# Patient Record
Sex: MALE | Race: BLACK OR AFRICAN AMERICAN | NOT HISPANIC OR LATINO | Employment: OTHER | URBAN - METROPOLITAN AREA
[De-identification: names, ages, dates, MRNs, and addresses within clinical notes are randomized per-mention and may not be internally consistent; named-entity substitution may affect disease eponyms.]

---

## 2017-01-27 DIAGNOSIS — I50.810 RIGHT HEART FAILURE WITH REDUCED RIGHT VENTRICULAR FUNCTION: Primary | ICD-10-CM

## 2017-01-30 ENCOUNTER — OFFICE VISIT (OUTPATIENT)
Dept: CARDIOLOGY | Facility: CLINIC | Age: 27
End: 2017-01-30
Payer: MEDICAID

## 2017-01-30 VITALS
DIASTOLIC BLOOD PRESSURE: 62 MMHG | SYSTOLIC BLOOD PRESSURE: 104 MMHG | HEART RATE: 82 BPM | HEIGHT: 65 IN | BODY MASS INDEX: 35.82 KG/M2 | OXYGEN SATURATION: 95 % | WEIGHT: 215 LBS

## 2017-01-30 DIAGNOSIS — I37.0 RESIDUAL PULMONARY OUTFLOW OBSTRUCTION: Primary | ICD-10-CM

## 2017-01-30 DIAGNOSIS — Z95.0 PACEMAKER: ICD-10-CM

## 2017-01-30 DIAGNOSIS — Z87.74 S/P SURGERY FOR COMPLEX CONGENITAL HEART DISEASE: ICD-10-CM

## 2017-01-30 DIAGNOSIS — I11.9 RIGHT VENTRICULAR HYPERTENSION: ICD-10-CM

## 2017-01-30 PROCEDURE — 99214 OFFICE O/P EST MOD 30 MIN: CPT | Mod: ,,, | Performed by: PEDIATRICS

## 2017-01-30 RX ORDER — LISINOPRIL 10 MG/1
TABLET ORAL
Status: ON HOLD | COMMUNITY
Start: 2016-12-06 | End: 2017-05-12 | Stop reason: HOSPADM

## 2017-01-30 RX ORDER — DIGOXIN 250 MCG
TABLET ORAL
Status: ON HOLD | COMMUNITY
Start: 2016-12-06 | End: 2017-05-12 | Stop reason: HOSPADM

## 2017-01-30 RX ORDER — LEVOTHYROXINE SODIUM 88 UG/1
88 TABLET ORAL
COMMUNITY
Start: 2016-12-06 | End: 2021-06-23 | Stop reason: SDUPTHER

## 2017-01-30 RX ORDER — CARVEDILOL 25 MG/1
TABLET ORAL
Status: ON HOLD | COMMUNITY
Start: 2016-12-06 | End: 2017-05-12 | Stop reason: HOSPADM

## 2017-01-30 NOTE — PROGRESS NOTES
"Subjective:          HPI     This patient is a 26 year old with Down Syndrome.  He originally was found to have TOF at [facility].  He underwent complete repair at 1 year of age at [facility]. He required  a bioprosthetic pulmonary valve (27 mm Free Style Medtronic Valve) for severe PI placed in 1999 at [facility].   He subsequently required a dual-chamber pacemaker for sinus node dysfunction in 2008 at [facility].  In 2010 he had a EPS for ventricular ectopy at [facility].  There was no inducable VT.  Cipriano has RV contractile dysfunction, and continues to have PVCc, couplents and triplets, but no sustained runs.  In the last year or so, Mom reports Cipriano has had increasing SOB walking up stairs.  No complaints of CP, slow or fast HRs. He sleeps well, and does not require extra pillows.  Minimal physical activity.   Cipriano also has, a history of hypothyroidism and is on Synthroid.  He also had an episode of atrial flutter (interatrial reentry tachycardia),  but at the time had an "elevated" T4 level. (? This may have been the cause of the rhythm disturbance.)   However, because of the tachycardia, he was started on warfarin by the EP staff at [facility].  Cipriano also suffers from obesity.     He currently is on Digoxin 0.25 mg qd.  Lisinoplil 10 mg qd.  Carvedilol 25 mg qd ( We should consider changing this to 12.5 mg q 12 hrs.)  He is on this for RV contractile dysfunction and some protection for ventricular ectopy.). Synthroid is at 88 micrograms qd (T4 has been ok on this dose).  Warfarin 5 mg qd  (I believe I discontinued this medication.)        Review of Systems   Constitution: Negative for chills, decreased appetite, diaphoresis, fever, weakness, malaise/fatigue and night sweats.   HENT: Negative for headaches, nosebleeds and stridor.    Cardiovascular: Positive for dyspnea on exertion and palpitations. Negative for chest pain, cyanosis, leg swelling " and syncope.   Respiratory: Positive for snoring. Negative for sleep disturbances due to breathing.    Gastrointestinal: Negative for abdominal pain, constipation, hematemesis, hemorrhoids and vomiting.   Genitourinary: Negative for frequency.   Neurological: Negative for dizziness, focal weakness, light-headedness, paresthesias and seizures.                 Physical Exam     A 26 yr old with Down Syndrome and in no distress.  Non cyanotic.  HEENT: No bruits. Normal eye movements. Membranes are moist and pink.  Neck is supple. No JVD.  Chest: Symmetric. Well healed mid-line scar. R-lateral scar. The BSs are distant. Air entry appears normal. Slight decreased in BSs at the bases.   No crackles or wheezing.   Heart:  Normal precordial activity. Diffuse RV impulse. S1 is normal. S2 appears to be single and is increased. A Gr 1-2/6 MARIELA over the base. Faint DDM at LLSB.                                                             No clicks, rubs or gallops.   Abd: Obese.  Liver edge at the RCM and down 3 FB and non-tender.  No spleen felt. No other masses.  Exts: Pulses are 2+ throughout.  No cyanosis or clubbing.  No edema.    ECHO:  (S/P) Repair of TOF. (S/P) Bioprosthetic Pulmonary Valve.  There are 4 cardiac chambers.  No evidence of an ASD or VSD. The RV is slightly dilated.   Measurements :  RV is 35 mm. LV is 46 mm. LA is 31 mm. IVS is 12 mm. PW is 12 mm. The pulmonary annulus is ~ 14 mm. The valve opening is ~ 8 mm.    The RPA is 11 mm and the LPA is 9 mm.  The Ao root is 31 mm and the Ao annulus is 25 mm. LV-EF is 56%. RV area of shortening is 33%, which is reduced.                                                              There is septal flattening, with paradoxical motion. No clots or vegetations seen. No pericardial effusion.    Doppler  Velocity Analysis: No MR or AI. Mod TR with a jet of 58 mmHg, in the setting of reduced contractile function. Moderate PI.                                                                                                          Peak Ao outflow pressure drop is 4 mmHg. Peak pulmonary outflow pressure drop is 41 mmHg (again reduced RV contractile function).  No retrograde flow in the branch PAs. Pulmonary venous return is to the LA.    ECG:  RBB, RVH based on QRS frontal axis. Atrial pacing.      Assessment:       1. Residual pulmonary outflow obstruction    2. Pacemaker    3. Right ventricular hypertension, and contractile dysfunction..     4. S/P surgery for complex congenital heart disease         Plan:        1. Continue with the current meds. (consider changing the carvedilol)  2. Patient needs pacemaker check in the next 2 weeks....    [This study showed No atrial flutter and frequent ventricular ectopy.  Six 3-beat run of VT (150 to 240 bpm).]   3. Will need to discuss patient with Surgery/Cardiology team at [facility].  Cipriano will require conduit revision (Suspect Jeanna Valve).                                                                                                                He may also need an EPS, although I suspect fixing the hemodynamics is most important. Also, probably a need to look further for evidence of A-flutter/fib.  4. Lab work needs to be checked.  5. Antibiotics for any invasive procedure.  6. RTC in 6 mos.   (I will call mom and inform her about the meeting with the [facility] team.)   Mom's # is .

## 2017-01-31 ENCOUNTER — TELEPHONE (OUTPATIENT)
Dept: CARDIOLOGY | Facility: CLINIC | Age: 27
End: 2017-01-31

## 2017-02-09 ENCOUNTER — OFFICE VISIT (OUTPATIENT)
Dept: CARDIOLOGY | Facility: CLINIC | Age: 27
End: 2017-02-09
Payer: MEDICAID

## 2017-02-09 VITALS
HEIGHT: 65 IN | SYSTOLIC BLOOD PRESSURE: 110 MMHG | WEIGHT: 217 LBS | HEART RATE: 87 BPM | TEMPERATURE: 98 F | BODY MASS INDEX: 36.15 KG/M2 | DIASTOLIC BLOOD PRESSURE: 68 MMHG

## 2017-02-09 DIAGNOSIS — Z95.0 PACEMAKER: Primary | ICD-10-CM

## 2017-02-09 PROCEDURE — 99211 OFF/OP EST MAY X REQ PHY/QHP: CPT | Mod: ,,, | Performed by: PEDIATRICS

## 2017-02-09 NOTE — PROGRESS NOTES
Pacemaker check:    Cipriano had a pacemaker check today.  It revealed ventricular ectopy, with couplets, six 3-beat runs  and one 4- beat run.   No higher ventricular runs.  None sustained.   (rates 150 to 248 bpm).    No obvious autrial flutter.  No atrial flutter or fib.      Will discuss with the cardiology team at the main campus.

## 2017-02-24 ENCOUNTER — TELEPHONE (OUTPATIENT)
Dept: PEDIATRIC CARDIOLOGY | Facility: CLINIC | Age: 27
End: 2017-02-24

## 2017-02-24 NOTE — TELEPHONE ENCOUNTER
Left voice mail to set up cath and offer clinic appt if they prefer.    Asked family to call back our office.

## 2017-03-02 ENCOUNTER — TELEPHONE (OUTPATIENT)
Dept: CARDIOLOGY | Facility: CLINIC | Age: 27
End: 2017-03-02

## 2017-03-06 ENCOUNTER — DOCUMENTATION ONLY (OUTPATIENT)
Dept: CARDIOLOGY | Facility: CLINIC | Age: 27
End: 2017-03-06

## 2017-03-07 ENCOUNTER — TELEPHONE (OUTPATIENT)
Dept: PEDIATRIC CARDIOLOGY | Facility: CLINIC | Age: 27
End: 2017-03-07

## 2017-03-07 NOTE — TELEPHONE ENCOUNTER
----- Message from Kimo Rehman Jr., MD sent at 2/23/2017  3:55 PM CST -----  Regarding: RE: kenzie toussaint  Thanks Yaniv, will get scheduled.    Yohana/Cierra/Guerita please go ahead and schedule for cath with congenital valve team under [facility] with RHC/LHC and TPVR (Mi).    OK to ask family if they would prefer to meet in clinic or in 3 prep    Adria    ----- Message -----     From: Augustine Dean MD     Sent: 2/23/2017  10:39 AM       To: Kimo Rehman Jr., MD  Subject: kenzie toussaint                                      Hi Adria,    I have spoken with mom.  She wanted the procedure done after Vick Chery. I am concerned about the RV contractile dysfunction, ventricular ectopy and residual PS/PI.  Thus, feel free to call her. I told her she would be contacted. Any problem just call me. Thanks for your help.     Yaniv  ----- Message -----     From: Kimo Rehman Jr., MD     Sent: 2/21/2017   2:20 PM       To: Augustine Dean MD, Bhargav Godfrey MD, #    Yaniv,    Would you like for me to go ahead and have the cath team contact Kenzie's family to set up a pre-cath eval and cath with percutaneous pulmonary valve implant?    Adria      ----- Message -----     From: Augustine Dean MD     Sent: 2/16/2017   1:23 PM       To: Kimo Rehman Jr., MD, #

## 2017-03-10 ENCOUNTER — TELEPHONE (OUTPATIENT)
Dept: PEDIATRIC CARDIOLOGY | Facility: CLINIC | Age: 27
End: 2017-03-10

## 2017-03-10 NOTE — TELEPHONE ENCOUNTER
----- Message from Bridger Villegas sent at 3/10/2017  8:28 AM CST -----  Contact: Mom [phone]  Mom is returning a missed call to Yohana.

## 2017-03-10 NOTE — TELEPHONE ENCOUNTER
Spoke to pt's mom, Luann, regarding scheduling cardiac cath procedure. Per Dr. Rehman, pt to be scheduled with RHC/LHC +/- TPVR with GET[facility]. Mom agreed to Thursday, March 30th at 11:00 AM. Pre-procedural instructions given and mom stated understanding. Address verified and letter placed in mail with instructions. Informed mom to call with any further questions or concerns. Mom stated understanding. Dr. Dean updated regarding scheduling.

## 2017-03-14 ENCOUNTER — TELEPHONE (OUTPATIENT)
Dept: PEDIATRIC CARDIOLOGY | Facility: CLINIC | Age: 27
End: 2017-03-14

## 2017-03-14 NOTE — TELEPHONE ENCOUNTER
----- Message from Bridger Villegas sent at 3/14/2017  3:42 PM CDT -----  Contact: Mom uLann [phone]  Mom states that Yohana sent her directions about the cardiac cath procedure via text and she somehow lost them. Mom is asking that those directions be resent to her.

## 2017-03-24 ENCOUNTER — TELEPHONE (OUTPATIENT)
Dept: PEDIATRIC CARDIOLOGY | Facility: CLINIC | Age: 27
End: 2017-03-24

## 2017-03-24 NOTE — TELEPHONE ENCOUNTER
----- Message from Isabel Ramos sent at 3/24/2017  8:11 AM CDT -----  Contact: mom [phone]  Mom would like to talk  to Cierra re: pre-op labs

## 2017-03-24 NOTE — TELEPHONE ENCOUNTER
Reviewed info and plan for cath day- informed mother we will draw labs that day.  Emotional support provided. Reviewed time and place for cath date again.

## 2017-03-28 ENCOUNTER — TELEPHONE (OUTPATIENT)
Dept: PEDIATRIC CARDIOLOGY | Facility: CLINIC | Age: 27
End: 2017-03-28

## 2017-03-29 ENCOUNTER — TELEPHONE (OUTPATIENT)
Dept: PEDIATRIC CARDIOLOGY | Facility: CLINIC | Age: 27
End: 2017-03-29

## 2017-03-29 NOTE — TELEPHONE ENCOUNTER
Spoke to pt's mom re: rescheduling cardiac cath procedure d/t surgeon avaliability. Offered mom a date next week 4/4. Mom agreed with new date. Pre-procedural instructions given and mom stated understanding. New letter sent via email per mom's request. Mom instructed to call if any further questions or concerns. Mom stated understanding.

## 2017-03-29 NOTE — TELEPHONE ENCOUNTER
----- Message from Bridger Villegas sent at 3/29/2017 11:14 AM CDT -----  Contact: Alisha Kong [phone]  Mom is requesting a return phone call from either Radha or Cierra regarding patient's upcoming procedure. I did inform the patient of the scheduled procedure date. Please advise.

## 2017-03-29 NOTE — TELEPHONE ENCOUNTER
Left detailed message about r/s of cath procedure to next Tuesday. Requested return phone call for confirmation.

## 2017-04-04 ENCOUNTER — HOSPITAL ENCOUNTER (OUTPATIENT)
Facility: HOSPITAL | Age: 27
Discharge: HOME | End: 2017-04-05
Attending: PEDIATRICS | Admitting: PEDIATRICS
Payer: MEDICAID

## 2017-04-04 ENCOUNTER — ANESTHESIA (OUTPATIENT)
Dept: MEDSURG UNIT | Facility: HOSPITAL | Age: 27
End: 2017-04-04
Payer: MEDICAID

## 2017-04-04 ENCOUNTER — ANESTHESIA EVENT (OUTPATIENT)
Dept: MEDSURG UNIT | Facility: HOSPITAL | Age: 27
End: 2017-04-04
Payer: MEDICAID

## 2017-04-04 ENCOUNTER — SURGERY (OUTPATIENT)
Age: 27
End: 2017-04-04

## 2017-04-04 DIAGNOSIS — I37.0 RESIDUAL PULMONARY OUTFLOW OBSTRUCTION: Primary | ICD-10-CM

## 2017-04-04 DIAGNOSIS — Z98.890 OTHER SPECIFIED POSTPROCEDURAL STATES: ICD-10-CM

## 2017-04-04 DIAGNOSIS — Z87.74 PERSONAL HISTORY OF CORRECTED CONGENITAL MALFORMATIONS OF HEART AND CIRCULATORY SYSTEM: ICD-10-CM

## 2017-04-04 DIAGNOSIS — Q21.3 TOF (TETRALOGY OF FALLOT): ICD-10-CM

## 2017-04-04 DIAGNOSIS — I11.9 RIGHT VENTRICULAR HYPERTENSION: ICD-10-CM

## 2017-04-04 DIAGNOSIS — Z95.0 PACEMAKER: ICD-10-CM

## 2017-04-04 DIAGNOSIS — I37.2: ICD-10-CM

## 2017-04-04 DIAGNOSIS — Z87.74 S/P SURGERY FOR COMPLEX CONGENITAL HEART DISEASE: ICD-10-CM

## 2017-04-04 LAB
ABO + RH BLD: NORMAL
ANION GAP SERPL CALC-SCNC: 6 MMOL/L
BASOPHILS # BLD AUTO: 0.04 K/UL
BASOPHILS NFR BLD: 0.8 %
BLD GP AB SCN CELLS X3 SERPL QL: NORMAL
BUN SERPL-MCNC: 26 MG/DL
CALCIUM SERPL-MCNC: 9 MG/DL
CHLORIDE SERPL-SCNC: 106 MMOL/L
CO2 SERPL-SCNC: 25 MMOL/L
CREAT SERPL-MCNC: 1.3 MG/DL
DIFFERENTIAL METHOD: ABNORMAL
EOSINOPHIL # BLD AUTO: 0.1 K/UL
EOSINOPHIL NFR BLD: 1.6 %
ERYTHROCYTE [DISTWIDTH] IN BLOOD BY AUTOMATED COUNT: 14.3 %
EST. GFR  (AFRICAN AMERICAN): >60 ML/MIN/1.73 M^2
EST. GFR  (NON AFRICAN AMERICAN): >60 ML/MIN/1.73 M^2
GLUCOSE SERPL-MCNC: 91 MG/DL
HCT VFR BLD AUTO: 41.3 %
HGB BLD-MCNC: 13.8 G/DL
LYMPHOCYTES # BLD AUTO: 1.4 K/UL
LYMPHOCYTES NFR BLD: 29.1 %
MCH RBC QN AUTO: 30.3 PG
MCHC RBC AUTO-ENTMCNC: 33.4 %
MCV RBC AUTO: 91 FL
MONOCYTES # BLD AUTO: 0.4 K/UL
MONOCYTES NFR BLD: 7.4 %
NEUTROPHILS # BLD AUTO: 3 K/UL
NEUTROPHILS NFR BLD: 60.9 %
PLATELET # BLD AUTO: 179 K/UL
PMV BLD AUTO: 9.8 FL
POTASSIUM SERPL-SCNC: 4.3 MMOL/L
RBC # BLD AUTO: 4.55 M/UL
SODIUM SERPL-SCNC: 137 MMOL/L
WBC # BLD AUTO: 4.88 K/UL

## 2017-04-04 PROCEDURE — 93320 DOPPLER ECHO COMPLETE: CPT | Performed by: PEDIATRICS

## 2017-04-04 PROCEDURE — 80048 BASIC METABOLIC PNL TOTAL CA: CPT

## 2017-04-04 PROCEDURE — 25000003 PHARM REV CODE 250: Performed by: PEDIATRICS

## 2017-04-04 PROCEDURE — 27000221 HC OXYGEN, UP TO 24 HOURS

## 2017-04-04 PROCEDURE — 86900 BLOOD TYPING SEROLOGIC ABO: CPT

## 2017-04-04 PROCEDURE — C1894 INTRO/SHEATH, NON-LASER: HCPCS

## 2017-04-04 PROCEDURE — 93563 NJX CGEN CAR CTH SLCTV C ANG: CPT | Mod: ,,, | Performed by: PEDIATRICS

## 2017-04-04 PROCEDURE — 93567 NJX CAR CTH SPRVLV AORTGRPHY: CPT | Mod: ,,, | Performed by: PEDIATRICS

## 2017-04-04 PROCEDURE — 25000003 PHARM REV CODE 250: Performed by: NURSE ANESTHETIST, CERTIFIED REGISTERED

## 2017-04-04 PROCEDURE — 63600175 PHARM REV CODE 636 W HCPCS: Performed by: NURSE ANESTHETIST, CERTIFIED REGISTERED

## 2017-04-04 PROCEDURE — 25000003 PHARM REV CODE 250: Performed by: ANESTHESIOLOGY

## 2017-04-04 PROCEDURE — 63600175 PHARM REV CODE 636 W HCPCS: Performed by: ANESTHESIOLOGY

## 2017-04-04 PROCEDURE — D9220A PRA ANESTHESIA: Mod: ANES,,, | Performed by: ANESTHESIOLOGY

## 2017-04-04 PROCEDURE — 75820 VEIN X-RAY ARM/LEG: CPT | Mod: 26,59,, | Performed by: PEDIATRICS

## 2017-04-04 PROCEDURE — 25000003 PHARM REV CODE 250

## 2017-04-04 PROCEDURE — 93531 CATH LAB PROCEDURE: CPT | Mod: 26,,, | Performed by: PEDIATRICS

## 2017-04-04 PROCEDURE — 36011 PLACE CATHETER IN VEIN: CPT | Mod: ,,, | Performed by: PEDIATRICS

## 2017-04-04 PROCEDURE — 37000009 HC ANESTHESIA EA ADD 15 MINS: Performed by: PEDIATRICS

## 2017-04-04 PROCEDURE — 86850 RBC ANTIBODY SCREEN: CPT

## 2017-04-04 PROCEDURE — D9220A PRA ANESTHESIA: Mod: CRNA,,, | Performed by: NURSE ANESTHETIST, CERTIFIED REGISTERED

## 2017-04-04 PROCEDURE — 37000008 HC ANESTHESIA 1ST 15 MINUTES: Performed by: PEDIATRICS

## 2017-04-04 PROCEDURE — 93568 NJX CAR CTH NSLC P-ART ANGRP: CPT | Mod: ,,, | Performed by: PEDIATRICS

## 2017-04-04 PROCEDURE — 86920 COMPATIBILITY TEST SPIN: CPT

## 2017-04-04 PROCEDURE — 93325 DOPPLER ECHO COLOR FLOW MAPG: CPT | Performed by: PEDIATRICS

## 2017-04-04 PROCEDURE — 93303 ECHO TRANSTHORACIC: CPT | Performed by: PEDIATRICS

## 2017-04-04 PROCEDURE — 85025 COMPLETE CBC W/AUTO DIFF WBC: CPT

## 2017-04-04 RX ORDER — DEXMEDETOMIDINE HYDROCHLORIDE 4 UG/ML
INJECTION, SOLUTION INTRAVENOUS
Status: COMPLETED
Start: 2017-04-04 | End: 2017-04-04

## 2017-04-04 RX ORDER — MIDAZOLAM HYDROCHLORIDE 1 MG/ML
INJECTION, SOLUTION INTRAMUSCULAR; INTRAVENOUS
Status: DISCONTINUED | OUTPATIENT
Start: 2017-04-04 | End: 2017-04-04

## 2017-04-04 RX ORDER — ACETAMINOPHEN 325 MG/1
650 TABLET ORAL EVERY 4 HOURS PRN
Status: DISCONTINUED | OUTPATIENT
Start: 2017-04-04 | End: 2017-04-05 | Stop reason: HOSPADM

## 2017-04-04 RX ORDER — LORAZEPAM 2 MG/ML
0.25 INJECTION INTRAMUSCULAR ONCE AS NEEDED
Status: COMPLETED | OUTPATIENT
Start: 2017-04-04 | End: 2017-04-04

## 2017-04-04 RX ORDER — PHENYLEPHRINE HYDROCHLORIDE 10 MG/ML
INJECTION INTRAVENOUS
Status: DISCONTINUED | OUTPATIENT
Start: 2017-04-04 | End: 2017-04-04

## 2017-04-04 RX ORDER — HYDROMORPHONE HYDROCHLORIDE 1 MG/ML
0.2 INJECTION, SOLUTION INTRAMUSCULAR; INTRAVENOUS; SUBCUTANEOUS EVERY 5 MIN PRN
Status: DISCONTINUED | OUTPATIENT
Start: 2017-04-04 | End: 2017-04-04 | Stop reason: HOSPADM

## 2017-04-04 RX ORDER — ONDANSETRON 8 MG/1
8 TABLET, ORALLY DISINTEGRATING ORAL EVERY 8 HOURS PRN
Status: DISCONTINUED | OUTPATIENT
Start: 2017-04-04 | End: 2017-04-05 | Stop reason: HOSPADM

## 2017-04-04 RX ORDER — FENTANYL CITRATE 50 UG/ML
25 INJECTION, SOLUTION INTRAMUSCULAR; INTRAVENOUS EVERY 5 MIN PRN
Status: DISCONTINUED | OUTPATIENT
Start: 2017-04-04 | End: 2017-04-04 | Stop reason: HOSPADM

## 2017-04-04 RX ORDER — LIDOCAINE HCL/PF 100 MG/5ML
SYRINGE (ML) INTRAVENOUS
Status: DISCONTINUED | OUTPATIENT
Start: 2017-04-04 | End: 2017-04-04

## 2017-04-04 RX ORDER — OXYCODONE HYDROCHLORIDE 5 MG/1
10 TABLET ORAL EVERY 4 HOURS PRN
Status: DISCONTINUED | OUTPATIENT
Start: 2017-04-04 | End: 2017-04-05 | Stop reason: HOSPADM

## 2017-04-04 RX ORDER — HEPARIN SODIUM 1000 [USP'U]/ML
INJECTION, SOLUTION INTRAVENOUS; SUBCUTANEOUS
Status: DISCONTINUED | OUTPATIENT
Start: 2017-04-04 | End: 2017-04-04

## 2017-04-04 RX ORDER — ONDANSETRON 2 MG/ML
4 INJECTION INTRAMUSCULAR; INTRAVENOUS DAILY PRN
Status: DISCONTINUED | OUTPATIENT
Start: 2017-04-04 | End: 2017-04-04 | Stop reason: HOSPADM

## 2017-04-04 RX ORDER — PROPOFOL 10 MG/ML
VIAL (ML) INTRAVENOUS
Status: DISCONTINUED | OUTPATIENT
Start: 2017-04-04 | End: 2017-04-04

## 2017-04-04 RX ORDER — HYDROCODONE BITARTRATE AND ACETAMINOPHEN 500; 5 MG/1; MG/1
TABLET ORAL
Status: DISCONTINUED | OUTPATIENT
Start: 2017-04-04 | End: 2017-04-04

## 2017-04-04 RX ORDER — ROCURONIUM BROMIDE 10 MG/ML
INJECTION, SOLUTION INTRAVENOUS
Status: DISCONTINUED | OUTPATIENT
Start: 2017-04-04 | End: 2017-04-04

## 2017-04-04 RX ORDER — CEFAZOLIN SODIUM 1 G/3ML
INJECTION, POWDER, FOR SOLUTION INTRAMUSCULAR; INTRAVENOUS
Status: DISCONTINUED | OUTPATIENT
Start: 2017-04-04 | End: 2017-04-04

## 2017-04-04 RX ORDER — DEXMEDETOMIDINE HYDROCHLORIDE 4 UG/ML
1 INJECTION, SOLUTION INTRAVENOUS CONTINUOUS
Status: ACTIVE | OUTPATIENT
Start: 2017-04-04 | End: 2017-04-04

## 2017-04-04 RX ORDER — SODIUM CHLORIDE 0.9 % (FLUSH) 0.9 %
3 SYRINGE (ML) INJECTION
Status: DISCONTINUED | OUTPATIENT
Start: 2017-04-04 | End: 2017-04-05 | Stop reason: HOSPADM

## 2017-04-04 RX ORDER — FENTANYL CITRATE 50 UG/ML
INJECTION, SOLUTION INTRAMUSCULAR; INTRAVENOUS
Status: DISCONTINUED | OUTPATIENT
Start: 2017-04-04 | End: 2017-04-04

## 2017-04-04 RX ORDER — DEXMEDETOMIDINE HYDROCHLORIDE 100 UG/ML
INJECTION, SOLUTION INTRAVENOUS
Status: DISCONTINUED | OUTPATIENT
Start: 2017-04-04 | End: 2017-04-04

## 2017-04-04 RX ORDER — SODIUM CHLORIDE 0.9 % (FLUSH) 0.9 %
3 SYRINGE (ML) INJECTION EVERY 8 HOURS
Status: DISCONTINUED | OUTPATIENT
Start: 2017-04-04 | End: 2017-04-04 | Stop reason: HOSPADM

## 2017-04-04 RX ORDER — SUCCINYLCHOLINE CHLORIDE 20 MG/ML
INJECTION INTRAMUSCULAR; INTRAVENOUS
Status: DISCONTINUED | OUTPATIENT
Start: 2017-04-04 | End: 2017-04-04

## 2017-04-04 RX ORDER — MORPHINE SULFATE 2 MG/ML
2 INJECTION, SOLUTION INTRAMUSCULAR; INTRAVENOUS EVERY 4 HOURS PRN
Status: DISCONTINUED | OUTPATIENT
Start: 2017-04-04 | End: 2017-04-05 | Stop reason: HOSPADM

## 2017-04-04 RX ADMIN — HEPARIN SODIUM 5000 UNITS: 1000 INJECTION INTRAVENOUS; SUBCUTANEOUS at 12:04

## 2017-04-04 RX ADMIN — FENTANYL CITRATE 50 MCG: 50 INJECTION, SOLUTION INTRAMUSCULAR; INTRAVENOUS at 11:04

## 2017-04-04 RX ADMIN — SODIUM CHLORIDE, PRESERVATIVE FREE 3 ML: 5 INJECTION INTRAVENOUS at 10:04

## 2017-04-04 RX ADMIN — MIDAZOLAM HYDROCHLORIDE 1 MG: 1 INJECTION INTRAMUSCULAR; INTRAVENOUS at 01:04

## 2017-04-04 RX ADMIN — PROPOFOL 150 MG: 10 INJECTION, EMULSION INTRAVENOUS at 11:04

## 2017-04-04 RX ADMIN — PROPOFOL 50 MG: 10 INJECTION, EMULSION INTRAVENOUS at 12:04

## 2017-04-04 RX ADMIN — MIDAZOLAM HYDROCHLORIDE 2 MG: 1 INJECTION INTRAMUSCULAR; INTRAVENOUS at 10:04

## 2017-04-04 RX ADMIN — PHENYLEPHRINE HYDROCHLORIDE 200 MCG: 10 INJECTION INTRAVENOUS at 11:04

## 2017-04-04 RX ADMIN — ROCURONIUM BROMIDE 30 MG: 10 INJECTION, SOLUTION INTRAVENOUS at 12:04

## 2017-04-04 RX ADMIN — DEXMEDETOMIDINE HYDROCHLORIDE 1 MCG/KG/HR: 4 INJECTION, SOLUTION INTRAVENOUS at 03:04

## 2017-04-04 RX ADMIN — CEFAZOLIN 2 G: 1 INJECTION, POWDER, FOR SOLUTION INTRAMUSCULAR; INTRAVENOUS; PARENTERAL at 11:04

## 2017-04-04 RX ADMIN — ONDANSETRON 8 MG: 8 TABLET, ORALLY DISINTEGRATING ORAL at 11:04

## 2017-04-04 RX ADMIN — EPHEDRINE SULFATE 10 MG: 50 INJECTION, SOLUTION INTRAMUSCULAR; INTRAVENOUS; SUBCUTANEOUS at 11:04

## 2017-04-04 RX ADMIN — FENTANYL CITRATE 100 MCG: 50 INJECTION, SOLUTION INTRAMUSCULAR; INTRAVENOUS at 01:04

## 2017-04-04 RX ADMIN — EPHEDRINE SULFATE 5 MG: 50 INJECTION, SOLUTION INTRAMUSCULAR; INTRAVENOUS; SUBCUTANEOUS at 12:04

## 2017-04-04 RX ADMIN — PHENYLEPHRINE HYDROCHLORIDE 100 MCG: 10 INJECTION INTRAVENOUS at 11:04

## 2017-04-04 RX ADMIN — LORAZEPAM 0.25 MG: 2 INJECTION, SOLUTION INTRAMUSCULAR; INTRAVENOUS at 02:04

## 2017-04-04 RX ADMIN — ROCURONIUM BROMIDE 10 MG: 10 INJECTION, SOLUTION INTRAVENOUS at 12:04

## 2017-04-04 RX ADMIN — SUCCINYLCHOLINE CHLORIDE 120 MG: 20 INJECTION, SOLUTION INTRAMUSCULAR; INTRAVENOUS at 11:04

## 2017-04-04 RX ADMIN — ROCURONIUM BROMIDE 45 MG: 10 INJECTION, SOLUTION INTRAVENOUS at 11:04

## 2017-04-04 RX ADMIN — LIDOCAINE HYDROCHLORIDE 100 MG: 20 INJECTION, SOLUTION INTRAVENOUS at 11:04

## 2017-04-04 RX ADMIN — DEXMEDETOMIDINE HYDROCHLORIDE 100 MCG: 100 INJECTION, SOLUTION, CONCENTRATE INTRAVENOUS at 01:04

## 2017-04-04 RX ADMIN — ROCURONIUM BROMIDE 5 MG: 10 INJECTION, SOLUTION INTRAVENOUS at 11:04

## 2017-04-04 NOTE — H&P
[facility]  History & Physical    Subjective:      Chief Complaint/Reason for Admission: Pulmonary insufficiency    Cipriano Thompson is a 26 y.o. male with repaired tetralogy of Fallot and pulmonary insufficiency    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     Past Surgical History:   Procedure Laterality Date    BUNIONECTOMY      CARDIAC SURGERY      open heart, ppm     TONSILLECTOMY       History reviewed. No pertinent family history.  Social History   Substance Use Topics    Smoking status: Never Smoker    Smokeless tobacco: None    Alcohol use No       PTA Medications   Medication Sig    carvedilol (COREG) 25 MG tablet     digoxin (LANOXIN) 250 mcg tablet     levothyroxine (SYNTHROID) 88 MCG tablet     lisinopril 10 MG tablet      Review of patient's allergies indicates:   Allergen Reactions    Latex, natural rubber     Sulfa (sulfonamide antibiotics)         Review of Systems   Constitutional: Negative.    HENT: Negative.    Eyes: Negative.    Cardiovascular: Negative.    Gastrointestinal: Negative.    Skin: Negative.    Neurological: Negative.        Objective:      Vital Signs (Most Recent)  Temp: 98.6 °F (37 °C) (04/04/17 0925)  Pulse: 75 (04/04/17 0925)  Resp: 18 (04/04/17 0925)  BP: (!) 154/70 (04/04/17 0926)  SpO2: 96 % (04/04/17 0925)    Vital Signs Range (Last 24H):  Temp:  [98.6 °F (37 °C)]   Pulse:  [75]   Resp:  [18]   BP: (154-160)/(70)   SpO2:  [96 %]     Physical Exam   Constitutional: He is oriented to person, place, and time. He appears well-developed.   HENT:   Head: Normocephalic.   Eyes: Pupils are equal, round, and reactive to light.   Neck: Normal range of motion.   Cardiovascular: Normal rate and regular rhythm.    Murmur heard.  Pulmonary/Chest: Effort normal and breath sounds normal.   Abdominal: Soft.   Musculoskeletal: Normal  range of motion.   Neurological: He is alert and oriented to person, place, and time.       Data Review:  Clinic notes reviewed     Assessment:      Active Hospital Problems    Diagnosis  POA    Pulmonary valve stenosis with insufficiency [I37.2]  Yes      Resolved Hospital Problems    Diagnosis Date Resolved POA   No resolved problems to display.   Cipriano Thomposn is a 26 y.o. male with repaired tetralogy of Fallot and pulmonary insufficiency    Plan:    To cath lab for right/left heart cath and possible percutaneous pulmonary valve implantation.

## 2017-04-04 NOTE — PLAN OF CARE
Problem: Cardiac Catheterization (Diagnostic/Interventional) (Adult)  Goal: Signs and Symptoms of Listed Potential Problems Will be Absent, Minimized or Managed (Cardiac Catheterization)  Signs and symptoms of listed potential problems will be absent, minimized or managed by discharge/transition of care (reference Cardiac Catheterization (Diagnostic/Interventional) (Adult) CPG).  Outcome: Ongoing (interventions implemented as appropriate)  Admit assessment done. Labs sent. IV placed x 2. Plan of care and safety prec initiated. Will continue to monitor.

## 2017-04-04 NOTE — DISCHARGE SUMMARY
[facility]  Discharge Note  Short Stay    Admit Date: 4/4/2017    Discharge Date and Time: 4/4/2017  1700    Attending Physician: Kimo Rehman Jr., MD     Discharge Provider: Amie Burgos    Diagnoses:  Active Hospital Problems    Diagnosis  POA    *TOF (tetralogy of Fallot) [Q21.3]  Not Applicable    Pulmonary valve stenosis with insufficiency [I37.2]  Yes      Resolved Hospital Problems    Diagnosis Date Resolved POA   No resolved problems to display.       Discharged Condition: good    Hospital Course: Patient was admitted for an outpatient procedure and tolerated the procedure well with no complications.    Final Diagnoses: Same as principal problem.    Disposition: Home or Self Care    Follow up/Patient Instructions:  Follow-up in the cardiology clinic will be scheduled after surgery.  Medications:  Reconciled Home Medications:   Current Discharge Medication List      CONTINUE these medications which have NOT CHANGED    Details   carvedilol (COREG) 25 MG tablet       digoxin (LANOXIN) 250 mcg tablet       levothyroxine (SYNTHROID) 88 MCG tablet       lisinopril 10 MG tablet              Discharge Procedure Orders  Diet general     Activity as tolerated     No dressing needed     Call MD for:  increased confusion or weakness     Call MD for:  persistent dizziness, light-headedness, or visual disturbances     Call MD for:  worsening rash     Call MD for:  difficulty breathing or increased cough     Call MD for:  severe persistent headache     Call MD for:  redness, tenderness, or signs of infection (pain, swelling, redness, odor or green/yellow discharge around incision site)     Call MD for:  severe uncontrolled pain     Call MD for:  persistent nausea and vomiting or diarrhea     Call MD for:  temperature >100.4           Discharge Procedure Orders (must include Diet, Follow-up, Activity):    Discharge Procedure Orders (must include Diet, Follow-up, Activity)  Diet general     Activity  as tolerated     No dressing needed     Call MD for:  increased confusion or weakness     Call MD for:  persistent dizziness, light-headedness, or visual disturbances     Call MD for:  worsening rash     Call MD for:  difficulty breathing or increased cough     Call MD for:  severe persistent headache     Call MD for:  redness, tenderness, or signs of infection (pain, swelling, redness, odor or green/yellow discharge around incision site)     Call MD for:  severe uncontrolled pain     Call MD for:  persistent nausea and vomiting or diarrhea     Call MD for:  temperature >100.4

## 2017-04-04 NOTE — PROGRESS NOTES
Patient report received from MILTON Mckeon. Patient supine, on bed rest until 1800. Patient sedated on precedex infusing infusing, face tent in place. Mother at the bedside. R groin site clean/dry/intact. No s/s of distress. Will continue to monitor.

## 2017-04-04 NOTE — OP NOTE
MD:  Amie Burgos III, MD  Assistant: Kimo Rehman MD  Procedure: 1) Right heart prograde catheterization (congenital anomalies)                     2) Left heart retrograde catheterization (congenital anomalies)  Indication for procedure: Repaired tetralogy of Fallot s/p bioprosthetic pulmonary valve implantation  Angios:  1) MPA  2) AO  3) Left coronary artery  4) Right coronary artery  5) Innominate vein  Intervention: None performed  Procedure time: 1hr 28 minutes  Fluoroscopy time: 15 minutes  Contrast total: 150cc  Access: 7F  RFV, 4F RFA  Estimated blood loss: 3cc  Replaced: None  Anesthesia: GETA  Anticoagulation: Heparin 5000 units IV X1  Antibiotics: Ancef 2gm IV X1  Complications: None evident  Findings: 1) Down syndrome. Repaired tetralogy of Fallot with subsequent 27mm Freestyle bioprosthetic pulmonary valve implantation                  2) Moderate subpulmonary stenosis (peak gradient 24mmHg). Moderate pulmonary insufficiency                  3) Normal cardiac output and vascular resistance calculations.                  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 Normal origin of left coronary                  6) Left aortic arch. Normal head and neck branching  Disposition: To PACU for recovery

## 2017-04-04 NOTE — ANESTHESIA PREPROCEDURE EVALUATION
04/04/2017  Cipriano Thompson is a 26 y.o., male.    [facility] Anesthesia Evaluation    I have reviewed the Patient Summary Reports.    I have reviewed the Nursing Notes.   I have reviewed the Medications.     Review of Systems  Anesthesia Hx:  History of emergence delirium History of prior surgery of interest to airway management or planning: Denies Family Hx of Anesthesia complications.    Social:  Non-Smoker, No Alcohol Use    Hematology/Oncology:  Hematology Normal   Oncology Normal     EENT/Dental:EENT/Dental Normal   Cardiovascular:   Pacemaker Dysrhythmias CHF TOF sp repair, pulmonary insufficiency, cardiac notes and studies reviewed.    ECHO: (S/P) Repair of TOF. (S/P) Bioprosthetic Pulmonary Valve. There are 4 cardiac chambers. No evidence of an ASD or VSD. The RV is slightly dilated.   Measurements : RV is 35 mm. LV is 46 mm. LA is 31 mm. IVS is 12 mm. PW is 12 mm. The pulmonary annulus is ~ 14 mm. The valve opening is ~ 8 mm.   The RPA is 11 mm and the LPA is 9 mm. The Ao root is 31 mm and the Ao annulus is 25 mm. LV-EF is 56%. RV area of shortening is 33%, which is reduced. There is septal flattening, with paradoxical motion. No clots or vegetations seen. No pericardial effusion.     Doppler Velocity Analysis: No MR or AI. Mod TR with a jet of 58 mmHg, in the setting of reduced contractile function. Moderate PI. Peak Ao outflow pressure drop is 4 mmHg. Peak pulmonary outflow pressure drop is 41 mmHg (again reduced RV contractile function).  No retrograde flow in the branch PAs. Pulmonary venous return is to the LA.     ECG: RBB, RVH based on QRS frontal axis. Atrial pacing.   Pulmonary:  Pulmonary Normal    Renal/:  Renal/ Normal     Hepatic/GI:  Hepatic/GI Normal    Musculoskeletal:   Denies neck pain on flexion, extension, or lateral rotation, denies neck stiffness   OB/GYN/PEDS:  Down syndrome    Neurological:  Neurology Normal    Endocrine:   Hypothyroidism Morbid obesity       Physical Exam  General:  Well nourished, Morbid Obesity    Airway/Jaw/Neck:  Airway Findings: Mouth Opening: Small, but > 3cm Tongue: Large  General Airway Assessment: Adult  Down facies  Mallampati: III  TM Distance: 4 - 6 cm  Jaw/Neck Findings:  Neck ROM: Extension Decreased, Mild      Dental:  Dental Findings: In tact   Chest/Lungs:  Chest/Lungs Findings: Clear to auscultation, Normal Respiratory Rate     Heart/Vascular:  Heart Findings: Rate: Normal  Rhythm: Regular Rhythm        Mental Status:  Mental Status Findings:  Cooperative, Alert and Oriented         Anesthesia Plan  Type of Anesthesia, risks & benefits discussed:  Anesthesia Type:  general  Patient's Preference:   Intra-op Monitoring Plan:   Intra-op Monitoring Plan Comments:   Post Op Pain Control Plan:   Post Op Pain Control Plan Comments:   Induction:   IV  Beta Blocker:  Patient is on a Beta-Blocker and has received one dose within the past 24 hours (No further documentation required).       Informed Consent: Patient representative understands risks and agrees with Anesthesia plan.  Questions answered. Anesthesia consent signed with patient representative.  ASA Score: 3     Day of Surgery Review of History & Physical:    H&P update referred to the surgeon.         Ready For Surgery From Anesthesia Perspective.

## 2017-04-04 NOTE — IP AVS SNAPSHOT
[facility]  [address]  [facility] [address]  Phone: [phone]           Patient Discharge Instructions   Our goal is to set you up for success. This packet includes information on your condition, medications, and your home care.  It will help you care for yourself to prevent having to return to the hospital.     Please ask your nurse if you have any questions.      There are many details to remember when preparing to leave the hospital. Here is what you will need to do:    1. Take your medicine. If you are prescribed medications, review your Medication List on the following pages. You may have new medications to  at the pharmacy and others that you'll need to stop taking. Review the instructions for how and when to take your medications. Talk with your doctor or nurses if you are unsure of what to do.     2. Go to your follow-up appointments. Specific follow-up information is listed in the following pages. Your may be contacted by a nurse or clinical provider about future appointments. Be sure we have all of the phone numbers to reach you. Please contact your provider's office if you are unable to make an appointment.     3. Watch for warning signs. Your doctor or nurse will give you detailed warning signs to watch for and when to call for assistance. These instructions may also include educational information about your condition. If you experience any of warning signs to your health, call your doctor.           [facility]  Unless otherwise directed by your provider, please   contact [facility], our nurse care line   that is available for 24/7 assistance.     [phone] (toll-free)     Registered nurses in the [facility]   provide: appointment scheduling, clinical advisement, health education, and other advisory services.                  ** Verify the list of medication(s) below is accurate and up to date. Carry this with you in case of  emergency. If your medications have changed, please notify your healthcare provider.             Medication List      CONTINUE taking these medications        Additional Info    Begin Date AM Noon PM Bedtime    carvedilol 25 MG tablet   Commonly known as:  COREG   Refills:  0                                digoxin 250 mcg tablet   Commonly known as:  LANOXIN   Refills:  0                                levothyroxine 88 MCG tablet   Commonly known as:  SYNTHROID   Refills:  0                                lisinopril 10 MG tablet   Refills:  0                                           Please bring to all follow up appointments:    1. A copy of your discharge instructions.  2. All medicines you are currently taking in their original bottles.  3. Identification and insurance card.    Please arrive 15 minutes ahead of scheduled appointment time.    Please call 24 hours in advance if you must reschedule your appointment and/or time.          Discharge Instructions     Future Orders    Activity as tolerated     Activity as tolerated     Call MD for:  difficulty breathing or increased cough     Call MD for:  difficulty breathing or increased cough     Call MD for:  increased confusion or weakness     Call MD for:  increased confusion or weakness     Call MD for:  persistent dizziness, light-headedness, or visual disturbances     Call MD for:  persistent dizziness, light-headedness, or visual disturbances     Call MD for:  persistent nausea and vomiting or diarrhea     Call MD for:  persistent nausea and vomiting or diarrhea     Call MD for:  redness, tenderness, or signs of infection (pain, swelling, redness, odor or green/yellow discharge around incision site)     Call MD for:  redness, tenderness, or signs of infection (pain, swelling, redness, odor or green/yellow discharge around incision site)     Call MD for:  severe persistent headache     Call MD for:  severe persistent headache     Call MD for:  severe  "uncontrolled pain     Call MD for:  severe uncontrolled pain     Call MD for:  temperature >100.4     Call MD for:  temperature >100.4     Call MD for:  worsening rash     Call MD for:  worsening rash     Diet general     Questions:    Total calories:      Fat restriction, if any:      Protein restriction, if any:      Na restriction, if any:      Fluid restriction:      Additional restrictions:      Diet general     Questions:    Total calories:      Fat restriction, if any:      Protein restriction, if any:      Na restriction, if any:      Fluid restriction:      Additional restrictions:      No dressing needed     No dressing needed         Primary Diagnosis     Your primary diagnosis was:  Tetralogy Of Fallot      Admission Information     Date & Time Provider Department [facility]    4/4/2017  8:57 AM Kimo Rehman Jr., MD [facility] [id number]      Care Providers     Provider Role Specialty Primary office phone    Kimo Rehman Jr., MD Attending Provider Pediatric Cardiology [phone]    Kimo Rehman Jr., MD Surgeon  Pediatric Cardiology [phone]      Your Vitals Were     BP Pulse Temp Resp Height Weight    106/52 (BP Location: Right arm, Patient Position: Lying, BP Method: Automatic) 75 98.1 °F (36.7 °C) (Oral) 16 5' 5" (1.651 m) 99.8 kg (220 lb)    SpO2 BMI             94% 36.61 kg/m2         Recent Lab Values     No lab values to display.      Pending Labs     Order Current Status    Prepare RBC 1 Unit In process    Prepare RBC Preliminary result      Allergies as of 4/5/2017        Reactions    Latex, Natural Rubber     Sulfa (Sulfonamide Antibiotics)       Advance Directives     An advance directive is a document which, in the event you are no longer able to make decisions for yourself, tells your healthcare team what kind of treatment you do or do not want to receive, or who you would like to make those decisions for you.  If you do not currently have an advance directive, " [facility] encourages you to create one.  For more information call:  [phone]), [phone] ([phone]),  or log on to [email or web address].        Language Assistance Services     ATTENTION: Language assistance services are available, free of charge. Please call [phone].      ATENCIÓN: Si habla español, tiene a roberts disposición servicios gratuitos de asistencia lingüística. Llame al [phone].     [facility] Ý: N?u b?n nói Ti?ng Vi?t, có các d?ch v? h? tr? ngôn ng? mi?n phí dành cho b?n. G?i s? [phone].        [facility] Sign-Up     Activating your [facility] account is as easy as 1-2-3!     1) Visit [email or web address], select Sign Up Now, enter this activation code and your date of birth, then select Next.  [email or web address]  Expires: 5/20/2017  9:27 AM      2) Create a username and password to use when you visit [facility] in the future and select a security question in case you lose your password and select Next.    3) Enter your e-mail address and click Sign Up!    Additional Information  If you have questions, please e-mail [email or web address] or call [phone] to talk to our [facility] staff. Remember, [facility] is NOT to be used for urgent needs. For medical emergencies, dial 911.          [facility] complies with applicable Federal civil rights laws and does not discriminate on the basis of race, color, national origin, age, disability, or sex.

## 2017-04-05 ENCOUNTER — TELEPHONE (OUTPATIENT)
Dept: CARDIAC SURGERY | Facility: CLINIC | Age: 27
End: 2017-04-05

## 2017-04-05 ENCOUNTER — CONFERENCE (OUTPATIENT)
Dept: PEDIATRIC CARDIOLOGY | Facility: CLINIC | Age: 27
End: 2017-04-05
Payer: MEDICAID

## 2017-04-05 VITALS
SYSTOLIC BLOOD PRESSURE: 106 MMHG | OXYGEN SATURATION: 94 % | HEART RATE: 75 BPM | RESPIRATION RATE: 16 BRPM | HEIGHT: 65 IN | BODY MASS INDEX: 36.65 KG/M2 | TEMPERATURE: 98 F | WEIGHT: 220 LBS | DIASTOLIC BLOOD PRESSURE: 52 MMHG

## 2017-04-05 DIAGNOSIS — Q90.9 TRISOMY 21: ICD-10-CM

## 2017-04-05 DIAGNOSIS — Z87.74 TETRALOGY OF FALLOT S/P REPAIR: ICD-10-CM

## 2017-04-05 DIAGNOSIS — Q21.3 TETRALOGY OF FALLOT: Primary | ICD-10-CM

## 2017-04-05 DIAGNOSIS — Z01.818 PRE-OP TESTING: ICD-10-CM

## 2017-04-05 DIAGNOSIS — I37.2 PULMONARY VALVE STENOSIS WITH INSUFFICIENCY, UNSPECIFIED ETIOLOGY: ICD-10-CM

## 2017-04-05 LAB
GLUCOSE SERPL-MCNC: 93 MG/DL (ref 70–110)
HCO3 UR-SCNC: 20.8 MMOL/L (ref 24–28)
HCT VFR BLD CALC: 37 %PCV (ref 36–54)
PCO2 BLDA: 35.5 MMHG (ref 35–45)
PH SMN: 7.38 [PH] (ref 7.35–7.45)
PO2 BLDA: 59 MMHG (ref 80–100)
POC ACTIVATED CLOTTING TIME K: 209 SEC (ref 74–137)
POC BE: -4 MMOL/L
POC IONIZED CALCIUM: 1.09 MMOL/L (ref 1.06–1.42)
POC SATURATED O2: 90 % (ref 95–100)
POC TCO2: 22 MMOL/L (ref 23–27)
POTASSIUM BLD-SCNC: 3.8 MMOL/L (ref 3.5–5.1)
SAMPLE: ABNORMAL
SAMPLE: ABNORMAL
SODIUM BLD-SCNC: 140 MMOL/L (ref 136–145)

## 2017-04-05 NOTE — NURSING TRANSFER
Nursing Transfer Note      4/4/2017     Transfer To: 307    Transfer via stretcher    Transfer with cardiac monitoring    Transported by RN & PACU Tech    Medicines sent: none    Chart send with patient: Yes    Notified: mother at bedside    Patient reassessed at: 4/4/17@2300     Upon arrival to floor: cardiac monitor applied

## 2017-04-05 NOTE — PROGRESS NOTES
Pt was reviewed in Peds CV Conference yesterday after diagnostic cath and echo. Team agrees that pt should have surgical pulmonary valve replacement. Per Dr. Rehman, he spoke with Dr. Yaniv Dean and we have been advised to schedule electively. Lorelei and the CV team spoke to pt and his mother yesterday in recovery and are scheduling surgery for 4/18/17.

## 2017-04-05 NOTE — DISCHARGE SUMMARY
[facility]  Discharge Note  Short Stay    Admit Date: 4/4/2017    Discharge Date and Time: 4/5/2017  0841     Attending Physician: Kimo Rehman Jr., MD     Discharge Provider: Amie Burgos    Diagnoses:  Active Hospital Problems    Diagnosis  POA    *TOF (tetralogy of Fallot) [Q21.3]  Not Applicable    Pulmonary valve stenosis with insufficiency [I37.2]  Yes      Resolved Hospital Problems    Diagnosis Date Resolved POA   No resolved problems to display.       Discharged Condition: good    Hospital Course: Patient was admitted for an outpatient procedure and tolerated the procedure well with no complications.    Final Diagnoses: Same as principal problem.    Disposition: Home or Self Care    Follow up/Patient Instructions:  Follow-up in clinic will be scheduled after surgical procedure on April 18th.  Medications:  Reconciled Home Medications:   Current Discharge Medication List      CONTINUE these medications which have NOT CHANGED    Details   carvedilol (COREG) 25 MG tablet       digoxin (LANOXIN) 250 mcg tablet       levothyroxine (SYNTHROID) 88 MCG tablet       lisinopril 10 MG tablet              Discharge Procedure Orders  Diet general     Diet general     Activity as tolerated     No dressing needed     Call MD for:  increased confusion or weakness     Call MD for:  persistent dizziness, light-headedness, or visual disturbances     Call MD for:  worsening rash     Call MD for:  difficulty breathing or increased cough     Call MD for:  severe persistent headache     Call MD for:  redness, tenderness, or signs of infection (pain, swelling, redness, odor or green/yellow discharge around incision site)     Call MD for:  severe uncontrolled pain     Call MD for:  persistent nausea and vomiting or diarrhea     Call MD for:  temperature >100.4     Activity as tolerated     No dressing needed     Call MD for:  increased confusion or weakness     Call MD for:  persistent dizziness,  light-headedness, or visual disturbances     Call MD for:  worsening rash     Call MD for:  severe persistent headache     Call MD for:  difficulty breathing or increased cough     Call MD for:  redness, tenderness, or signs of infection (pain, swelling, redness, odor or green/yellow discharge around incision site)     Call MD for:  severe uncontrolled pain     Call MD for:  persistent nausea and vomiting or diarrhea     Call MD for:  temperature >100.4           Discharge Procedure Orders (must include Diet, Follow-up, Activity):    Discharge Procedure Orders (must include Diet, Follow-up, Activity)  Diet general     Diet general     Activity as tolerated     No dressing needed     Call MD for:  increased confusion or weakness     Call MD for:  persistent dizziness, light-headedness, or visual disturbances     Call MD for:  worsening rash     Call MD for:  difficulty breathing or increased cough     Call MD for:  severe persistent headache     Call MD for:  redness, tenderness, or signs of infection (pain, swelling, redness, odor or green/yellow discharge around incision site)     Call MD for:  severe uncontrolled pain     Call MD for:  persistent nausea and vomiting or diarrhea     Call MD for:  temperature >100.4     Activity as tolerated     No dressing needed     Call MD for:  increased confusion or weakness     Call MD for:  persistent dizziness, light-headedness, or visual disturbances     Call MD for:  worsening rash     Call MD for:  severe persistent headache     Call MD for:  difficulty breathing or increased cough     Call MD for:  redness, tenderness, or signs of infection (pain, swelling, redness, odor or green/yellow discharge around incision site)     Call MD for:  severe uncontrolled pain     Call MD for:  persistent nausea and vomiting or diarrhea     Call MD for:  temperature >100.4

## 2017-04-05 NOTE — PLAN OF CARE
Problem: Patient Care Overview  Goal: Individualization & Mutuality  Outcome: Outcome(s) achieved Date Met:  04/05/17  Pt discharged per MD orders.  Tele and IV discontinued.  Catheter tip intact x1.  Medication list and prescriptions reviewed; prescriptions sent to pt preferred pharmacy and printed prescriptions provided.  Pt verbalizes understanding of all written and verbal discharge instructions.  MIS performed and documented in chart. Pt awaiting family/escort arrival.  Will continue to monitor.

## 2017-04-05 NOTE — NURSING TRANSFER
Nursing Transfer Note      4/5/2017      Transfer From: PACU    Transfer via bed    Transfer with cardiac monitoring    Transported by patient transport    Medicines sent: none    Chart send with patient: Yes    Notified: mother    Patient reassessed at: 04/5/17 12:19am     Upon arrival to floor: cardiac monitor applied, patient oriented to room, call bell in reach and bed in lowest position  VSS and NADN. Mother at bedside. Will continue to monitor.

## 2017-04-05 NOTE — ANESTHESIA POSTPROCEDURE EVALUATION
"Anesthesia Post Evaluation    Patient: Cipriano Thompson    Procedure(s) Performed: Procedure(s) (LRB):  RHC WITH RETRO LHC CONGEITAL CARD ABN (N/A)  REPLACEMENT-VALVE-PULMONARY (N/A)    Final Anesthesia Type: general  Patient location during evaluation: PACU  Patient participation: Yes- Able to Participate  Level of consciousness: awake and alert and oriented  Post-procedure vital signs: reviewed and stable  Pain management: adequate  Airway patency: patent  PONV status at discharge: No PONV  Anesthetic complications: no      Cardiovascular status: blood pressure returned to baseline, hemodynamically stable and stable  Respiratory status: unassisted, room air and spontaneous ventilation  Hydration status: euvolemic  Follow-up not needed.        Visit Vitals    BP (!) 94/55    Pulse 76    Temp 36.5 °C (97.7 °F) (Axillary)    Resp 14    Ht 5' 5" (1.651 m)    Wt 99.8 kg (220 lb)    SpO2 (!) 92%    BMI 36.61 kg/m2       Pain/Justo Score: Pain Assessment Performed: Yes (4/4/2017  9:00 PM)  Presence of Pain: non-verbal indicators absent (pt sleeping with NAD noted ) (4/4/2017  9:00 PM)  Justo Score: 7 (4/4/2017  4:00 PM)      "

## 2017-04-05 NOTE — PLAN OF CARE
Problem: Patient Care Overview  Goal: Plan of Care Review  Outcome: Ongoing (interventions implemented as appropriate)  Pt. Remains free from falls/ injury/trauma. No complaints of CP, SOB, pain/discomfort. Right Groin site covered with gauze. No bleeding or hematoma at the site. Plan of Care reviewed with patient and mother. VSS and NADN. Will continue to monitor.

## 2017-04-05 NOTE — ANESTHESIA RELEASE NOTE
"Anesthesia Release from PACU Note    Patient: Cipriano Thompson    Procedure(s) Performed: Procedure(s) (LRB):  RHC WITH RETRO LHC CONGEITAL CARD ABN (N/A)  REPLACEMENT-VALVE-PULMONARY (N/A)    Anesthesia type: general    Post pain: Adequate analgesia    Post assessment: no apparent anesthetic complications, tolerated procedure well and no evidence of recall    Last Vitals:   Visit Vitals    BP (!) 94/55    Pulse 76    Temp 36.5 °C (97.7 °F) (Axillary)    Resp 14    Ht 5' 5" (1.651 m)    Wt 99.8 kg (220 lb)    SpO2 (!) 92%    BMI 36.61 kg/m2       Post vital signs: stable    Level of consciousness: awake, alert  and oriented    Nausea/Vomiting: no nausea/no vomiting    Complications: none    Airway Patency: patent    Respiratory: unassisted    Cardiovascular: stable and blood pressure at baseline    Hydration: euvolemic  "

## 2017-04-05 NOTE — TELEPHONE ENCOUNTER
Spoke with Cipriano's mother, Luann Thompson, to reschedule heart surgery to May 2, 2017 and pre op appointments to May 1, 2017.  Explained will need to reschedule d/t surgeon being out of town on mission trip. Mother very understanding. Notified Dr Augustine Dean via epic message.

## 2017-04-08 LAB
BLD PROD TYP BPU: NORMAL
BLOOD UNIT EXPIRATION DATE: NORMAL
BLOOD UNIT TYPE CODE: 7300
BLOOD UNIT TYPE: NORMAL
CODING SYSTEM: NORMAL
DISPENSE STATUS: NORMAL
TRANS ERYTHROCYTES VOL PATIENT: NORMAL ML

## 2017-05-01 ENCOUNTER — ANESTHESIA EVENT (OUTPATIENT)
Dept: SURGERY | Facility: HOSPITAL | Age: 27
DRG: 219 | End: 2017-05-01
Payer: MEDICAID

## 2017-05-01 ENCOUNTER — DOCUMENTATION ONLY (OUTPATIENT)
Dept: CARDIAC SURGERY | Facility: CLINIC | Age: 27
End: 2017-05-01

## 2017-05-01 ENCOUNTER — HOSPITAL ENCOUNTER (OUTPATIENT)
Dept: RADIOLOGY | Facility: HOSPITAL | Age: 27
Discharge: HOME | DRG: 219 | End: 2017-05-01
Attending: THORACIC SURGERY (CARDIOTHORACIC VASCULAR SURGERY)
Payer: MEDICAID

## 2017-05-01 ENCOUNTER — INITIAL CONSULT (OUTPATIENT)
Dept: VASCULAR SURGERY | Facility: CLINIC | Age: 27
DRG: 219 | End: 2017-05-01
Payer: MEDICAID

## 2017-05-01 ENCOUNTER — CLINICAL SUPPORT (OUTPATIENT)
Dept: PEDIATRIC CARDIOLOGY | Facility: CLINIC | Age: 27
DRG: 219 | End: 2017-05-01
Payer: MEDICAID

## 2017-05-01 VITALS
OXYGEN SATURATION: 99 % | BODY MASS INDEX: 35.61 KG/M2 | HEART RATE: 90 BPM | SYSTOLIC BLOOD PRESSURE: 101 MMHG | DIASTOLIC BLOOD PRESSURE: 68 MMHG | HEIGHT: 66 IN | WEIGHT: 221.56 LBS

## 2017-05-01 DIAGNOSIS — Z01.818 PRE-OP TESTING: ICD-10-CM

## 2017-05-01 DIAGNOSIS — Q90.9 TRISOMY 21: ICD-10-CM

## 2017-05-01 DIAGNOSIS — Z87.74 TETRALOGY OF FALLOT S/P REPAIR: ICD-10-CM

## 2017-05-01 DIAGNOSIS — I37.2 PULMONARY VALVE STENOSIS WITH INSUFFICIENCY, UNSPECIFIED ETIOLOGY: ICD-10-CM

## 2017-05-01 DIAGNOSIS — Q21.3 TETRALOGY OF FALLOT: ICD-10-CM

## 2017-05-01 PROCEDURE — 99999 PR PBB SHADOW E&M-EST. PATIENT-LVL I: CPT | Mod: PBBFAC,,,

## 2017-05-01 PROCEDURE — 71020 XR CHEST PA AND LATERAL: CPT | Mod: 26,,, | Performed by: RADIOLOGY

## 2017-05-01 PROCEDURE — 99999 PR PBB SHADOW E&M-EST. PATIENT-LVL IV: CPT | Mod: PBBFAC,,, | Performed by: PHYSICIAN ASSISTANT

## 2017-05-01 PROCEDURE — 93010 ELECTROCARDIOGRAM REPORT: CPT | Mod: S$PBB,,, | Performed by: PEDIATRICS

## 2017-05-01 PROCEDURE — 99204 OFFICE O/P NEW MOD 45 MIN: CPT | Mod: 57,S$PBB,, | Performed by: PHYSICIAN ASSISTANT

## 2017-05-01 RX ORDER — MILRINONE LACTATE 0.2 MG/ML
0.25 INJECTION, SOLUTION INTRAVENOUS CONTINUOUS
Status: DISCONTINUED | OUTPATIENT
Start: 2017-05-02 | End: 2017-05-02

## 2017-05-01 RX ORDER — DEXMEDETOMIDINE HYDROCHLORIDE 4 UG/ML
0.5 INJECTION, SOLUTION INTRAVENOUS CONTINUOUS
Status: DISCONTINUED | OUTPATIENT
Start: 2017-05-02 | End: 2017-05-02

## 2017-05-01 RX ORDER — CEFAZOLIN SODIUM 1 G/50ML
1 SOLUTION INTRAVENOUS
Status: DISCONTINUED | OUTPATIENT
Start: 2017-05-02 | End: 2017-05-02 | Stop reason: HOSPADM

## 2017-05-01 NOTE — PROGRESS NOTES
Subjective:      Patient: Cipriano Thompson, MRN: [id number]  Requesting Physician:  Dr. Augustine Dean     Chief Complaint   Patient presents with    Heart Problem     pulmonary vlave stenosis/insufficiency       Surgical CONSULT/EVALUATION: Patient presents for pulmonary valve replacement    Diagnosis:      ICD-10-CM ICD-9-CM   1. Tetralogy of Fallot Q21.3 745.2   2. Tetralogy of Fallot s/p repair Z98.890 V15.1    Z87.74    3. Pulmonary valve stenosis with insufficiency, unspecified etiology I37.2 424.3   4. Pre-op testing Z01.818 V72.84   5. Trisomy 21 Q90.9 758.0       HPI:   Cipriano is a 26 year old withTOF and trisomy 21.  He underwent complete TOF repair at 1 year of age at [facility]. He required  a bioprosthetic pulmonary valve (27 mm Free Style Medtronic Valve) for severe PI placed in 1999 at [facility].  He subsequently required a dual-chamber pacemaker for sinus node dysfunction in 2008 at [facility]. In 2010 he had a EPS for ventricular ectopy at [facility].  In the last year or so, Mom reports Cipriano has had increasing SOB walking up stairs. No complaints of chest pain or palpitations. Cipriano also has, a history of hypothyroidism and is on Synthroid. He was referred by his cardiologist for possible Jeanna valve implantation. Unfortunately due to his coronary artery anatomy he was not a candidate. He was referred for surgical valve replacement.          Review of Systems   Constitution: Negative for chills, diaphoresis, fever and weight loss.   HENT: Negative for congestion, headaches and sore throat.    Eyes: Negative for discharge and redness.   Cardiovascular: Negative for chest pain, cyanosis, dyspnea on exertion, irregular heartbeat, leg swelling, near-syncope, orthopnea, palpitations and syncope.   Respiratory: Positive for shortness of breath and snoring. Negative for cough and wheezing.    Skin: Negative for color change, nail changes and rash.   Musculoskeletal:  Negative for muscle weakness and stiffness.   Gastrointestinal: Negative for abdominal pain, constipation, diarrhea, nausea and vomiting.   Neurological: Negative for dizziness, focal weakness, paresthesias and seizures.   Psychiatric/Behavioral: Negative for altered mental status. The patient is not nervous/anxious.    Allergic/Immunologic: Negative for environmental allergies.       History:    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    Tetralogy of Fallot 05/1990       Past Surgical History:   Procedure Laterality Date    BUNIONECTOMY      CARDIAC SURGERY      open heart, ppm     INSERT / REPLACE / REMOVE PACEMAKER Left 10/2008    TONSILLECTOMY         Family History   Problem Relation Age of Onset    No Known Problems Mother     No Known Problems Sister        Social History     Social History    Marital status: Single     Spouse name: N/A    Number of children: N/A    Years of education: N/A     Occupational History    Not on file.     Social History Main Topics    Smoking status: Never Smoker    Smokeless tobacco: Not on file    Alcohol use No    Drug use: Not on file    Sexual activity: Not Currently     Other Topics Concern    Not on file     Social History Narrative    Lives with mother, 1 dog (inside)         Objective:      Physical Exam   Constitutional: He is oriented to person, place, and time. He appears well-developed and well-nourished. No distress.   HENT:   Head: Normocephalic and atraumatic.   Mouth/Throat: Oropharynx is clear and moist. No oropharyngeal exudate.   Eyes: Pupils are equal, round, and reactive to light. Right eye exhibits no discharge. Left eye exhibits no discharge.   Neck: Normal range of motion. Neck supple. No JVD present.   Cardiovascular: Normal rate and regular rhythm.    Murmur (II/VI MARIELA) heard.  Pulmonary/Chest: Effort  "normal and breath sounds normal. No stridor. No respiratory distress. He has no wheezes.   Abdominal: Soft. Bowel sounds are normal. He exhibits no distension. There is no hepatosplenomegaly. There is no tenderness.   Musculoskeletal: Normal range of motion. He exhibits no edema or deformity.   Neurological: He is alert and oriented to person, place, and time. He exhibits normal muscle tone.   Skin: Skin is warm and dry. No rash noted. He is not diaphoretic. No erythema.   Psychiatric:   Trisomy 21       /68  Pulse 90  Ht 5' 6.14" (1.68 m)  Wt 100.5 kg (221 lb 9 oz)  SpO2 99%  BMI 35.61 kg/m2        Studies:  Echo:  Images consistent with history of repaired tetralogy of Fallot and later placement  of bioprosthetic valve in pulmonary position:  Technically limited acoustic windows-.  Mild right atrial enlargement.  A pacing wires noted crossing the tricuspid valve to enter the right ventricle with  mild associated tricuspid insufficiency.  Dilated right ventricle, moderate.  Qualitative impression of at least moderately reduced right ventricular systolic  function  Right ventricular pressure estimated greater than 50 mmHg from well defined  tricuspid insufficiency Doppler profile.  Intact ventricular septum.  Mildly calcified bioprosthetic pulmonary valve in pulmonary position with peak  velocity <3.1 m/sec., mean gradient <25 mmHg and mild insufficiency  Limited views suggest normal size pulmonary branches.  Left pulmonary artery branch stenosis, mild  Normal left ventricle structure and size.  Abnormal septal motion with shortening fraction measured as 39% and left  ventricular ejection fraction estimated 50-55% from apical views.  Normal aortic valve velocity.  Normal size aorta.  No evidence of coarctation of the aorta.  No pericardial effusion.        All physician notes and studies have been reviewed in detail.    Assessment & Plan:       Cipriano is a 26 year old with moderate subpulmonary stenosis " (peak gradient 24mmHg) and moderate pulmonary insufficiency following TOF repair. He would benefit from pulmonary valve replacement at this time. The risks, benefits, and alternatives to pulmonary valve replacement were discussed in detail with the patient and his mother. They are aware that there is a five percent mortality associated with this operation. There is also a risk of bleeding, infection, stroke, and the risk of anesthesia. A surgical date of 5-2-17 has been made. Cipriano will undergo pre-operative testing-cbc, type and cross, cmp, mrsa screen, cxr, ekg-prior to his operation. These results will be reviewed when available. All questions and concerns were addressed.

## 2017-05-01 NOTE — LETTER
May 1, 2017      Augustine Dean MD  [address]  [address]  [facility] [address]           [facility] - Pediatric Cardiovasular Surgery  [address]  [facility] [address]  Phone: [phone]  Fax: [phone]          Patient: Cipriano Thompson   MR Number: [id number]   YOB: 1990   Date of Visit: 5/1/2017       Dear Dr. Augustine Dean:    Thank you for referring Cipriano Thompson to me for evaluation. Attached you will find relevant portions of my assessment and plan of care.    If you have questions, please do not hesitate to call me. I look forward to following Cipriano Thompson along with you.    Sincerely,    Alexia Tao PA-C    Enclosure  CC:  No Recipients    If you would like to receive this communication electronically, please contact [email or web address] or [phone] to request more information on [employer] [facility] access.    For providers and/or their staff who would like to refer a patient to [facility], please contact us through our one-stop-shop provider referral line, [facility], at [phone].    If you feel you have received this communication in error or would no longer like to receive these types of communications, please e-mail [email or web address]

## 2017-05-01 NOTE — MR AVS SNAPSHOT
[facility] - Pediatric Cardiovasular Surgery  [address]  [place] [address]  Phone: [phone]  Fax: [phone]                  Cipriano Thompson   2017 1:30 PM   Initial consult    Description:  Male : 1990   Provider:  Alexia Tao PA-C   Department:  [facility] - Pediatric Cardiovasular Surgery           Reason for Visit     Heart Problem           Diagnoses this Visit        Comments    Tetralogy of Fallot         Tetralogy of Fallot s/p repair         Pulmonary valve stenosis with insufficiency, unspecified etiology         Pre-op testing         Trisomy 21                To Do List           Your Future Surgeries/Procedures     May 02, 2017   Surgery with Bhargav Godfrey MD   [facility] ([facility])    [address] [facility] [address] [phone]              Goals (5 Years of Data)     None      [facility]     [facility] Nurse Care Line -  Assistance  Unless otherwise directed by your provider, please contact [facility], our nurse care line that is available for  assistance.     Registered nurses in the [facility] provide: appointment scheduling, clinical advisement, health education, and other advisory services.  Call: [phone] (toll free)               Medications           Message regarding Medications     Verify the changes and/or additions to your medication regime listed below are the same as discussed with your clinician today.  If any of these changes or additions are incorrect, please notify your healthcare provider.             Verify that the below list of medications is an accurate representation of the medications you are currently taking.  If none reported, the list may be blank. If incorrect, please contact your healthcare provider. Carry this list with you in case of emergency.           Current Medications     carvedilol (COREG) 25 MG tablet     digoxin  "(LANOXIN) 250 mcg tablet     levothyroxine (SYNTHROID) 88 MCG tablet     lisinopril 10 MG tablet            Clinical Reference Information           Your Vitals Were     BP Pulse Height Weight SpO2 BMI    101/68 90 5' 6.14" (1.68 m) 100.5 kg (221 lb 9 oz) 99% 35.61 kg/m2      Blood Pressure          Most Recent Value    BP  101/68      Allergies as of 5/1/2017     Latex, Natural Rubber    Sulfa (Sulfonamide Antibiotics)      Immunizations Administered on Date of Encounter - 5/1/2017     None      Orders Placed During Today's Visit      Normal Orders This Visit    Culture, MRSA       [employer] Sign-Up     Activating your [facility] account is as easy as 1-2-3!     1) Visit [email or web address], select Sign Up Now, enter this activation code and your date of birth, then select Next.  [email or web address]  Expires: 5/20/2017  9:27 AM      2) Create a username and password to use when you visit [facility] in the future and select a security question in case you lose your password and select Next.    3) Enter your e-mail address and click Sign Up!    Additional Information  If you have questions, please e-mail [email or web address] or call [phone] to talk to our [facility] staff. Remember, [facility] is NOT to be used for urgent needs. For medical emergencies, dial 911.         Language Assistance Services     ATTENTION: Language assistance services are available, free of charge. Please call [phone].      ATENCIÓN: Si habla jenn, tiene a roberts disposición servicios gratuitos de asistencia lingüística. Llame al [phone].     LITZY Ý: N?u b?n nói Ti?ng Vi?t, có các d?ch v? h? tr? ngôn ng? mi?n phí dành cho b?n. G?i s? [phone].         [facility] complies with applicable Federal civil rights laws and does not discriminate on the basis of race, color, national origin, age, disability, or sex.        "

## 2017-05-01 NOTE — PROGRESS NOTES
Cipriano here today with mother for surgery consult for surgery scheduled May 1, 2017 at 0730.  Mother denies any recent illness. Provided pre op instructions--no food or milk products after 11 pm tonight, may have clear liquids, such as water or apple juice until 530 am tomorrow morning, and check in on 2nd floor of hospital, day of surgery center, for 530 am tomorrow morning.  Explained pre surgery procedures. Answered questions. Will  provided tour of hospital and Ped CVICU.

## 2017-05-02 ENCOUNTER — HOSPITAL ENCOUNTER (INPATIENT)
Facility: HOSPITAL | Age: 27
LOS: 10 days | Discharge: HOME | DRG: 219 | End: 2017-05-12
Attending: THORACIC SURGERY (CARDIOTHORACIC VASCULAR SURGERY) | Admitting: THORACIC SURGERY (CARDIOTHORACIC VASCULAR SURGERY)
Payer: MEDICAID

## 2017-05-02 ENCOUNTER — DOCUMENTATION ONLY (OUTPATIENT)
Dept: ELECTROPHYSIOLOGY | Facility: CLINIC | Age: 27
End: 2017-05-02

## 2017-05-02 ENCOUNTER — ANESTHESIA (OUTPATIENT)
Dept: SURGERY | Facility: HOSPITAL | Age: 27
DRG: 219 | End: 2017-05-02
Payer: MEDICAID

## 2017-05-02 DIAGNOSIS — Z95.0 PACEMAKER: ICD-10-CM

## 2017-05-02 DIAGNOSIS — Q21.3 TOF (TETRALOGY OF FALLOT): ICD-10-CM

## 2017-05-02 DIAGNOSIS — I49.3 PVC'S (PREMATURE VENTRICULAR CONTRACTIONS): ICD-10-CM

## 2017-05-02 DIAGNOSIS — I37.9 PULMONARY VALVE DISORDER: ICD-10-CM

## 2017-05-02 DIAGNOSIS — Z95.2 S/P PULMONARY VALVE REPLACEMENT: ICD-10-CM

## 2017-05-02 DIAGNOSIS — Z95.2 PULMONARY VALVE REPLACED: ICD-10-CM

## 2017-05-02 DIAGNOSIS — I37.1 PULMONARY VALVE INSUFFICIENCY: ICD-10-CM

## 2017-05-02 LAB
ALLENS TEST: ABNORMAL
ANION GAP SERPL CALC-SCNC: 12 MMOL/L
APTT BLDCRRT: 27.2 SEC
BASOPHILS # BLD AUTO: 0.01 K/UL
BASOPHILS NFR BLD: 0.1 %
BLD PROD TYP BPU: NORMAL
BLOOD UNIT EXPIRATION DATE: NORMAL
BLOOD UNIT TYPE CODE: 5100
BLOOD UNIT TYPE CODE: 5100
BLOOD UNIT TYPE CODE: 7300
BLOOD UNIT TYPE CODE: NORMAL
BLOOD UNIT TYPE: NORMAL
BUN SERPL-MCNC: 19 MG/DL
CALCIUM SERPL-MCNC: 10.8 MG/DL
CHLORIDE SERPL-SCNC: 108 MMOL/L
CO2 SERPL-SCNC: 20 MMOL/L
CODING SYSTEM: NORMAL
CREAT SERPL-MCNC: 1.3 MG/DL
DELSYS: ABNORMAL
DIFFERENTIAL METHOD: ABNORMAL
DISPENSE STATUS: NORMAL
EOSINOPHIL # BLD AUTO: 0 K/UL
EOSINOPHIL NFR BLD: 0.6 %
ERYTHROCYTE [DISTWIDTH] IN BLOOD BY AUTOMATED COUNT: 15.3 %
ERYTHROCYTE [SEDIMENTATION RATE] IN BLOOD BY WESTERGREN METHOD: 16 MM/H
EST. GFR  (AFRICAN AMERICAN): >60 ML/MIN/1.73 M^2
EST. GFR  (NON AFRICAN AMERICAN): >60 ML/MIN/1.73 M^2
ETCO2: 27
ETCO2: 27
ETCO2: 30
FIBRINOGEN PPP-MCNC: 290 MG/DL
FIBRINOGEN PPP-MCNC: 338 MG/DL
FIO2: 100
FIO2: 60
FIO2: 80
GLUCOSE SERPL-MCNC: 101 MG/DL (ref 70–110)
GLUCOSE SERPL-MCNC: 102 MG/DL (ref 70–110)
GLUCOSE SERPL-MCNC: 160 MG/DL (ref 70–110)
GLUCOSE SERPL-MCNC: 160 MG/DL (ref 70–110)
GLUCOSE SERPL-MCNC: 163 MG/DL
GLUCOSE SERPL-MCNC: 172 MG/DL (ref 70–110)
GLUCOSE SERPL-MCNC: 174 MG/DL (ref 70–110)
GLUCOSE SERPL-MCNC: 90 MG/DL (ref 70–110)
GLUCOSE SERPL-MCNC: 91 MG/DL (ref 70–110)
HCO3 UR-SCNC: 18 MMOL/L (ref 24–28)
HCO3 UR-SCNC: 20.6 MMOL/L (ref 24–28)
HCO3 UR-SCNC: 21.9 MMOL/L (ref 24–28)
HCO3 UR-SCNC: 21.9 MMOL/L (ref 24–28)
HCO3 UR-SCNC: 22.2 MMOL/L (ref 24–28)
HCO3 UR-SCNC: 22.5 MMOL/L (ref 24–28)
HCO3 UR-SCNC: 22.6 MMOL/L (ref 24–28)
HCO3 UR-SCNC: 22.9 MMOL/L (ref 24–28)
HCO3 UR-SCNC: 23.3 MMOL/L (ref 24–28)
HCO3 UR-SCNC: 23.5 MMOL/L (ref 24–28)
HCO3 UR-SCNC: 23.6 MMOL/L (ref 24–28)
HCO3 UR-SCNC: 24.1 MMOL/L (ref 24–28)
HCO3 UR-SCNC: 24.5 MMOL/L (ref 24–28)
HCO3 UR-SCNC: 24.7 MMOL/L (ref 24–28)
HCO3 UR-SCNC: 24.9 MMOL/L (ref 24–28)
HCO3 UR-SCNC: 25.1 MMOL/L (ref 24–28)
HCO3 UR-SCNC: 25.6 MMOL/L (ref 24–28)
HCT VFR BLD AUTO: 30.2 %
HCT VFR BLD CALC: 25 %PCV (ref 36–54)
HCT VFR BLD CALC: 26 %PCV (ref 36–54)
HCT VFR BLD CALC: 28 %PCV (ref 36–54)
HCT VFR BLD CALC: 28 %PCV (ref 36–54)
HCT VFR BLD CALC: 29 %PCV (ref 36–54)
HCT VFR BLD CALC: 30 %PCV (ref 36–54)
HCT VFR BLD CALC: 31 %PCV (ref 36–54)
HCT VFR BLD CALC: 31 %PCV (ref 36–54)
HCT VFR BLD CALC: 32 %PCV (ref 36–54)
HCT VFR BLD CALC: 32 %PCV (ref 36–54)
HCT VFR BLD CALC: 33 %PCV (ref 36–54)
HCT VFR BLD CALC: 33 %PCV (ref 36–54)
HCT VFR BLD CALC: 36 %PCV (ref 36–54)
HGB BLD-MCNC: 10.5 G/DL
INR PPP: 1.1
LDH SERPL L TO P-CCNC: 0.49 MMOL/L (ref 0.36–1.25)
LDH SERPL L TO P-CCNC: 2 MMOL/L (ref 0.36–1.25)
LDH SERPL L TO P-CCNC: 2.01 MMOL/L (ref 0.36–1.25)
LDH SERPL L TO P-CCNC: 2.24 MMOL/L (ref 0.36–1.25)
LDH SERPL L TO P-CCNC: 3.12 MMOL/L (ref 0.36–1.25)
LYMPHOCYTES # BLD AUTO: 0.5 K/UL
LYMPHOCYTES NFR BLD: 7.6 %
MAGNESIUM SERPL-MCNC: 2.3 MG/DL
MCH RBC QN AUTO: 30.7 PG
MCHC RBC AUTO-ENTMCNC: 34.8 %
MCV RBC AUTO: 88 FL
MODE: ABNORMAL
MONOCYTES # BLD AUTO: 0.1 K/UL
MONOCYTES NFR BLD: 1.4 %
NEUTROPHILS # BLD AUTO: 6.2 K/UL
NEUTROPHILS NFR BLD: 89.3 %
NUM UNITS TRANS FFP: NORMAL
PCO2 BLDA: 26.9 MMHG (ref 35–45)
PCO2 BLDA: 31.5 MMHG (ref 35–45)
PCO2 BLDA: 31.9 MMHG (ref 35–45)
PCO2 BLDA: 33.3 MMHG (ref 35–45)
PCO2 BLDA: 33.5 MMHG (ref 35–45)
PCO2 BLDA: 33.7 MMHG (ref 35–45)
PCO2 BLDA: 34.6 MMHG (ref 35–45)
PCO2 BLDA: 34.6 MMHG (ref 35–45)
PCO2 BLDA: 35 MMHG (ref 35–45)
PCO2 BLDA: 35.7 MMHG (ref 35–45)
PCO2 BLDA: 35.7 MMHG (ref 35–45)
PCO2 BLDA: 36.1 MMHG (ref 35–45)
PCO2 BLDA: 36.2 MMHG (ref 35–45)
PCO2 BLDA: 36.3 MMHG (ref 35–45)
PCO2 BLDA: 39.5 MMHG (ref 35–45)
PCO2 BLDA: 44.7 MMHG (ref 35–45)
PCO2 BLDA: 46.1 MMHG (ref 35–45)
PEEP: 6
PEEP: 7
PEEP: 7
PH SMN: 7.35 [PH] (ref 7.35–7.45)
PH SMN: 7.35 [PH] (ref 7.35–7.45)
PH SMN: 7.38 [PH] (ref 7.35–7.45)
PH SMN: 7.41 [PH] (ref 7.35–7.45)
PH SMN: 7.42 [PH] (ref 7.35–7.45)
PH SMN: 7.43 [PH] (ref 7.35–7.45)
PH SMN: 7.44 [PH] (ref 7.35–7.45)
PH SMN: 7.45 [PH] (ref 7.35–7.45)
PH SMN: 7.47 [PH] (ref 7.35–7.45)
PIP: 32
PIP: 33
PIP: 34
PLATELET # BLD AUTO: 134 K/UL
PMV BLD AUTO: 9.4 FL
PO2 BLDA: 101 MMHG (ref 80–100)
PO2 BLDA: 128 MMHG (ref 80–100)
PO2 BLDA: 129 MMHG (ref 80–100)
PO2 BLDA: 157 MMHG (ref 80–100)
PO2 BLDA: 171 MMHG (ref 80–100)
PO2 BLDA: 228 MMHG (ref 80–100)
PO2 BLDA: 311 MMHG (ref 80–100)
PO2 BLDA: 316 MMHG (ref 80–100)
PO2 BLDA: 332 MMHG (ref 80–100)
PO2 BLDA: 341 MMHG (ref 80–100)
PO2 BLDA: 349 MMHG (ref 80–100)
PO2 BLDA: 383 MMHG (ref 80–100)
PO2 BLDA: 440 MMHG (ref 80–100)
PO2 BLDA: 90 MMHG (ref 80–100)
PO2 BLDA: 90 MMHG (ref 80–100)
PO2 BLDA: 96 MMHG (ref 80–100)
PO2 BLDA: 98 MMHG (ref 80–100)
POC BE: -1 MMOL/L
POC BE: -2 MMOL/L
POC BE: -3 MMOL/L
POC BE: -4 MMOL/L
POC BE: -6 MMOL/L
POC BE: 0 MMOL/L
POC BE: 1 MMOL/L
POC BE: 1 MMOL/L
POC IONIZED CALCIUM: 1.01 MMOL/L (ref 1.06–1.42)
POC IONIZED CALCIUM: 1.04 MMOL/L (ref 1.06–1.42)
POC IONIZED CALCIUM: 1.05 MMOL/L (ref 1.06–1.42)
POC IONIZED CALCIUM: 1.11 MMOL/L (ref 1.06–1.42)
POC IONIZED CALCIUM: 1.12 MMOL/L (ref 1.06–1.42)
POC IONIZED CALCIUM: 1.15 MMOL/L (ref 1.06–1.42)
POC IONIZED CALCIUM: 1.21 MMOL/L (ref 1.06–1.42)
POC IONIZED CALCIUM: 1.25 MMOL/L (ref 1.06–1.42)
POC IONIZED CALCIUM: 1.26 MMOL/L (ref 1.06–1.42)
POC IONIZED CALCIUM: 1.28 MMOL/L (ref 1.06–1.42)
POC IONIZED CALCIUM: 1.3 MMOL/L (ref 1.06–1.42)
POC IONIZED CALCIUM: 1.31 MMOL/L (ref 1.06–1.42)
POC IONIZED CALCIUM: 1.32 MMOL/L (ref 1.06–1.42)
POC IONIZED CALCIUM: 1.32 MMOL/L (ref 1.06–1.42)
POC IONIZED CALCIUM: 1.36 MMOL/L (ref 1.06–1.42)
POC SATURATED O2: 100 % (ref 95–100)
POC SATURATED O2: 96 % (ref 95–100)
POC SATURATED O2: 97 % (ref 95–100)
POC SATURATED O2: 97 % (ref 95–100)
POC SATURATED O2: 98 % (ref 95–100)
POC SATURATED O2: 98 % (ref 95–100)
POC SATURATED O2: 99 % (ref 95–100)
POC TCO2: 19 MMOL/L (ref 23–27)
POC TCO2: 22 MMOL/L (ref 23–27)
POC TCO2: 23 MMOL/L (ref 23–27)
POC TCO2: 24 MMOL/L (ref 23–27)
POC TCO2: 25 MMOL/L (ref 23–27)
POC TCO2: 26 MMOL/L (ref 23–27)
POC TCO2: 27 MMOL/L (ref 23–27)
POOLED CRYOPPT GVN BPU: NORMAL
POOLED CRYOPPT GVN BPU: NORMAL
POTASSIUM BLD-SCNC: 3.2 MMOL/L (ref 3.5–5.1)
POTASSIUM BLD-SCNC: 3.2 MMOL/L (ref 3.5–5.1)
POTASSIUM BLD-SCNC: 3.3 MMOL/L (ref 3.5–5.1)
POTASSIUM BLD-SCNC: 3.5 MMOL/L (ref 3.5–5.1)
POTASSIUM BLD-SCNC: 3.6 MMOL/L (ref 3.5–5.1)
POTASSIUM BLD-SCNC: 3.6 MMOL/L (ref 3.5–5.1)
POTASSIUM BLD-SCNC: 3.7 MMOL/L (ref 3.5–5.1)
POTASSIUM BLD-SCNC: 3.8 MMOL/L (ref 3.5–5.1)
POTASSIUM BLD-SCNC: 4 MMOL/L (ref 3.5–5.1)
POTASSIUM BLD-SCNC: 4.1 MMOL/L (ref 3.5–5.1)
POTASSIUM SERPL-SCNC: 3.9 MMOL/L
POTASSIUM SERPL-SCNC: 3.9 MMOL/L
PROTHROMBIN TIME: 11.8 SEC
PROVIDER CREDENTIALS: ABNORMAL
PROVIDER NOTIFIED: ABNORMAL
PS: 10
RBC # BLD AUTO: 3.42 M/UL
SAMPLE: ABNORMAL
SITE: ABNORMAL
SODIUM BLD-SCNC: 141 MMOL/L (ref 136–145)
SODIUM BLD-SCNC: 142 MMOL/L (ref 136–145)
SODIUM BLD-SCNC: 143 MMOL/L (ref 136–145)
SODIUM BLD-SCNC: 144 MMOL/L (ref 136–145)
SODIUM BLD-SCNC: 145 MMOL/L (ref 136–145)
SODIUM SERPL-SCNC: 140 MMOL/L
SP02: 100
SP02: 100
SP02: 99
TIME NOTIFIED: 1530
TIME NOTIFIED: 1650
TIME NOTIFIED: 1750
TIME NOTIFIED: 1920
TIME NOTIFIED: 1920
TIME NOTIFIED: 2045
TIME NOTIFIED: 2215
TIME NOTIFIED: 2330
TIME NOTIFIED: 2330
TRANS ERYTHROCYTES VOL PATIENT: NORMAL ML
TRANS PLATPHERESIS VOL PATIENT: NORMAL ML
TRANS PLATPHERESIS VOL PATIENT: NORMAL ML
VERBAL RESULT READBACK PERFORMED: YES
VT: 500
WBC # BLD AUTO: 6.97 K/UL

## 2017-05-02 PROCEDURE — P9035 PLATELET PHERES LEUKOREDUCED: HCPCS

## 2017-05-02 PROCEDURE — 25000003 PHARM REV CODE 250: Performed by: THORACIC SURGERY (CARDIOTHORACIC VASCULAR SURGERY)

## 2017-05-02 PROCEDURE — 5A1221Z PERFORMANCE OF CARDIAC OUTPUT, CONTINUOUS: ICD-10-PCS | Performed by: THORACIC SURGERY (CARDIOTHORACIC VASCULAR SURGERY)

## 2017-05-02 PROCEDURE — 25000003 PHARM REV CODE 250: Performed by: PEDIATRICS

## 2017-05-02 PROCEDURE — P9017 PLASMA 1 DONOR FRZ W/IN 8 HR: HCPCS

## 2017-05-02 PROCEDURE — 63600175 PHARM REV CODE 636 W HCPCS: Performed by: PEDIATRICS

## 2017-05-02 PROCEDURE — 25000003 PHARM REV CODE 250: Performed by: NURSE ANESTHETIST, CERTIFIED REGISTERED

## 2017-05-02 PROCEDURE — P9012 CRYOPRECIPITATE EACH UNIT: HCPCS

## 2017-05-02 PROCEDURE — 83735 ASSAY OF MAGNESIUM: CPT

## 2017-05-02 PROCEDURE — 27100088 HC CELL SAVER

## 2017-05-02 PROCEDURE — 37799 UNLISTED PX VASCULAR SURGERY: CPT

## 2017-05-02 PROCEDURE — 27000188 HC CONGENITAL BYPASS PUMP

## 2017-05-02 PROCEDURE — 84295 ASSAY OF SERUM SODIUM: CPT

## 2017-05-02 PROCEDURE — 76937 US GUIDE VASCULAR ACCESS: CPT | Mod: 26,,, | Performed by: ANESTHESIOLOGY

## 2017-05-02 PROCEDURE — 85014 HEMATOCRIT: CPT

## 2017-05-02 PROCEDURE — 99233 SBSQ HOSP IP/OBS HIGH 50: CPT | Mod: ,,, | Performed by: PEDIATRICS

## 2017-05-02 PROCEDURE — 86985 SPLIT BLOOD OR PRODUCTS: CPT

## 2017-05-02 PROCEDURE — 93005 ELECTROCARDIOGRAM TRACING: CPT

## 2017-05-02 PROCEDURE — 93010 ELECTROCARDIOGRAM REPORT: CPT | Mod: ,,, | Performed by: PEDIATRICS

## 2017-05-02 PROCEDURE — 93317 ECHO TRANSESOPHAGEAL: CPT | Mod: 76 | Performed by: PEDIATRICS

## 2017-05-02 PROCEDURE — 27100021 HC MULTIPORT INFUSION MANIFOLD: Performed by: NURSE ANESTHETIST, CERTIFIED REGISTERED

## 2017-05-02 PROCEDURE — 86900 BLOOD TYPING SEROLOGIC ABO: CPT

## 2017-05-02 PROCEDURE — P9045 ALBUMIN (HUMAN), 5%, 250 ML: HCPCS | Performed by: NURSE ANESTHETIST, CERTIFIED REGISTERED

## 2017-05-02 PROCEDURE — D9220A PRA ANESTHESIA: Mod: ANES,,, | Performed by: ANESTHESIOLOGY

## 2017-05-02 PROCEDURE — 86965 POOLING BLOOD PLATELETS: CPT

## 2017-05-02 PROCEDURE — 36000712 HC OR TIME LEV V 1ST 15 MIN: Performed by: THORACIC SURGERY (CARDIOTHORACIC VASCULAR SURGERY)

## 2017-05-02 PROCEDURE — 86920 COMPATIBILITY TEST SPIN: CPT

## 2017-05-02 PROCEDURE — C1768 GRAFT, VASCULAR: HCPCS | Performed by: THORACIC SURGERY (CARDIOTHORACIC VASCULAR SURGERY)

## 2017-05-02 PROCEDURE — 85730 THROMBOPLASTIN TIME PARTIAL: CPT

## 2017-05-02 PROCEDURE — C1894 INTRO/SHEATH, NON-LASER: HCPCS | Performed by: NURSE ANESTHETIST, CERTIFIED REGISTERED

## 2017-05-02 PROCEDURE — 93320 DOPPLER ECHO COMPLETE: CPT | Performed by: PEDIATRICS

## 2017-05-02 PROCEDURE — 27100025 HC TUBING, SET FLUID WARMER: Performed by: NURSE ANESTHETIST, CERTIFIED REGISTERED

## 2017-05-02 PROCEDURE — 37000009 HC ANESTHESIA EA ADD 15 MINS: Performed by: THORACIC SURGERY (CARDIOTHORACIC VASCULAR SURGERY)

## 2017-05-02 PROCEDURE — 85520 HEPARIN ASSAY: CPT

## 2017-05-02 PROCEDURE — P9011 BLOOD SPLIT UNIT: HCPCS

## 2017-05-02 PROCEDURE — 99900035 HC TECH TIME PER 15 MIN (STAT)

## 2017-05-02 PROCEDURE — 36556 INSERT NON-TUNNEL CV CATH: CPT | Mod: 59,,, | Performed by: ANESTHESIOLOGY

## 2017-05-02 PROCEDURE — 36592 COLLECT BLOOD FROM PICC: CPT

## 2017-05-02 PROCEDURE — 63600175 PHARM REV CODE 636 W HCPCS: Performed by: NURSE ANESTHETIST, CERTIFIED REGISTERED

## 2017-05-02 PROCEDURE — 5A1945Z RESPIRATORY VENTILATION, 24-96 CONSECUTIVE HOURS: ICD-10-PCS | Performed by: THORACIC SURGERY (CARDIOTHORACIC VASCULAR SURGERY)

## 2017-05-02 PROCEDURE — 36000713 HC OR TIME LEV V EA ADD 15 MIN: Performed by: THORACIC SURGERY (CARDIOTHORACIC VASCULAR SURGERY)

## 2017-05-02 PROCEDURE — 27200966 HC CLOSED SUCTION SYSTEM

## 2017-05-02 PROCEDURE — 85384 FIBRINOGEN ACTIVITY: CPT

## 2017-05-02 PROCEDURE — C1729 CATH, DRAINAGE: HCPCS | Performed by: THORACIC SURGERY (CARDIOTHORACIC VASCULAR SURGERY)

## 2017-05-02 PROCEDURE — 27000191 HC C-V MONITORING

## 2017-05-02 PROCEDURE — 80048 BASIC METABOLIC PNL TOTAL CA: CPT

## 2017-05-02 PROCEDURE — 85002 BLEEDING TIME TEST: CPT

## 2017-05-02 PROCEDURE — P9021 RED BLOOD CELLS UNIT: HCPCS

## 2017-05-02 PROCEDURE — 82803 BLOOD GASES ANY COMBINATION: CPT

## 2017-05-02 PROCEDURE — 27201015 HC HEMO-CONCENTRATOR

## 2017-05-02 PROCEDURE — 27800595 HC HEART VALVES

## 2017-05-02 PROCEDURE — 25000003 PHARM REV CODE 250: Performed by: ANESTHESIOLOGY

## 2017-05-02 PROCEDURE — 83605 ASSAY OF LACTIC ACID: CPT

## 2017-05-02 PROCEDURE — 63600175 PHARM REV CODE 636 W HCPCS: Performed by: PHYSICIAN ASSISTANT

## 2017-05-02 PROCEDURE — 27000221 HC OXYGEN, UP TO 24 HOURS

## 2017-05-02 PROCEDURE — 84132 ASSAY OF SERUM POTASSIUM: CPT

## 2017-05-02 PROCEDURE — 02RH0JZ REPLACEMENT OF PULMONARY VALVE WITH SYNTHETIC SUBSTITUTE, OPEN APPROACH: ICD-10-PCS | Performed by: THORACIC SURGERY (CARDIOTHORACIC VASCULAR SURGERY)

## 2017-05-02 PROCEDURE — 99291 CRITICAL CARE FIRST HOUR: CPT | Mod: ,,, | Performed by: PEDIATRICS

## 2017-05-02 PROCEDURE — 82330 ASSAY OF CALCIUM: CPT

## 2017-05-02 PROCEDURE — 93325 DOPPLER ECHO COLOR FLOW MAPG: CPT | Performed by: PEDIATRICS

## 2017-05-02 PROCEDURE — 27100080 HC AIRWAY ADAPTER-END TIDAL CO2

## 2017-05-02 PROCEDURE — 33475 REPLACEMENT PULMONARY VALVE: CPT | Mod: ,,, | Performed by: THORACIC SURGERY (CARDIOTHORACIC VASCULAR SURGERY)

## 2017-05-02 PROCEDURE — 93313 ECHO TRANSESOPHAGEAL: CPT | Mod: ,,, | Performed by: ANESTHESIOLOGY

## 2017-05-02 PROCEDURE — 99292 CRITICAL CARE ADDL 30 MIN: CPT | Mod: ,,, | Performed by: PEDIATRICS

## 2017-05-02 PROCEDURE — 37000008 HC ANESTHESIA 1ST 15 MINUTES: Performed by: THORACIC SURGERY (CARDIOTHORACIC VASCULAR SURGERY)

## 2017-05-02 PROCEDURE — 99900022

## 2017-05-02 PROCEDURE — 27201423 OPTIME MED/SURG SUP & DEVICES STERILE SUPPLY: Performed by: THORACIC SURGERY (CARDIOTHORACIC VASCULAR SURGERY)

## 2017-05-02 PROCEDURE — 27200680 HC TRANSDUCER, NEONATAL DISP

## 2017-05-02 PROCEDURE — 20300000 HC PICU ROOM

## 2017-05-02 PROCEDURE — 25000003 PHARM REV CODE 250

## 2017-05-02 PROCEDURE — 27201037 HC PRESSURE MONITORING SET UP

## 2017-05-02 PROCEDURE — 85610 PROTHROMBIN TIME: CPT

## 2017-05-02 PROCEDURE — 86901 BLOOD TYPING SEROLOGIC RH(D): CPT

## 2017-05-02 PROCEDURE — 27000445 HC TEMPORARY PACEMAKER LEADS

## 2017-05-02 PROCEDURE — 33475 REPLACEMENT PULMONARY VALVE: CPT | Mod: AS,,, | Performed by: PHYSICIAN ASSISTANT

## 2017-05-02 PROCEDURE — 85025 COMPLETE CBC W/AUTO DIFF WBC: CPT

## 2017-05-02 PROCEDURE — 36620 INSERTION CATHETER ARTERY: CPT | Mod: 59,,, | Performed by: ANESTHESIOLOGY

## 2017-05-02 PROCEDURE — D9220A PRA ANESTHESIA: Mod: CRNA,,, | Performed by: NURSE ANESTHETIST, CERTIFIED REGISTERED

## 2017-05-02 PROCEDURE — C1751 CATH, INF, PER/CENT/MIDLINE: HCPCS | Performed by: NURSE ANESTHETIST, CERTIFIED REGISTERED

## 2017-05-02 PROCEDURE — 94002 VENT MGMT INPAT INIT DAY: CPT

## 2017-05-02 DEVICE — VALVE HEART AORTIC EPIC 27MM: Type: IMPLANTABLE DEVICE | Site: HEART | Status: FUNCTIONAL

## 2017-05-02 DEVICE — IMPLANTABLE DEVICE: Type: IMPLANTABLE DEVICE | Site: HEART | Status: FUNCTIONAL

## 2017-05-02 RX ORDER — MILRINONE LACTATE 0.2 MG/ML
INJECTION, SOLUTION INTRAVENOUS CONTINUOUS PRN
Status: DISCONTINUED | OUTPATIENT
Start: 2017-05-02 | End: 2017-05-02

## 2017-05-02 RX ORDER — MAGNESIUM SULFATE HEPTAHYDRATE 40 MG/ML
2 INJECTION, SOLUTION INTRAVENOUS
Status: DISCONTINUED | OUTPATIENT
Start: 2017-05-02 | End: 2017-05-12 | Stop reason: HOSPADM

## 2017-05-02 RX ORDER — MILRINONE LACTATE 0.2 MG/ML
0.2 INJECTION, SOLUTION INTRAVENOUS CONTINUOUS
Status: DISCONTINUED | OUTPATIENT
Start: 2017-05-02 | End: 2017-05-03

## 2017-05-02 RX ORDER — FAMOTIDINE 10 MG/ML
20 INJECTION INTRAVENOUS 2 TIMES DAILY
Status: DISCONTINUED | OUTPATIENT
Start: 2017-05-02 | End: 2017-05-05

## 2017-05-02 RX ORDER — MORPHINE SULFATE 2 MG/ML
2 INJECTION, SOLUTION INTRAMUSCULAR; INTRAVENOUS
Status: DISCONTINUED | OUTPATIENT
Start: 2017-05-02 | End: 2017-05-06

## 2017-05-02 RX ORDER — MORPHINE SULFATE 2 MG/ML
1 INJECTION, SOLUTION INTRAMUSCULAR; INTRAVENOUS
Status: DISCONTINUED | OUTPATIENT
Start: 2017-05-02 | End: 2017-05-02

## 2017-05-02 RX ORDER — MORPHINE SULFATE 2 MG/ML
4 INJECTION, SOLUTION INTRAMUSCULAR; INTRAVENOUS
Status: DISCONTINUED | OUTPATIENT
Start: 2017-05-02 | End: 2017-05-06

## 2017-05-02 RX ORDER — POTASSIUM CHLORIDE 29.8 MG/ML
40 INJECTION INTRAVENOUS
Status: DISCONTINUED | OUTPATIENT
Start: 2017-05-02 | End: 2017-05-05

## 2017-05-02 RX ORDER — MIDAZOLAM HYDROCHLORIDE 5 MG/ML
4 INJECTION INTRAMUSCULAR; INTRAVENOUS
Status: DISCONTINUED | OUTPATIENT
Start: 2017-05-02 | End: 2017-05-02

## 2017-05-02 RX ORDER — POTASSIUM CHLORIDE 14.9 MG/ML
20 INJECTION INTRAVENOUS
Status: DISCONTINUED | OUTPATIENT
Start: 2017-05-02 | End: 2017-05-05

## 2017-05-02 RX ORDER — HEPARIN SODIUM,PORCINE 10 UNIT/ML
10 VIAL (ML) INTRAVENOUS
Status: DISCONTINUED | OUTPATIENT
Start: 2017-05-02 | End: 2017-05-04

## 2017-05-02 RX ORDER — FENTANYL CITRATE 50 UG/ML
INJECTION, SOLUTION INTRAMUSCULAR; INTRAVENOUS
Status: DISCONTINUED | OUTPATIENT
Start: 2017-05-02 | End: 2017-05-02

## 2017-05-02 RX ORDER — MIDAZOLAM HYDROCHLORIDE 1 MG/ML
2 INJECTION INTRAMUSCULAR; INTRAVENOUS
Status: DISCONTINUED | OUTPATIENT
Start: 2017-05-02 | End: 2017-05-03

## 2017-05-02 RX ORDER — FENTANYL CITRATE 50 UG/ML
100 INJECTION, SOLUTION INTRAMUSCULAR; INTRAVENOUS
Status: DISCONTINUED | OUTPATIENT
Start: 2017-05-02 | End: 2017-05-03

## 2017-05-02 RX ORDER — MIDAZOLAM HYDROCHLORIDE 1 MG/ML
INJECTION, SOLUTION INTRAMUSCULAR; INTRAVENOUS
Status: DISCONTINUED | OUTPATIENT
Start: 2017-05-02 | End: 2017-05-02

## 2017-05-02 RX ORDER — POTASSIUM CHLORIDE 14.9 MG/ML
20 INJECTION INTRAVENOUS
Status: DISCONTINUED | OUTPATIENT
Start: 2017-05-02 | End: 2017-05-02

## 2017-05-02 RX ORDER — ROCURONIUM BROMIDE 10 MG/ML
INJECTION, SOLUTION INTRAVENOUS
Status: DISCONTINUED | OUTPATIENT
Start: 2017-05-02 | End: 2017-05-02

## 2017-05-02 RX ORDER — MUPIROCIN 20 MG/G
1 OINTMENT TOPICAL 2 TIMES DAILY
Status: COMPLETED | OUTPATIENT
Start: 2017-05-02 | End: 2017-05-05

## 2017-05-02 RX ORDER — LIDOCAINE HYDROCHLORIDE 10 MG/ML
1 INJECTION, SOLUTION EPIDURAL; INFILTRATION; INTRACAUDAL; PERINEURAL ONCE
Status: COMPLETED | OUTPATIENT
Start: 2017-05-02 | End: 2017-05-02

## 2017-05-02 RX ORDER — SODIUM CHLORIDE 9 MG/ML
INJECTION, SOLUTION INTRAVENOUS CONTINUOUS
Status: DISCONTINUED | OUTPATIENT
Start: 2017-05-02 | End: 2017-05-02

## 2017-05-02 RX ORDER — CHLORHEXIDINE GLUCONATE ORAL RINSE 1.2 MG/ML
15 SOLUTION DENTAL 2 TIMES DAILY
Status: DISCONTINUED | OUTPATIENT
Start: 2017-05-02 | End: 2017-05-03

## 2017-05-02 RX ORDER — MIDAZOLAM HYDROCHLORIDE 1 MG/ML
4 INJECTION INTRAMUSCULAR; INTRAVENOUS
Status: DISCONTINUED | OUTPATIENT
Start: 2017-05-02 | End: 2017-05-03

## 2017-05-02 RX ORDER — DEXMEDETOMIDINE HYDROCHLORIDE 4 UG/ML
0.7 INJECTION, SOLUTION INTRAVENOUS CONTINUOUS
Status: DISCONTINUED | OUTPATIENT
Start: 2017-05-02 | End: 2017-05-02

## 2017-05-02 RX ORDER — SODIUM BICARBONATE 1 MEQ/ML
SYRINGE (ML) INTRAVENOUS
Status: DISCONTINUED | OUTPATIENT
Start: 2017-05-02 | End: 2017-05-02

## 2017-05-02 RX ORDER — LIDOCAINE HCL/PF 100 MG/5ML
SYRINGE (ML) INTRAVENOUS
Status: DISCONTINUED | OUTPATIENT
Start: 2017-05-02 | End: 2017-05-02

## 2017-05-02 RX ORDER — ONDANSETRON 2 MG/ML
INJECTION INTRAMUSCULAR; INTRAVENOUS
Status: DISCONTINUED | OUTPATIENT
Start: 2017-05-02 | End: 2017-05-02

## 2017-05-02 RX ORDER — INDOMETHACIN 25 MG/1
100 CAPSULE ORAL
Status: DISCONTINUED | OUTPATIENT
Start: 2017-05-02 | End: 2017-05-06

## 2017-05-02 RX ORDER — MIDAZOLAM HYDROCHLORIDE 5 MG/ML
2 INJECTION INTRAMUSCULAR; INTRAVENOUS
Status: DISCONTINUED | OUTPATIENT
Start: 2017-05-02 | End: 2017-05-02

## 2017-05-02 RX ORDER — PHENYLEPHRINE HYDROCHLORIDE 10 MG/ML
INJECTION INTRAVENOUS
Status: DISCONTINUED | OUTPATIENT
Start: 2017-05-02 | End: 2017-05-02

## 2017-05-02 RX ORDER — DOCUSATE SODIUM 100 MG/1
100 CAPSULE, LIQUID FILLED ORAL 2 TIMES DAILY
Status: DISCONTINUED | OUTPATIENT
Start: 2017-05-02 | End: 2017-05-10

## 2017-05-02 RX ORDER — DEXMEDETOMIDINE HYDROCHLORIDE 4 UG/ML
0.2 INJECTION, SOLUTION INTRAVENOUS CONTINUOUS
Status: DISCONTINUED | OUTPATIENT
Start: 2017-05-02 | End: 2017-05-02

## 2017-05-02 RX ORDER — FENTANYL CITRATE 50 UG/ML
50 INJECTION, SOLUTION INTRAMUSCULAR; INTRAVENOUS
Status: DISCONTINUED | OUTPATIENT
Start: 2017-05-02 | End: 2017-05-03

## 2017-05-02 RX ORDER — MAGNESIUM SULFATE HEPTAHYDRATE 40 MG/ML
INJECTION, SOLUTION INTRAVENOUS
Status: DISCONTINUED | OUTPATIENT
Start: 2017-05-02 | End: 2017-05-02

## 2017-05-02 RX ORDER — MORPHINE SULFATE 2 MG/ML
2 INJECTION, SOLUTION INTRAMUSCULAR; INTRAVENOUS
Status: DISCONTINUED | OUTPATIENT
Start: 2017-05-02 | End: 2017-05-02

## 2017-05-02 RX ORDER — PROTAMINE SULFATE 10 MG/ML
INJECTION, SOLUTION INTRAVENOUS
Status: DISCONTINUED | OUTPATIENT
Start: 2017-05-02 | End: 2017-05-02

## 2017-05-02 RX ORDER — ALBUMIN HUMAN 50 G/1000ML
SOLUTION INTRAVENOUS CONTINUOUS PRN
Status: DISCONTINUED | OUTPATIENT
Start: 2017-05-02 | End: 2017-05-02

## 2017-05-02 RX ORDER — CEFAZOLIN SODIUM 2 G/50ML
2 SOLUTION INTRAVENOUS
Status: DISPENSED | OUTPATIENT
Start: 2017-05-02 | End: 2017-05-03

## 2017-05-02 RX ORDER — INDOMETHACIN 25 MG/1
CAPSULE ORAL
Status: COMPLETED
Start: 2017-05-02 | End: 2017-05-02

## 2017-05-02 RX ORDER — HEPARIN SODIUM 1000 [USP'U]/ML
INJECTION, SOLUTION INTRAVENOUS; SUBCUTANEOUS
Status: DISCONTINUED | OUTPATIENT
Start: 2017-05-02 | End: 2017-05-02

## 2017-05-02 RX ORDER — DEXTROSE MONOHYDRATE AND SODIUM CHLORIDE 5; .45 G/100ML; G/100ML
INJECTION, SOLUTION INTRAVENOUS CONTINUOUS
Status: DISCONTINUED | OUTPATIENT
Start: 2017-05-02 | End: 2017-05-05

## 2017-05-02 RX ORDER — AMINOCAPROIC ACID 250 MG/ML
INJECTION, SOLUTION INTRAVENOUS
Status: DISCONTINUED | OUTPATIENT
Start: 2017-05-02 | End: 2017-05-02

## 2017-05-02 RX ORDER — PROPOFOL 10 MG/ML
VIAL (ML) INTRAVENOUS
Status: DISCONTINUED | OUTPATIENT
Start: 2017-05-02 | End: 2017-05-02

## 2017-05-02 RX ORDER — HEPARIN SODIUM,PORCINE/PF 1 UNIT/ML
1 SYRINGE (ML) INTRAVENOUS
Status: DISCONTINUED | OUTPATIENT
Start: 2017-05-02 | End: 2017-05-06

## 2017-05-02 RX ORDER — FUROSEMIDE 10 MG/ML
20 INJECTION INTRAMUSCULAR; INTRAVENOUS EVERY 8 HOURS
Status: DISCONTINUED | OUTPATIENT
Start: 2017-05-02 | End: 2017-05-03

## 2017-05-02 RX ADMIN — ALBUMIN (HUMAN): 12.5 SOLUTION INTRAVENOUS at 10:05

## 2017-05-02 RX ADMIN — ROCURONIUM BROMIDE 30 MG: 10 INJECTION, SOLUTION INTRAVENOUS at 12:05

## 2017-05-02 RX ADMIN — PROTAMINE SULFATE 90 MG: 10 INJECTION, SOLUTION INTRAVENOUS at 01:05

## 2017-05-02 RX ADMIN — LIDOCAINE HYDROCHLORIDE 100 MG: 20 INJECTION, SOLUTION INTRAVENOUS at 07:05

## 2017-05-02 RX ADMIN — PROTAMINE SULFATE 310 MG: 10 INJECTION, SOLUTION INTRAVENOUS at 12:05

## 2017-05-02 RX ADMIN — MIDAZOLAM HYDROCHLORIDE 3 MG: 1 INJECTION, SOLUTION INTRAMUSCULAR; INTRAVENOUS at 07:05

## 2017-05-02 RX ADMIN — DEXMEDETOMIDINE HYDROCHLORIDE 0.7 MCG/KG/HR: 4 INJECTION, SOLUTION INTRAVENOUS at 10:05

## 2017-05-02 RX ADMIN — ROCURONIUM BROMIDE 100 MG: 10 INJECTION, SOLUTION INTRAVENOUS at 07:05

## 2017-05-02 RX ADMIN — Medication 50 MCG/HR: at 05:05

## 2017-05-02 RX ADMIN — DEXTROSE MONOHYDRATE AND SODIUM CHLORIDE: 5; .45 INJECTION, SOLUTION INTRAVENOUS at 03:05

## 2017-05-02 RX ADMIN — ROCURONIUM BROMIDE 30 MG: 10 INJECTION, SOLUTION INTRAVENOUS at 11:05

## 2017-05-02 RX ADMIN — HEPARIN SODIUM 30000 UNITS: 1000 INJECTION, SOLUTION INTRAVENOUS; SUBCUTANEOUS at 10:05

## 2017-05-02 RX ADMIN — Medication 3 UNITS/HR: at 03:05

## 2017-05-02 RX ADMIN — MILRINONE LACTATE 0.5 MCG/KG/MIN: 200 INJECTION, SOLUTION INTRAVENOUS at 12:05

## 2017-05-02 RX ADMIN — FENTANYL CITRATE 250 MCG: 50 INJECTION, SOLUTION INTRAMUSCULAR; INTRAVENOUS at 03:05

## 2017-05-02 RX ADMIN — EPINEPHRINE 0.01 MCG/KG/MIN: 1 INJECTION PARENTERAL at 09:05

## 2017-05-02 RX ADMIN — CHLORHEXIDINE GLUCONATE 15 ML: 1.2 RINSE ORAL at 09:05

## 2017-05-02 RX ADMIN — LIDOCAINE HYDROCHLORIDE 0.1 MG: 10 INJECTION, SOLUTION EPIDURAL; INFILTRATION; INTRACAUDAL; PERINEURAL at 07:05

## 2017-05-02 RX ADMIN — FENTANYL CITRATE 250 MCG: 50 INJECTION, SOLUTION INTRAMUSCULAR; INTRAVENOUS at 12:05

## 2017-05-02 RX ADMIN — MIDAZOLAM HYDROCHLORIDE 2 MG: 1 INJECTION, SOLUTION INTRAMUSCULAR; INTRAVENOUS at 07:05

## 2017-05-02 RX ADMIN — PROPOFOL 150 MG: 10 INJECTION, EMULSION INTRAVENOUS at 07:05

## 2017-05-02 RX ADMIN — MUPIROCIN 1 G: 20 OINTMENT TOPICAL at 09:05

## 2017-05-02 RX ADMIN — AMINOCAPROIC ACID 10 G: 250 INJECTION, SOLUTION INTRAVENOUS at 08:05

## 2017-05-02 RX ADMIN — ROCURONIUM BROMIDE 30 MG: 10 INJECTION, SOLUTION INTRAVENOUS at 09:05

## 2017-05-02 RX ADMIN — EPINEPHRINE 0.02 MCG/KG/MIN: 1 INJECTION PARENTERAL at 09:05

## 2017-05-02 RX ADMIN — CEFAZOLIN SODIUM 2 G: 1 SOLUTION INTRAVENOUS at 09:05

## 2017-05-02 RX ADMIN — SODIUM BICARBONATE 100 MEQ: 84 INJECTION, SOLUTION INTRAVENOUS at 04:05

## 2017-05-02 RX ADMIN — CALCIUM CHLORIDE 500 MG: 100 INJECTION, SOLUTION INTRAVENOUS at 12:05

## 2017-05-02 RX ADMIN — DEXTROSE MONOHYDRATE AND SODIUM CHLORIDE 37.4 ML/HR: 5; .45 INJECTION, SOLUTION INTRAVENOUS at 09:05

## 2017-05-02 RX ADMIN — SODIUM BICARBONATE 50 MEQ: 84 INJECTION, SOLUTION INTRAVENOUS at 10:05

## 2017-05-02 RX ADMIN — ALBUMIN (HUMAN): 12.5 SOLUTION INTRAVENOUS at 12:05

## 2017-05-02 RX ADMIN — FENTANYL CITRATE 250 MCG: 50 INJECTION, SOLUTION INTRAMUSCULAR; INTRAVENOUS at 08:05

## 2017-05-02 RX ADMIN — CEFAZOLIN SODIUM 2 G: 1 SOLUTION INTRAVENOUS at 12:05

## 2017-05-02 RX ADMIN — EPINEPHRINE 0.02 MCG/KG/MIN: 1 INJECTION INTRAMUSCULAR; INTRAVENOUS; SUBCUTANEOUS at 12:05

## 2017-05-02 RX ADMIN — CALCIUM CHLORIDE 250 MG: 100 INJECTION, SOLUTION INTRAVENOUS at 01:05

## 2017-05-02 RX ADMIN — FAMOTIDINE 20 MG: 10 INJECTION, SOLUTION INTRAVENOUS at 09:05

## 2017-05-02 RX ADMIN — CALCIUM CHLORIDE 500 MG: 100 INJECTION, SOLUTION INTRAVENOUS at 01:05

## 2017-05-02 RX ADMIN — ROCURONIUM BROMIDE 30 MG: 10 INJECTION, SOLUTION INTRAVENOUS at 10:05

## 2017-05-02 RX ADMIN — ROCURONIUM BROMIDE 30 MG: 10 INJECTION, SOLUTION INTRAVENOUS at 01:05

## 2017-05-02 RX ADMIN — MIDAZOLAM HYDROCHLORIDE 2 MG: 1 INJECTION, SOLUTION INTRAMUSCULAR; INTRAVENOUS at 02:05

## 2017-05-02 RX ADMIN — PHENYLEPHRINE HYDROCHLORIDE 100 MCG: 10 INJECTION INTRAVENOUS at 07:05

## 2017-05-02 RX ADMIN — HEPARIN SODIUM: 1000 INJECTION, SOLUTION INTRAVENOUS; SUBCUTANEOUS at 05:05

## 2017-05-02 RX ADMIN — POTASSIUM CHLORIDE 40 MEQ: 400 INJECTION, SOLUTION INTRAVENOUS at 06:05

## 2017-05-02 RX ADMIN — MAGNESIUM SULFATE IN WATER 2 G: 40 INJECTION, SOLUTION INTRAVENOUS at 12:05

## 2017-05-02 RX ADMIN — FENTANYL CITRATE 250 MCG: 50 INJECTION, SOLUTION INTRAMUSCULAR; INTRAVENOUS at 09:05

## 2017-05-02 RX ADMIN — MIDAZOLAM HYDROCHLORIDE 3 MG: 1 INJECTION, SOLUTION INTRAMUSCULAR; INTRAVENOUS at 11:05

## 2017-05-02 RX ADMIN — FUROSEMIDE 20 MG: 10 INJECTION, SOLUTION INTRAMUSCULAR; INTRAVENOUS at 10:05

## 2017-05-02 RX ADMIN — MIDAZOLAM HYDROCHLORIDE 2 MG: 1 INJECTION, SOLUTION INTRAMUSCULAR; INTRAVENOUS at 01:05

## 2017-05-02 RX ADMIN — FENTANYL CITRATE 500 MCG: 50 INJECTION, SOLUTION INTRAMUSCULAR; INTRAVENOUS at 02:05

## 2017-05-02 RX ADMIN — DEXMEDETOMIDINE HYDROCHLORIDE 0.7 MCG/KG/HR: 4 INJECTION, SOLUTION INTRAVENOUS at 04:05

## 2017-05-02 RX ADMIN — PROTAMINE SULFATE 20 MG: 10 INJECTION, SOLUTION INTRAVENOUS at 01:05

## 2017-05-02 RX ADMIN — FENTANYL CITRATE 200 MCG: 50 INJECTION, SOLUTION INTRAMUSCULAR; INTRAVENOUS at 08:05

## 2017-05-02 RX ADMIN — MILRINONE LACTATE 0.5 MCG/KG/MIN: 200 INJECTION, SOLUTION INTRAVENOUS at 04:05

## 2017-05-02 RX ADMIN — SODIUM CHLORIDE, SODIUM GLUCONATE, SODIUM ACETATE, POTASSIUM CHLORIDE, MAGNESIUM CHLORIDE, SODIUM PHOSPHATE, DIBASIC, AND POTASSIUM PHOSPHATE: .53; .5; .37; .037; .03; .012; .00082 INJECTION, SOLUTION INTRAVENOUS at 08:05

## 2017-05-02 RX ADMIN — ACETAMINOPHEN 650 MG: 10 INJECTION, SOLUTION INTRAVENOUS at 06:05

## 2017-05-02 RX ADMIN — FENTANYL CITRATE 250 MCG: 50 INJECTION, SOLUTION INTRAMUSCULAR; INTRAVENOUS at 10:05

## 2017-05-02 RX ADMIN — FENTANYL CITRATE 50 MCG: 50 INJECTION, SOLUTION INTRAMUSCULAR; INTRAVENOUS at 07:05

## 2017-05-02 RX ADMIN — FENTANYL CITRATE 250 MCG: 50 INJECTION, SOLUTION INTRAMUSCULAR; INTRAVENOUS at 02:05

## 2017-05-02 RX ADMIN — ROCURONIUM BROMIDE 30 MG: 10 INJECTION, SOLUTION INTRAVENOUS at 02:05

## 2017-05-02 RX ADMIN — DEXMEDETOMIDINE HYDROCHLORIDE 0.5 MCG/KG/HR: 100 INJECTION, SOLUTION, CONCENTRATE INTRAVENOUS at 12:05

## 2017-05-02 RX ADMIN — AMINOCAPROIC ACID 1 G/HR: 250 INJECTION, SOLUTION INTRAVENOUS at 08:05

## 2017-05-02 RX ADMIN — PHENYLEPHRINE HYDROCHLORIDE 25 MCG: 10 INJECTION INTRAVENOUS at 10:05

## 2017-05-02 RX ADMIN — SODIUM CHLORIDE: 0.9 INJECTION, SOLUTION INTRAVENOUS at 07:05

## 2017-05-02 RX ADMIN — CALCIUM CHLORIDE 1000 MG: 100 INJECTION, SOLUTION INTRAVENOUS at 12:05

## 2017-05-02 RX ADMIN — CEFAZOLIN SODIUM 2 G: 2 SOLUTION INTRAVENOUS at 09:05

## 2017-05-02 NOTE — IP AVS SNAPSHOT
[facility]  [address]  [facility] [address]  Phone: [phone]           Patient Discharge Instructions   Our goal is to set you up for success. This packet includes information on your condition, medications, and your home care.  It will help you care for yourself to prevent having to return to the hospital.     Please ask your nurse if you have any questions.      There are many details to remember when preparing to leave the hospital. Here is what you will need to do:    1. Take your medicine. If you are prescribed medications, review your Medication List on the following pages. You may have new medications to  at the pharmacy and others that you'll need to stop taking. Review the instructions for how and when to take your medications. Talk with your doctor or nurses if you are unsure of what to do.     2. Go to your follow-up appointments. Specific follow-up information is listed in the following pages. Your may be contacted by a nurse or clinical provider about future appointments. Be sure we have all of the phone numbers to reach you. Please contact your provider's office if you are unable to make an appointment.     3. Watch for warning signs. Your doctor or nurse will give you detailed warning signs to watch for and when to call for assistance. These instructions may also include educational information about your condition. If you experience any of warning signs to your health, call your doctor.           [facility]  Unless otherwise directed by your provider, please   contact [facility], our nurse care line   that is available for 24/7 assistance.     [phone] (toll-free)     Registered nurses in the [facility]   provide: appointment scheduling, clinical advisement, health education, and other advisory services.                  ** Verify the list of medication(s) below is accurate and up to date. Carry this with you in case of  emergency. If your medications have changed, please notify your healthcare provider.             Medication List      START taking these medications        Additional Info    Begin Date AM Noon PM Bedtime    furosemide 40 MG tablet   Commonly known as:  LASIX   Quantity:  60 tablet   Refills:  0   Dose:  40 mg    Last time this was given:  40 mg on 5/12/2017  9:56 AM   Instructions:  Take 1 tablet (40 mg total) by mouth 2 (two) times daily.                                  metoprolol tartrate 25 MG tablet   Commonly known as:  LOPRESSOR   Quantity:  60 tablet   Refills:  0   Dose:  25 mg    Last time this was given:  25 mg on 5/12/2017  9:56 AM   Instructions:  Take 1 tablet (25 mg total) by mouth 2 (two) times daily.                                  oxycodone 5 MG immediate release tablet   Commonly known as:  ROXICODONE   Quantity:  30 tablet   Refills:  0   Dose:  5 mg    Last time this was given:  5 mg on 5/12/2017  5:54 AM   Instructions:  Take 1 tablet (5 mg total) by mouth every 6 (six) hours as needed.                              CONTINUE taking these medications        Additional Info    Begin Date AM Noon PM Bedtime    levothyroxine 88 MCG tablet   Commonly known as:  SYNTHROID   Refills:  0    Last time this was given:  88 mcg on 5/12/2017  5:52 AM                                 STOP taking these medications     carvedilol 25 MG tablet   Commonly known as:  COREG       digoxin 250 mcg tablet   Commonly known as:  LANOXIN       lisinopril 10 MG tablet            Where to Get Your Medications      You can get these medications from any pharmacy     Bring a paper prescription for each of these medications     furosemide 40 MG tablet    metoprolol tartrate 25 MG tablet    oxycodone 5 MG immediate release tablet                  Please bring to all follow up appointments:    1. A copy of your discharge instructions.  2. All medicines you are currently taking in their original bottles.  3. Identification and  insurance card.    Please arrive 15 minutes ahead of scheduled appointment time.    Please call 24 hours in advance if you must reschedule your appointment and/or time.        Your Scheduled Appointments     May 15, 2017  1:00 PM CDT   Established Patient Visit with Shanna Prado MD   [facility]    [address], [address]  [facility] [address]   [phone]            May 26, 2017  8:00 AM CDT   Diagnostic Xray with NOM XR1 PEDS  LB LIMIT   [facility])    [address]  [place] [address]   [phone]            May 26, 2017  8:15 AM CDT   Procedure with ECHO, PEDIATRICS   [facility] ([facility])    [address]  [place] [address]   [phone]            May 26, 2017  9:00 AM CDT   Established Patient Visit with MD [facility] - Peds Cardiology ([facility])    [address]  [place] [id number] [phone]            May 26, 2017  9:30 AM CDT   Established Patient Visit with VIVIENNE [facility]oa [facility] - Pediatric Cardiovasular Surgery ([facility] )    [address]  [place] [id number] [phone]              Follow-up Information     Follow up with Augustine Dean MD On 5/15/2017.    Specialty:  Pediatric Cardiology    Why:  at one pm    Contact information:    [address]  [address]  [facility] [address]  [phone]          Follow up with Bhargav Godfrey MD On 5/26/2017.    Specialty:  Cardiothoracic Surgery    Contact information:    [address] [facility] [address]  [phone]          Discharge Instructions     Future Orders    Call MD for:  difficulty breathing or increased cough     Call MD for:  increased confusion or weakness     Call MD for:  redness, tenderness, or signs of infection (pain, swelling, redness, odor or green/yellow  "discharge around incision site)     Call MD for:  severe uncontrolled pain     Diet general     Questions:    Total calories:      Fat restriction, if any:      Protein restriction, if any:      Na restriction, if any:      Fluid restriction:      Additional restrictions:      Lifting restrictions     Scheduling Instructions:    No heavy lifting for 6 weeks    No dressing needed     Shower on day dressing removed (No bath)     Scheduling Instructions:    MAY SHOWER DAILY        Primary Diagnosis     Your primary diagnosis was:  Status Post Pulmonary Valve Replacement      Admission Information     Date & Time Provider Department [facility]    5/2/2017  6:07 AM Bhargav Godfrey MD [facility] [phone]      Care Providers     Provider Role Specialty Primary office phone    Bhargav Godfrey MD Attending Provider Cardiothoracic Surgery [phone]    Bhargav Godfrey MD Surgeon  Cardiothoracic Surgery [phone]    Sagrario Diggs MD Consulting Physician  Pediatric Cardiology  [phone]    Grecia Hansen MD Consulting Physician  Pediatric Cardiology  [phone]      Your Vitals Were     BP Pulse Temp Resp Height Weight    125/60 (BP Location: Left arm, Patient Position: Sitting, BP Method: Automatic) 90 98 °F (36.7 °C) (Oral) 18 5' 6" (1.676 m) 98.8 kg (217 lb 13 oz)    SpO2 BMI             96% 35.16 kg/m2         Recent Lab Values     No lab values to display.      Pending Labs     Order Current Status    Prepare Cryoprecipitate 1 Dose (10 Units) In process    Prepare Platelets 1 Dose In process    Prepare RBC 2 Units; surgery In process    Type & Screen In process    Urinalysis In process    CBC auto differential Preliminary result    Prepare Cryoprecipitate 1 Dose (10 Units) Preliminary result    Prepare Cryoprecipitate 1 Dose (10 Units) Preliminary result    Prepare Fresh Frozen Plasma 1 Unit Preliminary result    Prepare Fresh Frozen Plasma 2 Units Preliminary result    " Prepare Fresh Frozen Plasma 2 Units Preliminary result    Prepare Platelets 1 Dose Preliminary result    Prepare Platelets 1 Dose Preliminary result    Prepare RBC Preliminary result    Prepare RBC 2 Units; surgery Preliminary result      Allergies as of 5/12/2017        Reactions    Latex, Natural Rubber     Sulfa (Sulfonamide Antibiotics)       Advance Directives     An advance directive is a document which, in the event you are no longer able to make decisions for yourself, tells your healthcare team what kind of treatment you do or do not want to receive, or who you would like to make those decisions for you.  If you do not currently have an advance directive, [facility] encourages you to create one.  For more information call:  [phone] ([phone]),  or log on to [email or web address].        Language Assistance Services     ATTENTION: Language assistance services are available, free of charge. Please call [phone].      ATENCIÓN: Si habla español, tiene a roberts disposición servicios gratuitos de asistencia lingüística. Llame al [phone].     [facility] Ý: N?u b?n nói Ti?ng Vi?t, có các d?ch v? h? tr? ngôn ng? mi?n phí dành cho b?n. G?i s? [phone].        Blood Transfusion Reaction Signs and Symptoms     The blood you have received has been matched for you as carefully as possible. Most patients who receive a blood transfusion do not experience problems. However, there can be a delayed reaction that happens a few weeks after your blood transfusion. Contact your physician immediately if you experience any NEW SYMPTOMS listed below:     Fever greater than 100.4 degrees    Chills   Yellow color to your skin or eyes(Jaundice)   Back pain, chest pain, or pain at the infusion site   Weakness (more than usual)   Discomfort or uneasiness more than usual (Malaise)   Nausea or vomiting   Shortness of breath, wheezing, or coughing   Higher or lower blood pressure than  normal   Skin rash, itching, skin redness, or localized swelling (example: hands or feet)   Urinating less than normal   Urine appears reddish or orange and is darker than normal      Remember that some these signs may already exist for you--such as having chronic back pain or high blood pressure. You only need to look for and report to your doctor any new occurrences since your blood transfusion that are of concern.        [facility] Sign-Up     Activating your [facility] account is as easy as 1-2-3!     1) Visit [email or web address], select Sign Up Now, enter this activation code and your date of birth, then select Next.  [email or web address]  Expires: 5/20/2017  9:27 AM      2) Create a username and password to use when you visit [facility] in the future and select a security question in case you lose your password and select Next.    3) Enter your e-mail address and click Sign Up!    Additional Information  If you have questions, please e-mail [email or web address] or call [phone] to talk to our [facility] staff. Remember, [facility] is NOT to be used for urgent needs. For medical emergencies, dial 911.          [facility] complies with applicable Federal civil rights laws and does not discriminate on the basis of race, color, national origin, age, disability, or sex.

## 2017-05-02 NOTE — ANESTHESIA PROCEDURE NOTES
Arterial    Diagnosis: Pulm Valve Stenosis  Doctor requesting consult: Bekah    Patient location during procedure: done in OR  Procedure start time: 5/2/2017 8:01 AM  Timeout: 5/2/2017 8:01 AM  Procedure end time: 5/2/2017 8:12 AM  Staffing  Anesthesiologist: ARI LEVINE  Performed by: anesthesiologist   Anesthesiologist was present at the time of the procedure.  Preanesthetic Checklist  Completed: patient identified, site marked, surgical consent, pre-op evaluation, timeout performed, IV checked, risks and benefits discussed, monitors and equipment checked and anesthesia consent given  Arterial Line  Skin Prep: chlorhexidine gluconate  Local Infiltration: none  Orientation: right  Location: radial  Catheter Size: 20 G{[facility] ANESTHESIA BLOCK ART PLACEMENT  Vessel Caliber: small, patent, compressibility normal  Vascular Doppler:  not done  Needle advanced into vessel with real time Ultrasound guidance.  Sterile sheath used.Insertion Attempts: 2  Assessment  Dressing: secured with tape and tegaderm  Patient: Tolerated well

## 2017-05-02 NOTE — TRANSFER OF CARE
"Anesthesia Transfer of Care Note    Patient: Cipriano Thompson    Procedure(s) Performed: Procedure(s) (LRB):  REPLACEMENT-VALVE-PULMONARY (N/A)    Patient location: Other: PICU CVICU    Anesthesia Type: general    Transport from OR: Transported from OR intubated on 100% O2 by AMBU with adequate controlled ventilation. Continuous ECG monitoring in transport. Continuous SpO2 monitoring in transport. Upon arrival to PACU/ICU, patient attached to ventilator and auscultated to confirm bilateral breath sounds and adequate TV. Continuos invasive BP monitoring in transport    Post pain: adequate analgesia    Post assessment: no apparent anesthetic complications    Post vital signs: stable    Level of consciousness: sedated    Nausea/Vomiting: no nausea/vomiting    Complications: none          Last vitals:   Visit Vitals    /69 (BP Location: Left arm, Patient Position: Lying, BP Method: Automatic)    Pulse 75    Temp 36.4 °C (97.5 °F) (Axillary)    Resp 16    Ht 5' 6" (1.676 m)    Wt 100.5 kg (221 lb 9 oz)    SpO2 100%    BMI 35.76 kg/m2     "

## 2017-05-02 NOTE — H&P (VIEW-ONLY)
Subjective:      Patient: Cipriano Thompson, MRN: [id number]  Requesting Physician:  Dr. Augustine Dean     Chief Complaint   Patient presents with    Heart Problem     pulmonary vlave stenosis/insufficiency       Surgical CONSULT/EVALUATION: Patient presents for pulmonary valve replacement    Diagnosis:      ICD-10-CM ICD-9-CM   1. Tetralogy of Fallot Q21.3 745.2   2. Tetralogy of Fallot s/p repair Z98.890 V15.1    Z87.74    3. Pulmonary valve stenosis with insufficiency, unspecified etiology I37.2 424.3   4. Pre-op testing Z01.818 V72.84   5. Trisomy 21 Q90.9 758.0       HPI:   Cipriano is a 26 year old withTOF and trisomy 21.  He underwent complete TOF repair at 1 year of age at [facility]. He required  a bioprosthetic pulmonary valve (27 mm Free Style Medtronic Valve) for severe PI placed in 1999 at [facility].  He subsequently required a dual-chamber pacemaker for sinus node dysfunction in 2008 at [facility]. In 2010 he had a EPS for ventricular ectopy at [facility].  In the last year or so, Mom reports Cipriano has had increasing SOB walking up stairs. No complaints of chest pain or palpitations. Cipriano also has, a history of hypothyroidism and is on Synthroid. He was referred by his cardiologist for possible Jeanna valve implantation. Unfortunately due to his coronary artery anatomy he was not a candidate. He was referred for surgical valve replacement.          Review of Systems   Constitution: Negative for chills, diaphoresis, fever and weight loss.   HENT: Negative for congestion, headaches and sore throat.    Eyes: Negative for discharge and redness.   Cardiovascular: Negative for chest pain, cyanosis, dyspnea on exertion, irregular heartbeat, leg swelling, near-syncope, orthopnea, palpitations and syncope.   Respiratory: Positive for shortness of breath and snoring. Negative for cough and wheezing.    Skin: Negative for color change, nail changes and rash.   Musculoskeletal:  Negative for muscle weakness and stiffness.   Gastrointestinal: Negative for abdominal pain, constipation, diarrhea, nausea and vomiting.   Neurological: Negative for dizziness, focal weakness, paresthesias and seizures.   Psychiatric/Behavioral: Negative for altered mental status. The patient is not nervous/anxious.    Allergic/Immunologic: Negative for environmental allergies.       History:    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    Tetralogy of Fallot 05/1990       Past Surgical History:   Procedure Laterality Date    BUNIONECTOMY      CARDIAC SURGERY      open heart, ppm     INSERT / REPLACE / REMOVE PACEMAKER Left 10/2008    TONSILLECTOMY         Family History   Problem Relation Age of Onset    No Known Problems Mother     No Known Problems Sister        Social History     Social History    Marital status: Single     Spouse name: N/A    Number of children: N/A    Years of education: N/A     Occupational History    Not on file.     Social History Main Topics    Smoking status: Never Smoker    Smokeless tobacco: Not on file    Alcohol use No    Drug use: Not on file    Sexual activity: Not Currently     Other Topics Concern    Not on file     Social History Narrative    Lives with mother, 1 dog (inside)         Objective:      Physical Exam   Constitutional: He is oriented to person, place, and time. He appears well-developed and well-nourished. No distress.   HENT:   Head: Normocephalic and atraumatic.   Mouth/Throat: Oropharynx is clear and moist. No oropharyngeal exudate.   Eyes: Pupils are equal, round, and reactive to light. Right eye exhibits no discharge. Left eye exhibits no discharge.   Neck: Normal range of motion. Neck supple. No JVD present.   Cardiovascular: Normal rate and regular rhythm.    Murmur (II/VI MARIELA) heard.  Pulmonary/Chest: Effort  "normal and breath sounds normal. No stridor. No respiratory distress. He has no wheezes.   Abdominal: Soft. Bowel sounds are normal. He exhibits no distension. There is no hepatosplenomegaly. There is no tenderness.   Musculoskeletal: Normal range of motion. He exhibits no edema or deformity.   Neurological: He is alert and oriented to person, place, and time. He exhibits normal muscle tone.   Skin: Skin is warm and dry. No rash noted. He is not diaphoretic. No erythema.   Psychiatric:   Trisomy 21       /68  Pulse 90  Ht 5' 6.14" (1.68 m)  Wt 100.5 kg (221 lb 9 oz)  SpO2 99%  BMI 35.61 kg/m2        Studies:  Echo:  Images consistent with history of repaired tetralogy of Fallot and later placement  of bioprosthetic valve in pulmonary position:  Technically limited acoustic windows-.  Mild right atrial enlargement.  A pacing wires noted crossing the tricuspid valve to enter the right ventricle with  mild associated tricuspid insufficiency.  Dilated right ventricle, moderate.  Qualitative impression of at least moderately reduced right ventricular systolic  function  Right ventricular pressure estimated greater than 50 mmHg from well defined  tricuspid insufficiency Doppler profile.  Intact ventricular septum.  Mildly calcified bioprosthetic pulmonary valve in pulmonary position with peak  velocity <3.1 m/sec., mean gradient <25 mmHg and mild insufficiency  Limited views suggest normal size pulmonary branches.  Left pulmonary artery branch stenosis, mild  Normal left ventricle structure and size.  Abnormal septal motion with shortening fraction measured as 39% and left  ventricular ejection fraction estimated 50-55% from apical views.  Normal aortic valve velocity.  Normal size aorta.  No evidence of coarctation of the aorta.  No pericardial effusion.        All physician notes and studies have been reviewed in detail.    Assessment & Plan:       Cipriano is a 26 year old with moderate subpulmonary stenosis " (peak gradient 24mmHg) and moderate pulmonary insufficiency following TOF repair. He would benefit from pulmonary valve replacement at this time. The risks, benefits, and alternatives to pulmonary valve replacement were discussed in detail with the patient and his mother. They are aware that there is a five percent mortality associated with this operation. There is also a risk of bleeding, infection, stroke, and the risk of anesthesia. A surgical date of 5-2-17 has been made. Cipriano will undergo pre-operative testing-cbc, type and cross, cmp, mrsa screen, cxr, ekg-prior to his operation. These results will be reviewed when available. All questions and concerns were addressed.

## 2017-05-02 NOTE — PROGRESS NOTES
[facility]  Pediatric Critical Care  Postoperative Note      Patient Name: Cipriano Thompson  MRN: [id number]  Admission Date: 5/2/2017  Code Status: Full Code   Attending Provider: Shaunna Villalba  Primary Care Physician: Princess KHUSHBOO Rodgers MD  Principal Problem:<principal problem not specified>    Subjective:     HPI: Cipriano Thompson is a 26 y.o. 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  He also has a ho hypertension and ventricular ectopy (PVCs, couplets and triplets).  He presented to us with mixed moderate subPS (24mmhg)/moderatePI and exercise intolerance (SOB with climbing stairs) is now s/p redo sternotomy and pulmonary valve replacement with 27mm porcine valve in 30 mm conduit POD#0    At home meds include:  Carvedilol, digoxin, synthroid and lisinopril    Intraoperative Course:  Difficult case without major complications but overall did well.  Required decent amount of blood products.  No significant arrhythmias in the OR.  CPB: 87 min, MUFF: 300ml.  Received: 3 units of blood, 2 units of FFP, platelets & cryo, 250 ml of cell saver, 1750ml 5% albumin and 700ml of Crystalloid.  He arrived to pCVICU on Milrinone 0.5mcg/kg/min, Nicardipine of 5mg/hr and Epinephrine was discontinued on the way up.       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    Tetralogy of Fallot 05/1990       Past Surgical History:   Procedure Laterality Date    BUNIONECTOMY      CARDIAC SURGERY      open heart, ppm     INSERT / REPLACE / REMOVE PACEMAKER Left 10/2008    TONSILLECTOMY         Review of patient's allergies indicates:   Allergen Reactions    Latex, natural rubber     Sulfa (sulfonamide antibiotics)        Family History     Problem Relation (Age of Onset)     No Known Problems Mother, Sister          Social History Main Topics    Smoking status: Never Smoker    Smokeless tobacco: Not on file    Alcohol use No    Drug use: Not on file    Sexual activity: Not Currently       Review of Systems    Objective:     Vital Signs Range (Last 24H):  Temp:  [97.5 °F (36.4 °C)-98.4 °F (36.9 °C)]   Pulse:  [75-77]   Resp:  [16-20]   BP: (135)/(69)   SpO2:  [100 %]   Arterial Line BP: (121)/(50)     I & O (Last 24H):  Intake/Output Summary (Last 24 hours) at 05/02/17 1624  Last data filed at 05/02/17 1600   Gross per 24 hour   Intake             5206 ml   Output             1080 ml   Net             4126 ml       Ventilator Data (Last 24H):     Vent Mode: SIMV (VC) + PS  Oxygen Concentration (%):  [80.1] 80.1  Resp Rate Total:  [16 br/min] 16 br/min  Vt Set:  [500 mL] 500 mL  PEEP/CPAP:  [6 cmH20] 6 cmH20  Pressure Support:  [10 cmH20] 10 cmH20  Mean Airway Pressure:  [9.4 cmH20] 9.4 cmH20    Hemodynamic Parameters (Last 24H):       Physical Exam:  Physical Exam  Gen: sedated  HEENT: MMM. PERRL. Down's facies  Resp: course bs b/l, no wheezes noted.  On mechanical ventilation  CV: RRR. 3/6 systolic murmur best heard at LUSB. 2+ pulses, CR < 3sec   Abd: soft NTND.  Liver edge 4 cm below RCM.  Ext: dry skin    Lines/Drains/Airways     Central Venous Catheter Line                 Percutaneous Central Line Insertion/Assessment - double lumen  05/02/17 0816 right internal jugular less than 1 day          Drain                 Chest Tube 05/02/17 1602 Left Mediastinal 24 Fr. less than 1 day         Chest Tube 05/02/17 1602 Right Mediastinal 24 Fr. less than 1 day         Urethral Catheter 05/02/17 0844 Non-latex;Temperature probe 16 Fr. less than 1 day          Airway                 Airway - Non-Surgical 05/02/17 0751 Endotracheal Tube less than 1 day          Arterial Line                 Arterial Line 05/02/17 0807 Right Radial less than 1 day          Peripheral Intravenous Line                  Peripheral IV - Single Lumen 05/02/17 0727 Right Forearm less than 1 day         Peripheral IV - Single Lumen 05/02/17 0807 Left Wrist less than 1 day                Laboratory (Last 24H):   ABG:   Recent Labs  Lab 05/02/17  1527   PH 7.445   PCO2 33.3*   HCO3 22.9*   POCSATURATED 100   BE -1     CMP:   Recent Labs  Lab 05/02/17  1525      K 3.9  3.9      CO2 20*   *   BUN 19   CREATININE 1.3   CALCIUM 10.8*   ANIONGAP 12   EGFRNONAA >60.0     CBC:   Recent Labs  Lab 05/01/17  1343 05/02/17  1525 05/02/17  1527   WBC 6.14 6.97  --    HGB 13.6* 10.5*  --    HCT 39.8* 30.2* 31*    134*  --        Chest X-Ray: I personally reviewed the films and findings are: ETT in place, +pulmonary edema, Righ IJ in am.      Assessment/Plan:     Active Diagnoses:    Diagnosis Date Noted POA    Pulmonary valve insufficiency [I37.1] 05/02/2017 Yes      Problems Resolved During this Admission:    Diagnosis Date Noted Date Resolved MITCHA     Cipriano Thompson is a 26 y.o. male with T21 s/p TOF s/p repair s/p subsequent pulmonary valve replacement in 1999, with sinus node syfunction requiring pacemaker placement in 2008 with symptomatic mixed moderate subPS (24mmhg)/moderatePI is now s/p redo sternotomy and pulmonary valve replacement with 27mm porcine valve/30 mm conduit POD#0    Neuro:  Postoperative Sedation & Analgesia:  -- Will start Precedex and Fentanyl gtt , will need to continue Precedex thru extubation  -- Prn Fentanyl and Versed  -- IV tylenol to start tonight  -- Will start Toradol if no CI (YAEL/Bleeding/Thrombocytopenia)    Resp:  Postoperative Respiratory Failure:  -- On mechanical ventilation with PEEP of 7 due to concerns of pulmonary edema on cxr  -- Will wean to a goal of extubation in am  -- Once extubated start IS every 1 hour while awake    CV:  Pulmonary Valve replacement with Porcine 27mm valve/30mm conduit:  Rhythm: ho junctional escape rhythm.  Medtronic pacer: A-paced, V-sensed  low rate: 75, upper rate:150  : PVCs noted as per history.  Preload: 75ml/hr IVF.  Once hemodynamically stable, will start Lasix this evening  Contractility: Milrinone 0.5mcg/kg/min  Afterload: Milrinone 0.5 mcg/kg/min, holding home Carvedilol and Lisinopril.  Nicardipine at bedside.    FEN/GI:  Nutrition/IVFs:  -- NPO. On fluids, will advance diet once extubated  Electrolytes:  -- will replace as needed  GI prophylaxis:  -- Famotidine    Renal:  -- BUN/Cr pending  -- Strict Is/Os, tai in place    Heme:  -- Minimal postoperative bleeding, coags pending  -- Monitor for excessive bleeding    ID:  Postoperative prophylaxis:  -- Ancef for 72 hours     Social/Dispo:  I will update family members once they arrive to bedside    Critical Care Time: 120 minutes     Shaunna Villalba  Pediatric Critical Care  [facility]

## 2017-05-02 NOTE — ASSESSMENT & PLAN NOTE
26 y.o. male with history of Down's, TOF s/p repair as infant, PVR in 1999, sinus node dysfunction s/p pacemaker 2008, now s/p repeat PVR today. Post-operative respiratory insufficiency.   Neuro/Pain:  - Continue Sedative Infusions  - Pain control  Respiratory:  - Wean mechanical ventilation as tolerated  - CPT  Cardiovascular:  - Monitor BP's closely.   - Follow up EKG. Monitor telemetry. Known to have frequent PVC's  - Monitor BP, patient on multi antihypertensives at home  FEN/GI:  - NPO  - Monitor electrolytes and replace as needed.   Renal:  - Monitor I/O's closely  Heme/ID:  - Ancef x 48 hours post-op  - H/H stable. Monitor for bleeding  Plastics:  - Stable  Disposition:  - Stabilize. Wean respiratory support. Monitor blood pressures.

## 2017-05-02 NOTE — ANESTHESIA PREPROCEDURE EVALUATION
05/02/2017  Cipriano Thompson is a 26 y.o., male with repaired tetralogy of Fallot and pulmonary insufficiency. Now needs pulmonary valve replacement.    Anesthesia Evaluation    I have reviewed the Patient Summary Reports.    I have reviewed the Nursing Notes.   I have reviewed the Medications.     Review of Systems  Anesthesia Hx:  Hx of Anesthetic complications History of emergence delirium History of prior surgery of interest to airway management or planning: Denies Family Hx of Anesthesia complications.   Denies Personal Hx of Anesthesia complications.   Social:  Non-Smoker, No Alcohol Use    Hematology/Oncology:  Hematology Normal   Oncology Normal     EENT/Dental:EENT/Dental Normal   Cardiovascular:   Pacemaker Dysrhythmias CHF TOF sp repair, pulmonary insufficiency, cardiac notes and studies reviewed.    ECHO: (S/P) Repair of TOF. (S/P) Bioprosthetic Pulmonary Valve. There are 4 cardiac chambers. No evidence of an ASD or VSD. The RV is slightly dilated.   Measurements : RV is 35 mm. LV is 46 mm. LA is 31 mm. IVS is 12 mm. PW is 12 mm. The pulmonary annulus is ~ 14 mm. The valve opening is ~ 8 mm.   The RPA is 11 mm and the LPA is 9 mm. The Ao root is 31 mm and the Ao annulus is 25 mm. LV-EF is 56%. RV area of shortening is 33%, which is reduced. There is septal flattening, with paradoxical motion. No clots or vegetations seen. No pericardial effusion.     Doppler Velocity Analysis: No MR or AI. Mod TR with a jet of 58 mmHg, in the setting of reduced contractile function. Moderate PI. Peak Ao outflow pressure drop is 4 mmHg. Peak pulmonary outflow pressure drop is 41 mmHg (again reduced RV contractile function).  No retrograde flow in the branch PAs. Pulmonary venous return is to the LA.     ECG: RBB, RVH based on QRS frontal axis. Atrial pacing.   Pulmonary:  Pulmonary Normal    Renal/:  Renal/ Normal      Hepatic/GI:  Hepatic/GI Normal    Musculoskeletal:   Denies neck pain on flexion, extension, or lateral rotation, denies neck stiffness   OB/GYN/PEDS:  Down syndrome   Neurological:  Neurology Normal    Endocrine:   Hypothyroidism Morbid obesity       Physical Exam  General:  Well nourished, Morbid Obesity    Airway/Jaw/Neck:  Airway Findings: Mouth Opening: Small, but > 3cm Tongue: Large  General Airway Assessment: Adult  Down facies  Mallampati: III  TM Distance: 4 - 6 cm  Jaw/Neck Findings:  Neck ROM: Extension Decreased, Mild      Dental:  Dental Findings: In tact   Chest/Lungs:  Chest/Lungs Findings: Clear to auscultation, Normal Respiratory Rate     Heart/Vascular:  Heart Findings: Rate: Normal  Rhythm: Regular Rhythm        Mental Status:  Mental Status Findings:  Cooperative, Alert and Oriented         Anesthesia Plan  Type of Anesthesia, risks & benefits discussed:  Anesthesia Type:  general  Patient's Preference:   Intra-op Monitoring Plan:   Intra-op Monitoring Plan Comments:   Post Op Pain Control Plan:   Post Op Pain Control Plan Comments:   Induction:   IV  Beta Blocker:  Patient is on a Beta-Blocker and has received one dose within the past 24 hours (No further documentation required).       Informed Consent: Patient representative understands risks and agrees with Anesthesia plan.  Questions answered. Anesthesia consent signed with patient representative.  ASA Score: 4     Day of Surgery Review of History & Physical:    H&P update referred to the surgeon.         Ready For Surgery From Anesthesia Perspective.

## 2017-05-02 NOTE — PROGRESS NOTES
Arrhythmia clinic  Called to OR to reprogram pt's PPM.  Set at AAIR-DDDR 75bpm.  RA paces 99%  RV 0%.  Underlying rhythm SB   Reprogrammed to DOO 75bpm per Anesthesia request.  Please call [phone] post op to have PPM restored to initial settings.

## 2017-05-02 NOTE — ANESTHESIA PROCEDURE NOTES
Monitor JAKE    Diagnosis: Pulmonary valve Stenosis  Patient location during procedure: OR  Procedure start time: 5/2/2017 8:21 AM  Timeout: 5/2/2017 8:21 AM  Procedure end time: 5/2/2017 8:22 AM  Surgery related to: Pulm Valve replacement  Exam type: Monitor Only  Staffing  Anesthesiologist: ARI PRATER  Performed by: anesthesiologist   Preanesthetic Checklist  Completed: patient identified, surgical consent, pre-op evaluation, timeout performed, risks and benefits discussed, monitors and equipment checked, anesthesia consent given, oxygen available, suction available, hand hygiene performed and patient being monitored  Setup & Induction  Patient preparation: bite block inserted  Probe Insertion: easy  Exam: incomplete    Exam                                      Effusions    Summary    Other Findings  JAKE placement by Dr. Prater, JAKE exam and interpretation by Dr. Villegas (peds cardiologist)

## 2017-05-02 NOTE — NURSING TRANSFER
Nursing Transfer Note    Receiving Transfer Note    5/2/2017 3:15 PM  Received in transfer from OR to [id number]  Report received as documented in PER Handoff on Doc Flowsheet.  See Doc Flowsheet for VS's and complete assessment.  Continuous EKG monitoring in place Yes  Chart received with patient: Yes   Frequent PVCs.  500 mcg fentanyl given by anesthesia for SBP >125.  Debby Robledo RN  5/2/2017 3:15 PM

## 2017-05-02 NOTE — SUBJECTIVE & OBJECTIVE
Hospital Course:  Cipriano was taken to the operating room on 5/2/17 where he underwent pulmonary valve replacement with a 27 mm pig valve. CPB time 87 minutes,  min. He tolerated the procedure well and returns to the Pediatric CVICU on mechanical ventilation, sedative infusions, Milrinone and Nipride infusions. Stable on arrival.     Past Surgical History:   Procedure Laterality Date    BUNIONECTOMY      CARDIAC SURGERY      open heart, ppm     INSERT / REPLACE / REMOVE PACEMAKER Left 10/2008    TONSILLECTOMY         Review of patient's allergies indicates:   Allergen Reactions    Latex, natural rubber     Sulfa (sulfonamide antibiotics)        No current facility-administered medications on file prior to encounter.      Current Outpatient Prescriptions on File Prior to Encounter   Medication Sig    carvedilol (COREG) 25 MG tablet     digoxin (LANOXIN) 250 mcg tablet     levothyroxine (SYNTHROID) 88 MCG tablet     lisinopril 10 MG tablet      Family History     Problem Relation (Age of Onset)    No Known Problems Mother, Sister        Social History     Social History Narrative    Lives with mother, 1 dog (inside)     Review of Systems  Objective:     Vital Signs (Most Recent):  Temp: 98.4 °F (36.9 °C) (05/02/17 1530)  Pulse: 75 (05/02/17 1537)  Resp: 20 (05/02/17 1537)  BP: 135/69 (05/02/17 0701)  SpO2: 100 % (05/02/17 1537) Vital Signs (24h Range):  Temp:  [97.5 °F (36.4 °C)-98.4 °F (36.9 °C)] 98.4 °F (36.9 °C)  Pulse:  [75-77] 75  Resp:  [16-20] 20  SpO2:  [100 %] 100 %  BP: (135)/(69) 135/69  Arterial Line BP: (121)/(50) 121/50     Weight: 100.5 kg (221 lb 9 oz)  Body mass index is 35.76 kg/(m^2).    SpO2: 100 %  O2 Device (Oxygen Therapy): ventilator      Intake/Output Summary (Last 24 hours) at 05/02/17 1608  Last data filed at 05/02/17 1524   Gross per 24 hour   Intake             5206 ml   Output              965 ml   Net             4241 ml       Lines/Drains/Airways     Central Venous  Catheter Line                 Percutaneous Central Line Insertion/Assessment - double lumen  05/02/17 0816 right internal jugular less than 1 day          Drain                 Chest Tube 05/02/17 1602 Left Mediastinal 24 Fr. less than 1 day         Chest Tube 05/02/17 1602 Right Mediastinal 24 Fr. less than 1 day         Urethral Catheter 05/02/17 0844 Non-latex;Temperature probe 16 Fr. less than 1 day          Airway                 Airway - Non-Surgical 05/02/17 0751 Endotracheal Tube less than 1 day          Arterial Line                 Arterial Line 05/02/17 0807 Right Radial less than 1 day          Peripheral Intravenous Line                 Peripheral IV - Single Lumen 05/02/17 0727 Right Forearm less than 1 day         Peripheral IV - Single Lumen 05/02/17 0807 Left Wrist less than 1 day                Physical Exam  General: Well-nourished. Obese Male. Down's facies. Sedated/Intubated and in NAD.   HEENT: Normocephalic. Atraumatic. PERRL. ETT in place. MMM.   Neck: Supple. No lymphadenopathy or thyromegaly.   Respiratory: Symmetrical chest wall rise. CTA bilaterally.   Cardiac: Regular rate and normal Rhythm. Normal S1 and physiologically split S2. + rub, 2/6 systolic ejection murmur. No gallop.   Abdomen: Soft. ND. No splenomegaly. Liver border difficult to palpate given body habitus. Hypoactive bowel sounds  Extremities: No cyanosis, clubbing or edema. Brisk capillary refill. Pulses 2+ bilaterally to upper and lower extremities.  Derm: No rashes or lesions noted.   Lines/Tubes: Espitia, ETT, NG, R IJ CVL, R radial Art Line, R forearm, L wrist PIV, L and R Mediastinal tubes    Lab Results   Component Value Date    WBC 6.14 05/01/2017    HGB 13.6 (L) 05/01/2017    HCT 31 (L) 05/02/2017    MCV 93 05/01/2017     05/01/2017     CMP  Sodium   Date Value Ref Range Status   05/01/2017 141 136 - 145 mmol/L Final     Potassium   Date Value Ref Range Status   05/01/2017 4.6 3.5 - 5.1 mmol/L Final     Chloride    Date Value Ref Range Status   05/01/2017 107 95 - 110 mmol/L Final     CO2   Date Value Ref Range Status   05/01/2017 26 23 - 29 mmol/L Final     Glucose   Date Value Ref Range Status   05/01/2017 87 70 - 110 mg/dL Final     BUN, Bld   Date Value Ref Range Status   05/01/2017 24 (H) 6 - 20 mg/dL Final     Creatinine   Date Value Ref Range Status   05/01/2017 1.3 0.5 - 1.4 mg/dL Final     Calcium   Date Value Ref Range Status   05/01/2017 9.1 8.7 - 10.5 mg/dL Final     Total Protein   Date Value Ref Range Status   05/01/2017 7.5 6.0 - 8.4 g/dL Final     Albumin   Date Value Ref Range Status   05/01/2017 3.4 (L) 3.5 - 5.2 g/dL Final     Total Bilirubin   Date Value Ref Range Status   05/01/2017 0.3 0.1 - 1.0 mg/dL Final     Comment:     For infants and newborns, interpretation of results should be based  on gestational age, weight and in agreement with clinical  observations.  Premature Infant recommended reference ranges:  Up to 24 hours.............<8.0 mg/dL  Up to 48 hours............<12.0 mg/dL  3-5 days..................<15.0 mg/dL  6-29 days.................<15.0 mg/dL       Alkaline Phosphatase   Date Value Ref Range Status   05/01/2017 107 55 - 135 U/L Final     AST   Date Value Ref Range Status   05/01/2017 18 10 - 40 U/L Final     ALT   Date Value Ref Range Status   05/01/2017 15 10 - 44 U/L Final     Anion Gap   Date Value Ref Range Status   05/01/2017 8 8 - 16 mmol/L Final     eGFR if    Date Value Ref Range Status   05/01/2017 >60.0 >60 mL/min/1.73 m^2 Final     eGFR if non    Date Value Ref Range Status   05/01/2017 >60.0 >60 mL/min/1.73 m^2 Final     Comment:     Calculation used to obtain the estimated glomerular filtration  rate (eGFR) is the CKD-EPI equation. Since race is unknown   in our information system, the eGFR values for   -American and Non--American patients are given   for each creatinine result.       Lab Results   Component Value Date     INR 1.1 05/02/2017     ABG    Recent Labs  Lab 05/02/17  1527   PH 7.445   PO2 228*   PCO2 33.3*   HCO3 22.9*   BE -1       CXR Reviewed

## 2017-05-02 NOTE — CONSULTS
[facility]  Pediatric Cardiology  Consult Note    Patient Name: Cipriano Thompson  MRN: [id number]  Admission Date: 5/2/2017  Hospital Length of Stay: 0 days  Code Status: No Order   Attending Provider: Bhargav Godfrey MD   Consulting Provider: BHARAT Perdue  Primary Care Physician: Princess KHUSHBOO Rodgers MD  Principal Problem:<principal problem not specified>    Inpatient consult to Pediatric Cardiology  Consult performed by: SILVERIO TELLO  Consult ordered by: KRISTIE CABALLERO        Subjective:     Chief Complaint:  TOF     HPI:   Cipriano is a 26 y.o. Male with Down's syndrome, TOF s/p complete repair as an infant with subsequent PVR in 1999 for right ventricular dilation and pacemaker placement in 2008 for Sinus node dysfunction. He presented with exercise intolerance and moderate PS/PI so he was referred for repeat PVR. Also with history of hypertension and ventricular ectopy with frequent PVC's. His home medication regimen includes Carvedilol 25 mg daily, Digoxin 250 mcg qd, Lisinopril 10 mg qd and Synthroid. He has a cardiac catheterization for possible Jeanna valve implantation on 4/4/17 and were unable to secondary to coronary artery anatomy.   Cath findings:  1.  Repaired tetralogy of Fallot with subsequent 27 mm FreeStyle bioprosthetic pulmonary valve implantation.  2.  Moderate pulmonary valve stenosis (peak gradient 24 mmHg).  Moderate pulmonary insufficiency.  3.  Normal cardiac output and vascular resistance calculations.  4.  Pacemaker for sinus node dysfunction.  5.  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  Normal origin of left coronary.  6.  Left aortic arch.  Normal head and neck branching.   He was presented in our multidisciplinary cardiac conference and recommendations were made for surgical pulmonary valve replacement.     Hospital Course:  Cipriano was taken to the operating room on 5/2/17 where he underwent pulmonary valve replacement with a 27 mm St. Quinn Epic Valve.  Postoperative JAKE reportedly demonstrated a bioprosthetic pulmonary valve with a peak velocity of 2.7 m/sec, no significant regurgitation and good biventricular systolic function. He reportedly had atrial flutter during the procedure with no furher complications. He returns to the Pediatric CVICU on mechanical ventilation, sedative infusions, Milrinone and Nipride infusions.      Past Surgical History:   Procedure Laterality Date    BUNIONECTOMY      CARDIAC SURGERY      open heart, ppm     INSERT / REPLACE / REMOVE PACEMAKER Left 10/2008    TONSILLECTOMY         Review of patient's allergies indicates:   Allergen Reactions    Latex, natural rubber     Sulfa (sulfonamide antibiotics)        No current facility-administered medications on file prior to encounter.      Current Outpatient Prescriptions on File Prior to Encounter   Medication Sig    carvedilol (COREG) 25 MG tablet     digoxin (LANOXIN) 250 mcg tablet     levothyroxine (SYNTHROID) 88 MCG tablet     lisinopril 10 MG tablet      Family History     Problem Relation (Age of Onset)    No Known Problems Mother, Sister        Social History     Social History Narrative    Lives with mother, 1 dog (inside)     Review of Systems  Objective:     Vital Signs (Most Recent):  Temp: 98.4 °F (36.9 °C) (05/02/17 1530)  Pulse: 75 (05/02/17 1537)  Resp: 20 (05/02/17 1537)  BP: 135/69 (05/02/17 0701)  SpO2: 100 % (05/02/17 1537) Vital Signs (24h Range):  Temp:  [97.5 °F (36.4 °C)-98.4 °F (36.9 °C)] 98.4 °F (36.9 °C)  Pulse:  [75-77] 75  Resp:  [16-20] 20  SpO2:  [100 %] 100 %  BP: (135)/(69) 135/69  Arterial Line BP: (121)/(50) 121/50     Weight: 100.5 kg (221 lb 9 oz)  Body mass index is 35.76 kg/(m^2).    SpO2: 100 %  O2 Device (Oxygen Therapy): ventilator      Intake/Output Summary (Last 24 hours) at 05/02/17 1608  Last data filed at 05/02/17 1524   Gross per 24 hour   Intake             5206 ml   Output              965 ml   Net             4241 ml        Lines/Drains/Airways     Central Venous Catheter Line                 Percutaneous Central Line Insertion/Assessment - double lumen  05/02/17 0816 right internal jugular less than 1 day          Drain                 Chest Tube 05/02/17 1602 Left Mediastinal 24 Fr. less than 1 day         Chest Tube 05/02/17 1602 Right Mediastinal 24 Fr. less than 1 day         Urethral Catheter 05/02/17 0844 Non-latex;Temperature probe 16 Fr. less than 1 day          Airway                 Airway - Non-Surgical 05/02/17 0751 Endotracheal Tube less than 1 day          Arterial Line                 Arterial Line 05/02/17 0807 Right Radial less than 1 day          Peripheral Intravenous Line                 Peripheral IV - Single Lumen 05/02/17 0727 Right Forearm less than 1 day         Peripheral IV - Single Lumen 05/02/17 0807 Left Wrist less than 1 day                Physical Exam  General: Well-nourished. Obese Male. Down's facies. Sedated/Intubated and in NAD.   HEENT: Normocephalic. Atraumatic. PERRL. ETT in place. MMM.   Neck: Supple. No lymphadenopathy or thyromegaly.   Respiratory: Symmetrical chest wall rise. CTA bilaterally.   Cardiac: Regular rate and normal Rhythm. Normal S1 and physiologically split S2. + rub, 2/6 systolic ejection murmur. No gallop.   Abdomen: Soft. ND. No splenomegaly. Liver border difficult to palpate given body habitus. Hypoactive bowel sounds  Extremities: No cyanosis, clubbing or edema. Brisk capillary refill. Pulses 2+ bilaterally to upper and lower extremities.  Derm: No rashes or lesions noted.   Lines/Tubes: Espitai, ETT, NG, R IJ CVL, R radial Art Line, R forearm, L wrist PIV, L and R Mediastinal tubes    Lab Results   Component Value Date    WBC 6.14 05/01/2017    HGB 13.6 (L) 05/01/2017    HCT 31 (L) 05/02/2017    MCV 93 05/01/2017     05/01/2017     CMP  Sodium   Date Value Ref Range Status   05/01/2017 141 136 - 145 mmol/L Final     Potassium   Date Value Ref Range Status    05/01/2017 4.6 3.5 - 5.1 mmol/L Final     Chloride   Date Value Ref Range Status   05/01/2017 107 95 - 110 mmol/L Final     CO2   Date Value Ref Range Status   05/01/2017 26 23 - 29 mmol/L Final     Glucose   Date Value Ref Range Status   05/01/2017 87 70 - 110 mg/dL Final     BUN, Bld   Date Value Ref Range Status   05/01/2017 24 (H) 6 - 20 mg/dL Final     Creatinine   Date Value Ref Range Status   05/01/2017 1.3 0.5 - 1.4 mg/dL Final     Calcium   Date Value Ref Range Status   05/01/2017 9.1 8.7 - 10.5 mg/dL Final     Total Protein   Date Value Ref Range Status   05/01/2017 7.5 6.0 - 8.4 g/dL Final     Albumin   Date Value Ref Range Status   05/01/2017 3.4 (L) 3.5 - 5.2 g/dL Final     Total Bilirubin   Date Value Ref Range Status   05/01/2017 0.3 0.1 - 1.0 mg/dL Final     Comment:     For infants and newborns, interpretation of results should be based  on gestational age, weight and in agreement with clinical  observations.  Premature Infant recommended reference ranges:  Up to 24 hours.............<8.0 mg/dL  Up to 48 hours............<12.0 mg/dL  3-5 days..................<15.0 mg/dL  6-29 days.................<15.0 mg/dL       Alkaline Phosphatase   Date Value Ref Range Status   05/01/2017 107 55 - 135 U/L Final     AST   Date Value Ref Range Status   05/01/2017 18 10 - 40 U/L Final     ALT   Date Value Ref Range Status   05/01/2017 15 10 - 44 U/L Final     Anion Gap   Date Value Ref Range Status   05/01/2017 8 8 - 16 mmol/L Final     eGFR if    Date Value Ref Range Status   05/01/2017 >60.0 >60 mL/min/1.73 m^2 Final     eGFR if non    Date Value Ref Range Status   05/01/2017 >60.0 >60 mL/min/1.73 m^2 Final     Comment:     Calculation used to obtain the estimated glomerular filtration  rate (eGFR) is the CKD-EPI equation. Since race is unknown   in our information system, the eGFR values for   -American and Non--American patients are given   for each creatinine  result.       Lab Results   Component Value Date    INR 1.1 05/02/2017     ABG    Recent Labs  Lab 05/02/17  1527   PH 7.445   PO2 228*   PCO2 33.3*   HCO3 22.9*   BE -1       CXR Reviewed      Assessment and Plan:     Cardiac  Pulmonary valve insufficiency  26 y.o. male with history of Down's syndrome, TOF s/p repair as infant, and sinus node dysfunction s/p pacemaker 2008, now s/p pulmonary valve replacement with a 27 mm St. Quinn epic bioprosthetic valve (5/2/17). He is currently critically ill with post-operative respiratory and cardiac failure on vasoactive medications and mechanical ventilation.     Neuro/Pain:  - Continue Sedative Infusions  - Pain control prn  Respiratory:  - Wean mechanical ventilation as tolerated  Cardiovascular:  - Monitor BP's closely.   - Follow up EKG. Monitor telemetry. Known to have frequent PVC's  - Monitor BP, patient on multi antihypertensives at home  - Goal normotensive  FEN/GI:  - NPO  - Monitor electrolytes and replace as needed.   Renal:  - Monitor I/O's closely  Heme/ID:  - Ancef x 48 hours post-op  - H/H stable. Monitor for bleeding  Plastics:  - Stable  Disposition:  - Stabilize. Wean respiratory support. Monitor blood pressures.       Thank you for your consult. I will follow-up with patient. Please contact us if you have any additional questions.    BHARAT Perdue  Pediatric Cardiology   [facility]    Patient seen, examined, discussed with PICU team.  I agree with the physician assistant's findings, assessment, and plan with addition of my edits as above.     Sagrario Diggs MD  Pediatric Cardiology  [facility] [address] [place]  [address]  [phone]

## 2017-05-02 NOTE — PLAN OF CARE
Problem: Patient Care Overview  Goal: Plan of Care Review  Outcome: Ongoing (interventions implemented as appropriate)  Mother at bedside, updated on POC and pt status. Cipriano is sedated and comfortable at this time. Addressed all questions and concerns with mom. Will continue to monitor closely.

## 2017-05-02 NOTE — ANESTHESIA POSTPROCEDURE EVALUATION
"Anesthesia Post Evaluation    Patient: Cipriano Thompson    Procedure(s) Performed: Procedure(s) (LRB):  REPLACEMENT-VALVE-PULMONARY (N/A)    Final Anesthesia Type: general  Patient location during evaluation: PICU  Patient participation: No - Unable to Participate, Intubation  Level of consciousness: sedated  Post-procedure vital signs: reviewed and stable  Pain management: adequate  Airway patency: patent  PONV status at discharge: No PONV  Anesthetic complications: no      Cardiovascular status: blood pressure returned to baseline, stable and hemodynamically stable  Respiratory status: ventilator, intubated and ETT  Hydration status: euvolemic  Follow-up not needed.        Visit Vitals    /69 (BP Location: Left arm, Patient Position: Lying, BP Method: Automatic)    Pulse 75    Temp 36.9 °C (98.4 °F) (Axillary)    Resp 20    Ht 5' 6" (1.676 m)    Wt 100.5 kg (221 lb 9 oz)    SpO2 100%    BMI 35.76 kg/m2       Pain/Justo Score: Pain Assessment Performed: Yes (5/2/2017  7:02 AM)  Presence of Pain: denies (5/2/2017  7:02 AM)  Pain Assessment Performed: Yes (5/2/2017  3:15 PM)      "

## 2017-05-02 NOTE — INTERVAL H&P NOTE
The patient has been examined and the H&P has been reviewed:    I concur with the findings and no changes have occurred since H&P was written.    Anesthesia/Surgery risks, benefits and alternative options discussed and understood by patient/family.    Labs and studies were reviewed. Patient is clear to proceed with surgery at this time.       Active Hospital Problems    Diagnosis  POA    Pulmonary valve insufficiency [I37.1]  Yes      Resolved Hospital Problems    Diagnosis Date Resolved POA   No resolved problems to display.

## 2017-05-03 LAB
ALBUMIN SERPL BCP-MCNC: 3.3 G/DL
ALBUMIN SERPL BCP-MCNC: 3.5 G/DL
ALLENS TEST: ABNORMAL
ALP SERPL-CCNC: 64 U/L
ALP SERPL-CCNC: 65 U/L
ALT SERPL W/O P-5'-P-CCNC: 11 U/L
ALT SERPL W/O P-5'-P-CCNC: 13 U/L
ANION GAP SERPL CALC-SCNC: 10 MMOL/L
ANION GAP SERPL CALC-SCNC: 10 MMOL/L
APTT BLDCRRT: 24.5 SEC
AST SERPL-CCNC: 33 U/L
AST SERPL-CCNC: 41 U/L
BASOPHILS # BLD AUTO: 0.03 K/UL
BASOPHILS NFR BLD: 0.3 %
BILIRUB SERPL-MCNC: 0.6 MG/DL
BILIRUB SERPL-MCNC: 1 MG/DL
BLD PROD TYP BPU: NORMAL
BLOOD UNIT EXPIRATION DATE: NORMAL
BLOOD UNIT TYPE CODE: 8400
BLOOD UNIT TYPE: NORMAL
BUN SERPL-MCNC: 18 MG/DL
BUN SERPL-MCNC: 21 MG/DL
CALCIUM SERPL-MCNC: 9.2 MG/DL
CALCIUM SERPL-MCNC: 9.2 MG/DL
CHLORIDE SERPL-SCNC: 105 MMOL/L
CHLORIDE SERPL-SCNC: 108 MMOL/L
CO2 SERPL-SCNC: 22 MMOL/L
CO2 SERPL-SCNC: 24 MMOL/L
CODING SYSTEM: NORMAL
CREAT SERPL-MCNC: 1.4 MG/DL
CREAT SERPL-MCNC: 1.6 MG/DL
DELSYS: ABNORMAL
DIFFERENTIAL METHOD: ABNORMAL
DISPENSE STATUS: NORMAL
EOSINOPHIL # BLD AUTO: 0.1 K/UL
EOSINOPHIL NFR BLD: 0.8 %
ERYTHROCYTE [DISTWIDTH] IN BLOOD BY AUTOMATED COUNT: 14.8 %
ERYTHROCYTE [SEDIMENTATION RATE] IN BLOOD BY WESTERGREN METHOD: 10 MM/H
ERYTHROCYTE [SEDIMENTATION RATE] IN BLOOD BY WESTERGREN METHOD: 12 MM/H
ERYTHROCYTE [SEDIMENTATION RATE] IN BLOOD BY WESTERGREN METHOD: 12 MM/H
ERYTHROCYTE [SEDIMENTATION RATE] IN BLOOD BY WESTERGREN METHOD: 20 MM/H
EST. GFR  (AFRICAN AMERICAN): >60 ML/MIN/1.73 M^2
EST. GFR  (AFRICAN AMERICAN): >60 ML/MIN/1.73 M^2
EST. GFR  (NON AFRICAN AMERICAN): 58.6 ML/MIN/1.73 M^2
EST. GFR  (NON AFRICAN AMERICAN): >60 ML/MIN/1.73 M^2
ETCO2: 39
ETCO2: 39
ETCO2: 40
ETCO2: 41
ETCO2: 41
FIBRINOGEN PPP-MCNC: 442 MG/DL
FIO2: 100
FIO2: 50
FIO2: 80
FLOW: 10
FLOW: 12
FLOW: 15
GLUCOSE SERPL-MCNC: 183 MG/DL
GLUCOSE SERPL-MCNC: 204 MG/DL
HCO3 UR-SCNC: 24.4 MMOL/L (ref 24–28)
HCO3 UR-SCNC: 24.8 MMOL/L (ref 24–28)
HCO3 UR-SCNC: 24.9 MMOL/L (ref 24–28)
HCO3 UR-SCNC: 25.1 MMOL/L (ref 24–28)
HCO3 UR-SCNC: 25.1 MMOL/L (ref 24–28)
HCO3 UR-SCNC: 25.6 MMOL/L (ref 24–28)
HCO3 UR-SCNC: 26 MMOL/L (ref 24–28)
HCO3 UR-SCNC: 26.5 MMOL/L (ref 24–28)
HCO3 UR-SCNC: 26.7 MMOL/L (ref 24–28)
HCO3 UR-SCNC: 27.1 MMOL/L (ref 24–28)
HCO3 UR-SCNC: 27.5 MMOL/L (ref 24–28)
HCT VFR BLD AUTO: 32.5 %
HCT VFR BLD CALC: 31 %PCV (ref 36–54)
HCT VFR BLD CALC: 31 %PCV (ref 36–54)
HCT VFR BLD CALC: 33 %PCV (ref 36–54)
HCT VFR BLD CALC: 34 %PCV (ref 36–54)
HCT VFR BLD CALC: 35 %PCV (ref 36–54)
HGB BLD-MCNC: 11.6 G/DL
INR PPP: 1
LDH SERPL L TO P-CCNC: 1.47 MMOL/L (ref 0.36–1.25)
LDH SERPL L TO P-CCNC: 1.75 MMOL/L (ref 0.36–1.25)
LDH SERPL L TO P-CCNC: 2.58 MMOL/L (ref 0.36–1.25)
LDH SERPL L TO P-CCNC: 2.98 MMOL/L (ref 0.36–1.25)
LDH SERPL L TO P-CCNC: 3.16 MMOL/L (ref 0.36–1.25)
LYMPHOCYTES # BLD AUTO: 0.4 K/UL
LYMPHOCYTES NFR BLD: 3.4 %
MAGNESIUM SERPL-MCNC: 1.7 MG/DL
MAGNESIUM SERPL-MCNC: 2.1 MG/DL
MCH RBC QN AUTO: 30.6 PG
MCHC RBC AUTO-ENTMCNC: 35.7 %
MCV RBC AUTO: 86 FL
MODE: ABNORMAL
MONOCYTES # BLD AUTO: 0.3 K/UL
MONOCYTES NFR BLD: 2.8 %
MRSA SPEC QL CULT: NORMAL
NEUTROPHILS # BLD AUTO: 9.5 K/UL
NEUTROPHILS NFR BLD: 92.4 %
NUM UNITS TRANS FFP: NORMAL
PCO2 BLDA: 43.4 MMHG (ref 35–45)
PCO2 BLDA: 46.2 MMHG (ref 35–45)
PCO2 BLDA: 47.4 MMHG (ref 35–45)
PCO2 BLDA: 48.5 MMHG (ref 35–45)
PCO2 BLDA: 48.7 MMHG (ref 35–45)
PCO2 BLDA: 50.9 MMHG (ref 35–45)
PCO2 BLDA: 51.3 MMHG (ref 35–45)
PCO2 BLDA: 51.6 MMHG (ref 35–45)
PCO2 BLDA: 53.1 MMHG (ref 35–45)
PCO2 BLDA: 53.5 MMHG (ref 35–45)
PCO2 BLDA: 54.2 MMHG (ref 35–45)
PEEP: 6
PEEP: 6
PEEP: 7
PH SMN: 7.3 [PH] (ref 7.35–7.45)
PH SMN: 7.3 [PH] (ref 7.35–7.45)
PH SMN: 7.31 [PH] (ref 7.35–7.45)
PH SMN: 7.31 [PH] (ref 7.35–7.45)
PH SMN: 7.32 [PH] (ref 7.35–7.45)
PH SMN: 7.33 [PH] (ref 7.35–7.45)
PH SMN: 7.33 [PH] (ref 7.35–7.45)
PH SMN: 7.34 [PH] (ref 7.35–7.45)
PH SMN: 7.37 [PH] (ref 7.35–7.45)
PHOSPHATE SERPL-MCNC: 3.5 MG/DL
PIP: 23
PIP: 23
PIP: 24
PIP: 30
PIP: 30
PLATELET # BLD AUTO: 152 K/UL
PMV BLD AUTO: 9.2 FL
PO2 BLDA: 101 MMHG (ref 80–100)
PO2 BLDA: 103 MMHG (ref 80–100)
PO2 BLDA: 104 MMHG (ref 80–100)
PO2 BLDA: 112 MMHG (ref 80–100)
PO2 BLDA: 139 MMHG (ref 80–100)
PO2 BLDA: 167 MMHG (ref 80–100)
PO2 BLDA: 67 MMHG (ref 80–100)
PO2 BLDA: 91 MMHG (ref 80–100)
PO2 BLDA: 95 MMHG (ref 80–100)
PO2 BLDA: 97 MMHG (ref 80–100)
PO2 BLDA: 98 MMHG (ref 80–100)
POC BE: -1 MMOL/L
POC BE: -2 MMOL/L
POC BE: 0 MMOL/L
POC BE: 1 MMOL/L
POC BE: 1 MMOL/L
POC IONIZED CALCIUM: 1.17 MMOL/L (ref 1.06–1.42)
POC IONIZED CALCIUM: 1.18 MMOL/L (ref 1.06–1.42)
POC IONIZED CALCIUM: 1.24 MMOL/L (ref 1.06–1.42)
POC IONIZED CALCIUM: 1.25 MMOL/L (ref 1.06–1.42)
POC IONIZED CALCIUM: 1.25 MMOL/L (ref 1.06–1.42)
POC IONIZED CALCIUM: 1.26 MMOL/L (ref 1.06–1.42)
POC IONIZED CALCIUM: 1.26 MMOL/L (ref 1.06–1.42)
POC IONIZED CALCIUM: 1.27 MMOL/L (ref 1.06–1.42)
POC IONIZED CALCIUM: 1.27 MMOL/L (ref 1.06–1.42)
POC IONIZED CALCIUM: 1.29 MMOL/L (ref 1.06–1.42)
POC IONIZED CALCIUM: 1.31 MMOL/L (ref 1.06–1.42)
POC SATURATED O2: 91 % (ref 95–100)
POC SATURATED O2: 97 % (ref 95–100)
POC SATURATED O2: 98 % (ref 95–100)
POC SATURATED O2: 99 % (ref 95–100)
POC SATURATED O2: 99 % (ref 95–100)
POC TCO2: 26 MMOL/L (ref 23–27)
POC TCO2: 27 MMOL/L (ref 23–27)
POC TCO2: 27 MMOL/L (ref 23–27)
POC TCO2: 28 MMOL/L (ref 23–27)
POC TCO2: 29 MMOL/L (ref 23–27)
POC TCO2: 29 MMOL/L (ref 23–27)
POCT GLUCOSE: 154 MG/DL (ref 70–110)
POCT GLUCOSE: 183 MG/DL (ref 70–110)
POCT GLUCOSE: 193 MG/DL (ref 70–110)
POCT GLUCOSE: 207 MG/DL (ref 70–110)
POCT GLUCOSE: 212 MG/DL (ref 70–110)
POTASSIUM BLD-SCNC: 3.8 MMOL/L (ref 3.5–5.1)
POTASSIUM BLD-SCNC: 4.1 MMOL/L (ref 3.5–5.1)
POTASSIUM BLD-SCNC: 4.1 MMOL/L (ref 3.5–5.1)
POTASSIUM BLD-SCNC: 4.2 MMOL/L (ref 3.5–5.1)
POTASSIUM BLD-SCNC: 4.3 MMOL/L (ref 3.5–5.1)
POTASSIUM BLD-SCNC: 4.4 MMOL/L (ref 3.5–5.1)
POTASSIUM BLD-SCNC: 4.4 MMOL/L (ref 3.5–5.1)
POTASSIUM BLD-SCNC: 4.6 MMOL/L (ref 3.5–5.1)
POTASSIUM BLD-SCNC: 4.6 MMOL/L (ref 3.5–5.1)
POTASSIUM SERPL-SCNC: 4.2 MMOL/L
POTASSIUM SERPL-SCNC: 4.4 MMOL/L
PROT SERPL-MCNC: 6.2 G/DL
PROT SERPL-MCNC: 6.3 G/DL
PROTHROMBIN TIME: 10.7 SEC
PROVIDER CREDENTIALS: ABNORMAL
PROVIDER NOTIFIED: ABNORMAL
PS: 10
PS: 10
PS: 8
RBC # BLD AUTO: 3.79 M/UL
SAMPLE: ABNORMAL
SITE: ABNORMAL
SODIUM BLD-SCNC: 137 MMOL/L (ref 136–145)
SODIUM BLD-SCNC: 139 MMOL/L (ref 136–145)
SODIUM BLD-SCNC: 140 MMOL/L (ref 136–145)
SODIUM BLD-SCNC: 140 MMOL/L (ref 136–145)
SODIUM BLD-SCNC: 141 MMOL/L (ref 136–145)
SODIUM BLD-SCNC: 142 MMOL/L (ref 136–145)
SODIUM BLD-SCNC: 142 MMOL/L (ref 136–145)
SODIUM BLD-SCNC: 143 MMOL/L (ref 136–145)
SODIUM SERPL-SCNC: 137 MMOL/L
SODIUM SERPL-SCNC: 142 MMOL/L
SP02: 100
SP02: 94
SP02: 96
SP02: 98
SP02: 98
SP02: 99
SP02: 99
TIME NOTIFIED: 1035
TIME NOTIFIED: 115
TIME NOTIFIED: 1155
TIME NOTIFIED: 1155
TIME NOTIFIED: 1340
TIME NOTIFIED: 1535
TIME NOTIFIED: 1545
TIME NOTIFIED: 1545
TIME NOTIFIED: 1940
TIME NOTIFIED: 1940
TIME NOTIFIED: 2200
TIME NOTIFIED: 310
TIME NOTIFIED: 310
TIME NOTIFIED: 515
TIME NOTIFIED: 755
TIME NOTIFIED: 755
VERBAL RESULT READBACK PERFORMED: YES
VT: 500
WBC # BLD AUTO: 10.23 K/UL

## 2017-05-03 PROCEDURE — 84132 ASSAY OF SERUM POTASSIUM: CPT

## 2017-05-03 PROCEDURE — 83735 ASSAY OF MAGNESIUM: CPT

## 2017-05-03 PROCEDURE — 25000003 PHARM REV CODE 250: Performed by: PEDIATRICS

## 2017-05-03 PROCEDURE — 97530 THERAPEUTIC ACTIVITIES: CPT

## 2017-05-03 PROCEDURE — 63600175 PHARM REV CODE 636 W HCPCS: Performed by: PEDIATRICS

## 2017-05-03 PROCEDURE — 93288 INTERROG EVL PM/LDLS PM IP: CPT | Mod: 26,,, | Performed by: PEDIATRICS

## 2017-05-03 PROCEDURE — 99291 CRITICAL CARE FIRST HOUR: CPT | Mod: ,,, | Performed by: PEDIATRICS

## 2017-05-03 PROCEDURE — 99900035 HC TECH TIME PER 15 MIN (STAT)

## 2017-05-03 PROCEDURE — 94770 HC EXHALED C02 TEST: CPT

## 2017-05-03 PROCEDURE — 85025 COMPLETE CBC W/AUTO DIFF WBC: CPT

## 2017-05-03 PROCEDURE — 99900017 HC EXTUBATION W/PARAMETERS (STAT)

## 2017-05-03 PROCEDURE — 86644 CMV ANTIBODY: CPT

## 2017-05-03 PROCEDURE — 82330 ASSAY OF CALCIUM: CPT

## 2017-05-03 PROCEDURE — 85014 HEMATOCRIT: CPT

## 2017-05-03 PROCEDURE — 27000221 HC OXYGEN, UP TO 24 HOURS

## 2017-05-03 PROCEDURE — 97161 PT EVAL LOW COMPLEX 20 MIN: CPT

## 2017-05-03 PROCEDURE — 83605 ASSAY OF LACTIC ACID: CPT

## 2017-05-03 PROCEDURE — 99292 CRITICAL CARE ADDL 30 MIN: CPT | Mod: ,,, | Performed by: PEDIATRICS

## 2017-05-03 PROCEDURE — 94799 UNLISTED PULMONARY SVC/PX: CPT

## 2017-05-03 PROCEDURE — 94664 DEMO&/EVAL PT USE INHALER: CPT

## 2017-05-03 PROCEDURE — 85384 FIBRINOGEN ACTIVITY: CPT

## 2017-05-03 PROCEDURE — 99233 SBSQ HOSP IP/OBS HIGH 50: CPT | Mod: ,,, | Performed by: PEDIATRICS

## 2017-05-03 PROCEDURE — 99900029 HC O2 SETUP (STAT)

## 2017-05-03 PROCEDURE — 82803 BLOOD GASES ANY COMBINATION: CPT

## 2017-05-03 PROCEDURE — 97535 SELF CARE MNGMENT TRAINING: CPT

## 2017-05-03 PROCEDURE — 80053 COMPREHEN METABOLIC PANEL: CPT | Mod: 91

## 2017-05-03 PROCEDURE — 93010 ELECTROCARDIOGRAM REPORT: CPT | Mod: 59,,, | Performed by: PEDIATRICS

## 2017-05-03 PROCEDURE — 20300000 HC PICU ROOM

## 2017-05-03 PROCEDURE — 84295 ASSAY OF SERUM SODIUM: CPT

## 2017-05-03 PROCEDURE — 27100171 HC OXYGEN HIGH FLOW UP TO 24 HOURS

## 2017-05-03 PROCEDURE — 94003 VENT MGMT INPAT SUBQ DAY: CPT

## 2017-05-03 PROCEDURE — 97165 OT EVAL LOW COMPLEX 30 MIN: CPT

## 2017-05-03 PROCEDURE — 94761 N-INVAS EAR/PLS OXIMETRY MLT: CPT

## 2017-05-03 PROCEDURE — 84100 ASSAY OF PHOSPHORUS: CPT

## 2017-05-03 PROCEDURE — 37799 UNLISTED PX VASCULAR SURGERY: CPT

## 2017-05-03 PROCEDURE — 83735 ASSAY OF MAGNESIUM: CPT | Mod: 91

## 2017-05-03 PROCEDURE — 93005 ELECTROCARDIOGRAM TRACING: CPT

## 2017-05-03 PROCEDURE — 93288 INTERROG EVL PM/LDLS PM IP: CPT

## 2017-05-03 PROCEDURE — 85610 PROTHROMBIN TIME: CPT

## 2017-05-03 PROCEDURE — 85730 THROMBOPLASTIN TIME PARTIAL: CPT

## 2017-05-03 PROCEDURE — 80053 COMPREHEN METABOLIC PANEL: CPT

## 2017-05-03 RX ORDER — METOPROLOL TARTRATE 25 MG/1
25 TABLET, FILM COATED ORAL EVERY EVENING
Status: DISCONTINUED | OUTPATIENT
Start: 2017-05-03 | End: 2017-05-04

## 2017-05-03 RX ORDER — LEVOTHYROXINE SODIUM 88 UG/1
88 TABLET ORAL
Status: DISCONTINUED | OUTPATIENT
Start: 2017-05-03 | End: 2017-05-12 | Stop reason: HOSPADM

## 2017-05-03 RX ORDER — KETOROLAC TROMETHAMINE 15 MG/ML
30 INJECTION, SOLUTION INTRAMUSCULAR; INTRAVENOUS EVERY 6 HOURS
Status: DISCONTINUED | OUTPATIENT
Start: 2017-05-03 | End: 2017-05-03

## 2017-05-03 RX ORDER — CEFAZOLIN SODIUM 2 G/50ML
2 SOLUTION INTRAVENOUS
Status: COMPLETED | OUTPATIENT
Start: 2017-05-03 | End: 2017-05-05

## 2017-05-03 RX ORDER — ONDANSETRON 2 MG/ML
4 INJECTION INTRAMUSCULAR; INTRAVENOUS EVERY 8 HOURS PRN
Status: DISCONTINUED | OUTPATIENT
Start: 2017-05-03 | End: 2017-05-04

## 2017-05-03 RX ORDER — METOPROLOL TARTRATE 25 MG/1
25 TABLET, FILM COATED ORAL EVERY EVENING
Status: DISCONTINUED | OUTPATIENT
Start: 2017-05-03 | End: 2017-05-03

## 2017-05-03 RX ADMIN — ACETAMINOPHEN 650 MG: 10 INJECTION, SOLUTION INTRAVENOUS at 05:05

## 2017-05-03 RX ADMIN — METOPROLOL TARTRATE 25 MG: 25 TABLET ORAL at 09:05

## 2017-05-03 RX ADMIN — FENTANYL CITRATE 50 MCG: 50 INJECTION, SOLUTION INTRAMUSCULAR; INTRAVENOUS at 03:05

## 2017-05-03 RX ADMIN — MUPIROCIN 1 G: 20 OINTMENT TOPICAL at 09:05

## 2017-05-03 RX ADMIN — CHLORHEXIDINE GLUCONATE 15 ML: 1.2 RINSE ORAL at 09:05

## 2017-05-03 RX ADMIN — DEXTROSE 2 G: 50 INJECTION, SOLUTION INTRAVENOUS at 08:05

## 2017-05-03 RX ADMIN — FUROSEMIDE 20 MG: 10 INJECTION, SOLUTION INTRAMUSCULAR; INTRAVENOUS at 01:05

## 2017-05-03 RX ADMIN — MILRINONE LACTATE 0.3 MCG/KG/MIN: 200 INJECTION, SOLUTION INTRAVENOUS at 01:05

## 2017-05-03 RX ADMIN — MAGNESIUM SULFATE IN WATER 2 G: 40 INJECTION, SOLUTION INTRAVENOUS at 05:05

## 2017-05-03 RX ADMIN — MAGNESIUM SULFATE IN WATER 2 G: 40 INJECTION, SOLUTION INTRAVENOUS at 01:05

## 2017-05-03 RX ADMIN — MILRINONE LACTATE 0.3 MCG/KG/MIN: 200 INJECTION, SOLUTION INTRAVENOUS at 10:05

## 2017-05-03 RX ADMIN — DEXMEDETOMIDINE HYDROCHLORIDE 0.7 MCG/KG/HR: 4 INJECTION, SOLUTION INTRAVENOUS at 10:05

## 2017-05-03 RX ADMIN — ACETAMINOPHEN 650 MG: 10 INJECTION, SOLUTION INTRAVENOUS at 11:05

## 2017-05-03 RX ADMIN — DOCUSATE SODIUM 100 MG: 100 CAPSULE, LIQUID FILLED ORAL at 09:05

## 2017-05-03 RX ADMIN — DEXTROSE MONOHYDRATE AND SODIUM CHLORIDE: 5; .45 INJECTION, SOLUTION INTRAVENOUS at 07:05

## 2017-05-03 RX ADMIN — MORPHINE SULFATE 4 MG: 2 INJECTION, SOLUTION INTRAMUSCULAR; INTRAVENOUS at 06:05

## 2017-05-03 RX ADMIN — FENTANYL CITRATE 50 MCG: 50 INJECTION, SOLUTION INTRAMUSCULAR; INTRAVENOUS at 12:05

## 2017-05-03 RX ADMIN — DEXTROSE 2 G: 50 INJECTION, SOLUTION INTRAVENOUS at 12:05

## 2017-05-03 RX ADMIN — LEVOTHYROXINE SODIUM 88 MCG: 88 TABLET ORAL at 10:05

## 2017-05-03 RX ADMIN — CEFAZOLIN SODIUM 2 G: 2 SOLUTION INTRAVENOUS at 04:05

## 2017-05-03 RX ADMIN — ACETAMINOPHEN 650 MG: 10 INJECTION, SOLUTION INTRAVENOUS at 12:05

## 2017-05-03 RX ADMIN — DOCUSATE SODIUM 100 MG: 100 CAPSULE, LIQUID FILLED ORAL at 08:05

## 2017-05-03 RX ADMIN — KETOROLAC TROMETHAMINE 30 MG: 15 INJECTION, SOLUTION INTRAMUSCULAR; INTRAVENOUS at 01:05

## 2017-05-03 RX ADMIN — ONDANSETRON 4 MG: 2 INJECTION INTRAMUSCULAR; INTRAVENOUS at 08:05

## 2017-05-03 RX ADMIN — DEXTROSE MONOHYDRATE AND SODIUM CHLORIDE: 5; .45 INJECTION, SOLUTION INTRAVENOUS at 01:05

## 2017-05-03 RX ADMIN — FENTANYL CITRATE 50 MCG: 50 INJECTION, SOLUTION INTRAMUSCULAR; INTRAVENOUS at 09:05

## 2017-05-03 RX ADMIN — FAMOTIDINE 20 MG: 10 INJECTION, SOLUTION INTRAVENOUS at 09:05

## 2017-05-03 RX ADMIN — MIDAZOLAM HYDROCHLORIDE 4 MG: 1 INJECTION, SOLUTION INTRAMUSCULAR; INTRAVENOUS at 03:05

## 2017-05-03 RX ADMIN — DEXMEDETOMIDINE HYDROCHLORIDE 0.7 MCG/KG/HR: 4 INJECTION, SOLUTION INTRAVENOUS at 04:05

## 2017-05-03 RX ADMIN — EPINEPHRINE 0.02 MCG/KG/MIN: 1 INJECTION PARENTERAL at 06:05

## 2017-05-03 RX ADMIN — FAMOTIDINE 20 MG: 10 INJECTION, SOLUTION INTRAVENOUS at 08:05

## 2017-05-03 NOTE — PLAN OF CARE
Problem: Occupational Therapy Goal  Goal: Occupational Therapy Goal  Goals to be met by: 5/13/17     Patient will increase functional independence with ADLs by performing:    UE Dressing with Supervision.  LE Dressing with Supervision.  Grooming while standing at sink with Supervision.  Toileting from toilet with Supervision for hygiene and clothing management.   Supine to sit with Supervision.  Stand pivot transfers with Supervision.  Toilet transfer to toilet with Supervision.  Outcome: Ongoing (interventions implemented as appropriate)  OT evaluation completed, POC established.  DONNIE Jacobo  5/3/2017

## 2017-05-03 NOTE — PLAN OF CARE
Problem: Patient Care Overview  Goal: Plan of Care Review  PT evaluation complete. Pt is POD#1 s/p pulmonary valve replacement, sternal precautions maintained. Assisted OOBTC for 1.5 hours this afternoon, tolerated overall well but with a drop in BP once in chair. Assisted back without difficult, asymptomatic and no pain. Discussed POC and sternal precautions with patient and mom; more time OOBTC tomorrow and possibly PM ambulation into hallway. Anticipate d/c to home with family assistance once medically appropriate. No DME recommended at this time. Will progress mobility as tolerated.     Ric Fiore, PT  5/3/2017

## 2017-05-03 NOTE — PT/OT/SLP EVAL
Physical Therapy  Evaluation/Treatment    Cipriano Thompson   MRN: [id number]   Admitting Diagnosis: s/p pulmonary valve replacement, POD#1    PT Received On: 05/03/17  PT Start Time: 1400     PT Stop Time: 1434 (returned from 2484-6963 to assist back to bed)  PT Total Time (min): 48 min       Billable Minutes:  Evaluation 20 and Therapeutic Activity 28    Diagnosis: s/p pulmonary valve replacement, POD#1    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    Tetralogy of Fallot 05/1990      Past Surgical History:   Procedure Laterality Date    BUNIONECTOMY      CARDIAC SURGERY      open heart, ppm     INSERT / REPLACE / REMOVE PACEMAKER Left 10/2008    TONSILLECTOMY       Referring physician: Alec King MD  Date referred to PT: 5/2/17    General Precautions: Standard, fall, sternal, cardiac  Orthopedic Precautions: Sternal    Do you have any cultural, spiritual, Moravian conflicts, given your current situation?: Mother has no barriers to learning. Mother verbalizes understanding of his/her program and goals and demonstrates them correctly. No cultural, spiritual or educational needs identified.    Patient History:  Lives With: parent(s), sibling(s)  Living Arrangements: house  Home Accessibility: stairs within home  Home Layout: Able to live on 1st floor  Number of Stairs Within Home: 14  Stair Railings at Home: inside, present at both sides    Living Environment Comment: Pt lives with mom and sibling(s) in a 2  with 0 CHERIE; pt is able to stay downstairs on 1st floor upon d/c. PTA, he was primarily independent with mobility; however, he did have foot surgery in August 2016. Mom states they have a walker stored away in closet when Cipriano has increased foot pain (we discussed how he would not be able to use this post-op 2* sternal). Needs supervision for ADL's. Very strong, participated in  "special [employer] for powerlifting and long jumping. Has good family support available upon d/c.    Equipment Currently Used at Home: walker, rolling (PRN 2* foot pain)  DME owned (not currently used): none    Previous Level of Function:  Ambulation Skills: independent  Transfer Skills: independent  ADL Skills: family supervision    Subjective:  Communicated with MILTON Guardado prior to session, ok to see for evaluation this afternoon. Pt was just recently extubated ~1 hour ago.    Pt supine in bed (HOB elevated) with no family present upon PT/OT entry to room (Mom arrived once patient into chair). Cipriano was initially difficult to arouse (weaning precedex) but expressed interest in getting into a "wheelchair".    Chief Complaint: none, states no pain and feeling good to be up OOB  Patient goals: go home    Pain Ratin/10   Pain Rating Post-Intervention: 0/10    Objective:   Patient found with: arterial line, blood pressure cuff, central line (RIJ), chest tube x 2, tai catheter, oxygen via HFNC, peripheral IV, pulse ox (continuous), telemetry, SCD     Cognitive Exam:  Oriented to: Person, Place, Time and Situation    Follows Commands/attention: Follows one-step commands  Communication: clear/fluent  Safety awareness/insight to disability: intact    Physical Exam:    Lower Extremity Range of Motion:  Right Lower Extremity: WFL  Left Lower Extremity: WFL    Lower Extremity Strength:  Right Lower Extremity: grossly 4-/5  Left Lower Extremity: grossly 4-/5    Functional Mobility:    Bed Mobility:  Scooting/Bridging: Maximum Assistance (towards EOB in sitting)    Supine to Sit: Minimum Assistance  Sit to Supine: Moderate Assistance    Transfers:  Sit <> Stand Assistance: Maximum Assistance on 1st trial from EOB, Minimum Assistance on 2nd trial from EOB; pt hugging sternal pillow on both attempts  Sit <> Stand Assistive Device: No Assistive Device    Bed <> Chair Technique: Stand Pivot  Bed <> Chair Assistance: Minimum " Assistance x 1 trial from EOB to BS chair; min (A) x 1 trial from BS chair to EOB, pt hugging sternal pillow  Bed <> Chair Assistive Device: No Assistive Device    Gait:   Gait Distance: 4 steps to/from bed and BS chair via stand pivot, min (A) of therapist at hips to guide patient and steady    Assistance 1: Minimum assistance  Gait Assistive Device: No device  Gait Pattern: 2-point gait  Gait Deviation(s): decreased lance, increased time in double stance, decreased velocity of limb motion, decreased step length, decreased stride length    Balance:   Static Sit: ~10 minutes at EOB with SBA/CGA, frequent cueing to lift head upright    Static Stand: on 1st stand, only ~15 seconds due to dizziness/weakness; up to 30 seconds on 2nd trial before getting into BS chair    Therapeutic Activities and Exercises:  1. Pt OOBTC for ~1.5 hours this afternoon with PT/OT assistance. Requiring increased time for line management    AM-PAC 6 CLICK MOBILITY  How much help from another person does this patient currently need?   1 = Unable, Total/Dependent Assistance  2 = A lot, Maximum/Moderate Assistance  3 = A little, Minimum/Contact Guard/Supervision  4 = None, Modified [place]/Independent    Turning over in bed (including adjusting bedclothes, sheets and blankets)?: 3  Sitting down on and standing up from a chair with arms (e.g., wheelchair, bedside commode, etc.): 3  Moving from lying on back to sitting on the side of the bed?: 2  Moving to and from a bed to a chair (including a wheelchair)?: 3  Need to walk in hospital room?: 2  Climbing 3-5 steps with a railing?: 1  Total Score: 14     AM-PAC Raw Score CMS G-Code Modifier Level of Impairment Assistance   6 % Total / Unable   7 - 9 CM 80 - 100% Maximal Assist   10 - 14 CL 60 - 80% Moderate Assist   15 - 19 CK 40 - 60% Moderate Assist   20 - 22 CJ 20 - 40% Minimal Assist   23 CI 1-20% SBA / CGA   24 CH 0% Independent/ Mod I     Patient left supine (after being OOBTC  for 1.5 hours) with all lines intact, call button in reach, RN notified and mom present.    Assessment:   Cipriano Thompson is a 26 y.o. male admitted to [facility] on 17, now POD#1 pulmonary valve replacement. Upon initial PT evaluation, pt presents with decreased strength, endurance, balance, functional mobility. Pt would benefit from skilled PT services to address these deficits and improve return to PLOF. Anticipate d/c to home with family assistance as needed. No DME recommended at this time.    Rehab identified problem list/impairments: weakness, impaired endurance, impaired self care skills, impaired functional mobilty, gait instability, impaired balance, impaired cognition, decreased lower extremity function, decreased upper extremity function, decreased safety awareness, impaired cardiopulmonary response to activity, orthopedic precautions    Rehab potential is good.    Activity tolerance: Good    Discharge recommendations: home     Barriers to discharge: None    Equipment recommendations: none     GOALS:   Physical Therapy Goals        Problem: Physical Therapy Goal    Goal Priority Disciplines Outcome Goal Variances Interventions   Physical Therapy Goal     PT/OT, PT      Description:  Goals to be met by: 5/10/17     Patient will increase functional independence with mobility by performin. Supine to sit with Stand-by Assistance  2. Sit to supine with Stand-by Assistance  3. Sit to stand transfer with Stand-By Assistance  4. Bed to chair transfer with Stand-by Assistance  5. Gait x 200 feet with Stand-by Assistance.             PLAN:    Patient to be seen 6x/week to address the above listed problems via gait training, therapeutic activities, therapeutic exercises     Plan of Care expires: 17  Plan of Care reviewed with: mother, patient     Ric Fiore, PT  5/3/2017

## 2017-05-03 NOTE — PLAN OF CARE
Problem: Patient Care Overview  Goal: Plan of Care Review  Outcome: Ongoing (interventions implemented as appropriate)  Mother at bedside throughout the shift, updated on POC and pt status, addressed all questions and concerns. Mother attentive to Cipriano and participating in pt cares. Pt extubated to HFNC, did great. Up in chair today with PT, tolerated well. Epi has been on and off throughout the shift, Milrinone remains off. Cipriano has rested on and off throughout the day and said he is feeling better. He is looking forward to getting out of the bed again donnie. Will continue to monitor closely.

## 2017-05-03 NOTE — PROCEDURES
Called to interrogate pacemaker on patient postop Day 1 from pulmonary valve replacement.     Digital [employer] Beau GARCÍA# [id number]  S# [id number]  DOI: 10/09/08    Interrogation and lead testing performed.    Battery 2.70V/ >=9 mos longevity    Mode AAIR<=>DDDR 75-150bpm    P-wave 2.0-2.8mV  Imp 378 ohms  Threshold 0.25V @ 0.4ms    R-wave 4.0-5.6mV  Imp 507 ohms  Threshold 1.0V @ 0.4ms    Ap 99%   3.3%    Current ECG reveal Ap-Vs with frequent PVCs noted.  Underlying Rhythm SR with PVCs @ 62-65 bpm    No episodes noted on device.    Device and lead function WNL.    Leydi Mckeon RN, CCDS  Pediatric Cardiology RN Arrhythmia Device Specialist

## 2017-05-03 NOTE — PROGRESS NOTES
[facility]  Pediatric Critical Care  Postoperative Note      Patient Name: Cipriano Thompson  MRN: [id number]  Admission Date: 5/2/2017  Code Status: Full Code   Attending Provider: Shaunna Villalba  Primary Care Physician: Princess KHUSHBOO Rodgers MD  Principal Problem:<principal problem not specified>    Subjective:     HPI: Cipriano Thompson is a 26 y.o. 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  He also has a ho hypertension and ventricular ectopy (PVCs, couplets and triplets).  He presented to us with mixed moderate subPS (24mmhg)/moderatePI and exercise intolerance (SOB with climbing stairs) is now s/p redo sternotomy and pulmonary valve replacement with 27mm porcine valve in 30 mm conduit POD#1      Overnight Night:  Had multiple PVCs over the course of the evening, required increased dose of sedation due to severe agitation.       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    Tetralogy of Fallot 05/1990       Past Surgical History:   Procedure Laterality Date    BUNIONECTOMY      CARDIAC SURGERY      open heart, ppm     INSERT / REPLACE / REMOVE PACEMAKER Left 10/2008    TONSILLECTOMY         Review of patient's allergies indicates:   Allergen Reactions    Latex, natural rubber     Sulfa (sulfonamide antibiotics)        Family History     Problem Relation (Age of Onset)    No Known Problems Mother, Sister          Social History Main Topics    Smoking status: Never Smoker    Smokeless tobacco: Not on file    Alcohol use No    Drug use: Not on file    Sexual activity: Not Currently       Review of Systems    Objective:     Vital Signs Range (Last 24H):  Temp:  [95.9 °F (35.5 °C)-98.4 °F (36.9 °C)]   Pulse:  [74-78]   Resp:  [0-20]   BP:  ()/(35-56)   SpO2:  [95 %-100 %]   Arterial Line BP: ()/(36-63)     I & O (Last 24H):    Intake/Output Summary (Last 24 hours) at 05/03/17 0941  Last data filed at 05/03/17 0900   Gross per 24 hour   Intake          6786.89 ml   Output             2631 ml   Net          4155.89 ml       Ventilator Data (Last 24H):     Vent Mode: SIMV (VC) + PS  Oxygen Concentration (%):  [49.4-80.2] 50  Resp Rate Total:  [0 br/min-16 br/min] 12 br/min  Vt Set:  [500 mL] 500 mL  PEEP/CPAP:  [6 cmH20-7 cmH20] 6 cmH20  Pressure Support:  [10 cmH20] 10 cmH20  Mean Airway Pressure:  [8.6 cmH20-10.4 cmH20] 9 cmH20    Hemodynamic Parameters (Last 24H):       Physical Exam:  Physical Exam  Gen: sedated  HEENT: MMM. PERRL. Down's facies  Resp: course bs b/l, no wheezes noted.  On mechanical ventilation  CV: RRR. 3/6 systolic murmur best heard at LUSB. 2+ pulses, CR < 3sec   Abd: soft NTND.  Liver edge 4 cm below RCM.  Ext: dry skin    Lines/Drains/Airways     Central Venous Catheter Line                 Percutaneous Central Line Insertion/Assessment - double lumen  05/02/17 0816 right internal jugular 1 day          Drain                 Urethral Catheter 05/02/17 0844 Non-latex;Temperature probe 16 Fr. 1 day         Chest Tube 05/02/17 1602 Left Mediastinal 24 Fr. less than 1 day         Chest Tube 05/02/17 1602 Right Mediastinal 24 Fr. less than 1 day         NG/OG Tube 05/02/17 1500 [place] sump Center mouth less than 1 day          Airway                 Airway - Non-Surgical 05/02/17 0751 Endotracheal Tube 1 day          Arterial Line                 Arterial Line 05/02/17 0807 Right Radial 1 day          Peripheral Intravenous Line                 Peripheral IV - Single Lumen 05/02/17 0727 Right Forearm 1 day         Peripheral IV - Single Lumen 05/02/17 0807 Left Wrist 1 day                Laboratory (Last 24H):   ABG:     Recent Labs  Lab 05/02/17  2325 05/03/17  0108 05/03/17  0259 05/03/17  0515 05/03/17  0752   PH 7.346*  7.344* 7.330* 7.370 7.326*   PCO2 44.7 46.2* 51.3* 43.4 47.4*   HCO3 24.5 25.1 27.1 25.1 24.8   POCSATURATED 96 98 97 97 97   BE -1 -1 1 0 -1     CMP:     Recent Labs  Lab 05/02/17  1525 05/03/17  0300    142   K 3.9  3.9 4.2    108   CO2 20* 24   * 183*   BUN 19 18   CREATININE 1.3 1.4   CALCIUM 10.8* 9.2   PROT  --  6.2   ALBUMIN  --  3.5   BILITOT  --  1.0   ALKPHOS  --  64   AST  --  41*   ALT  --  13   ANIONGAP 12 10   EGFRNONAA >60.0 >60.0     CBC:   Recent Labs  Lab 05/01/17  1343  05/02/17  1525  05/03/17  0300 05/03/17  0515 05/03/17  0752   WBC 6.14  --  6.97  --  10.23  --   --    HGB 13.6*  --  10.5*  --  11.6*  --   --    HCT 39.8*  < > 30.2*  < > 32.5* 33* 34*     --  134*  --  152  --   --    < > = values in this interval not displayed.    Chest X-Ray: I personally reviewed the films and findings are: ETT in place, slight improvement in pulmonary edema, Righ IJ in am.      Assessment/Plan:     Active Diagnoses:    Diagnosis Date Noted POA    Pulmonary valve insufficiency [I37.1] 05/02/2017 Yes      Problems Resolved During this Admission:    Diagnosis Date Noted Date Resolved POA     Cipriano Thompson is a 26 y.o. male with T21 s/p TOF s/p repair s/p subsequent pulmonary valve replacement in 1999, with sinus node syfunction requiring pacemaker placement in 2008 with symptomatic mixed moderate subPS (24mmhg)/moderatePI is now s/p redo sternotomy and pulmonary valve replacement with 27mm porcine valve/30 mm conduit POD#0    Neuro:  Postoperative Sedation & Analgesia:  -- On Precedex and Fentanyl gtt , will continue Precedex thru extubation  -- Prn Fentanyl and Versed  -- Continue IV tylenol  -- Increased lactate during the course of multiple arrhythmias - once improved will start Toradol this afternoon.     Resp:  Postoperative Respiratory Failure:  -- Goal of extubation to high flow nasal canula this am  -- Once extubated start IS every 1 hour while awake    CV:  Pulmonary Valve  replacement with Porcine 27mm valve/30mm conduit:  Rhythm: ho junctional escape rhythm.  Medtronic pacer: A-paced, V-sensed low rate: 75, upper rate:150  : PVCs noted as per history.  Pacemaker interrogated no issues.  Will keep Mag greater than 2.2  Preload: 75ml/hr IVF.  On Lasix IV Q8   Contractility: Milrinone 0.3mcg/kg/min, Epinephrine 0.02mcg/kg/min  Afterload: Milrinone 0.3 mcg/kg/min - will restart lisinopril post extubation.  Holding home Carvedilol and Digoxin    FEN/GI:  Nutrition/IVFs:  -- NPO. On fluids, will advance diet once extubated  Electrolytes:  -- will replace as needed, Mag greater than 2.2.  GI prophylaxis:  -- Famotidine    Renal:  -- Mild post bypass YAEL - with Cr of 1.4 from baseline of 1.3, will monitor  -- Strict Is/Os, tai in place    Heme:  -- Minimal postoperative bleeding, coags stable    ID:  Postoperative prophylaxis:  -- Ancef for 72 hours     Social/Dispo:  I will update mom once she awakes.     Critical Care Time: 100 minutes     Shaunna Villalba  Pediatric Critical Care  [facility]

## 2017-05-03 NOTE — PT/OT/SLP EVAL
Occupational Therapy  Evaluation/Treatment    Cipriano Thompson   MRN: [id number]   Admitting Diagnosis: Pulmonary valve insufficiency    OT Date of Treatment: 05/03/17   OT Start Time: 1349 (6002)  OT Stop Time: 1419 (1546)  OT Total Time (min): 30 min + 14 min = 44 min total    Billable Minutes:  Evaluation 20 min  Self Care/Home Management 24 min    Diagnosis: Pulmonary valve insufficieny   S/p pulmonary valve replacement POD#1    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    Tetralogy of Fallot 05/1990      Past Surgical History:   Procedure Laterality Date    BUNIONECTOMY      CARDIAC SURGERY      open heart, ppm     INSERT / REPLACE / REMOVE PACEMAKER Left 10/2008    TONSILLECTOMY         Referring physician: Bekah  Date referred to OT: 5/3/17    General Precautions: Standard, fall, sternal (cardiac)  Orthopedic Precautions:  (Sternal)  Braces: N/A    Do you have any cultural, spiritual, Quaker conflicts, given your current situation?: None     Patient History:  Living Environment  Lives With: parent(s), sibling(s)  Living Arrangements: house  Home Accessibility: stairs within home  Home Layout: Able to live on 1st floor  Number of Stairs Within Home: 14  Stair Railings at Home: inside, present on right side  Transportation Available: family or friend will provide  Living Environment Comment: Pt lives with his mom and sister in a 2SH with 0 CHERIE, pt's bed/bath on 2nd floor however mom states he can sleep on the 1st floor for a while. PTA, he requires Supervision for  ADLs and (I) with mobility, however had foot surgery in August 2016, and occasionally uses RW when he has pain. He was recently in [place] [employer] and enjoys power lifting and long jump. He has good family support upon D/c.  Equipment Currently Used at Home: walker, rolling (PRN with foot pain)    Prior level of  function:   Bed Mobility/Transfers: independent (needs RW PRN, but per mom doesn't typically use it)  Grooming: independent  Bathing: independent  Upper Body Dressing: independent  Lower Body Dressing: independent  Toileting: independent        Dominant hand: right    Subjective:  Communicated with MILTON Guardado prior to session.  Pt agreeable to OT/PT valerio.  Chief Complaint: None reported  Patient/Family stated goals: Return home    Pain Ratin/10  Pain Rating Post-Intervention: 0/10    Objective:  Patient found with: arterial line, blood pressure cuff, central line, chest tube, tai catheter, peripheral IV, telemetry, pulse ox (continuous), oxygen, SCD    Cognitive Exam:  Oriented to: Person and Place  Follows Commands/attention: Follows two-step commands  Communication: clear/fluent  Memory:  No Deficits noted  Safety awareness/insight to disability: intact  Coping skills/emotional control: Appropriate to situation    Visual/perceptual:  Intact    Physical Exam:  Postural examination/scapula alignment: Rounded shoulder and Head forward  Skin integrity: Visible skin intact  Edema: None noted     Sensation:   Intact    Upper Extremity Range of Motion:  Right Upper Extremity: WFL  Left Upper Extremity: WFL    Upper Extremity Strength:  Right Upper Extremity: WFL  Left Upper Extremity: WFL   Strength: WFL bilaterally    Fine motor coordination:   Intact    Gross motor coordination: WFL    Functional Mobility:  Bed Mobility:  Scooting/Bridging: Maximum Assistance (in sitting scooting to EOB)  Supine to Sit: Minimum Assistance  Sit to Supine: Contact Guard Assistance    Transfers:  Sit <> Stand Assistance:  (x1 from EOB with Max Assist, 2nd trial with Min Assist and x1 trial from bedside chair with Min Assist)  Sit <> Stand Assistive Device: No Assistive Device  Bed <> Chair Technique: Stand Pivot  Bed <> Chair Transfer Assistance: Minimum Assistance  Bed <> Chair Assistive Device: No Assistive Device    Functional  "Ambulation: Pt performed ~4 steps to and from bed<>chair with Min Assist for the stand pivot transfers.    Activities of Daily Living:    UE Dressing Level of Assistance: Total assistance (to don/doff gown as robe while seated EOB 2/2 line management)    LE Dressing Level of Assistance: Total assistance (to don socks while supine in bed)    Grooming Position: other (supine in bed)  Grooming Level of Assistance: Total assistance (for face washing)      Balance:   Static Sit: Min to CGA for sitting EOB  Static Stand: Min Assist     Therapeutic Activities and Exercises:  - Pt and mom educated on OT role and POC, as well as sternal precautions in regards to functional mobility.   - Pt tolerated sitting UIC for ~1.5 hours today.    AM-PAC 6 CLICK ADL  How much help from another person does this patient currently need?  1 = Unable, Total/Dependent Assistance  2 = A lot, Maximum/Moderate Assistance  3 = A little, Minimum/Contact Guard/Supervision  4 = None, Modified [place]/Independent    Putting on and taking off regular lower body clothing? : 2  Bathing (including washing, rinsing, drying)?: 2  Toileting, which includes using toilet, bedpan, or urinal? : 2  Putting on and taking off regular upper body clothing?: 2  Taking care of personal grooming such as brushing teeth?: 2  Eating meals?: 2  Total Score: 12    AM-PAC Raw Score CMS "G-Code Modifier Level of Impairment Assistance   6 % Total / Unable   7 - 9 CM 80 - 100% Maximal Assist   10 - 14 CL 60 - 80% Moderate Assist   15 - 19 CK 40 - 60% Moderate Assist   20 - 22 CJ 20 - 40% Minimal Assist   23 CI 1-20% SBA / CGA   24 CH 0% Independent/ Mod I       Patient left supine with all lines intact, call button in reach and MILTON Guardado present    Assessment:  Cipriano Thompson is a 26 y.o. male with a medical diagnosis of Pulmonary valve insufficiency s/p valve replacement POD#1 and presents with decreased mobility and performance in ADLs. He tolerated session well and " tolerated OOBTC for ~1.5 hrs. He would benefit from skilled OT intervention to promote (I) in ADLs and return to PLOF.    Rehab identified problem list/impairments: Rehab identified problem list/impairments: weakness, impaired endurance, impaired balance, impaired self care skills, impaired functional mobilty, decreased upper extremity function, pain, orthopedic precautions, impaired cardiopulmonary response to activity, decreased ROM    Rehab potential is good.    Activity tolerance: Good    Discharge recommendations: Discharge Facility/Level Of Care Needs: home     Barriers to discharge: Barriers to Discharge: None    Equipment recommendations: none     GOALS:   Occupational Therapy Goals        Problem: Occupational Therapy Goal    Goal Priority Disciplines Outcome Interventions   Occupational Therapy Goal     OT, PT/OT Ongoing (interventions implemented as appropriate)    Description:  Goals to be met by: 5/13/17     Patient will increase functional independence with ADLs by performing:    UE Dressing with Supervision.  LE Dressing with Supervision.  Grooming while standing at sink with Supervision.  Toileting from toilet with Supervision for hygiene and clothing management.   Supine to sit with Supervision.  Stand pivot transfers with Supervision.  Toilet transfer to toilet with Supervision.                PLAN:  Patient to be seen 6 x/week to address the above listed problems via self-care/home management, therapeutic activities, therapeutic exercises  Plan of Care expires: 06/02/17  Plan of Care reviewed with: patient, mother         DONNIE Lion  05/03/2017

## 2017-05-03 NOTE — PROGRESS NOTES
Pt extubated to HFNC at 15lpm and 100% O2. MD and RN at bedside. Tolerating well, will continue to monitor.

## 2017-05-03 NOTE — ASSESSMENT & PLAN NOTE
26 y.o. male with Down's syndrome, TOF s/p repair as infant, and sinus node dysfunction s/p pacemaker 2008, now s/p pulmonary valve replacement with a 27 mm [employer] Quinn epic bioprosthetic valve (5/2/17). He is currently critically ill with post-operative respiratory and cardiac failure on vasoactive medications and mechanical ventilation.     Plan:   Neuro/Pain:  - Continue Sedative Infusions  - Pain control prn  - Scheduled Tylenol, will start Toradol later today  Respiratory:  - Goal extubation today once she wakes up  Cardiovascular:  - Monitor BP's closely, goal normotensive   - Pacemaker interrogated by EP: frequent PVC's, no atrial arrhythmia, generator requires replacement in 6-9 months   - Metoprolol 25 mg daily for ectopy  FEN/GI:  - NPO  - Monitor electrolytes and replace as needed.   - Start Lasix 20 mg IV q8  Renal:  - Monitor I/O's closely  Heme/ID:  - Ancef x 48 hours post-op  - H/H stable. Monitor for bleeding  Plastics:  - Stable  Disposition:  - Wean respiratory support. Monitor blood pressures.

## 2017-05-03 NOTE — PLAN OF CARE
Problem: Patient Care Overview  Goal: Plan of Care Review  Outcome: Ongoing (interventions implemented as appropriate)  Plan of care updated and discussed with mother, answered all questions addressed all issues.

## 2017-05-03 NOTE — PLAN OF CARE
05/03/17 1408   Discharge Assessment   Assessment Type Discharge Planning Assessment   Eileen PEREZ to complete d/c assess

## 2017-05-03 NOTE — SUBJECTIVE & OBJECTIVE
Interval History: Woke up last night agitated and trying to remove lines. Received a bolus of fentanyl and was placed on a fentanyl gtt.     Objective:     Vital Signs (Most Recent):  Temp: 97.7 °F (36.5 °C) (05/03/17 0800)  Pulse: 78 (05/03/17 0900)  Resp: 17 (05/03/17 0900)  BP: (!) 87/37 (05/03/17 0900)  SpO2: 97 % (05/03/17 0900) Vital Signs (24h Range):  Temp:  [95.9 °F (35.5 °C)-98.4 °F (36.9 °C)] 97.7 °F (36.5 °C)  Pulse:  [74-78] 78  Resp:  [0-20] 17  SpO2:  [95 %-100 %] 97 %  BP: ()/(35-56) 87/37  Arterial Line BP: ()/(36-63) 105/45     Weight: 100.3 kg (221 lb 1.9 oz)  Body mass index is 35.69 kg/(m^2).     SpO2: 97 %  O2 Device (Oxygen Therapy): ventilator    Intake/Output - Last 3 Shifts       05/01 0700 - 05/02 0659 05/02 0700 - 05/03 0659 05/03 0700 - 05/04 0659    I.V. (mL/kg)  4476.1 (44.6) 359.8 (3.6)    Blood  2306     IV Piggyback  395     Total Intake(mL/kg)  7177.1 (71.6) 359.8 (3.6)    Urine (mL/kg/hr)  2559 (1.1) 103 (0.4)    Blood  3 (0)     Chest Tube  246 (0.1) 20 (0.1)    Total Output   2808 123    Net   +4369.1 +236.8             CT: R: 117/60 ml, L: 74/67 ml    Lines/Drains/Airways     Central Venous Catheter Line                 Percutaneous Central Line Insertion/Assessment - double lumen  05/02/17 0816 right internal jugular 1 day          Drain                 Urethral Catheter 05/02/17 0844 Non-latex;Temperature probe 16 Fr. 1 day         Chest Tube 05/02/17 1602 Left Mediastinal 24 Fr. less than 1 day         Chest Tube 05/02/17 1602 Right Mediastinal 24 Fr. less than 1 day         NG/OG Tube 05/02/17 1500 [facility] mouth less than 1 day          Airway                 Airway - Non-Surgical 05/02/17 0751 Endotracheal Tube 1 day          Arterial Line                 Arterial Line 05/02/17 0807 Right Radial 1 day          Peripheral Intravenous Line                 Peripheral IV - Single Lumen 05/02/17 0727 Right Forearm 1 day         Peripheral IV - Single Lumen  05/02/17 0807 Left Wrist 1 day                Scheduled Medications:    acetaminophen  650 mg Intravenous Q6H    ceFAZolin (ANCEF) IVPB  2 g Intravenous Q8H    ceFAZolin (ANCEF) IVPB  2 g Intravenous Q8H    chlorhexidine  15 mL Mouth/Throat BID    docusate sodium  100 mg Oral BID    famotidine (PF)  20 mg Intravenous BID    furosemide  20 mg Intravenous Q8H    levothyroxine  88 mcg Oral Before breakfast    metoprolol tartrate  25 mg Oral Daily    mupirocin  1 g Nasal BID       Continuous Medications:    dexmedetomidine (PRECEDEX) infusion (non-titrating) 0.7 mcg/kg/hr (05/03/17 0900)    dextrose 5 % and 0.45 % NaCl 75 mL/hr at 05/03/17 0900    epinephrine infusion 0.02 mcg/kg/min (05/03/17 0900)    fentanyl 50 mcg/hr (05/03/17 0900)    heparin(porcine) in 0.45% NaCl 3 Units/hr (05/03/17 0900)    heparin(porcine) in 0.45% NaCl      heparin(porcine) in 0.45% NaCl Stopped (05/02/17 1728)    milrinone 20mg/100ml D5W (200mcg/ml) 0.3 mcg/kg/min (05/03/17 0900)    papervine / heparin 3 mL/hr at 05/03/17 0900       PRN Medications: fentaNYL, fentaNYL, heparin flush (porcine), heparin, porcine (PF), magnesium sulfate IVPB, midazolam (PF), midazolam (PF), morphine, morphine, [DISCONTINUED] potassium chloride **AND** potassium chloride **AND** potassium chloride, sodium bicarbonate    Physical Exam   Constitutional: He appears well-developed and well-nourished.   Sedated, intubated   HENT:   Head: Normocephalic and atraumatic.   Mouth/Throat: Oropharynx is clear and moist.   Down's facies   Eyes: Conjunctivae are normal. Pupils are equal, round, and reactive to light.   Neck: Neck supple.   Cardiovascular: Normal rate, regular rhythm, S1 normal and S2 normal.  Frequent extrasystoles are present. Exam reveals no gallop and no friction rub.    Pulses:       Radial pulses are 1+ on the left side.        Dorsalis pedis pulses are 1+ on the left side.   There is a harsh 2/6 systolic ejection murmur heard best at  the LUSB. Cool distal extremities.   Pulmonary/Chest: Effort normal and breath sounds normal. He has no wheezes. He has no rales.   Chest tube and sternal wound bandaged   Abdominal: Soft. Bowel sounds are normal. He exhibits no distension. There is no hepatosplenomegaly. There is no tenderness.   Musculoskeletal: He exhibits no edema or tenderness.   Neurological: He exhibits normal muscle tone.   Skin: Skin is dry. No cyanosis. No pallor. Nails show no clubbing.       Significant Labs:   ABG    Recent Labs  Lab 05/03/17  0752   PH 7.326*   PO2 98   PCO2 47.4*   HCO3 24.8   BE -1     Lab Results   Component Value Date    WBC 10.23 05/03/2017    HGB 11.6 (L) 05/03/2017    HCT 34 (L) 05/03/2017    MCV 86 05/03/2017     05/03/2017     CMP  Sodium   Date Value Ref Range Status   05/03/2017 142 136 - 145 mmol/L Final     Potassium   Date Value Ref Range Status   05/03/2017 4.2 3.5 - 5.1 mmol/L Final     Chloride   Date Value Ref Range Status   05/03/2017 108 95 - 110 mmol/L Final     CO2   Date Value Ref Range Status   05/03/2017 24 23 - 29 mmol/L Final     Glucose   Date Value Ref Range Status   05/03/2017 183 (H) 70 - 110 mg/dL Final     BUN, Bld   Date Value Ref Range Status   05/03/2017 18 6 - 20 mg/dL Final     Creatinine   Date Value Ref Range Status   05/03/2017 1.4 0.5 - 1.4 mg/dL Final     Calcium   Date Value Ref Range Status   05/03/2017 9.2 8.7 - 10.5 mg/dL Final     Total Protein   Date Value Ref Range Status   05/03/2017 6.2 6.0 - 8.4 g/dL Final     Albumin   Date Value Ref Range Status   05/03/2017 3.5 3.5 - 5.2 g/dL Final     Total Bilirubin   Date Value Ref Range Status   05/03/2017 1.0 0.1 - 1.0 mg/dL Final     Comment:     For infants and newborns, interpretation of results should be based  on gestational age, weight and in agreement with clinical  observations.  Premature Infant recommended reference ranges:  Up to 24 hours.............<8.0 mg/dL  Up to 48 hours............<12.0 mg/dL  3-5  days..................<15.0 mg/dL  6-29 days.................<15.0 mg/dL       Alkaline Phosphatase   Date Value Ref Range Status   05/03/2017 64 55 - 135 U/L Final     AST   Date Value Ref Range Status   05/03/2017 41 (H) 10 - 40 U/L Final     ALT   Date Value Ref Range Status   05/03/2017 13 10 - 44 U/L Final     Anion Gap   Date Value Ref Range Status   05/03/2017 10 8 - 16 mmol/L Final     eGFR if    Date Value Ref Range Status   05/03/2017 >60.0 >60 mL/min/1.73 m^2 Final     eGFR if non    Date Value Ref Range Status   05/03/2017 >60.0 >60 mL/min/1.73 m^2 Final     Comment:     Calculation used to obtain the estimated glomerular filtration  rate (eGFR) is the CKD-EPI equation. Since race is unknown   in our information system, the eGFR values for   -American and Non--American patients are given   for each creatinine result.           Significant Imaging:  CXR reviewed    JAKE (post): Pulmonary valve velocity <2.5 m/sec. Mild to moderate TR with RV pressure estimate of 46 mmHg. Mildly dilated RV with low normal systolic function. Low normal LV systolic function.

## 2017-05-03 NOTE — PROGRESS NOTES
Dr. Villalba, Alex Pagett RT, and RN at bedside for extubation to [facility], pt tolerated well. Clear shallow breath sounds port extubation, pt encouraged to cough. Well continue to monitor

## 2017-05-03 NOTE — PROGRESS NOTES
[facility]  Pediatric Cardiology  Progress Note    Patient Name: Cipriano Thompson  MRN: [id number]  Admission Date: 5/2/2017  Hospital Length of Stay: 1 days  Code Status: Full Code   Attending Physician: Bhargav Godfrey MD   Primary Care Physician: Princess KHUSHBOO Rodgers MD  Expected Discharge Date:   Principal Problem:<principal problem not specified>    Subjective:     Interval History: Woke up last night agitated and trying to remove lines. Received a bolus of fentanyl and was placed on a fentanyl gtt. Increased ectopy overnight with lower BP and increased lactate this AM.    Objective:     Vital Signs (Most Recent):  Temp: 97.7 °F (36.5 °C) (05/03/17 0800)  Pulse: 78 (05/03/17 0900)  Resp: 17 (05/03/17 0900)  BP: (!) 87/37 (05/03/17 0900)  SpO2: 97 % (05/03/17 0900) Vital Signs (24h Range):  Temp:  [95.9 °F (35.5 °C)-98.4 °F (36.9 °C)] 97.7 °F (36.5 °C)  Pulse:  [74-78] 78  Resp:  [0-20] 17  SpO2:  [95 %-100 %] 97 %  BP: ()/(35-56) 87/37  Arterial Line BP: ()/(36-63) 105/45     Weight: 100.3 kg (221 lb 1.9 oz)  Body mass index is 35.69 kg/(m^2).     SpO2: 97 %  O2 Device (Oxygen Therapy): ventilator    Intake/Output - Last 3 Shifts       05/01 0700 - 05/02 0659 05/02 0700 - 05/03 0659 05/03 0700 - 05/04 0659    I.V. (mL/kg)  4476.1 (44.6) 359.8 (3.6)    Blood  2306     IV Piggyback  395     Total Intake(mL/kg)  7177.1 (71.6) 359.8 (3.6)    Urine (mL/kg/hr)  2559 (1.1) 103 (0.4)    Blood  3 (0)     Chest Tube  246 (0.1) 20 (0.1)    Total Output   2808 123    Net   +4369.1 +236.8             CT: R: 117/60 ml, L: 74/67 ml    Lines/Drains/Airways     Central Venous Catheter Line                 Percutaneous Central Line Insertion/Assessment - double lumen  05/02/17 0816 right internal jugular 1 day          Drain                 Urethral Catheter 05/02/17 0844 Non-latex;Temperature probe 16 Fr. 1 day         Chest Tube 05/02/17 1602 Left Mediastinal 24 Fr. less than 1 day         Chest Tube  05/02/17 1602 Right Mediastinal 24 Fr. less than 1 day         NG/OG Tube 05/02/17 1500 [facility] mouth less than 1 day          Airway                 Airway - Non-Surgical 05/02/17 0751 Endotracheal Tube 1 day          Arterial Line                 Arterial Line 05/02/17 0807 Right Radial 1 day          Peripheral Intravenous Line                 Peripheral IV - Single Lumen 05/02/17 0727 Right Forearm 1 day         Peripheral IV - Single Lumen 05/02/17 0807 Left Wrist 1 day                Scheduled Medications:    acetaminophen  650 mg Intravenous Q6H    ceFAZolin (ANCEF) IVPB  2 g Intravenous Q8H    ceFAZolin (ANCEF) IVPB  2 g Intravenous Q8H    chlorhexidine  15 mL Mouth/Throat BID    docusate sodium  100 mg Oral BID    famotidine (PF)  20 mg Intravenous BID    furosemide  20 mg Intravenous Q8H    levothyroxine  88 mcg Oral Before breakfast    metoprolol tartrate  25 mg Oral Daily    mupirocin  1 g Nasal BID       Continuous Medications:    dexmedetomidine (PRECEDEX) infusion (non-titrating) 0.7 mcg/kg/hr (05/03/17 0900)    dextrose 5 % and 0.45 % NaCl 75 mL/hr at 05/03/17 0900    epinephrine infusion 0.02 mcg/kg/min (05/03/17 0900)    fentanyl 50 mcg/hr (05/03/17 0900)    heparin(porcine) in 0.45% NaCl 3 Units/hr (05/03/17 0900)    heparin(porcine) in 0.45% NaCl      heparin(porcine) in 0.45% NaCl Stopped (05/02/17 1728)    milrinone 20mg/100ml D5W (200mcg/ml) 0.3 mcg/kg/min (05/03/17 0900)    papervine / heparin 3 mL/hr at 05/03/17 0900       PRN Medications: fentaNYL, fentaNYL, heparin flush (porcine), heparin, porcine (PF), magnesium sulfate IVPB, midazolam (PF), midazolam (PF), morphine, morphine, [DISCONTINUED] potassium chloride **AND** potassium chloride **AND** potassium chloride, sodium bicarbonate    Physical Exam   Constitutional: He appears well-developed and well-nourished.   Sedated, intubated   HENT:   Head: Normocephalic and atraumatic.   Mouth/Throat: Oropharynx is  clear and moist.   Down's facies   Eyes: Conjunctivae are normal. Pupils are equal, round, and reactive to light.   Neck: Neck supple.   Cardiovascular: Normal rate, regular rhythm, S1 normal and S2 normal.  Frequent extrasystoles are present. Exam reveals no gallop and no friction rub.    Pulses:       Radial pulses are 1+ on the left side.        Dorsalis pedis pulses are 1+ on the left side.   There is a harsh 2/6 systolic ejection murmur heard best at the LUSB. Cool distal extremities.   Pulmonary/Chest: Effort normal and breath sounds normal. He has no wheezes. He has no rales.   Chest tube and sternal wound bandaged   Abdominal: Soft. Bowel sounds are normal. He exhibits no distension. There is no hepatosplenomegaly. There is no tenderness.   Musculoskeletal: He exhibits no edema or tenderness.   Neurological: He exhibits normal muscle tone.   Skin: Skin is dry. No cyanosis. No pallor. Nails show no clubbing.       Significant Labs:   ABG    Recent Labs  Lab 05/03/17  0752   PH 7.326*   PO2 98   PCO2 47.4*   HCO3 24.8   BE -1     Lab Results   Component Value Date    WBC 10.23 05/03/2017    HGB 11.6 (L) 05/03/2017    HCT 34 (L) 05/03/2017    MCV 86 05/03/2017     05/03/2017     CMP  Sodium   Date Value Ref Range Status   05/03/2017 142 136 - 145 mmol/L Final     Potassium   Date Value Ref Range Status   05/03/2017 4.2 3.5 - 5.1 mmol/L Final     Chloride   Date Value Ref Range Status   05/03/2017 108 95 - 110 mmol/L Final     CO2   Date Value Ref Range Status   05/03/2017 24 23 - 29 mmol/L Final     Glucose   Date Value Ref Range Status   05/03/2017 183 (H) 70 - 110 mg/dL Final     BUN, Bld   Date Value Ref Range Status   05/03/2017 18 6 - 20 mg/dL Final     Creatinine   Date Value Ref Range Status   05/03/2017 1.4 0.5 - 1.4 mg/dL Final     Calcium   Date Value Ref Range Status   05/03/2017 9.2 8.7 - 10.5 mg/dL Final     Total Protein   Date Value Ref Range Status   05/03/2017 6.2 6.0 - 8.4 g/dL Final      Albumin   Date Value Ref Range Status   05/03/2017 3.5 3.5 - 5.2 g/dL Final     Total Bilirubin   Date Value Ref Range Status   05/03/2017 1.0 0.1 - 1.0 mg/dL Final     Comment:     For infants and newborns, interpretation of results should be based  on gestational age, weight and in agreement with clinical  observations.  Premature Infant recommended reference ranges:  Up to 24 hours.............<8.0 mg/dL  Up to 48 hours............<12.0 mg/dL  3-5 days..................<15.0 mg/dL  6-29 days.................<15.0 mg/dL       Alkaline Phosphatase   Date Value Ref Range Status   05/03/2017 64 55 - 135 U/L Final     AST   Date Value Ref Range Status   05/03/2017 41 (H) 10 - 40 U/L Final     ALT   Date Value Ref Range Status   05/03/2017 13 10 - 44 U/L Final     Anion Gap   Date Value Ref Range Status   05/03/2017 10 8 - 16 mmol/L Final     eGFR if    Date Value Ref Range Status   05/03/2017 >60.0 >60 mL/min/1.73 m^2 Final     eGFR if non    Date Value Ref Range Status   05/03/2017 >60.0 >60 mL/min/1.73 m^2 Final     Comment:     Calculation used to obtain the estimated glomerular filtration  rate (eGFR) is the CKD-EPI equation. Since race is unknown   in our information system, the eGFR values for   -American and Non--American patients are given   for each creatinine result.           Significant Imaging:  CXR reviewed    JAKE (post): Pulmonary valve velocity <2.5 m/sec. Mild to moderate TR with RV pressure estimate of 46 mmHg. Mildly dilated RV with low normal systolic function. Low normal LV systolic function.       Assessment and Plan:   Cardiac  Pulmonary valve insufficiency  26 y.o. male with Down's syndrome, TOF s/p repair as infant, and sinus node dysfunction s/p pacemaker 2008, now s/p pulmonary valve replacement with a 27 mm [employer] Quinn epic bioprosthetic valve (5/2/17). He is currently critically ill with post-operative respiratory and cardiac failure on  vasoactive medications and mechanical ventilation.     Plan:   Neuro/Pain:  - Continue Sedative Infusions  - Pain control prn  - Scheduled Tylenol, will start Toradol later today  Respiratory:  - Goal extubation today once she wakes up  Cardiovascular:  - Monitor BP's closely, goal normotensive   - Pacemaker interrogated by EP: frequent PVC's, no atrial arrhythmia, generator requires replacement in 6-9 months   - Metoprolol 25 mg daily for ectopy  FEN/GI:  - NPO  - Monitor electrolytes and replace as needed.  - Keep Magnesium >2 (closer to 2.5)  - Start Lasix 20 mg IV q8  Renal:  - Monitor I/O's closely  Heme/ID:  - Ancef x 48 hours post-op  - H/H stable. Monitor for bleeding  Plastics:  - Stable  Disposition:  - Wean respiratory support. Monitor blood pressures.       Sagrario Diggs MD  Pediatric Cardiology  [facility]

## 2017-05-04 PROBLEM — I37.9 PULMONARY VALVE DISORDER: Status: ACTIVE | Noted: 2017-01-30

## 2017-05-04 LAB
ALBUMIN SERPL BCP-MCNC: 3.1 G/DL
ALLENS TEST: ABNORMAL
ALLENS TEST: NORMAL
ALP SERPL-CCNC: 86 U/L
ALT SERPL W/O P-5'-P-CCNC: 8 U/L
ANION GAP SERPL CALC-SCNC: 10 MMOL/L
ANION GAP SERPL CALC-SCNC: 10 MMOL/L
AST SERPL-CCNC: 28 U/L
BASOPHILS # BLD AUTO: 0.04 K/UL
BASOPHILS NFR BLD: 0.3 %
BILIRUB SERPL-MCNC: 0.5 MG/DL
BUN SERPL-MCNC: 17 MG/DL
BUN SERPL-MCNC: 18 MG/DL
CALCIUM SERPL-MCNC: 8.5 MG/DL
CALCIUM SERPL-MCNC: 8.6 MG/DL
CHLORIDE SERPL-SCNC: 102 MMOL/L
CHLORIDE SERPL-SCNC: 107 MMOL/L
CO2 SERPL-SCNC: 23 MMOL/L
CO2 SERPL-SCNC: 26 MMOL/L
CREAT SERPL-MCNC: 1.4 MG/DL
CREAT SERPL-MCNC: 1.6 MG/DL
DELSYS: ABNORMAL
DIFFERENTIAL METHOD: ABNORMAL
EOSINOPHIL # BLD AUTO: 0.1 K/UL
EOSINOPHIL NFR BLD: 0.8 %
ERYTHROCYTE [DISTWIDTH] IN BLOOD BY AUTOMATED COUNT: 15.6 %
EST. GFR  (AFRICAN AMERICAN): >60 ML/MIN/1.73 M^2
EST. GFR  (AFRICAN AMERICAN): >60 ML/MIN/1.73 M^2
EST. GFR  (NON AFRICAN AMERICAN): 58.6 ML/MIN/1.73 M^2
EST. GFR  (NON AFRICAN AMERICAN): >60 ML/MIN/1.73 M^2
FIO2: 100
FIO2: 100
FIO2: 60
FIO2: 80
FIO2: 99
FLOW: 10
FLOW: 30
GLUCOSE SERPL-MCNC: 116 MG/DL (ref 70–110)
GLUCOSE SERPL-MCNC: 121 MG/DL (ref 70–110)
GLUCOSE SERPL-MCNC: 125 MG/DL (ref 70–110)
GLUCOSE SERPL-MCNC: 134 MG/DL
GLUCOSE SERPL-MCNC: 151 MG/DL
HCO3 UR-SCNC: 23.4 MMOL/L (ref 24–28)
HCO3 UR-SCNC: 24.9 MMOL/L (ref 24–28)
HCO3 UR-SCNC: 26.3 MMOL/L (ref 24–28)
HCO3 UR-SCNC: 26.5 MMOL/L (ref 24–28)
HCO3 UR-SCNC: 27.8 MMOL/L (ref 24–28)
HCO3 UR-SCNC: 28.2 MMOL/L (ref 24–28)
HCO3 UR-SCNC: 29 MMOL/L (ref 24–28)
HCO3 UR-SCNC: 30 MMOL/L (ref 24–28)
HCO3 UR-SCNC: 30.7 MMOL/L (ref 24–28)
HCT VFR BLD AUTO: 32.5 %
HCT VFR BLD CALC: 26 %PCV (ref 36–54)
HCT VFR BLD CALC: 26 %PCV (ref 36–54)
HCT VFR BLD CALC: 27 %PCV (ref 36–54)
HCT VFR BLD CALC: 31 %PCV (ref 36–54)
HCT VFR BLD CALC: 31 %PCV (ref 36–54)
HCT VFR BLD CALC: 32 %PCV (ref 36–54)
HCT VFR BLD CALC: 33 %PCV (ref 36–54)
HGB BLD-MCNC: 11.2 G/DL
LDH SERPL L TO P-CCNC: 1.01 MMOL/L (ref 0.36–1.25)
LYMPHOCYTES # BLD AUTO: 0.6 K/UL
LYMPHOCYTES NFR BLD: 4.9 %
MAGNESIUM SERPL-MCNC: 1.8 MG/DL
MCH RBC QN AUTO: 30.7 PG
MCHC RBC AUTO-ENTMCNC: 34.5 %
MCV RBC AUTO: 89 FL
MODE: ABNORMAL
MONOCYTES # BLD AUTO: 0.6 K/UL
MONOCYTES NFR BLD: 4.6 %
NEUTROPHILS # BLD AUTO: 11.1 K/UL
NEUTROPHILS NFR BLD: 89.4 %
PCO2 BLDA: 41.3 MMHG (ref 35–45)
PCO2 BLDA: 41.6 MMHG (ref 35–45)
PCO2 BLDA: 42.8 MMHG (ref 35–45)
PCO2 BLDA: 46.3 MMHG (ref 35–45)
PCO2 BLDA: 47.5 MMHG (ref 35–45)
PCO2 BLDA: 49.9 MMHG (ref 35–45)
PCO2 BLDA: 50.8 MMHG (ref 35–45)
PCO2 BLDA: 53.6 MMHG (ref 35–45)
PCO2 BLDA: 59.4 MMHG (ref 35–45)
PH SMN: 7.31 [PH] (ref 7.35–7.45)
PH SMN: 7.32 [PH] (ref 7.35–7.45)
PH SMN: 7.32 [PH] (ref 7.35–7.45)
PH SMN: 7.33 [PH] (ref 7.35–7.45)
PH SMN: 7.36 [PH] (ref 7.35–7.45)
PH SMN: 7.38 [PH] (ref 7.35–7.45)
PH SMN: 7.39 [PH] (ref 7.35–7.45)
PH SMN: 7.43 [PH] (ref 7.35–7.45)
PH SMN: 7.44 [PH] (ref 7.35–7.45)
PHOSPHATE SERPL-MCNC: 4.5 MG/DL
PLATELET # BLD AUTO: 117 K/UL
PMV BLD AUTO: 9.9 FL
PO2 BLDA: 202 MMHG (ref 80–100)
PO2 BLDA: 39 MMHG (ref 40–60)
PO2 BLDA: 461 MMHG (ref 80–100)
PO2 BLDA: 483 MMHG (ref 80–100)
PO2 BLDA: 50 MMHG (ref 80–100)
PO2 BLDA: 58 MMHG (ref 80–100)
PO2 BLDA: 61 MMHG (ref 80–100)
PO2 BLDA: 64 MMHG (ref 80–100)
PO2 BLDA: 91 MMHG (ref 80–100)
POC BE: -3 MMOL/L
POC BE: 0 MMOL/L
POC BE: 0 MMOL/L
POC BE: 1 MMOL/L
POC BE: 3 MMOL/L
POC BE: 4 MMOL/L
POC BE: 4 MMOL/L
POC BE: 5 MMOL/L
POC BE: 5 MMOL/L
POC IONIZED CALCIUM: 0.91 MMOL/L (ref 1.06–1.42)
POC IONIZED CALCIUM: 0.96 MMOL/L (ref 1.06–1.42)
POC IONIZED CALCIUM: 0.97 MMOL/L (ref 1.06–1.42)
POC IONIZED CALCIUM: 1.1 MMOL/L (ref 1.06–1.42)
POC IONIZED CALCIUM: 1.15 MMOL/L (ref 1.06–1.42)
POC IONIZED CALCIUM: 1.15 MMOL/L (ref 1.06–1.42)
POC IONIZED CALCIUM: 1.18 MMOL/L (ref 1.06–1.42)
POC IONIZED CALCIUM: 1.18 MMOL/L (ref 1.06–1.42)
POC IONIZED CALCIUM: 1.19 MMOL/L (ref 1.06–1.42)
POC SATURATED O2: 100 % (ref 95–100)
POC SATURATED O2: 73 % (ref 95–100)
POC SATURATED O2: 81 % (ref 95–100)
POC SATURATED O2: 87 % (ref 95–100)
POC SATURATED O2: 90 % (ref 95–100)
POC SATURATED O2: 90 % (ref 95–100)
POC SATURATED O2: 96 % (ref 95–100)
POC TCO2: 25 MMOL/L (ref 23–27)
POC TCO2: 26 MMOL/L (ref 24–29)
POC TCO2: 28 MMOL/L (ref 23–27)
POC TCO2: 28 MMOL/L (ref 23–27)
POC TCO2: 29 MMOL/L (ref 23–27)
POC TCO2: 30 MMOL/L (ref 23–27)
POC TCO2: 30 MMOL/L (ref 23–27)
POC TCO2: 32 MMOL/L (ref 23–27)
POC TCO2: 32 MMOL/L (ref 23–27)
POCT GLUCOSE: 155 MG/DL (ref 70–110)
POCT GLUCOSE: 182 MG/DL (ref 70–110)
POTASSIUM BLD-SCNC: 3.6 MMOL/L (ref 3.5–5.1)
POTASSIUM BLD-SCNC: 3.7 MMOL/L (ref 3.5–5.1)
POTASSIUM BLD-SCNC: 3.8 MMOL/L (ref 3.5–5.1)
POTASSIUM BLD-SCNC: 4.3 MMOL/L (ref 3.5–5.1)
POTASSIUM BLD-SCNC: 4.5 MMOL/L (ref 3.5–5.1)
POTASSIUM SERPL-SCNC: 3.9 MMOL/L
POTASSIUM SERPL-SCNC: 4.6 MMOL/L
PROT SERPL-MCNC: 6.3 G/DL
PROVIDER CREDENTIALS: ABNORMAL
PROVIDER CREDENTIALS: NORMAL
PROVIDER NOTIFIED: ABNORMAL
PROVIDER NOTIFIED: NORMAL
RBC # BLD AUTO: 3.65 M/UL
SAMPLE: ABNORMAL
SAMPLE: NORMAL
SITE: ABNORMAL
SITE: NORMAL
SODIUM BLD-SCNC: 136 MMOL/L (ref 136–145)
SODIUM BLD-SCNC: 139 MMOL/L (ref 136–145)
SODIUM BLD-SCNC: 139 MMOL/L (ref 136–145)
SODIUM BLD-SCNC: 141 MMOL/L (ref 136–145)
SODIUM BLD-SCNC: 141 MMOL/L (ref 136–145)
SODIUM BLD-SCNC: 143 MMOL/L (ref 136–145)
SODIUM BLD-SCNC: 143 MMOL/L (ref 136–145)
SODIUM SERPL-SCNC: 138 MMOL/L
SODIUM SERPL-SCNC: 140 MMOL/L
SP02: 80
SP02: 82
SP02: 94
T4 FREE SERPL-MCNC: 0.65 NG/DL
TIME NOTIFIED: 2110
TIME NOTIFIED: 345
TIME NOTIFIED: 345
TIME NOTIFIED: 515
TSH SERPL DL<=0.005 MIU/L-ACNC: 0.75 UIU/ML
VERBAL RESULT READBACK PERFORMED: YES
WBC # BLD AUTO: 12.55 K/UL

## 2017-05-04 PROCEDURE — 84100 ASSAY OF PHOSPHORUS: CPT

## 2017-05-04 PROCEDURE — 25000003 PHARM REV CODE 250: Performed by: PEDIATRICS

## 2017-05-04 PROCEDURE — 99292 CRITICAL CARE ADDL 30 MIN: CPT | Mod: ,,, | Performed by: PEDIATRICS

## 2017-05-04 PROCEDURE — 63600175 PHARM REV CODE 636 W HCPCS: Performed by: PEDIATRICS

## 2017-05-04 PROCEDURE — 27100171 HC OXYGEN HIGH FLOW UP TO 24 HOURS

## 2017-05-04 PROCEDURE — 94761 N-INVAS EAR/PLS OXIMETRY MLT: CPT

## 2017-05-04 PROCEDURE — 80048 BASIC METABOLIC PNL TOTAL CA: CPT

## 2017-05-04 PROCEDURE — 97535 SELF CARE MNGMENT TRAINING: CPT

## 2017-05-04 PROCEDURE — 25000003 PHARM REV CODE 250: Performed by: THORACIC SURGERY (CARDIOTHORACIC VASCULAR SURGERY)

## 2017-05-04 PROCEDURE — 84439 ASSAY OF FREE THYROXINE: CPT

## 2017-05-04 PROCEDURE — 82330 ASSAY OF CALCIUM: CPT

## 2017-05-04 PROCEDURE — 27000221 HC OXYGEN, UP TO 24 HOURS

## 2017-05-04 PROCEDURE — 84295 ASSAY OF SERUM SODIUM: CPT

## 2017-05-04 PROCEDURE — 85014 HEMATOCRIT: CPT

## 2017-05-04 PROCEDURE — 80053 COMPREHEN METABOLIC PANEL: CPT

## 2017-05-04 PROCEDURE — 97116 GAIT TRAINING THERAPY: CPT

## 2017-05-04 PROCEDURE — 99900035 HC TECH TIME PER 15 MIN (STAT)

## 2017-05-04 PROCEDURE — 37799 UNLISTED PX VASCULAR SURGERY: CPT

## 2017-05-04 PROCEDURE — 27000190 HC CPAP FULL FACE MASK W/VALVE

## 2017-05-04 PROCEDURE — 84132 ASSAY OF SERUM POTASSIUM: CPT

## 2017-05-04 PROCEDURE — 20300000 HC PICU ROOM

## 2017-05-04 PROCEDURE — 94799 UNLISTED PULMONARY SVC/PX: CPT

## 2017-05-04 PROCEDURE — 84443 ASSAY THYROID STIM HORMONE: CPT

## 2017-05-04 PROCEDURE — 82803 BLOOD GASES ANY COMBINATION: CPT

## 2017-05-04 PROCEDURE — 85025 COMPLETE CBC W/AUTO DIFF WBC: CPT

## 2017-05-04 PROCEDURE — 97530 THERAPEUTIC ACTIVITIES: CPT

## 2017-05-04 PROCEDURE — 87086 URINE CULTURE/COLONY COUNT: CPT

## 2017-05-04 PROCEDURE — 99233 SBSQ HOSP IP/OBS HIGH 50: CPT | Mod: ,,, | Performed by: PEDIATRICS

## 2017-05-04 PROCEDURE — 87040 BLOOD CULTURE FOR BACTERIA: CPT

## 2017-05-04 PROCEDURE — 99291 CRITICAL CARE FIRST HOUR: CPT | Mod: 25,,, | Performed by: PEDIATRICS

## 2017-05-04 PROCEDURE — 63600175 PHARM REV CODE 636 W HCPCS: Performed by: THORACIC SURGERY (CARDIOTHORACIC VASCULAR SURGERY)

## 2017-05-04 PROCEDURE — 94664 DEMO&/EVAL PT USE INHALER: CPT

## 2017-05-04 PROCEDURE — 83735 ASSAY OF MAGNESIUM: CPT

## 2017-05-04 RX ORDER — FUROSEMIDE 10 MG/ML
20 INJECTION INTRAMUSCULAR; INTRAVENOUS EVERY 8 HOURS
Status: DISCONTINUED | OUTPATIENT
Start: 2017-05-04 | End: 2017-05-05

## 2017-05-04 RX ORDER — LANOLIN ALCOHOL/MO/W.PET/CERES
400 CREAM (GRAM) TOPICAL 2 TIMES DAILY
Status: DISCONTINUED | OUTPATIENT
Start: 2017-05-04 | End: 2017-05-05

## 2017-05-04 RX ORDER — FUROSEMIDE 10 MG/ML
20 INJECTION INTRAMUSCULAR; INTRAVENOUS ONCE
Status: COMPLETED | OUTPATIENT
Start: 2017-05-04 | End: 2017-05-04

## 2017-05-04 RX ORDER — OXYCODONE HCL 5 MG/5 ML
2.5 SOLUTION, ORAL ORAL EVERY 4 HOURS PRN
Status: DISCONTINUED | OUTPATIENT
Start: 2017-05-04 | End: 2017-05-05

## 2017-05-04 RX ORDER — ONDANSETRON 2 MG/ML
8 INJECTION INTRAMUSCULAR; INTRAVENOUS EVERY 8 HOURS PRN
Status: DISCONTINUED | OUTPATIENT
Start: 2017-05-04 | End: 2017-05-06

## 2017-05-04 RX ORDER — ACETAMINOPHEN 325 MG/1
650 TABLET ORAL
Status: COMPLETED | OUTPATIENT
Start: 2017-05-04 | End: 2017-05-06

## 2017-05-04 RX ORDER — HEPARIN SODIUM,PORCINE/PF 10 UNIT/ML
10 SYRINGE (ML) INTRAVENOUS
Status: DISCONTINUED | OUTPATIENT
Start: 2017-05-04 | End: 2017-05-06

## 2017-05-04 RX ORDER — OXYCODONE HCL 5 MG/5 ML
5 SOLUTION, ORAL ORAL EVERY 4 HOURS PRN
Status: DISCONTINUED | OUTPATIENT
Start: 2017-05-04 | End: 2017-05-05

## 2017-05-04 RX ORDER — ACETAMINOPHEN 10 MG/ML
1000 INJECTION, SOLUTION INTRAVENOUS ONCE
Status: COMPLETED | OUTPATIENT
Start: 2017-05-04 | End: 2017-05-04

## 2017-05-04 RX ORDER — METOPROLOL TARTRATE 50 MG/1
50 TABLET ORAL ONCE
Status: DISCONTINUED | OUTPATIENT
Start: 2017-05-04 | End: 2017-05-04

## 2017-05-04 RX ORDER — MICONAZOLE NITRATE 2 %
POWDER (GRAM) TOPICAL 2 TIMES DAILY
Status: DISCONTINUED | OUTPATIENT
Start: 2017-05-04 | End: 2017-05-12 | Stop reason: HOSPADM

## 2017-05-04 RX ADMIN — DEXTROSE 2 G: 50 INJECTION, SOLUTION INTRAVENOUS at 11:05

## 2017-05-04 RX ADMIN — MICONAZOLE NITRATE: 20 POWDER TOPICAL at 08:05

## 2017-05-04 RX ADMIN — FUROSEMIDE 20 MG: 10 INJECTION, SOLUTION INTRAMUSCULAR; INTRAVENOUS at 04:05

## 2017-05-04 RX ADMIN — ACETAMINOPHEN 1000 MG: 10 INJECTION, SOLUTION INTRAVENOUS at 09:05

## 2017-05-04 RX ADMIN — MORPHINE SULFATE 2 MG: 2 INJECTION, SOLUTION INTRAMUSCULAR; INTRAVENOUS at 04:05

## 2017-05-04 RX ADMIN — MAGNESIUM SULFATE IN WATER 2 G: 40 INJECTION, SOLUTION INTRAVENOUS at 05:05

## 2017-05-04 RX ADMIN — NICARDIPINE HYDROCHLORIDE 0.5 MCG/KG/MIN: 0.2 INJECTION, SOLUTION INTRAVENOUS at 03:05

## 2017-05-04 RX ADMIN — DEXTROSE 2 G: 50 INJECTION, SOLUTION INTRAVENOUS at 07:05

## 2017-05-04 RX ADMIN — Medication 1 UNITS: at 03:05

## 2017-05-04 RX ADMIN — CHLOROTHIAZIDE SODIUM 0.1 MG/KG/HR: 500 INJECTION, POWDER, LYOPHILIZED, FOR SOLUTION INTRAVENOUS at 06:05

## 2017-05-04 RX ADMIN — MAGNESIUM OXIDE TAB 400 MG (241.3 MG ELEMENTAL MG) 400 MG: 400 (241.3 MG) TAB at 08:05

## 2017-05-04 RX ADMIN — HEPARIN SODIUM: 1000 INJECTION, SOLUTION INTRAVENOUS; SUBCUTANEOUS at 04:05

## 2017-05-04 RX ADMIN — OXYCODONE HYDROCHLORIDE 5 MG: 5 SOLUTION ORAL at 06:05

## 2017-05-04 RX ADMIN — MUPIROCIN 1 G: 20 OINTMENT TOPICAL at 09:05

## 2017-05-04 RX ADMIN — DOCUSATE SODIUM 100 MG: 100 CAPSULE, LIQUID FILLED ORAL at 09:05

## 2017-05-04 RX ADMIN — LEVOTHYROXINE SODIUM 88 MCG: 88 TABLET ORAL at 09:05

## 2017-05-04 RX ADMIN — MUPIROCIN 1 G: 20 OINTMENT TOPICAL at 08:05

## 2017-05-04 RX ADMIN — DEXTROSE 2 G: 50 INJECTION, SOLUTION INTRAVENOUS at 04:05

## 2017-05-04 RX ADMIN — MICONAZOLE NITRATE 1 EACH: 20 POWDER TOPICAL at 02:05

## 2017-05-04 RX ADMIN — OXYCODONE HYDROCHLORIDE 5 MG: 5 SOLUTION ORAL at 02:05

## 2017-05-04 RX ADMIN — FAMOTIDINE 20 MG: 10 INJECTION, SOLUTION INTRAVENOUS at 08:05

## 2017-05-04 RX ADMIN — OXYCODONE HYDROCHLORIDE 5 MG: 5 SOLUTION ORAL at 10:05

## 2017-05-04 RX ADMIN — FUROSEMIDE 20 MG: 10 INJECTION, SOLUTION INTRAMUSCULAR; INTRAVENOUS at 11:05

## 2017-05-04 RX ADMIN — NICARDIPINE HYDROCHLORIDE 1.5 MCG/KG/MIN: 0.2 INJECTION, SOLUTION INTRAVENOUS at 07:05

## 2017-05-04 RX ADMIN — ACETAMINOPHEN 650 MG: 325 TABLET ORAL at 08:05

## 2017-05-04 RX ADMIN — DEXTROSE MONOHYDRATE AND SODIUM CHLORIDE 44 ML/HR: 5; .45 INJECTION, SOLUTION INTRAVENOUS at 11:05

## 2017-05-04 RX ADMIN — DEXMEDETOMIDINE HYDROCHLORIDE 0.5 MCG/KG/HR: 4 INJECTION, SOLUTION INTRAVENOUS at 05:05

## 2017-05-04 RX ADMIN — Medication 3 UNITS/HR: at 07:05

## 2017-05-04 RX ADMIN — DEXTROSE MONOHYDRATE AND SODIUM CHLORIDE: 5; .45 INJECTION, SOLUTION INTRAVENOUS at 05:05

## 2017-05-04 RX ADMIN — DOCUSATE SODIUM 100 MG: 100 CAPSULE, LIQUID FILLED ORAL at 08:05

## 2017-05-04 RX ADMIN — ONDANSETRON 4 MG: 2 INJECTION INTRAMUSCULAR; INTRAVENOUS at 04:05

## 2017-05-04 RX ADMIN — ACETAMINOPHEN 650 MG: 325 TABLET ORAL at 02:05

## 2017-05-04 RX ADMIN — HEPARIN, PORCINE (PF) 10 UNIT/ML INTRAVENOUS SYRINGE 10 UNITS: at 03:05

## 2017-05-04 NOTE — PROGRESS NOTES
Labs reviewed with Dr. Barajas, will increase total IVF's to 50 ml., allow pt to drink., Remains off diuretics, urine output decreasing and more concentrated in appearance

## 2017-05-04 NOTE — PROGRESS NOTES
[facility]  Pediatric Cardiology  Progress Note    Patient Name: Cipriano Thompson  MRN: [id number]  Admission Date: 5/2/2017  Hospital Length of Stay: 2 days  Code Status: Full Code   Attending Physician: Bhargav Godfrey MD   Primary Care Physician: Princess KHUSHBOO Rodgers MD  Expected Discharge Date:   Principal Problem:Pulmonary valve insufficiency    Subjective:     Interval History: Desaturated this am requiring BiPap, CXR demonstrated pulmonary edema, now on Lasix/Diuril gtt. Mild elevation in creatinine yesterday that is improving.     Objective:     Vital Signs (Most Recent):  Temp: (!) 100.6 °F (38.1 °C) (05/04/17 1200)  Pulse: 75 (05/04/17 1245)  Resp: (!) 21 (05/04/17 1245)  BP: (!) 86/61 (05/04/17 1141)  SpO2: 100 % (05/04/17 1245) Vital Signs (24h Range):  Temp:  [98.3 °F (36.8 °C)-102.5 °F (39.2 °C)] 100.6 °F (38.1 °C)  Pulse:  [74-96] 75  Resp:  [12-37] 21  SpO2:  [82 %-100 %] 100 %  BP: ()/(47-61) 86/61  Arterial Line BP: ()/(40-77) 125/48     Weight: 100.3 kg (221 lb 1.9 oz)  Body mass index is 35.69 kg/(m^2).     SpO2: 100 %  O2 Device (Oxygen Therapy): BiPAP   Oxygen Concentration (%):  [] 60      Intake/Output - Last 3 Shifts       05/02 0700 - 05/03 0659 05/03 0700 - 05/04 0659 05/04 0700 - 05/05 0659    P.O.  560 90    I.V. (mL/kg) 4476.1 (44.6) 2640.5 (26.3) 628.3 (6.3)    Blood 2306      IV Piggyback 395 215 185.2    Total Intake(mL/kg) 7177.1 (71.6) 3415.5 (34.1) 903.5 (9)    Urine (mL/kg/hr) 2559 (1.1) 1427 (0.6) 2180 (3.4)    Drains  50 (0)     Blood 3 (0)  4 (0)    Chest Tube 246 (0.1) 156 (0.1) 26 (0)    Total Output 2808 1633 2210    Net +4369.1 +1782.5 -1306.6             CT: R: 79/46/60 ml, L: 77/48 ml    Lines/Drains/Airways     Central Venous Catheter Line                 Percutaneous Central Line Insertion/Assessment - double lumen  05/02/17 0816 right internal jugular 2 days          Drain                 Urethral Catheter 05/02/17 0844  Non-latex;Temperature probe 16 Fr. 2 days         Chest Tube 05/02/17 1602 Left Mediastinal 24 Fr. 1 day         Chest Tube 05/02/17 1602 Right Mediastinal 24 Fr. 1 day          Arterial Line                 Arterial Line 05/02/17 0807 Right Radial 2 days          Peripheral Intravenous Line                 Peripheral IV - Single Lumen 05/02/17 0727 Right Forearm 2 days         Peripheral IV - Single Lumen 05/02/17 0807 Left Wrist 2 days                Scheduled Medications:    acetaminophen  650 mg Oral Q6H    ceFAZolin (ANCEF) IVPB  2 g Intravenous Q8H    docusate sodium  100 mg Oral BID    famotidine (PF)  20 mg Intravenous BID    levothyroxine  88 mcg Oral Before breakfast    magnesium oxide  400 mg Oral BID    miconazole NITRATE 2 %   Topical BID    mupirocin  1 g Nasal BID       Continuous Medications:    dextrose 5 % and 0.45 % NaCl 69 mL/hr at 05/04/17 1300    heparin(porcine) in 0.45% NaCl 3 Units/hr (05/04/17 1300)    heparin(porcine) in 0.45% NaCl Stopped (05/03/17 2056)    heparin(porcine) in 0.45% NaCl Stopped (05/02/17 1728)    papervine / heparin 3 mL/hr at 05/04/17 1300       PRN Medications: heparin, porcine (PF), heparin, porcine (PF), magnesium sulfate IVPB, morphine, morphine, ondansetron, [DISCONTINUED] potassium chloride **AND** potassium chloride **AND** potassium chloride, sodium bicarbonate    Physical Exam  Constitutional: He appears well-developed and well-nourished. On BiPap in no acute distress.  HENT:   Head: Normocephalic and atraumatic.   Mouth/Throat: Oropharynx is clear and moist. Down's facies   Eyes: Conjunctivae are normal. Pupils are equal, round, and reactive to light.   Neck: Neck supple.   Cardiovascular: Normal rate, regular rhythm, S1 normal and S2 split.  Infrequent extrasystoles are present. Exam reveals no gallop and no friction rub.    Pulses:       Radial pulses are 1+ on the left side.        Dorsalis pedis pulses are 1+ on the left side.   There is a  harsh 2/6 systolic ejection murmur heard best at the LUSB. Warm distal extremities.   Pulmonary/Chest: Mild tachypnea, no retractions, symmetric breath sounds. He has no wheezes. He has no rales. Chest tube and sternal wound bandaged   Abdominal: Soft. Bowel sounds are normal. He exhibits no distension. There is no hepatosplenomegaly. There is no tenderness.   Musculoskeletal: He exhibits no edema or tenderness.   Neurological: He exhibits normal muscle tone.   Skin: Skin is dry. No cyanosis. No pallor. Nails show no clubbing.     Significant Labs:   ABG    Recent Labs  Lab 05/04/17  0712   PH 7.313*   PO2 58*   PCO2 46.3*   HCO3 23.4*   BE -3     Lab Results   Component Value Date    WBC 12.55 05/04/2017    HGB 11.2 (L) 05/04/2017    HCT 31 (L) 05/04/2017    MCV 89 05/04/2017     (L) 05/04/2017     CMP  Sodium   Date Value Ref Range Status   05/04/2017 140 136 - 145 mmol/L Final     Potassium   Date Value Ref Range Status   05/04/2017 4.6 3.5 - 5.1 mmol/L Final     Chloride   Date Value Ref Range Status   05/04/2017 107 95 - 110 mmol/L Final     CO2   Date Value Ref Range Status   05/04/2017 23 23 - 29 mmol/L Final     Glucose   Date Value Ref Range Status   05/04/2017 151 (H) 70 - 110 mg/dL Final     BUN, Bld   Date Value Ref Range Status   05/04/2017 17 6 - 20 mg/dL Final     Creatinine   Date Value Ref Range Status   05/04/2017 1.4 0.5 - 1.4 mg/dL Final     Calcium   Date Value Ref Range Status   05/04/2017 8.6 (L) 8.7 - 10.5 mg/dL Final     Total Protein   Date Value Ref Range Status   05/04/2017 6.3 6.0 - 8.4 g/dL Final     Albumin   Date Value Ref Range Status   05/04/2017 3.1 (L) 3.5 - 5.2 g/dL Final     Total Bilirubin   Date Value Ref Range Status   05/04/2017 0.5 0.1 - 1.0 mg/dL Final     Comment:     For infants and newborns, interpretation of results should be based  on gestational age, weight and in agreement with clinical  observations.  Premature Infant recommended reference ranges:  Up to 24  hours.............<8.0 mg/dL  Up to 48 hours............<12.0 mg/dL  3-5 days..................<15.0 mg/dL  6-29 days.................<15.0 mg/dL       Alkaline Phosphatase   Date Value Ref Range Status   05/04/2017 86 55 - 135 U/L Final     AST   Date Value Ref Range Status   05/04/2017 28 10 - 40 U/L Final     ALT   Date Value Ref Range Status   05/04/2017 8 (L) 10 - 44 U/L Final     Anion Gap   Date Value Ref Range Status   05/04/2017 10 8 - 16 mmol/L Final     eGFR if    Date Value Ref Range Status   05/04/2017 >60.0 >60 mL/min/1.73 m^2 Final     eGFR if non    Date Value Ref Range Status   05/04/2017 >60.0 >60 mL/min/1.73 m^2 Final     Comment:     Calculation used to obtain the estimated glomerular filtration  rate (eGFR) is the CKD-EPI equation. Since race is unknown   in our information system, the eGFR values for   -American and Non--American patients are given   for each creatinine result.           Significant Imaging:  CXR reviewed    JAKE (post): Pulmonary valve velocity <2.5 m/sec. Mild to moderate TR with RV pressure estimate of 46 mmHg. Mildly dilated RV with low normal systolic function. Low normal LV systolic function.       Assessment and Plan:   Cardiac  * Pulmonary valve insufficiency  26 y.o. male with Down's syndrome, TOF s/p repair as infant, and sinus node dysfunction s/p pacemaker 2008, now s/p pulmonary valve replacement with a 27 mm St. Quinn epic bioprosthetic valve (5/2/17). He is currently critically ill with respiratory failure on positive pressure ventilation secondary to what sounds like flash pulmonary edema. His blood pressures have been labile with high's in the 180's and lows in the 70's with improved ectopy on Metoprolol.     Plan:   Neuro/Pain:  - Continue Sedative Infusions to tolerate BiPap  - Pain control prn  - Scheduled Tylenol, consider Toradol tomorrow pending renal function  Respiratory:  - Repeat CXR later, if improved wean  respiratory support  - Goal sats >92%  Cardiovascular:  - Monitor BP's closely, goal normotensive   - Pacemaker interrogated by EP: frequent PVC's, no atrial arrhythmia, generator requires replacement in 6-9 months   - Metoprolol 25 mg daily for ectopy, may increase to 50 if persistently hypertensive  FEN/GI:  - NPO for now  - Monitor electrolytes and replace as needed.   - Continue Lasix/Diuril gtt, goal negative fluid balance  Renal:  - Monitor I/O's closely  Heme/ID:  - Ancef x 48 hours post-op  - H/H stable. Monitor for bleeding  Plastics:  - Stable  Disposition:  - Wean respiratory support. Monitor blood pressures.       Sagrario Diggs MD  Pediatric Cardiology  [facility]

## 2017-05-04 NOTE — PLAN OF CARE
Problem: Occupational Therapy Goal  Goal: Occupational Therapy Goal  Goals to be met by: 5/13/17     Patient will increase functional independence with ADLs by performing:    UE Dressing with Supervision.  LE Dressing with Supervision.  Grooming while standing at sink with Supervision.  Toileting from toilet with Supervision for hygiene and clothing management.   Supine to sit with Supervision.  Stand pivot transfers with Supervision.  Toilet transfer to toilet with Supervision.   Outcome: Ongoing (interventions implemented as appropriate)  Pt is continuing to make progress towards goals. Goals remain appropriate, continue POC.   DONNIE Jacobo  5/4/2017

## 2017-05-04 NOTE — PLAN OF CARE
Problem: Physical Therapy Goal  Goal: Physical Therapy Goal  Goals to be met by: 5/10/17     Patient will increase functional independence with mobility by performin. Supine to sit with Stand-by Assistance  2. Sit to supine with Stand-by Assistance  3. Sit to stand transfer with Stand-By Assistance  4. Bed to chair transfer with Stand-by Assistance  5. Gait x 200 feet with Stand-by Assistance.   Outcome: Ongoing (interventions implemented as appropriate)  Pt is progressing toward goals.

## 2017-05-04 NOTE — PROGRESS NOTES
Cipriano complaining of scrotal discomfort, burning., Pt stood up at chair side, washed perineal area, dried and examined with Dr. Villalba, area not excoriated, but miconazole powder applied.,   Cipriano tolerating HFNC, breath sounds very diminished yet clear, encouraging to cough and deep breath.

## 2017-05-04 NOTE — PLAN OF CARE
05/03/17 1445   Discharge Assessment   Assessment Type Discharge Planning Assessment   Confirmed/corrected address and phone number on facesheet? Yes   Assessment information obtained from? Caregiver   Expected Length of Stay (days) 7   Communicated expected length of stay with patient/caregiver yes   Prior to hospitilization cognitive status: Alert/Oriented   Prior to hospitalization functional status: Independent   Current cognitive status: Alert/Oriented   Current Functional Status: Independent   Arrived From admitted as an inpatient   Lives With parent(s);sibling(s)   Able to Return to Prior Arrangements yes   Is patient able to care for self after discharge? Yes   How many people do you have in your home that can help with your care after discharge? 2   Who are your caregiver(s) and their phone number(s)? Mom: Luann Thompson [phone]; MGma: Kelsey Perdomo [phone]   Patient's perception of discharge disposition admitted as an inpatient   Readmission Within The Last 30 Days no previous admission in last 30 days   Patient currently being followed by outpatient case management? No   Patient currently receives home health services? No   Does the patient currently use HME? No   Patient currently receives private duty nursing? No   Patient currently receives any other outside agency services? No   Equipment Currently Used at Home walker, rolling   Do you have any problems affording any of your prescribed medications? No   Is the patient taking medications as prescribed? yes   Do you have any financial concerns preventing you from receiving the healthcare you need? No   Does the patient have transportation to healthcare appointments? Yes   Transportation Available car;family or friend will provide   On Dialysis? No   Does the patient receive services at the [facility]madin Clinic? No   Are there any open cases? No   Discharge Plan A Home with family   Discharge Plan B Home with family   Patient/Family In Agreement  With Plan yes   SW met with pt's Mom in PICU CVICU 22 today. SW explained role and offered emotional and listening support. Mom and pt appear to be coping appropriately with pt's hospitalization. Pt is a 25 y/o male who was admitted to [facility] on 5/2/17 with a diagnosis of pulmonary valve insufficiency and is now s/p pulmonary valve replacement with a 27 mm St. Quinn epic bioprosthetic valve (5/2).    Pt lives with Mom in a house at [address] [place] [id number]. Pt's Dad is not very involved in his life. Mom has her own transportation and works. Mom states pt is independent is able to stay home by himself when she goes to work. He knows not to use the oven when his Mom is gone. There are also many family members who live close by and can help with pt as well. Pt has [employer]-Medicaid insurance. Pt's primary doctor is Dr. Princess RADHA Rodgers.    Contact information is listed above. No other needs identified at this time. Family is aware of how to reach SW if needed.

## 2017-05-04 NOTE — PT/OT/SLP PROGRESS
Occupational Therapy  Treatment    Cipriano Thompson   MRN: [id number]   Admitting Diagnosis: Pulmonary valve insufficiency    OT Date of Treatment: 17   OT Start Time: 140  OT Stop Time: 1433  OT Total Time (min): 31 min    Billable Minutes:  Self Care/Home Management 16 min and Therapeutic Activity 15 min    General Precautions: Standard, fall, sternal (cardiac)  Orthopedic Precautions:  (sternal)  Braces: N/A    Do you have any cultural, spiritual, Adventist conflicts, given your current situation?: None    Subjective:  Communicated with RN Ifeoma prior to session.  Pt agreeable to OT session.    Pain Ratin/10  Pain Rating Post-Intervention: 0/10    Objective:  Patient found with: arterial line, blood pressure cuff, central line, chest tube, tai catheter, peripheral IV, telemetry, pulse ox (continuous), oxygen     Functional Mobility:  Bed Mobility:   Not performed, pt found seated UIC    Transfers:   Sit <> Stand Assistance: Minimum Assistance (x1 trial from bedside chair, increased time with transition)  Sit <> Stand Assistive Device: No Assistive Device    Functional Ambulation: None today    Activities of Daily Living:  Perihygiene: Total Assist (performed by RN) while OT assisted with standing     Grooming Level of Assistance: Contact guard assistance (for washing face and oral hygiene)    Balance:   Static Sit: Supervision while seated UIC  Static Stand: Min Assist      Therapeutic Activities and Exercises:  - Pt performed grooming tasks while seated UIC.  - Pt had been UIC for ~3 hrs per RN upon OT arrival.  - RN performed perihygiene while pt was standing with Min Assist for balance. While standing he demo'd wide TABITHA and resting chin on therapist's shoulder.  - Pt educated on importance in use of BUEs and hands in functional activities. He was slightly resistant due to multiple IV's however OT educated pt and mom that using hands for ADL performance was okay as long as not breaking sternal  precautions. OT also educated pt on performing BUE exercises of bicep curls and gross grasps to promote mobility and strengthening as well as BLE ankle pumps for reducing edema. OT insisted that pt perform exercises during commercial breaks watching TV.      AM-PAC 6 CLICK ADL   How much help from another person does this patient currently need?   1 = Unable, Total/Dependent Assistance  2 = A lot, Maximum/Moderate Assistance  3 = A little, Minimum/Contact Guard/Supervision  4 = None, Modified [place]/Independent    Putting on and taking off regular lower body clothing? : 2  Bathing (including washing, rinsing, drying)?: 2  Toileting, which includes using toilet, bedpan, or urinal? : 2  Putting on and taking off regular upper body clothing?: 3  Taking care of personal grooming such as brushing teeth?: 3  Eating meals?: 3  Total Score: 15     AM-PAC Raw Score CMS G-Code Modifier Level of Impairment Assistance   6 % Total / Unable   7 - 9 CM 80 - 100% Maximal Assist   10 - 14 CL 60 - 80% Moderate Assist   15 - 19 CK 40 - 60% Moderate Assist   20 - 22 CJ 20 - 40% Minimal Assist   23 CI 1-20% SBA / CGA   24 CH 0% Independent/ Mod I     Patient left up in chair with all lines intact and call button in reach    ASSESSMENT:  Cipriano Thompson is a 26 y.o. male with a medical diagnosis of Pulmonary valve insufficiency s/p valve repair POD#2 and presents with improved mobility and performance of ADLs. He continues to require Min Assist for transfers. He is hesitant to use BUEs for functional tasks due to IV placements, however is capable of performing these tasks. He reagan continue to benefit from skilled OT services to promote (I) in ADLs and return to valued roles and routines.    Rehab identified problem list/impairments: Rehab identified problem list/impairments: weakness, impaired endurance, impaired balance, impaired self care skills, impaired functional mobilty, decreased upper extremity function, orthopedic  precautions, impaired cardiopulmonary response to activity    Rehab potential is good.    Activity tolerance: Good    Discharge recommendations: Discharge Facility/Level Of Care Needs: home     Barriers to discharge: Barriers to Discharge: None    Equipment recommendations: none     GOALS:   Occupational Therapy Goals        Problem: Occupational Therapy Goal    Goal Priority Disciplines Outcome Interventions   Occupational Therapy Goal     OT, PT/OT Ongoing (interventions implemented as appropriate)    Description:  Goals to be met by: 5/13/17     Patient will increase functional independence with ADLs by performing:    UE Dressing with Supervision.  LE Dressing with Supervision.  Grooming while standing at sink with Supervision.  Toileting from toilet with Supervision for hygiene and clothing management.   Supine to sit with Supervision.  Stand pivot transfers with Supervision.  Toilet transfer to toilet with Supervision.                Plan:  Patient to be seen 6 x/week to address the above listed problems via self-care/home management, therapeutic activities, therapeutic exercises  Plan of Care expires: 06/02/17  Plan of Care reviewed with: patient, mother         Debby J DONNIE Temple  05/04/2017

## 2017-05-04 NOTE — PROGRESS NOTES
Blood gas results reviewed with DR. Glez, will decrease flow to 25lpm and increase Fio2 to 100%. I and O's reveiwed along with blood pressure

## 2017-05-04 NOTE — PROGRESS NOTES
CXR done, much improved. ABG along with CXR reviewed with DR. Villalba, will transition to HFNC 30lpmn at 50%

## 2017-05-04 NOTE — PROGRESS NOTES
05/04/17 0800   Vital Signs   Temp (!) 102.5 °F (39.2 °C)   Temp src Axillary   Pulse 75   Resp (!) 24   SpO2 (!) 94 %   Oximetry Probe Site Intact;Assessed   Oxygen Concentration (%) 100   O2 Device (Oxygen Therapy) BiPAP   Art Line   Arterial Line /61   Arterial Line MAP (mmHg) 82 mmHg   Invasive Hemodynamic Monitoring   CVP (mean) 24 mmHg   Blood cultures, urine culture and UA sent. Dr. Anderson notified.

## 2017-05-04 NOTE — PLAN OF CARE
Problem: Patient Care Overview  Goal: Plan of Care Review  Outcome: Ongoing (interventions implemented as appropriate)  POC updated with mother and patient throughout the shift. Patient cooperative with care and slept well. After x-ray attempted acapella and deep breathing. Patient began coughing, burping and gagging after acapella and deep breathing. At the time the patient was on 10 L 80% but oxygen saturation starti dropping from 94% to 87%. Flow increased to 12 L 100% to help patient recover. Patient oxygen saturation remained 87%. Dr. Mota called to bedside. Interventions tried were blow by, putting a face mask over the HFNC, Lasix IV, Zofran given for the nausea, Morphine 4 mg,  oral suctioning, albuterol treatment and now bipap. Patient sats sitting at 91%. Patient started on Cardene due to SBP ranging 150-180, MIVF's and now Precedex to help tolerate the bipap mask. CVP range from 15-25. See doc flow for chest tube drainage output. Patient responded to Lasix dose. Awaiting Laix/Diuril infusion. AUGIE's and SCD's in place. Will continue to monitor.

## 2017-05-04 NOTE — PROGRESS NOTES
Patient art line reading 150's SBP. Unable to get a cuff pressure to correlate with art line. Changed out blood pressure cuff and cords but not successful with getting a cuff. Dr. Mota notified and Metoprolol ordered. Will continue to monitor.

## 2017-05-04 NOTE — PROGRESS NOTES
05/04/17 0950   Vital Signs   Pulse 75   Resp 18   SpO2 (!) 94 %   Art Line   Arterial Line BP 99/42   Arterial Line MAP (mmHg) 55 mmHg   Invasive Hemodynamic Monitoring   CVP (mean) 9 mmHg   Cardene stopped

## 2017-05-04 NOTE — PT/OT/SLP PROGRESS
"Physical Therapy  Treatment    Cipriano Thompson   MRN: [id number]   Admitting Diagnosis: Pulmonary valve insufficiency    PT Received On: 17  PT Start Time: 1534     PT Stop Time: 1552    PT Total Time (min): 18 min       Billable Minutes:  Gait Training 18    Treatment Type: Treatment  PT/PTA: PT       General Precautions: Standard, fall, sternal    Subjective:  Communicated with RN prior to session.  Pt reported "I like to sit up." - when asked if he would like to get back to bed.     Pain Ratin/10    Objective:   Patient found with: arterial line, blood pressure cuff, central line, chest tube, tai catheter, oxygen, pulse ox (continuous), telemetry    Functional Mobility:  Transfers:  Sit <> Stand Assistance: Contact Guard Assistance  Sit <> Stand Assistive Device: No Assistive Device    Gait:   Gait Distance: 5 steps forward and backward x 2 (limited due to high flow NC)  Assistance 1: Contact Guard Assistance  Gait Assistive Device: No device  Gait Pattern: 2-point gait  Gait Deviation(s): decreased lance, increased time in double stance, decreased velocity of limb motion, decreased step length    Balance:   Static Sit: FAIR+: Able to take MINIMAL challenges from all directions  Dynamic Sit: FAIR+: Maintains balance through MINIMAL excursions of active trunk motion  Static Stand: FAIR: Maintains without assist but unable to take challenges  Dynamic stand: FAIR: Needs CONTACT GUARD during gait     Therapeutic Activities and Exercises:  PT reviewed sternal precautions with pt prior to transferring, as he was unable to recall them when first asked. PT encouraged holding pillow whenever he goes to move to avoid pushing with UEs.      AM-PAC 6 CLICK MOBILITY  How much help from another person does this patient currently need?   1 = Unable, Total/Dependent Assistance  2 = A lot, Maximum/Moderate Assistance  3 = A little, Minimum/Contact Guard/Supervision  4 = None, Modified Trevor/Independent    Turning " over in bed (including adjusting bedclothes, sheets and blankets)?: 3  Sitting down on and standing up from a chair with arms (e.g., wheelchair, bedside commode, etc.): 3  Moving from lying on back to sitting on the side of the bed?: 3  Moving to and from a bed to a chair (including a wheelchair)?: 3  Need to walk in hospital room?: 3  Climbing 3-5 steps with a railing?: 1  Total Score: 16    AM-PAC Raw Score CMS G-Code Modifier Level of Impairment Assistance   6 % Total / Unable   7 - 9 CM 80 - 100% Maximal Assist   10 - 14 CL 60 - 80% Moderate Assist   15 - 19 CK 40 - 60% Moderate Assist   20 - 22 CJ 20 - 40% Minimal Assist   23 CI 1-20% SBA / CGA   24 CH 0% Independent/ Mod I     Patient left up in chair with all lines intact, call button in reach and RN present.    Assessment:  Cipriano Thompson is a 26 y.o. male with a medical diagnosis of Pulmonary valve insufficiency s/p replacement and presents with improving activity tolerance and strength, as he was able to stand without significant assist today. He has difficulty remembering sternal precautions, so they will need to be reinforced regularly. Gait distance limited by high flow NC however once weaned anticipate pt will be able to ambulate in hallways.    Rehab identified problem list/impairments: Rehab identified problem list/impairments: weakness, impaired endurance, impaired functional mobilty, impaired self care skills, gait instability, impaired balance, impaired cardiopulmonary response to activity    Rehab potential is good.    Activity tolerance: Good    Discharge recommendations: Discharge Facility/Level Of Care Needs: home     Barriers to discharge: Barriers to Discharge: None    Equipment recommendations: Equipment Needed After Discharge: none     GOALS:   Physical Therapy Goals        Problem: Physical Therapy Goal    Goal Priority Disciplines Outcome Goal Variances Interventions   Physical Therapy Goal     PT/OT, PT Ongoing (interventions  implemented as appropriate)     Description:  Goals to be met by: 5/10/17     Patient will increase functional independence with mobility by performin. Supine to sit with Stand-by Assistance  2. Sit to supine with Stand-by Assistance  3. Sit to stand transfer with Stand-By Assistance  4. Bed to chair transfer with Stand-by Assistance  5. Gait x 200 feet with Stand-by Assistance.               PLAN:    Patient to be seen 6 x/week  to address the above listed problems via therapeutic exercises, therapeutic activities, gait training  Plan of Care expires: 17  Plan of Care reviewed with: patient, mother     Grecia TejadaJeune, PT, DPT  [phone]

## 2017-05-04 NOTE — PROGRESS NOTES
[facility]  Pediatric Critical Care  Postoperative Note      Patient Name: Cipriano Thompson  MRN: [id number]  Admission Date: 5/2/2017  Code Status: Full Code   Attending Provider: Shaunna Villalba  Primary Care Physician: Princess KHUSHBOO Rodgers MD  Principal Problem:<principal problem not specified>    Subjective:     HPI: Cipriano Thompson is a 26 y.o. 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  He also has a ho hypertension and ventricular ectopy (PVCs, couplets and triplets).  He presented to us with mixed moderate subPS (24mmhg)/moderatePI and exercise intolerance (SOB with climbing stairs) is now s/p redo sternotomy and pulmonary valve replacement with 27mm porcine valve in 30 mm conduit POD#2      Overnight Night:  Desaturation requiring BiPAP      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    Tetralogy of Fallot 05/1990       Past Surgical History:   Procedure Laterality Date    BUNIONECTOMY      CARDIAC SURGERY      open heart, ppm     INSERT / REPLACE / REMOVE PACEMAKER Left 10/2008    TONSILLECTOMY         Review of patient's allergies indicates:   Allergen Reactions    Latex, natural rubber     Sulfa (sulfonamide antibiotics)        Family History     Problem Relation (Age of Onset)    No Known Problems Mother, Sister          Social History Main Topics    Smoking status: Never Smoker    Smokeless tobacco: Not on file    Alcohol use No    Drug use: Not on file    Sexual activity: Not Currently       Review of Systems    Objective:     Vital Signs Range (Last 24H):  Temp:  [98.3 °F (36.8 °C)-102.5 °F (39.2 °C)]   Pulse:  [74-96]   Resp:  [12-37]   BP: (100-147)/(44-59)   SpO2:  [82 %-100 %]   Arterial Line BP: ()/(42-77)     I & O  (Last 24H):    Intake/Output Summary (Last 24 hours) at 05/04/17 1027  Last data filed at 05/04/17 0910   Gross per 24 hour   Intake          3489.12 ml   Output             2528 ml   Net           961.12 ml       Ventilator Data (Last 24H):     Vent Mode: PS/CPAP  Oxygen Concentration (%):  [] 100  Resp Rate Total:  [15 br/min-26.8 br/min] 26.8 br/min  Vt Set:  [500 mL] 500 mL  PEEP/CPAP:  [7 cmH20] 7 cmH20  Pressure Support:  [6 cmH20-8 cmH20] 6 cmH20  Mean Airway Pressure:  [8.6 iiE87-07.2 cmH20] 8.6 cmH20    Hemodynamic Parameters (Last 24H):       Physical Exam:  Physical Exam  Gen: sedated  HEENT: MMM. PERRL. Down's facies  Resp: course bs decreased breath sounds b/l, no wheezes noted.  On Bipap  CV: RRR. 3/6 systolic murmur best heard at LUSB. 2+ pulses, CR < 3sec   Abd: soft NTND.  Liver edge 4 cm below RCM.  Ext: dry skin    Lines/Drains/Airways     Central Venous Catheter Line                 Percutaneous Central Line Insertion/Assessment - double lumen  05/02/17 0816 right internal jugular 2 days          Drain                 Urethral Catheter 05/02/17 0844 Non-latex;Temperature probe 16 Fr. 2 days         Chest Tube 05/02/17 1602 Left Mediastinal 24 Fr. 1 day         Chest Tube 05/02/17 1602 Right Mediastinal 24 Fr. 1 day          Arterial Line                 Arterial Line 05/02/17 0807 Right Radial 2 days          Peripheral Intravenous Line                 Peripheral IV - Single Lumen 05/02/17 0727 Right Forearm 2 days         Peripheral IV - Single Lumen 05/02/17 0807 Left Wrist 2 days                Laboratory (Last 24H):   ABG:     Recent Labs  Lab 05/03/17  1933 05/03/17  2200 05/04/17  0343 05/04/17  0515 05/04/17  0712   PH 7.308* 7.316* 7.332* 7.321* 7.313*   PCO2 48.7* 50.9* 49.9* 50.8* 46.3*   HCO3 24.4 26.0 26.5 26.3 23.4*   POCSATURATED 97 91* 90* 81* 87*   BE -2 0 1 0 -3     CMP:     Recent Labs  Lab 05/03/17  1533 05/04/17  0335    140   K 4.4 4.6    107   CO2 22* 23    * 151*   BUN 21* 17   CREATININE 1.6* 1.4   CALCIUM 9.2 8.6*   PROT 6.3 6.3   ALBUMIN 3.3* 3.1*   BILITOT 0.6 0.5   ALKPHOS 65 86   AST 33 28   ALT 11 8*   ANIONGAP 10 10   EGFRNONAA 58.6* >60.0     CBC:   Recent Labs  Lab 05/02/17  1525  05/03/17  0300  05/04/17  0335 05/04/17  0343 05/04/17  0515 05/04/17  0712   WBC 6.97  --  10.23  --  12.55  --   --   --    HGB 10.5*  --  11.6*  --  11.2*  --   --   --    HCT 30.2*  < > 32.5*  < > 32.5* 32* 33* 31*   *  --  152  --  117*  --   --   --    < > = values in this interval not displayed.    Chest X-Ray: I personally reviewed the films and findings are: B/L pulmonary edema      Assessment/Plan:     Active Diagnoses:    Diagnosis Date Noted POA    Pulmonary valve insufficiency [I37.1] 05/02/2017 Yes      Problems Resolved During this Admission:    Diagnosis Date Noted Date Resolved POA     Cipriano Thompson is a 26 y.o. male with T21 s/p TOF s/p repair s/p subsequent pulmonary valve replacement in 1999, with sinus node syfunction requiring pacemaker placement in 2008 with symptomatic mixed moderate subPS (24mmhg)/moderatePI is now s/p redo sternotomy and pulmonary valve replacement with 27mm porcine valve/30 mm conduit POD#2    Neuro:  Postoperative Sedation & Analgesia:  -- Continue Precedex while on BiPap  -- Prn Morphine  -- Tylenol prn  -- No Toradol at this time due to YAEL    Resp:  Respiratory support:  -- On Bipap 16/12 - if improves oxygenation will attempt HFNC transition either this evening or in am.  -- Once Bipap is removed, will start IS every 1 hour while awake  Chest tube maintenance:  - will maintainchest tube patency  - continuous suction @ -20 cm H20  - likely remove tomorrow    CV:  Pulmonary Valve replacement with Porcine 27mm valve/30mm conduit:  Rhythm: ho junctional escape rhythm.  Medtronic pacer: A-paced, V-sensed low rate: 75, upper rate:150  : PVCs noted as per history.  Pacemaker interrogated no issues.  Will keep Mag greater than  2 and start po Magnesium.  Metoprolol.  Preload: 50ml/hr IVF.  On Lasix/Diuril gtt.   Contractility: none   Afterload: Nicardipine for hypertension, will continue to holding home Carvedilol and Digoxin    FEN/GI:  Nutrition/IVFs:  -- NPO. On fluids, will advance diet once stable on a respiratory standpoint  Electrolytes:  -- will replace as needed, will start po Mag 400 mg po BID  GI prophylaxis:  -- Famotidine    Renal:  -- Mild post bypass YAEL - with Cr of 1.6, improving, will repeat labs this afternoon  -- Strict Is/Os, tai in place    Heme:  -- Minimal postoperative bleeding, coags stable    ID:  Postoperative prophylaxis:  -- Ancef for 72 hours   -- febrile this morning likel pulmonary source, blood cultures pending.    Social/Dispo:  I will update mom once she awakes.     Critical Care Time: 120 minutes     Shaunna Villalba  Pediatric Critical Care  [facility]

## 2017-05-04 NOTE — PROGRESS NOTES
Cardene off, 2ml blood aspirated from port, then saline locked., SBP in the 90's via holli, Pt did take synthroid and stool softener with water., Lopressor not in CVICU yet, will discuss with MD's about timing-wait for Bp to come up some.,Ot HERE, ROOM ARRANGED TO HAVE PT. GET UP TO CHAIR. mOM AWAKE, UPDATED ON PLAN OF CARE

## 2017-05-04 NOTE — ASSESSMENT & PLAN NOTE
26 y.o. male with Down's syndrome, TOF s/p repair as infant, and sinus node dysfunction s/p pacemaker 2008, now s/p pulmonary valve replacement with a 27 mm [employer] epic bioprosthetic valve (5/2/17). He is currently critically ill with respiratory failure on positive pressure ventilation secondary to what sounds like flash pulmonary edema. His blood pressures have been labile with high's in the 180's and lows in the 70's with improved ectopy on Metoprolol.     Plan:   Neuro/Pain:  - Continue Sedative Infusions to tolerate BiPap  - Pain control prn  - Scheduled Tylenol, consider Toradol tomorrow pending renal function  Respiratory:  - Repeat CXR later, if improved wean respiratory support  - Goal sats >92%  Cardiovascular:  - Monitor BP's closely, goal normotensive   - Pacemaker interrogated by EP: frequent PVC's, no atrial arrhythmia, generator requires replacement in 6-9 months   - Metoprolol 25 mg daily for ectopy, may increase to 50 if persistently hypertensive  FEN/GI:  - NPO for now  - Monitor electrolytes and replace as needed.   - Continue Lasix/Diuril gtt, goal negative fluid balance  Renal:  - Monitor I/O's closely  Heme/ID:  - Ancef x 48 hours post-op  - H/H stable. Monitor for bleeding  Plastics:  - Stable  Disposition:  - Wean respiratory support. Monitor blood pressures.

## 2017-05-04 NOTE — SUBJECTIVE & OBJECTIVE
Interval History: Desaturated this am requiring BiPap, CXR demonstrated pulmonary edema, now on Lasix/Diuril gtt. Mild elevation in creatinine yesterday that is improving.     Objective:     Vital Signs (Most Recent):  Temp: (!) 100.6 °F (38.1 °C) (05/04/17 1200)  Pulse: 75 (05/04/17 1245)  Resp: (!) 21 (05/04/17 1245)  BP: (!) 86/61 (05/04/17 1141)  SpO2: 100 % (05/04/17 1245) Vital Signs (24h Range):  Temp:  [98.3 °F (36.8 °C)-102.5 °F (39.2 °C)] 100.6 °F (38.1 °C)  Pulse:  [74-96] 75  Resp:  [12-37] 21  SpO2:  [82 %-100 %] 100 %  BP: ()/(47-61) 86/61  Arterial Line BP: ()/(40-77) 125/48     Weight: 100.3 kg (221 lb 1.9 oz)  Body mass index is 35.69 kg/(m^2).     SpO2: 100 %  O2 Device (Oxygen Therapy): BiPAP   Oxygen Concentration (%):  [] 60      Intake/Output - Last 3 Shifts       05/02 0700 - 05/03 0659 05/03 0700 - 05/04 0659 05/04 0700 - 05/05 0659    P.O.  560 90    I.V. (mL/kg) 4476.1 (44.6) 2640.5 (26.3) 628.3 (6.3)    Blood 2306      IV Piggyback 395 215 185.2    Total Intake(mL/kg) 7177.1 (71.6) 3415.5 (34.1) 903.5 (9)    Urine (mL/kg/hr) 2559 (1.1) 1427 (0.6) 2180 (3.4)    Drains  50 (0)     Blood 3 (0)  4 (0)    Chest Tube 246 (0.1) 156 (0.1) 26 (0)    Total Output 2808 1633 2210    Net +4369.1 +1782.5 -1306.6             CT: R: 79/46/60 ml, L: 77/48 ml    Lines/Drains/Airways     Central Venous Catheter Line                 Percutaneous Central Line Insertion/Assessment - double lumen  05/02/17 0816 right internal jugular 2 days          Drain                 Urethral Catheter 05/02/17 0844 Non-latex;Temperature probe 16 Fr. 2 days         Chest Tube 05/02/17 1602 Left Mediastinal 24 Fr. 1 day         Chest Tube 05/02/17 1602 Right Mediastinal 24 Fr. 1 day          Arterial Line                 Arterial Line 05/02/17 0807 Right Radial 2 days          Peripheral Intravenous Line                 Peripheral IV - Single Lumen 05/02/17 0727 Right Forearm 2 days         Peripheral IV -  Single Lumen 05/02/17 0807 Left Wrist 2 days                Scheduled Medications:    acetaminophen  650 mg Oral Q6H    ceFAZolin (ANCEF) IVPB  2 g Intravenous Q8H    docusate sodium  100 mg Oral BID    famotidine (PF)  20 mg Intravenous BID    levothyroxine  88 mcg Oral Before breakfast    magnesium oxide  400 mg Oral BID    miconazole NITRATE 2 %   Topical BID    mupirocin  1 g Nasal BID       Continuous Medications:    dextrose 5 % and 0.45 % NaCl 69 mL/hr at 05/04/17 1300    heparin(porcine) in 0.45% NaCl 3 Units/hr (05/04/17 1300)    heparin(porcine) in 0.45% NaCl Stopped (05/03/17 2056)    heparin(porcine) in 0.45% NaCl Stopped (05/02/17 1728)    papervine / heparin 3 mL/hr at 05/04/17 1300       PRN Medications: heparin, porcine (PF), heparin, porcine (PF), magnesium sulfate IVPB, morphine, morphine, ondansetron, [DISCONTINUED] potassium chloride **AND** potassium chloride **AND** potassium chloride, sodium bicarbonate    Physical Exam  Constitutional: He appears well-developed and well-nourished. On BiPap in no acute distress.  HENT:   Head: Normocephalic and atraumatic.   Mouth/Throat: Oropharynx is clear and moist. Down's facies   Eyes: Conjunctivae are normal. Pupils are equal, round, and reactive to light.   Neck: Neck supple.   Cardiovascular: Normal rate, regular rhythm, S1 normal and S2 split.  Infrequent extrasystoles are present. Exam reveals no gallop and no friction rub.    Pulses:       Radial pulses are 1+ on the left side.        Dorsalis pedis pulses are 1+ on the left side.   There is a harsh 2/6 systolic ejection murmur heard best at the LUSB. Warm distal extremities.   Pulmonary/Chest: Mild tachypnea, no retractions, symmetric breath sounds. He has no wheezes. He has no rales. Chest tube and sternal wound bandaged   Abdominal: Soft. Bowel sounds are normal. He exhibits no distension. There is no hepatosplenomegaly. There is no tenderness.   Musculoskeletal: He exhibits no edema  or tenderness.   Neurological: He exhibits normal muscle tone.   Skin: Skin is dry. No cyanosis. No pallor. Nails show no clubbing.     Significant Labs:   ABG    Recent Labs  Lab 05/04/17  0712   PH 7.313*   PO2 58*   PCO2 46.3*   HCO3 23.4*   BE -3     Lab Results   Component Value Date    WBC 12.55 05/04/2017    HGB 11.2 (L) 05/04/2017    HCT 31 (L) 05/04/2017    MCV 89 05/04/2017     (L) 05/04/2017     CMP  Sodium   Date Value Ref Range Status   05/04/2017 140 136 - 145 mmol/L Final     Potassium   Date Value Ref Range Status   05/04/2017 4.6 3.5 - 5.1 mmol/L Final     Chloride   Date Value Ref Range Status   05/04/2017 107 95 - 110 mmol/L Final     CO2   Date Value Ref Range Status   05/04/2017 23 23 - 29 mmol/L Final     Glucose   Date Value Ref Range Status   05/04/2017 151 (H) 70 - 110 mg/dL Final     BUN, Bld   Date Value Ref Range Status   05/04/2017 17 6 - 20 mg/dL Final     Creatinine   Date Value Ref Range Status   05/04/2017 1.4 0.5 - 1.4 mg/dL Final     Calcium   Date Value Ref Range Status   05/04/2017 8.6 (L) 8.7 - 10.5 mg/dL Final     Total Protein   Date Value Ref Range Status   05/04/2017 6.3 6.0 - 8.4 g/dL Final     Albumin   Date Value Ref Range Status   05/04/2017 3.1 (L) 3.5 - 5.2 g/dL Final     Total Bilirubin   Date Value Ref Range Status   05/04/2017 0.5 0.1 - 1.0 mg/dL Final     Comment:     For infants and newborns, interpretation of results should be based  on gestational age, weight and in agreement with clinical  observations.  Premature Infant recommended reference ranges:  Up to 24 hours.............<8.0 mg/dL  Up to 48 hours............<12.0 mg/dL  3-5 days..................<15.0 mg/dL  6-29 days.................<15.0 mg/dL       Alkaline Phosphatase   Date Value Ref Range Status   05/04/2017 86 55 - 135 U/L Final     AST   Date Value Ref Range Status   05/04/2017 28 10 - 40 U/L Final     ALT   Date Value Ref Range Status   05/04/2017 8 (L) 10 - 44 U/L Final     Anion Gap    Date Value Ref Range Status   05/04/2017 10 8 - 16 mmol/L Final     eGFR if    Date Value Ref Range Status   05/04/2017 >60.0 >60 mL/min/1.73 m^2 Final     eGFR if non    Date Value Ref Range Status   05/04/2017 >60.0 >60 mL/min/1.73 m^2 Final     Comment:     Calculation used to obtain the estimated glomerular filtration  rate (eGFR) is the CKD-EPI equation. Since race is unknown   in our information system, the eGFR values for   -American and Non--American patients are given   for each creatinine result.           Significant Imaging:  CXR reviewed    JAKE (post): Pulmonary valve velocity <2.5 m/sec. Mild to moderate TR with RV pressure estimate of 46 mmHg. Mildly dilated RV with low normal systolic function. Low normal LV systolic function.

## 2017-05-04 NOTE — PLAN OF CARE
Problem: Patient Care Overview  Goal: Plan of Care Review  Outcome: Ongoing (interventions implemented as appropriate)  Cipriano has made progress today, diuresis has definitely helped with respiratory decompensation. CXR at 1pm improved along with Pao2 increasing able to switch to HFNC 30lpm at 50%., Breath sounds remain very diminshed throughout.,   Cardene weaned off mid morning, still have not dosed with Lopressor due to blood pressure., Lasix and Diuril drip weaned to off, still diuresing., Precedex also discontinued. Cuff pressure lower than ohlli.,   Pt pacing at 75 along with PVC's multifocal, no runs of VT., CVP 4-12 when at phlebostatic axis., Pt has been up in chair since 11am.,  Cipriano states he is hungry, but for now we are just allowing clear liquids, abd. Soft with hypoactive bowel sounds.,   CT with minimal serosang output., Sternal incision looks good, dsg changed this am.,   Pain controlled with tylenol and oxycodone, pt's major complaint has been perineal discomfort,burning. Mom states that he at times complains at home, as stated in previous note, examined and no excoriation noted, but cleansed., dried and miconazole powder applied.,   Cipriano has been pleasant today, talkative mellissa. when pet therapy stopped by. Cipriano's sister is upposed to be coming by with his art supplies  Mom has been at bedside, helpful, has been kept up to date as has Cipriano

## 2017-05-05 LAB
ALBUMIN SERPL BCP-MCNC: 2.9 G/DL
ALLENS TEST: ABNORMAL
ALLENS TEST: NORMAL
ALP SERPL-CCNC: 117 U/L
ALT SERPL W/O P-5'-P-CCNC: 13 U/L
ANION GAP SERPL CALC-SCNC: 8 MMOL/L
AST SERPL-CCNC: 26 U/L
BACTERIA UR CULT: NO GROWTH
BASOPHILS # BLD AUTO: 0.01 K/UL
BASOPHILS NFR BLD: 0.1 %
BILIRUB SERPL-MCNC: 0.6 MG/DL
BUN SERPL-MCNC: 15 MG/DL
CALCIUM SERPL-MCNC: 8.4 MG/DL
CHLORIDE SERPL-SCNC: 98 MMOL/L
CO2 SERPL-SCNC: 29 MMOL/L
CREAT SERPL-MCNC: 1.2 MG/DL
DELSYS: ABNORMAL
DELSYS: NORMAL
DIFFERENTIAL METHOD: ABNORMAL
EOSINOPHIL # BLD AUTO: 0.2 K/UL
EOSINOPHIL NFR BLD: 3.1 %
EP: 8
ERYTHROCYTE [DISTWIDTH] IN BLOOD BY AUTOMATED COUNT: 15.8 %
ERYTHROCYTE [SEDIMENTATION RATE] IN BLOOD BY WESTERGREN METHOD: 20 MM/H
EST. GFR  (AFRICAN AMERICAN): >60 ML/MIN/1.73 M^2
EST. GFR  (NON AFRICAN AMERICAN): >60 ML/MIN/1.73 M^2
FIO2: 100
FIO2: 70
FLOW: 20
GLUCOSE SERPL-MCNC: 133 MG/DL
HCO3 UR-SCNC: 31 MMOL/L (ref 24–28)
HCO3 UR-SCNC: 31.9 MMOL/L (ref 24–28)
HCO3 UR-SCNC: 34.8 MMOL/L (ref 24–28)
HCO3 UR-SCNC: 35.4 MMOL/L (ref 24–28)
HCT VFR BLD AUTO: 30.6 %
HCT VFR BLD CALC: 31 %PCV (ref 36–54)
HCT VFR BLD CALC: 32 %PCV (ref 36–54)
HGB BLD-MCNC: 10.3 G/DL
IP: 16
LDH SERPL L TO P-CCNC: 0.87 MMOL/L (ref 0.36–1.25)
LYMPHOCYTES # BLD AUTO: 0.5 K/UL
LYMPHOCYTES NFR BLD: 6.5 %
MAGNESIUM SERPL-MCNC: 1.5 MG/DL
MCH RBC QN AUTO: 30.7 PG
MCHC RBC AUTO-ENTMCNC: 33.7 %
MCV RBC AUTO: 91 FL
MIN VOL: 6.7
MODE: ABNORMAL
MODE: NORMAL
MONOCYTES # BLD AUTO: 0.3 K/UL
MONOCYTES NFR BLD: 3.5 %
NEUTROPHILS # BLD AUTO: 6.4 K/UL
NEUTROPHILS NFR BLD: 86.5 %
PCO2 BLDA: 51.3 MMHG (ref 35–45)
PCO2 BLDA: 56.5 MMHG (ref 35–45)
PCO2 BLDA: 58.9 MMHG (ref 35–45)
PCO2 BLDA: 59.1 MMHG (ref 35–45)
PH SMN: 7.34 [PH] (ref 7.35–7.45)
PH SMN: 7.39 [PH] (ref 7.35–7.45)
PH SMN: 7.39 [PH] (ref 7.35–7.45)
PH SMN: 7.4 [PH] (ref 7.35–7.45)
PHOSPHATE SERPL-MCNC: 2.6 MG/DL
PLATELET # BLD AUTO: 91 K/UL
PMV BLD AUTO: 9.7 FL
PO2 BLDA: 74 MMHG (ref 80–100)
PO2 BLDA: 79 MMHG (ref 80–100)
PO2 BLDA: 89 MMHG (ref 80–100)
PO2 BLDA: 95 MMHG (ref 80–100)
POC BE: 10 MMOL/L
POC BE: 10 MMOL/L
POC BE: 6 MMOL/L
POC BE: 6 MMOL/L
POC IONIZED CALCIUM: 1.11 MMOL/L (ref 1.06–1.42)
POC IONIZED CALCIUM: 1.12 MMOL/L (ref 1.06–1.42)
POC IONIZED CALCIUM: 1.15 MMOL/L (ref 1.06–1.42)
POC IONIZED CALCIUM: 1.16 MMOL/L (ref 1.06–1.42)
POC SATURATED O2: 93 % (ref 95–100)
POC SATURATED O2: 95 % (ref 95–100)
POC SATURATED O2: 96 % (ref 95–100)
POC SATURATED O2: 97 % (ref 95–100)
POC TCO2: 33 MMOL/L (ref 23–27)
POC TCO2: 34 MMOL/L (ref 23–27)
POC TCO2: 36 MMOL/L (ref 23–27)
POC TCO2: 37 MMOL/L (ref 23–27)
POCT GLUCOSE: 129 MG/DL (ref 70–110)
POCT GLUCOSE: 131 MG/DL (ref 70–110)
POCT GLUCOSE: 158 MG/DL (ref 70–110)
POTASSIUM BLD-SCNC: 3.8 MMOL/L (ref 3.5–5.1)
POTASSIUM BLD-SCNC: 3.8 MMOL/L (ref 3.5–5.1)
POTASSIUM BLD-SCNC: 3.9 MMOL/L (ref 3.5–5.1)
POTASSIUM BLD-SCNC: 4 MMOL/L (ref 3.5–5.1)
POTASSIUM SERPL-SCNC: 3.9 MMOL/L
PROT SERPL-MCNC: 6.4 G/DL
PROVIDER CREDENTIALS: ABNORMAL
PROVIDER NOTIFIED: ABNORMAL
RBC # BLD AUTO: 3.36 M/UL
SAMPLE: ABNORMAL
SAMPLE: NORMAL
SITE: ABNORMAL
SITE: NORMAL
SODIUM BLD-SCNC: 133 MMOL/L (ref 136–145)
SODIUM BLD-SCNC: 136 MMOL/L (ref 136–145)
SODIUM SERPL-SCNC: 135 MMOL/L
SP02: 97
SPONT RATE: 22
TIME NOTIFIED: 230
TIME NOTIFIED: 620
VERBAL RESULT READBACK PERFORMED: YES
VERBAL RESULT READBACK PERFORMED: YES
WBC # BLD AUTO: 7.38 K/UL

## 2017-05-05 PROCEDURE — 37799 UNLISTED PX VASCULAR SURGERY: CPT

## 2017-05-05 PROCEDURE — 99291 CRITICAL CARE FIRST HOUR: CPT | Mod: ,,, | Performed by: PEDIATRICS

## 2017-05-05 PROCEDURE — 97530 THERAPEUTIC ACTIVITIES: CPT

## 2017-05-05 PROCEDURE — 84295 ASSAY OF SERUM SODIUM: CPT

## 2017-05-05 PROCEDURE — 25000003 PHARM REV CODE 250: Performed by: PEDIATRICS

## 2017-05-05 PROCEDURE — 99900035 HC TECH TIME PER 15 MIN (STAT)

## 2017-05-05 PROCEDURE — 20300000 HC PICU ROOM

## 2017-05-05 PROCEDURE — 94799 UNLISTED PULMONARY SVC/PX: CPT

## 2017-05-05 PROCEDURE — 83605 ASSAY OF LACTIC ACID: CPT

## 2017-05-05 PROCEDURE — 85014 HEMATOCRIT: CPT

## 2017-05-05 PROCEDURE — 85025 COMPLETE CBC W/AUTO DIFF WBC: CPT

## 2017-05-05 PROCEDURE — 99292 CRITICAL CARE ADDL 30 MIN: CPT | Mod: ,,, | Performed by: PEDIATRICS

## 2017-05-05 PROCEDURE — 84100 ASSAY OF PHOSPHORUS: CPT

## 2017-05-05 PROCEDURE — 63600175 PHARM REV CODE 636 W HCPCS: Performed by: PEDIATRICS

## 2017-05-05 PROCEDURE — 83735 ASSAY OF MAGNESIUM: CPT

## 2017-05-05 PROCEDURE — P9047 ALBUMIN (HUMAN), 25%, 50ML: HCPCS | Performed by: PEDIATRICS

## 2017-05-05 PROCEDURE — 82803 BLOOD GASES ANY COMBINATION: CPT

## 2017-05-05 PROCEDURE — 97116 GAIT TRAINING THERAPY: CPT

## 2017-05-05 PROCEDURE — 80053 COMPREHEN METABOLIC PANEL: CPT

## 2017-05-05 PROCEDURE — 94660 CPAP INITIATION&MGMT: CPT

## 2017-05-05 PROCEDURE — 82330 ASSAY OF CALCIUM: CPT

## 2017-05-05 PROCEDURE — 84132 ASSAY OF SERUM POTASSIUM: CPT

## 2017-05-05 RX ORDER — OXYCODONE HYDROCHLORIDE 5 MG/1
5 TABLET ORAL EVERY 6 HOURS PRN
Status: DISCONTINUED | OUTPATIENT
Start: 2017-05-05 | End: 2017-05-12 | Stop reason: HOSPADM

## 2017-05-05 RX ORDER — LANOLIN ALCOHOL/MO/W.PET/CERES
800 CREAM (GRAM) TOPICAL 2 TIMES DAILY
Status: DISCONTINUED | OUTPATIENT
Start: 2017-05-05 | End: 2017-05-12 | Stop reason: HOSPADM

## 2017-05-05 RX ORDER — ALBUMIN HUMAN 250 G/1000ML
50 SOLUTION INTRAVENOUS ONCE
Status: COMPLETED | OUTPATIENT
Start: 2017-05-05 | End: 2017-05-05

## 2017-05-05 RX ORDER — SODIUM CHLORIDE 9 MG/ML
INJECTION, SOLUTION INTRAVENOUS CONTINUOUS
Status: DISCONTINUED | OUTPATIENT
Start: 2017-05-05 | End: 2017-05-05

## 2017-05-05 RX ORDER — FAMOTIDINE 20 MG/1
20 TABLET, FILM COATED ORAL 2 TIMES DAILY
Status: DISCONTINUED | OUTPATIENT
Start: 2017-05-05 | End: 2017-05-12 | Stop reason: HOSPADM

## 2017-05-05 RX ORDER — NAPROXEN SODIUM 220 MG/1
81 TABLET, FILM COATED ORAL DAILY
Status: DISCONTINUED | OUTPATIENT
Start: 2017-05-05 | End: 2017-05-05

## 2017-05-05 RX ORDER — FUROSEMIDE 10 MG/ML
20 INJECTION INTRAMUSCULAR; INTRAVENOUS EVERY 6 HOURS
Status: DISCONTINUED | OUTPATIENT
Start: 2017-05-05 | End: 2017-05-06

## 2017-05-05 RX ADMIN — FAMOTIDINE 20 MG: 10 INJECTION, SOLUTION INTRAVENOUS at 09:05

## 2017-05-05 RX ADMIN — DEXTROSE 2 G: 50 INJECTION, SOLUTION INTRAVENOUS at 04:05

## 2017-05-05 RX ADMIN — DOCUSATE SODIUM 100 MG: 100 CAPSULE, LIQUID FILLED ORAL at 08:05

## 2017-05-05 RX ADMIN — MAGNESIUM OXIDE TAB 400 MG (241.3 MG ELEMENTAL MG) 800 MG: 400 (241.3 MG) TAB at 08:05

## 2017-05-05 RX ADMIN — LEVOTHYROXINE SODIUM 88 MCG: 88 TABLET ORAL at 08:05

## 2017-05-05 RX ADMIN — MAGNESIUM SULFATE IN WATER 2 G: 40 INJECTION, SOLUTION INTRAVENOUS at 04:05

## 2017-05-05 RX ADMIN — FUROSEMIDE 20 MG: 10 INJECTION, SOLUTION INTRAMUSCULAR; INTRAVENOUS at 11:05

## 2017-05-05 RX ADMIN — MUPIROCIN 1 G: 20 OINTMENT TOPICAL at 09:05

## 2017-05-05 RX ADMIN — OXYCODONE HYDROCHLORIDE 5 MG: 5 SOLUTION ORAL at 02:05

## 2017-05-05 RX ADMIN — OXYCODONE HYDROCHLORIDE 5 MG: 5 SOLUTION ORAL at 08:05

## 2017-05-05 RX ADMIN — FUROSEMIDE 20 MG: 10 INJECTION, SOLUTION INTRAMUSCULAR; INTRAVENOUS at 06:05

## 2017-05-05 RX ADMIN — OXYCODONE HYDROCHLORIDE 5 MG: 5 TABLET ORAL at 04:05

## 2017-05-05 RX ADMIN — MAGNESIUM OXIDE TAB 400 MG (241.3 MG ELEMENTAL MG) 800 MG: 400 (241.3 MG) TAB at 09:05

## 2017-05-05 RX ADMIN — HEPARIN SODIUM: 1000 INJECTION, SOLUTION INTRAVENOUS; SUBCUTANEOUS at 03:05

## 2017-05-05 RX ADMIN — FUROSEMIDE 20 MG: 10 INJECTION, SOLUTION INTRAMUSCULAR; INTRAVENOUS at 12:05

## 2017-05-05 RX ADMIN — ALBUMIN (HUMAN) 50 G: 12.5 SOLUTION INTRAVENOUS at 07:05

## 2017-05-05 RX ADMIN — FAMOTIDINE 20 MG: 20 TABLET, FILM COATED ORAL at 08:05

## 2017-05-05 RX ADMIN — MICONAZOLE NITRATE 1 EACH: 20 POWDER TOPICAL at 09:05

## 2017-05-05 RX ADMIN — ACETAMINOPHEN 650 MG: 325 TABLET ORAL at 02:05

## 2017-05-05 RX ADMIN — ACETAMINOPHEN 650 MG: 325 TABLET ORAL at 08:05

## 2017-05-05 RX ADMIN — MICONAZOLE NITRATE: 20 POWDER TOPICAL at 09:05

## 2017-05-05 RX ADMIN — ACETAMINOPHEN 650 MG: 325 TABLET ORAL at 01:05

## 2017-05-05 NOTE — PROGRESS NOTES
Cipriano ambulated around CVICU on O2 mask@ 15lpm-nort nonrebreather, tolerated very well. Cipriano did have to take a sitting break one third way around, then ambulated rest of way with one standing rest. When asked Cipriano did state that he felt sl. Winded., also complained of belly pain. Once back in room, settled nicely on HFNC 20lpm at 100,. Talking on phone

## 2017-05-05 NOTE — PROGRESS NOTES
Overnight Note    Initially called at approximately 3 AM by Cipriano's nurse due to elevated blood pressures.  Pressures had been labile however now mostly 150's/160s systolic on arterial line.  Ordered nicardipine and in discussion with nursing will use weight based dosing per unit practice (0.5 mcg/kg/min=3 mg /hr, with our usual range of up to 2.5 equivalent to adult upper limit of 15 mg/hr).  Gtt obtain from pharmacy and hung.  Shortly after, Cipriano had morning labs and CXR then his nurse encouraged pulmonary toilet, coughing, and acapella.  Following these therapies Cipriano had continued coughing and burping with intermittent gagging and retching.  Desatted to mid 80s and sats did not recover.  Called to bedside at 4:20 as coughing persisted and sats remained in the mid 80s.  Delivered zofran for possible nausea/emesis.  Gave morphine for pain control to minimize splinting.  Reviewed CXR and morning labs which were significant for improved (but still increased from baseline) BUN/Cr and lung fields with significant pulmonary edema and complete white out (atelectasis vs edema and effusion) at the bases.  Delivered lasix with immediate UOP (400+ml) but no improvement in sats.  Breath sounds diminished so trialed albuterol neb with no improvement in sats and exam.  Orally suctioned without significant secretions removed. Trialed multiple oxygenation methods, decreasing nasal cannula with face mask which decreased gagging and burping but did not improve sats.  Initially agitated with simple face mask but ultimately tolerated.  Increased HFNC up to 14 L 100% which he tolerated but did not improve sats.  Hypertensive with agitation which persisted after calm and nicardipine titrated up.  Desat was temporarily related to initiation of nicardipine, reviewing side effect profile of med to ensure unlikely complication (none reported) and no evidence of allergic reaction (rash, lip/tongue swelling, audible wheezes, hypotension)   Encouraged deep breathing but Cipriano stated he couldn't because of pain.  Gave additional morphine with improve comfort but did not improve breathing.  Sats now drifting down to the low 80s.  ABG consistent with sats (PCO2 50) however with stable mild respiratory acidosis from previous in evening.  Reviewed with Dr. Ortiz in cardiology who agreed with my assessment that this is likely from ongoing pulmonary edema worsened with flash pulmonary edema following agitation in setting of significant fluid overload.  No immediate cardiac cause likely.  Will trial BiPAP, hope he tolerates, and if either does not tolerated or does not improve low threshold to reintubated.  Lasix/diuril gtt ordered.  Started precedex gtt of 0.5 and then placed on full face BiPAP titrated to IPAP 14/ EPAP 10.  Desatted to low 70s during transition to BiPAP but recovered to mid 80s quickly and over next 30 minutes sats improved to 93/94.  Diuretic gtt arrived and hung.  Cipriano tolerated BiPAP well, breathing appeared more comfortable, and precedex decreased. Cipriano sleeping comfortably.     Critical Care time: 124 minutes    Grecia Mota MD  Pediatric Critical Care

## 2017-05-05 NOTE — PT/OT/SLP PROGRESS
Occupational Therapy  Treatment    Cipriano Thompson   MRN: [id number]   Admitting Diagnosis: Pulmonary valve insufficiency    OT Date of Treatment: 17   OT Start Time: 947  OT Stop Time: 1004  OT Total Time (min): 17 min    Billable Minutes:  Therapeutic Activity 17 min    General Precautions: Standard, fall, sternal (cardiac)  Orthopedic Precautions:  (sternal)  Braces: N/A    Do you have any cultural, spiritual, Pentec[employer]alism conflicts, given your current situation?: None    Subjective:  Communicated with MILTON Dia prior to session.  Pt agreeable to OT session.    Pain Ratin/10  Pain Rating Post-Intervention: 0/10    Objective:  Patient found with: arterial line, blood pressure cuff, central line, chest tube, telemetry, tai catheter, oxygen     Functional Mobility:  Bed Mobility:  Scooting/Bridging: Contact Guard Assistance (scooting forwards and backwards in chair)    Transfers:   Sit <> Stand Assistance: Minimum Assistance (x1 trial from bedside chair)  Sit <> Stand Assistive Device: No Assistive Device    Functional Ambulation: Pt performed ~4 steps at chair side with CGA for balance.    Activities of Daily Living:  - Not performed during session, however mom and pt had just finished with oral hygiene upon OT arrival.    Balance:   Static Sit: SBA  Static Stand:CGA  Dynamic stand: CGA    Therapeutic Activities and Exercises:  - Pt performed ~5 min of dynamic/static standing with CGA for balance. OT attempted to instruct pt on performing dynamic tasks such as marching in place and lateral weight shifts. Pt unable to perform marching 2/2 fear of falling.  - Pt performed BUE exercises x10 reps of bicep curls. Pt demo'd significant fatigue, possibly due to decreased sleep last night.     AM-PAC 6 CLICK ADL   How much help from another person does this patient currently need?   1 = Unable, Total/Dependent Assistance  2 = A lot, Maximum/Moderate Assistance  3 = A little, Minimum/Contact Guard/Supervision  4 = None,  Modified [place]/Independent    Putting on and taking off regular lower body clothing? : 2  Bathing (including washing, rinsing, drying)?: 2  Toileting, which includes using toilet, bedpan, or urinal? : 2  Putting on and taking off regular upper body clothing?: 3  Taking care of personal grooming such as brushing teeth?: 3  Eating meals?: 3  Total Score: 15     AM-PAC Raw Score CMS G-Code Modifier Level of Impairment Assistance   6 % Total / Unable   7 - 9 CM 80 - 100% Maximal Assist   10 - 14 CL 60 - 80% Moderate Assist   15 - 19 CK 40 - 60% Moderate Assist   20 - 22 CJ 20 - 40% Minimal Assist   23 CI 1-20% SBA / CGA   24 CH 0% Independent/ Mod I     Patient left up in chair with all lines intact and call button in reach, and RN present    ASSESSMENT:  Cipriano Thompson is a 26 y.o. male with a medical diagnosis of Pulmonary valve insufficiency and presents with steady progress towards goals. He demo'd fatigue today after only a few exercises, possibly due to not getting much sleep last night. OT ceased session so that pt would also be able to work with PT today. He would continue to benefit from skilled OT services to maximize (I) in ADLs and return to valued roles and routines.    Rehab identified problem list/impairments: Rehab identified problem list/impairments: weakness, impaired endurance, impaired balance, impaired self care skills, impaired functional mobilty, pain, impaired cardiopulmonary response to activity, decreased upper extremity function, orthopedic precautions    Rehab potential is good.    Activity tolerance: Fair    Discharge recommendations: Discharge Facility/Level Of Care Needs: home     Barriers to discharge: Barriers to Discharge: None    Equipment recommendations: none     GOALS:   Occupational Therapy Goals        Problem: Occupational Therapy Goal    Goal Priority Disciplines Outcome Interventions   Occupational Therapy Goal     OT, PT/OT Ongoing (interventions implemented as  appropriate)    Description:  Goals to be met by: 5/13/17     Patient will increase functional independence with ADLs by performing:    UE Dressing with Supervision.  LE Dressing with Supervision.  Grooming while standing at sink with Supervision.  Toileting from toilet with Supervision for hygiene and clothing management.   Supine to sit with Supervision.  Stand pivot transfers with Supervision.  Toilet transfer to toilet with Supervision.                Plan:  Patient to be seen 6 x/week to address the above listed problems via self-care/home management, therapeutic activities, therapeutic exercises  Plan of Care expires: 06/02/17  Plan of Care reviewed with: patient         DONNIE Lion  05/05/2017

## 2017-05-05 NOTE — PLAN OF CARE
"Problem: Patient Care Overview  Goal: Plan of Care Review  Outcome: Ongoing (interventions implemented as appropriate)  Pt has been pacing with MF PVC"s      "

## 2017-05-05 NOTE — PROGRESS NOTES
[facility]  Pediatric Critical Care  Postoperative Note      Patient Name: Cipriano Thompson  MRN: [id number]  Admission Date: 5/2/2017  Code Status: Full Code   Attending Provider: Shaunna Villalba  Primary Care Physician: Princess KHUSHBOO Rodgers MD  Principal Problem:Pulmonary valve insufficiency    Subjective:     HPI: Cipriano Thompson is a 26 y.o. 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  He also has a ho hypertension and ventricular ectopy (PVCs, couplets and triplets).  He presented to us with mixed moderate subPS (24mmhg)/moderatePI and exercise intolerance (SOB with climbing stairs) is now s/p redo sternotomy and pulmonary valve replacement with 27mm porcine valve in 30 mm conduit POD#3      Overnight Night:  No major events overnight.      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    Tetralogy of Fallot 05/1990       Past Surgical History:   Procedure Laterality Date    BUNIONECTOMY      CARDIAC SURGERY      open heart, ppm     INSERT / REPLACE / REMOVE PACEMAKER Left 10/2008    TONSILLECTOMY         Review of patient's allergies indicates:   Allergen Reactions    Latex, natural rubber     Sulfa (sulfonamide antibiotics)        Family History     Problem Relation (Age of Onset)    No Known Problems Mother, Sister          Social History Main Topics    Smoking status: Never Smoker    Smokeless tobacco: Not on file    Alcohol use No    Drug use: Not on file    Sexual activity: Not Currently       Review of Systems    Objective:     Vital Signs Range (Last 24H):  Temp:  [98.4 °F (36.9 °C)-102.5 °F (39.2 °C)]   Pulse:  [74-79]   Resp:  [15-31]   BP: ()/(47-73)   SpO2:  [92 %-100 %]   Arterial Line BP: ()/(40-72)     I & O (Last  24H):    Intake/Output Summary (Last 24 hours) at 05/05/17 0744  Last data filed at 05/05/17 0700   Gross per 24 hour   Intake          3269.67 ml   Output             5072 ml   Net         -1802.33 ml       Ventilator Data (Last 24H):     Oxygen Concentration (%):  [] 70    Hemodynamic Parameters (Last 24H):       Physical Exam:  Physical Exam  Gen: awake and alert  HEENT: MMM. PERRL. Down's facies  Resp: course bs decreased breath sounds b/l, no wheezes noted.  On Bipap/HFNC  CV: RRR. 3/6 systolic murmur best heard at LUSB. 2+ pulses, CR < 3sec   Abd: soft NTND.  Liver edge 4 cm below RCM.  Ext: dry skin    Lines/Drains/Airways     Central Venous Catheter Line                 Percutaneous Central Line Insertion/Assessment - double lumen  05/02/17 0816 right internal jugular 2 days          Drain                 Chest Tube 05/02/17 1602 Left Mediastinal 24 Fr. 2 days         Chest Tube 05/02/17 1602 Right Mediastinal 24 Fr. 2 days         Urethral Catheter 05/02/17 0844 Non-latex;Temperature probe 16 Fr. 2 days          Arterial Line                 Arterial Line 05/02/17 0807 Right Radial 2 days          Peripheral Intravenous Line                 Peripheral IV - Single Lumen 05/02/17 0727 Right Forearm 3 days         Peripheral IV - Single Lumen 05/02/17 0807 Left Wrist 2 days                Laboratory (Last 24H):   ABG:     Recent Labs  Lab 05/04/17  1256 05/04/17  1757 05/04/17  2058 05/05/17  0220 05/05/17  0621   PH 7.321* 7.382 7.355 7.341* 7.390   PCO2 59.4* 47.5* 53.6* 59.1* 51.3*   HCO3 30.7* 28.2* 30.0* 31.9* 31.0*   POCSATURATED 100 90* 96 93* 97   BE 5 3 4 6 6     CMP:     Recent Labs  Lab 05/04/17  1251 05/05/17  0215    135*   K 3.9 3.9    98   CO2 26 29   * 133*   BUN 18 15   CREATININE 1.6* 1.2   CALCIUM 8.5* 8.4*   PROT  --  6.4   ALBUMIN  --  2.9*   BILITOT  --  0.6   ALKPHOS  --  117   AST  --  26   ALT  --  13   ANIONGAP 10 8   EGFRNONAA 58.6* >60.0     CBC:   Recent  Labs  Lab 05/04/17  0335  05/05/17  0215 05/05/17  0220 05/05/17  0621   WBC 12.55  --  7.38  --   --    HGB 11.2*  --  10.3*  --   --    HCT 32.5*  < > 30.6* 31* 31*   *  --  91*  --   --    < > = values in this interval not displayed.    Chest X-Ray: I personally reviewed the films and findings are: B/L pulmonary edema worse on left than right    Assessment/Plan:     Active Diagnoses:    Diagnosis Date Noted POA    PRINCIPAL PROBLEM:  Pulmonary valve insufficiency [I37.1] 05/02/2017 Yes    PVC's (premature ventricular contractions) [I49.3]  Unknown      Problems Resolved During this Admission:    Diagnosis Date Noted Date Resolved POA     Cipriano Thompson is a 26 y.o. male with T21 s/p TOF s/p repair s/p subsequent pulmonary valve replacement in 1999, with sinus node syfunction requiring pacemaker placement in 2008 with symptomatic mixed moderate subPS (24mmhg)/moderatePI is now s/p redo sternotomy and pulmonary valve replacement with 27mm porcine valve/30 mm conduit POD#3    Neuro:  Postoperative Sedation & Analgesia:  -- Off Precedex   -- On Tylenol ATC, Prn Oxycodone & prn Morphine  -- Tylenol prn  -- No Toradol at this time due to thrombocytopenia    Resp:  Respiratory support:  -- CXR improved from yesterday am but remains with pulmonary edema, will place on Bipap 16/12, while asleep, when awake on HFNC 20L  -- IS/Acapella every 1 hour while awake  Post-obstructive Pulmonary Edema likely due to BIJAN:  -- continue diuresis and pulmonary toileting  Chest tube maintenance:  -- will maintainchest tube patency   -- continuous suction @ -20 cm H20  -- due to chest xray findings, will remove once xray findings improve and mobile    CV:  Pulmonary Valve replacement with Porcine 27mm valve/30mm conduit:  Rhythm: ho junctional escape rhythm.  Medtronic pacer: A-paced, V-sensed low rate: 75, upper rate:150.  PVCs noted as per history.  Pacemaker interrogated no issues.  Will keep Mag greater than 2, will increase po  Magnesium.   Preload: regular diet, Lasix 20mg IV Q6, if urine output slacks will start gtt.  Contractility: none   Afterload: off Nicardipine - currently off all BP medications. Continue holding home Carvedilol and Digoxin    FEN/GI:  Nutrition/IVFs:  -- will advance to regular diet today.   Electrolytes:  -- will replace as needed, will increase Mag 800 mg po BID  Prevention of Opiod induced Constipation:  - on Colace BID, first meal today  - Increase in magnesium should help  GI prophylaxis:  -- Famotidine    Renal:  -- Mild post bypass YAEL - now resolved with Cr 1.2.   -- Strict Is/Os, tai in place - will remove today    Heme:  -- Will need ASA, but platelets only 91K, will re-evaluate tomorrow  -- CBC daily to assess thrombocytopenia    ID:  Postoperative prophylaxis:  -- Ancef for 72 hours to finish today.  -- febrile yesterday morning likely pulmonary source, has remained afebrile since, cultures all negative.     Endo:  H/o hypothyroidism:  - on home dose of levo    Social/Dispo:  I will update mom once she awakes.     Critical Care Time: 100 minutes     Shaunna Villalba  Pediatric Critical Care  [facility]

## 2017-05-05 NOTE — SUBJECTIVE & OBJECTIVE
Interval History: Hypotensive with diuresis with lasix/diuril gtt so changed to intermittent lasix. Able to come off BiPap, now on HFNC @20lpm. Did not receive metoprolol yesterday secondary to labile blood pressure.      Objective:     Vital Signs (Most Recent):  Temp: 99 °F (37.2 °C) (05/05/17 1220)  Pulse: 82 (05/05/17 1420)  Resp: (!) 25 (05/05/17 1420)  BP: (!) 134/57 (05/05/17 1420)  SpO2: 100 % (05/05/17 1420) Vital Signs (24h Range):  Temp:  [98.4 °F (36.9 °C)-99.1 °F (37.3 °C)] 99 °F (37.2 °C)  Pulse:  [75-94] 82  Resp:  [15-33] 25  SpO2:  [92 %-100 %] 100 %  BP: (118-134)/(54-73) 134/57  Arterial Line BP: (125-171)/(45-72) 154/51     Weight: 100.3 kg (221 lb 1.9 oz)  Body mass index is 35.69 kg/(m^2).     SpO2: 100 %  O2 Device (Oxygen Therapy): High Flow nasal Cannula 20 lpm  Oxygen Concentration (%):  [] 100      Intake/Output - Last 3 Shifts       05/03 0700 - 05/04 0659 05/04 0700 - 05/05 0659 05/05 0700 - 05/06 0659    P.O. 560 1530 750    I.V. (mL/kg) 2640.5 (26.3) 1609.3 (16) 238.8 (2.4)    Blood       IV Piggyback 215 285.2     Total Intake(mL/kg) 3415.5 (34.1) 3424.5 (34.1) 988.8 (9.9)    Urine (mL/kg/hr) 1427 (0.6) 5025 (2.1) 1890 (2)    Drains 50 (0)      Blood  5 (0)     Chest Tube 156 (0.1) 82 (0) 40 (0)    Total Output 1633 5112 1930    Net +1782.5 -1687.5 -941.2             CT: R: 40/0 ml, L: 42/10 ml    Lines/Drains/Airways     Drain                 Chest Tube 05/02/17 1602 Left Mediastinal 24 Fr. 3 days         Chest Tube 05/02/17 1602 Right Mediastinal 24 Fr. 3 days          Arterial Line                 Arterial Line 05/02/17 0807 Right Radial 3 days          Peripheral Intravenous Line                 Peripheral IV - Single Lumen 05/02/17 0727 Right Forearm 3 days         Peripheral IV - Single Lumen 05/02/17 0807 Left Wrist 3 days                Scheduled Medications:    acetaminophen  650 mg Oral Q6H    docusate sodium  100 mg Oral BID    famotidine  20 mg Oral BID     furosemide  20 mg Intravenous Q6H    levothyroxine  88 mcg Oral Before breakfast    magnesium oxide  800 mg Oral BID    miconazole NITRATE 2 %   Topical BID       Continuous Medications:    heparin(porcine) in 0.45% NaCl Stopped (05/05/17 1200)    papervine / heparin 3 mL/hr at 05/05/17 1521       PRN Medications: heparin, porcine (PF), heparin, porcine (PF), magnesium sulfate IVPB, morphine, morphine, ondansetron, oxycodone, sodium bicarbonate    Physical Exam    Constitutional: He appears well-developed and well-nourished. On BiPap in no acute distress.  HENT:   Head: Normocephalic and atraumatic.   Mouth/Throat: Oropharynx is clear and moist. Down's facies   Eyes: Conjunctivae are normal. Pupils are equal, round, and reactive to light.   Neck: Neck supple.   Cardiovascular: Normal rate, regular rhythm, S1 normal and S2 split.  Infrequent extrasystoles are present. Exam reveals no gallop and no friction rub.    Pulses:       Radial pulses are 1+ on the left side.        Dorsalis pedis pulses are 1+ on the left side.   There is a harsh 2/6 systolic ejection murmur heard best at the LUSB. Warm distal extremities.   Pulmonary/Chest: No tachypnea, no retractions, symmetric breath sounds. He has no wheezes. He has no rales. Able to speak in full sentences. Chest tube and sternal wound bandaged   Abdominal: Soft. Bowel sounds are normal. He exhibits no distension. There is no hepatosplenomegaly. There is no tenderness.   Musculoskeletal: He exhibits no edema or tenderness.   Neurological: He exhibits normal muscle tone.   Skin: Skin is dry. No cyanosis. No pallor. Nails show no clubbing.     Significant Labs:   ABG    Recent Labs  Lab 05/05/17  1158   PH 7.398   PO2 79*   PCO2 56.5*   HCO3 34.8*   BE 10     Lab Results   Component Value Date    WBC 7.38 05/05/2017    HGB 10.3 (L) 05/05/2017    HCT 31 (L) 05/05/2017    MCV 91 05/05/2017    PLT 91 (L) 05/05/2017     CMP  Sodium   Date Value Ref Range Status    05/05/2017 135 (L) 136 - 145 mmol/L Final     Potassium   Date Value Ref Range Status   05/05/2017 3.9 3.5 - 5.1 mmol/L Final     Chloride   Date Value Ref Range Status   05/05/2017 98 95 - 110 mmol/L Final     CO2   Date Value Ref Range Status   05/05/2017 29 23 - 29 mmol/L Final     Glucose   Date Value Ref Range Status   05/05/2017 133 (H) 70 - 110 mg/dL Final     BUN, Bld   Date Value Ref Range Status   05/05/2017 15 6 - 20 mg/dL Final     Creatinine   Date Value Ref Range Status   05/05/2017 1.2 0.5 - 1.4 mg/dL Final     Calcium   Date Value Ref Range Status   05/05/2017 8.4 (L) 8.7 - 10.5 mg/dL Final     Total Protein   Date Value Ref Range Status   05/05/2017 6.4 6.0 - 8.4 g/dL Final     Albumin   Date Value Ref Range Status   05/05/2017 2.9 (L) 3.5 - 5.2 g/dL Final     Total Bilirubin   Date Value Ref Range Status   05/05/2017 0.6 0.1 - 1.0 mg/dL Final     Comment:     For infants and newborns, interpretation of results should be based  on gestational age, weight and in agreement with clinical  observations.  Premature Infant recommended reference ranges:  Up to 24 hours.............<8.0 mg/dL  Up to 48 hours............<12.0 mg/dL  3-5 days..................<15.0 mg/dL  6-29 days.................<15.0 mg/dL       Alkaline Phosphatase   Date Value Ref Range Status   05/05/2017 117 55 - 135 U/L Final     AST   Date Value Ref Range Status   05/05/2017 26 10 - 40 U/L Final     ALT   Date Value Ref Range Status   05/05/2017 13 10 - 44 U/L Final     Anion Gap   Date Value Ref Range Status   05/05/2017 8 8 - 16 mmol/L Final     eGFR if    Date Value Ref Range Status   05/05/2017 >60.0 >60 mL/min/1.73 m^2 Final     eGFR if non    Date Value Ref Range Status   05/05/2017 >60.0 >60 mL/min/1.73 m^2 Final     Comment:     Calculation used to obtain the estimated glomerular filtration  rate (eGFR) is the CKD-EPI equation. Since race is unknown   in our information system, the eGFR  values for   -American and Non--American patients are given   for each creatinine result.           Significant Imaging:  CXR reviewed    JAKE (post): Pulmonary valve velocity <2.5 m/sec. Mild to moderate TR with RV pressure estimate of 46 mmHg. Mildly dilated RV with low normal systolic function. Low normal LV systolic function.

## 2017-05-05 NOTE — OP NOTE
DATE OF PROCEDURE:  05/02/2017    PREOPERATIVE DIAGNOSES:  Pulmonary stenosis and insufficiency.    POSTOPERATIVE DIAGNOSES:  Pulmonary stenosis and insufficiency.    PROCEDURES:  1.  Redo sternotomy.  2.  Pulmonary valve replacement with 27 mm St. Quinn Epic valve and 30 mm Dacron   conduit.    SURGEON:  Bhargav Godfrey M.D.    FIRST ASSISTANT:  Shy Tao PA-C.    ANESTHESIA:  General endotracheal.    EBL-min    BRIEF HISTORY:  Cipriano Thompson is a 26-year-old patient with Down syndrome and   tetralogy of Fallot.  He underwent repair at an early age.  He subsequently in   1999 had a pulmonary valve replacement.  He has mixed stenosis and insufficiency   at this point and was not amenable to placement of a Jeanna valve.  We planned   pulmonary valve replacement today.    DESCRIPTION OF PROCEDURE:  The patient was brought to the Operating Room, placed   on the operating table in a supine position.  After adequate general   endotracheal anesthesia had been obtained and adequate monitoring lines had been   placed, the patient was prepped and draped in the usual sterile fashion.  A   median sternotomy incision was made.  The patient's previous median sternotomy   incision was reopened.  The sternal braided bands were removed to the best of   our ability.  The sternum was divided in midline with the oscillating saw and   Boothe scissors and laminectomy spreaders.  This all went without incident.  The   cardiac structures were dissected off of each hemisternum with the Bovie   electrocautery.  The chest retractor was placed.  Cardiac structures were   dissected out.  Cannulation sutures were placed.  The previous right ventricular   to pulmonary artery conduit was noted to be densely calcified.  Heparin was   administered.  After adequate heparin circulation time, the aorta was cannulated   with a 22-Vietnamese aortic cannula.  The SVC and IVC were cannulated via the right   atrium with right angle venous cannulas.   Cardiopulmonary bypass was   instituted.  The patient was cooled to 32 degrees centigrade.  After the heart   was decompressed, we were able to enter the conduit using a #15 blade.  The   anterior aspect of the conduit was debrided with Boothe scissors.  The valve was   encountered.  The valve leaflets were removed sharply.  The supporting valve   stent apparatus was removed partially.  The anterior half at least the valve   stent was removed with Boothe scissors.  We then filled for a right ventricular   outflow tract obstruction by placing a finger inside the RVOT.  The anterior   wall of the RVOT in this area was quite thinned out.  I placed a clamp across   the anterior wall to check for EKG changes suggestive of ischemia and therefore   important coronary branches across the anterior right ventricular outflow tract.    There were no EKG changes, I thus opened the right ventricular outflow tract   anteriorly with a #15 blade over a distance of about 2 cm.  We then sized the   right ventricular outflow tract and pulmonary annulus to a 27 mm valve.  It was   brought on the operative field.  A 30 mm Dacron graft was also brought on the   operative field.  The Dacron graft was anastomosed to the valve using 4-0   Prolene running suture.  We then debrided the conduit distally down to the   confluence of pulmonary arteries.  The distal anastomosis of a new right   ventricular pulmonary artery conduit to the confluence of the pulmonary arteries   was then made with a 3-0 Prolene running suture.  The graft was then cut to the   appropriate length and shape.  It was anastomosed to the right ventricular   outflow tract using 3-0 Prolene running suture.  The patient was rewarmed.    After adequate rewarming, the patient was weaned from cardiopulmonary bypass   without difficulty using milrinone and epinephrine.  Modified ultrafiltration   was performed.  Several bleeders at the proximal and distal conduit anastomosis   were  controlled with additional Prolene sutures.  After the modified   ultrafiltration was completed, the protamine was given, the cannulas were   removed.  Two mediastinal tubes were placed.  After adequate hemostasis had been   achieved in the wound, the sternum was reapproximated with a #6 steel wire.    The presternal fascia and linea alba were reapproximated with a running Vicryl   suture.  The skin was closed with a running Monocryl subcuticular suture.    Sterile dressings were applied.  The patient tolerated the procedure well and   was taken to the Cardiovascular Intensive Care Unit in critical but stable   condition.  I was present and scrubbed for the entire procedure.  There were no   qualified residents available in the performance of this procedure.      TERRY  dd: 05/04/2017 13:42:38 (CDT)  td: 05/04/2017 20:08:19 (CDT)  Doc ID   #[id number]  Job ID #[id number]    CC:

## 2017-05-05 NOTE — PT/OT/SLP PROGRESS
Physical Therapy  Treatment    Cipriano Thompson   MRN: [id number]   Admitting Diagnosis: Pulmonary valve insufficiency    PT Received On: 17  PT Start Time: 1342     PT Stop Time: 1416    PT Total Time (min): 34 min       Billable Minutes:  Gait Training 15 and Therapeutic Activity 19    Treatment Type: Treatment  PT/PTA: PT       General Precautions: Standard, fall, sternal, cardiac  Orthopedic Precautions: Sternal    Do you have any cultural, spiritual, Orthodox conflicts, given your current situation?: Patient with history of T21 but able to follow commands and participate in spontaneous conversation with therapist. Mom not present for session today.    Subjective:  Communicated with MILTON Dia prior to session, ok to see for treatment this afternoon.    Pt seated in BS chair with RN, RT present upon my entry to room, pt looking forward to ambulating.    Pain Ratin/10 at rest in chair  Location - Orientation: generalized  Location: abdomen (c/o pain while up and ambulating)  Pain Addressed: Reposition, Distraction  Pain Rating Post-Intervention: rFLACC pain ratin/10 with walking    Objective:   Patient found with: arterial line, blood pressure cuff, chest tube x 2, telemetry, pulse ox (continuous), oxygen via HFNC at rest but with NRB for gait    Functional Mobility:    Bed Mobility:   Pt OOBTC before and after session    Transfers:  Sit <> Stand Assistance: Minimum Assistance x 1 trial from BS chair, min (A) x 1 trial from wheelchair (after seated rest break)  Sit <> Stand Assistive Device: No Assistive Device    Gait:   Gait Distance: 70, 115 ft on consecutive trials with seated rest break in between. Pt wearing NRB for oxygen, wheelchair following for safety; art-line, CTx2, portable telemetry in tow. Primary limiting factor was c/o abdominal pain when up and moving; otherwise o2 sats in mid 90's while ambulating    Assistance 1: Contact Guard Assistance  Gait Assistive Device: No device  Gait Pattern:  2-point gait  Gait Deviation(s): decreased lance, increased time in double stance, decreased velocity of limb motion, decreased step length, decreased stride length    Balance:   Static Sit: at edge of BS chair for 6-7 minutes with SBA, in preparation for standing    Static Stand: CGA without device; SBA for 3-4 seconds at best before becoming unsteady    AM-PAC 6 CLICK MOBILITY  How much help from another person does this patient currently need?   1 = Unable, Total/Dependent Assistance  2 = A lot, Maximum/Moderate Assistance  3 = A little, Minimum/Contact Guard/Supervision  4 = None, Modified Sullivan/Independent    Turning over in bed (including adjusting bedclothes, sheets and blankets)?: 3  Sitting down on and standing up from a chair with arms (e.g., wheelchair, bedside commode, etc.): 3  Moving from lying on back to sitting on the side of the bed?: 3  Moving to and from a bed to a chair (including a wheelchair)?: 3  Need to walk in hospital room?: 3  Climbing 3-5 steps with a railing?: 2  Total Score: 17    AM-PAC Raw Score CMS G-Code Modifier Level of Impairment Assistance   6 % Total / Unable   7 - 9 CM 80 - 100% Maximal Assist   10 - 14 CL 60 - 80% Moderate Assist   15 - 19 CK 40 - 60% Moderate Assist   20 - 22 CJ 20 - 40% Minimal Assist   23 CI 1-20% SBA / CGA   24 CH 0% Independent/ Mod I     Patient left up in chair with all lines intact, call button in reach and RN present.    Assessment:  Cipriano Thompson tolerated treatment well this afternoon. Increased mobility noted today; ambulates 70, 115 ft on separate trials with ~2-3 minute seated rest break in between. Primary limiting factor was c/o abdominal pain with up and moving. Needs consistent cueing/reminders to obey sternal precautions, often attempting to place hands on armrest of chair to scoot or sit down. No family present today to update on POC; will plan on seeing Saturday for PT and Sunday for OT over the weekend. Will cont to  benefit from acute PT services.    Rehab identified problem list/impairments: weakness, impaired endurance, impaired self care skills, impaired functional mobilty, gait instability, impaired balance, decreased lower extremity function, decreased upper extremity function, pain, orthopedic precautions, impaired cardiopulmonary response to activity    Rehab potential is good.    Activity tolerance: Good; only with c/o abdominal pain when up and ambulating    Discharge recommendations: home     Barriers to discharge: None    Equipment recommendations: none     GOALS:   Physical Therapy Goals        Problem: Physical Therapy Goal    Goal Priority Disciplines Outcome Goal Variances Interventions   Physical Therapy Goal     PT/OT, PT Ongoing (interventions implemented as appropriate)     Description:  Goals to be met by: 5/10/17     Patient will increase functional independence with mobility by performin. Supine to sit with Stand-by Assistance - Not met  2. Sit to supine with Stand-by Assistance - Not met  3. Sit to stand transfer with Stand-By Assistance - Not met  4. Bed to chair transfer with Stand-by Assistance - Not met  5. Gait x 200 feet with Stand-by Assistance - Not met            PLAN:    Patient to be seen 6x/week to address the above listed problems via gait training, therapeutic activities, therapeutic exercises     Plan of Care expires: 17  Plan of Care reviewed with: patient     Ric Fiore, PT  2017

## 2017-05-05 NOTE — PLAN OF CARE
Problem: Occupational Therapy Goal  Goal: Occupational Therapy Goal  Goals to be met by: 5/13/17     Patient will increase functional independence with ADLs by performing:    UE Dressing with Supervision.  LE Dressing with Supervision.  Grooming while standing at sink with Supervision.  Toileting from toilet with Supervision for hygiene and clothing management.   Supine to sit with Supervision.  Stand pivot transfers with Supervision.  Toilet transfer to toilet with Supervision.   Outcome: Ongoing (interventions implemented as appropriate)  Pt is continuing to make progress towards goals. Goals remain appropriate, continue POC.   DONNIE Jacobo  5/5/2017

## 2017-05-05 NOTE — PROGRESS NOTES
[facility]  Pediatric Cardiology  Progress Note    Patient Name: Cipriano Thompson  MRN: [id number]  Admission Date: 5/2/2017  Hospital Length of Stay: 3 days  Code Status: Full Code   Attending Physician: Bhargav Godfrey MD   Primary Care Physician: Princess KHUSHBOO Rodgers MD  Expected Discharge Date: 5/11/2017  Principal Problem:Pulmonary valve insufficiency    Subjective:     Interval History: Hypotensive with diuresis with lasix/diuril gtt so changed to intermittent lasix. Able to come off BiPap, now on HFNC @20lpm. Did not receive metoprolol yesterday secondary to labile blood pressure.      Objective:     Vital Signs (Most Recent):  Temp: 99 °F (37.2 °C) (05/05/17 1220)  Pulse: 82 (05/05/17 1420)  Resp: (!) 25 (05/05/17 1420)  BP: (!) 134/57 (05/05/17 1420)  SpO2: 100 % (05/05/17 1420) Vital Signs (24h Range):  Temp:  [98.4 °F (36.9 °C)-99.1 °F (37.3 °C)] 99 °F (37.2 °C)  Pulse:  [75-94] 82  Resp:  [15-33] 25  SpO2:  [92 %-100 %] 100 %  BP: (118-134)/(54-73) 134/57  Arterial Line BP: (125-171)/(45-72) 154/51     Weight: 100.3 kg (221 lb 1.9 oz)  Body mass index is 35.69 kg/(m^2).     SpO2: 100 %  O2 Device (Oxygen Therapy): High Flow nasal Cannula 20 lpm  Oxygen Concentration (%):  [] 100      Intake/Output - Last 3 Shifts       05/03 0700 - 05/04 0659 05/04 0700 - 05/05 0659 05/05 0700 - 05/06 0659    P.O. 560 1530 750    I.V. (mL/kg) 2640.5 (26.3) 1609.3 (16) 238.8 (2.4)    Blood       IV Piggyback 215 285.2     Total Intake(mL/kg) 3415.5 (34.1) 3424.5 (34.1) 988.8 (9.9)    Urine (mL/kg/hr) 1427 (0.6) 5025 (2.1) 1890 (2)    Drains 50 (0)      Blood  5 (0)     Chest Tube 156 (0.1) 82 (0) 40 (0)    Total Output 1633 5112 1930    Net +1782.5 -1687.5 -941.2             CT: R: 40/0 ml, L: 42/10 ml    Lines/Drains/Airways     Drain                 Chest Tube 05/02/17 1602 Left Mediastinal 24 Fr. 3 days         Chest Tube 05/02/17 1602 Right Mediastinal 24 Fr. 3 days          Arterial Line                  Arterial Line 05/02/17 0807 Right Radial 3 days          Peripheral Intravenous Line                 Peripheral IV - Single Lumen 05/02/17 0727 Right Forearm 3 days         Peripheral IV - Single Lumen 05/02/17 0807 Left Wrist 3 days                Scheduled Medications:    acetaminophen  650 mg Oral Q6H    docusate sodium  100 mg Oral BID    famotidine  20 mg Oral BID    furosemide  20 mg Intravenous Q6H    levothyroxine  88 mcg Oral Before breakfast    magnesium oxide  800 mg Oral BID    miconazole NITRATE 2 %   Topical BID       Continuous Medications:    heparin(porcine) in 0.45% NaCl Stopped (05/05/17 1200)    papervine / heparin 3 mL/hr at 05/05/17 1521       PRN Medications: heparin, porcine (PF), heparin, porcine (PF), magnesium sulfate IVPB, morphine, morphine, ondansetron, oxycodone, sodium bicarbonate    Physical Exam    Constitutional: He appears well-developed and well-nourished. On BiPap in no acute distress.  HENT:   Head: Normocephalic and atraumatic.   Mouth/Throat: Oropharynx is clear and moist. Down's facies   Eyes: Conjunctivae are normal. Pupils are equal, round, and reactive to light.   Neck: Neck supple.   Cardiovascular: Normal rate, regular rhythm, S1 normal and S2 split.  Infrequent extrasystoles are present. Exam reveals no gallop and no friction rub.    Pulses:       Radial pulses are 1+ on the left side.        Dorsalis pedis pulses are 1+ on the left side.   There is a harsh 2/6 systolic ejection murmur heard best at the LUSB. Warm distal extremities.   Pulmonary/Chest: No tachypnea, no retractions, symmetric breath sounds. He has no wheezes. He has no rales. Able to speak in full sentences. Chest tube and sternal wound bandaged   Abdominal: Soft. Bowel sounds are normal. He exhibits no distension. There is no hepatosplenomegaly. There is no tenderness.   Musculoskeletal: He exhibits no edema or tenderness.   Neurological: He exhibits normal muscle tone.   Skin: Skin is  dry. No cyanosis. No pallor. Nails show no clubbing.     Significant Labs:   ABG    Recent Labs  Lab 05/05/17  1158   PH 7.398   PO2 79*   PCO2 56.5*   HCO3 34.8*   BE 10     Lab Results   Component Value Date    WBC 7.38 05/05/2017    HGB 10.3 (L) 05/05/2017    HCT 31 (L) 05/05/2017    MCV 91 05/05/2017    PLT 91 (L) 05/05/2017     CMP  Sodium   Date Value Ref Range Status   05/05/2017 135 (L) 136 - 145 mmol/L Final     Potassium   Date Value Ref Range Status   05/05/2017 3.9 3.5 - 5.1 mmol/L Final     Chloride   Date Value Ref Range Status   05/05/2017 98 95 - 110 mmol/L Final     CO2   Date Value Ref Range Status   05/05/2017 29 23 - 29 mmol/L Final     Glucose   Date Value Ref Range Status   05/05/2017 133 (H) 70 - 110 mg/dL Final     BUN, Bld   Date Value Ref Range Status   05/05/2017 15 6 - 20 mg/dL Final     Creatinine   Date Value Ref Range Status   05/05/2017 1.2 0.5 - 1.4 mg/dL Final     Calcium   Date Value Ref Range Status   05/05/2017 8.4 (L) 8.7 - 10.5 mg/dL Final     Total Protein   Date Value Ref Range Status   05/05/2017 6.4 6.0 - 8.4 g/dL Final     Albumin   Date Value Ref Range Status   05/05/2017 2.9 (L) 3.5 - 5.2 g/dL Final     Total Bilirubin   Date Value Ref Range Status   05/05/2017 0.6 0.1 - 1.0 mg/dL Final     Comment:     For infants and newborns, interpretation of results should be based  on gestational age, weight and in agreement with clinical  observations.  Premature Infant recommended reference ranges:  Up to 24 hours.............<8.0 mg/dL  Up to 48 hours............<12.0 mg/dL  3-5 days..................<15.0 mg/dL  6-29 days.................<15.0 mg/dL       Alkaline Phosphatase   Date Value Ref Range Status   05/05/2017 117 55 - 135 U/L Final     AST   Date Value Ref Range Status   05/05/2017 26 10 - 40 U/L Final     ALT   Date Value Ref Range Status   05/05/2017 13 10 - 44 U/L Final     Anion Gap   Date Value Ref Range Status   05/05/2017 8 8 - 16 mmol/L Final     eGFR if     Date Value Ref Range Status   05/05/2017 >60.0 >60 mL/min/1.73 m^2 Final     eGFR if non    Date Value Ref Range Status   05/05/2017 >60.0 >60 mL/min/1.73 m^2 Final     Comment:     Calculation used to obtain the estimated glomerular filtration  rate (eGFR) is the CKD-EPI equation. Since race is unknown   in our information system, the eGFR values for   -American and Non--American patients are given   for each creatinine result.           Significant Imaging:  CXR reviewed    JAKE (post): Pulmonary valve velocity <2.5 m/sec. Mild to moderate TR with RV pressure estimate of 46 mmHg. Mildly dilated RV with low normal systolic function. Low normal LV systolic function.       Assessment and Plan:   Cardiac  * Pulmonary valve insufficiency  26 y.o. male with Down's syndrome, TOF s/p repair as infant, and sinus node dysfunction s/p pacemaker 2008, now s/p pulmonary valve replacement with a 27 mm St. Quinn epic bioprosthetic valve (5/2/17). He is currently critically ill with respiratory failure on positive pressure ventilation secondary to what sounds like flash pulmonary edema. His blood pressures have been labile with high's in the 180's and lows in the 70's with improved ectopy on Metoprolol.     Plan:   Neuro/Pain:  - Pain control prn  - Scheduled Tylenol, consider Toradol tomorrow pending renal function  Respiratory:  - Cont HFNC 20 lpm, BiPap at night  - Goal sats >92%  Cardiovascular:  - Monitor BP's closely, goal normotensive   - Pacemaker interrogated by EP: frequent PVC's, no atrial arrhythmia, generator requires replacement in 6-9 months   - Metoprolol 25 mg daily for ectopy, may increase to 50 if persistently hypertensive  FEN/GI:  - Regular diet  - Monitor electrolytes and replace as needed.   - Continue Lasix, goal negative fluid balance  - May require additional stool regimen (magnesium might help)  Renal:  - Monitor I/O's closely  Heme/ID:  - S/p Ancef x 48  hours post-op  - H/H stable. Monitor for bleeding  Plastics:  - Stable  Disposition:  - Wean respiratory support. Monitor blood pressures.       Sagrario Diggs MD  Pediatric Cardiology  [facility]

## 2017-05-05 NOTE — ASSESSMENT & PLAN NOTE
26 y.o. male with Down's syndrome, TOF s/p repair as infant, and sinus node dysfunction s/p pacemaker 2008, now s/p pulmonary valve replacement with a 27 mm St. Quinn epic bioprosthetic valve (5/2/17). He is currently critically ill with respiratory failure on positive pressure ventilation secondary to what sounds like flash pulmonary edema. His blood pressures have been labile with high's in the 180's and lows in the 70's with improved ectopy on Metoprolol.     Plan:   Neuro/Pain:  - Pain control prn  - Scheduled Tylenol, consider Toradol tomorrow pending renal function  Respiratory:  - Cont HFNC 20 lpm, BiPap at night  - Goal sats >92%  Cardiovascular:  - Monitor BP's closely, goal normotensive   - Pacemaker interrogated by EP: frequent PVC's, no atrial arrhythmia, generator requires replacement in 6-9 months   - Metoprolol 25 mg daily for ectopy, may increase to 50 if persistently hypertensive  FEN/GI:  - Regular diet  - Monitor electrolytes and replace as needed.   - Continue Lasix, goal negative fluid balance  - May require additional stool regimen (magnesium might help)  Renal:  - Monitor I/O's closely  Heme/ID:  - S/p Ancef x 48 hours post-op  - H/H stable. Monitor for bleeding  Plastics:  - Stable  Disposition:  - Wean respiratory support. Monitor blood pressures.

## 2017-05-05 NOTE — PLAN OF CARE
Problem: Patient Care Overview  Goal: Plan of Care Review  Cipriano Thompson tolerated treatment well this afternoon. Increased mobility noted today; ambulates 70, 115 ft on separate trials with ~2-3 minute seated rest break in between. Primary limiting factor was c/o abdominal pain with up and moving. Needs consistent cueing/reminders to obey sternal precautions, often attempting to place hands on armrest of chair to scoot or sit down. No family present today to update on POC; will plan on seeing Saturday for PT and Sunday for OT over the weekend. Will cont to benefit from acute PT services.     Ric Fiore, PT  5/5/2017

## 2017-05-05 NOTE — PLAN OF CARE
Problem: Patient Care Overview  Goal: Plan of Care Review  Outcome: Ongoing (interventions implemented as appropriate)  Pt performing IS with good technique independently, performing acapella and demonstrating good spontaneous cough, pt was comfortable on HFNC, but was fighting sleep, finally went to sleep around 2 am, ABG's showing good oxygenation but CO2 retaining, AM CXR demonstrated increased pulmonary edema, despite diuresis and frequent, encouraged pulmonary toiletting. Placed back on bipap at approximately 4 am. Answered all questions and addressed all issues. Mother at bedside for majority of shift.

## 2017-05-05 NOTE — PLAN OF CARE
Problem: Patient Care Overview  Goal: Plan of Care Review  Outcome: Ongoing (interventions implemented as appropriate)  Cipriano has had a good day. Cipriano just went back to bed after being up in chair since 630am., Cipriano ambulated around CVICU, does IS and Acapella independently. Breath sounds this afternoon anteriorly can be heard better even in left fields.,   Espitia and CVL removed, has not voided yet. CT with minimal output. Cuff -130's today, holli 20mmHg higher.,   Cipriano eating well, on stool softener, no stool but has passed some gas.   Cipriano very chatty , drawing, playing on Iphone.,   MOm here, along with rachael Dean.

## 2017-05-05 NOTE — CONSULTS
Consulting Physician:  Sagrario Gilmore MD    CHIEF COMPLAINT: Evaluate pacemaker and arrhythmia    HPI:   Cipriano is a 26 y.o. Male with Down's syndrome, TOF s/p complete repair as an infant with subsequent PVR in 1999 for right ventricular dilation and pacemaker placement in 2008 for Sinus node dysfunction. He presented with exercise intolerance and moderate PS/PI so he was referred for repeat PVR. Also with history of hypertension and ventricular ectopy with frequent PVC's. His home medication regimen includes Carvedilol 25 mg daily, Digoxin 250 mcg qd, Lisinopril 10 mg qd and Synthroid. He has a cardiac catheterization for possible Jeanna valve implantation on 4/4/17 and were unable to secondary to coronary artery anatomy.   Cath findings:  1.  Repaired tetralogy of Fallot with subsequent 27 mm FreeStyle bioprosthetic pulmonary valve implantation.  2.  Moderate pulmonary valve stenosis (peak gradient 24 mmHg).  Moderate pulmonary insufficiency.  3.  Normal cardiac output and vascular resistance calculations.  4.  Pacemaker for sinus node dysfunction.  5.  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  Normal origin of left coronary.  6.  Left aortic arch.  Normal head and neck branching.   He was presented in our multidisciplinary cardiac conference and recommendations were made for surgical pulmonary valve replacement.      Hospital Course:  Cipriano was taken to the operating room on 5/2/17 where he underwent pulmonary valve replacement with a 27 mm St. Quinn Epic Valve. Postoperative JAKE reportedly demonstrated a bioprosthetic pulmonary valve with a peak velocity of 2.7 m/sec, no significant regurgitation and good biventricular systolic function. He reportedly had atrial flutter during the procedure with no furher complications. He returned to the Pediatric CVICU on mechanical ventilation, sedative infusions, Milrinone and Nipride infusions.   Overnight post op he had increasing frequent ectopy and concern was  raised whether pacemaker was working appropriately.    REVIEW OF SYSTEMS:     GENERAL: No fever, chills, fatigability or weight loss.  SKIN: No rashes, itching or changes in color or texture of skin.  CHEST: Denies ECHEVERRIA, cyanosis, wheezing, cough and sputum production.  CARDIOVASCULAR: Denies chest pain or reduced exercise tolerance.  ABDOMEN: Appetite fine. No weight loss. Denies diarrhea, abdominal pain, or vomiting.  PERIPHERAL VASCULAR: No claudication or cyanosis.  MUSCULOSKELETAL: No joint stiffness or swelling.   NEUROLOGIC: No history of seizures,  alteration of gait or coordination.    PAST MEDICAL HISTORY:   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    Tetralogy of Fallot 05/1990           FAMILY HISTORY:   Family History   Problem Relation Age of Onset    No Known Problems Mother     No Known Problems Sister          SOCIAL HISTORY:   Social History     Social History    Marital status: Single     Spouse name: N/A    Number of children: N/A    Years of education: N/A     Occupational History    Not on file.     Social History Main Topics    Smoking status: Never Smoker    Smokeless tobacco: Not on file    Alcohol use No    Drug use: Not on file    Sexual activity: Not Currently     Other Topics Concern    Not on file     Social History Narrative    Lives with mother, 1 dog (inside)       ALLERGIES:  Review of patient's allergies indicates:   Allergen Reactions    Latex, natural rubber     Sulfa (sulfonamide antibiotics)        MEDICATIONS:    Current Facility-Administered Medications:     acetaminophen tablet 650 mg, 650 mg, Oral, Q6H, Shaunna Villalba DO, 650 mg at 05/04/17 1430    cefazolin (ANCEF) 2 gram in dextrose 5% 50 mL IVPB (premix), 2 g, Intravenous, Q8H, Shaunna Villalba DO, Last Rate: 100 mL/hr at 05/04/17 1147, 2 g at 05/04/17 1147     dextrose 5 % and 0.45 % NaCl infusion, , Intravenous, Continuous, Shaunna Villalba DO, Last Rate: 44 mL/hr at 05/04/17 1800    docusate sodium capsule 100 mg, 100 mg, Oral, BID, Alec King MD, 100 mg at 05/04/17 0927    famotidine (PF) 20 mg/2 mL injection 20 mg, 20 mg, Intravenous, BID, Alec King MD, 20 mg at 05/04/17 0857    heparin porcine 100 units in sodium chloride 0.45% 100 mL infusion (conc: 1 unit/mL) (NICU/PICU), 3 Units/hr, Intravenous, Continuous, Bhargav Godfrey MD, Last Rate: 3 mL/hr at 05/04/17 1800, 3 Units/hr at 05/04/17 1800    heparin porcine 100 units in sodium chloride 0.45% 100 mL infusion (conc: 1 unit/mL) (NICU/PICU), 3 Units/hr, Intravenous, Continuous, Bhargav Godfrey MD, Stopped at 05/03/17 2056    heparin porcine 100 units in sodium chloride 0.45% 100 mL infusion (conc: 1 unit/mL) (NICU/PICU), 3 Units/hr, Intravenous, Continuous, Bhargav Godfrey MD, Stopped at 05/02/17 1728    heparin, porcine (PF) 1 unit/mL injection flush 1 Units, 1 Units, Intravenous, PRN, Shaunna Villalba DO, 1 Units at 05/04/17 0337    heparin, porcine (PF) 10 unit/mL injection 10 Units, 10 Units, Intravenous, PRN, Bhargav Godfrey MD, 10 Units at 05/04/17 0349    levothyroxine tablet 88 mcg, 88 mcg, Oral, Before breakfast, Shaunna Villalba DO, 88 mcg at 05/04/17 0926    magnesium oxide tablet 400 mg, 400 mg, Oral, BID, Shaunna Villalba DO    magnesium sulfate 2g in water 50mL IVPB (premix), 2 g, Intravenous, PRN, Alec King MD, Last Rate: 0 mL/hr at 05/03/17 1612, 2 g at 05/04/17 0536    miconazole NITRATE 2 % top powder, , Topical, BID, Shaunna Villalba DO, 1 each at 05/04/17 1419    morphine injection 2 mg, 2 mg, Intravenous, Q2H PRN, Shaunna Villalba DO, 2 mg at 05/04/17 0438    morphine injection 4 mg, 4 mg, Intravenous, Q2H PRN, Shaunna Villalba DO, 4 mg at 05/03/17 1814    mupirocin 2 % ointment 1 g, 1 g, Nasal, BID, Alec King MD, 1 g at 05/04/17 1952     ondansetron injection 8 mg, 8 mg, Intravenous, Q8H PRN, Shaunna V. Villalba, DO    oxycodone 5 mg/5 mL solution 2.5 mg, 2.5 mg, Oral, Q4H PRN, Shaunna V. Villalba, DO    oxycodone 5 mg/5 mL solution 5 mg, 5 mg, Oral, Q4H PRN, Shaunna V. Villalba, DO, 5 mg at 05/04/17 1817    papaverine 30 mg, heparin, porcine (PF) 250 Units, sodium chloride 0.45% 248.75 mL solution, , Intravenous, Continuous, Shaunna V. Villalba, DO, Last Rate: 3 mL/hr at 05/04/17 1800    [DISCONTINUED] potassium chloride 20 mEq in 100 mL IVPB (FOR CENTRAL LINE ADMINISTRATION ONLY), 20 mEq, Intravenous, PRN **AND** potassium chloride 40 mEq in 100 mL IVPB (FOR CENTRAL LINE ADMINISTRATION ONLY), 40 mEq, Intravenous, PRN, Last Rate: 25 mL/hr at 05/02/17 1828, 40 mEq at 05/02/17 1828 **AND** potassium chloride 20 mEq in 100 mL IVPB (FOR CENTRAL LINE ADMINISTRATION ONLY), 20 mEq, Intravenous, PRN, Alec King MD    sodium bicarbonate solution 100 mEq, 100 mEq, Intravenous, PRN, Shaunna V. Villalba, DO      PHYSICAL EXAM:   Vitals:    05/04/17 1715 05/04/17 1757 05/04/17 1800 05/04/17 1815   BP: 130/61   129/73   BP Location: Left arm   Left arm   Patient Position: Lying   Lying   BP Method: Automatic   Automatic   Pulse: 75 75 75 75   Resp: (!) 23 (!) 26 (!) 22 (!) 25   Temp:       TempSrc:       SpO2: 97% 95% (!) 92% 100%   Weight:       Height:             Physical Exam  Constitutional: He appears well-developed and well-nourished. On BiPap in no acute distress.  HENT:   Head: Normocephalic and atraumatic.   Mouth/Throat: Oropharynx is clear and moist. Down's facies   Eyes: Conjunctivae are normal. Pupils are equal, round, and reactive to light.   Neck: Neck supple.   Cardiovascular: Normal rate, regular rhythm, S1 normal and S2 split. Infrequent extrasystoles are present. Exam reveals no gallop and no friction rub.   Pulses:  Radial pulses are 1+ on the left side.   Dorsalis pedis pulses are 1+ on the left side.   There is a harsh 2/6 systolic ejection  murmur heard best at the LUSB. Warm distal extremities.   Pulmonary/Chest: Mild tachypnea, no retractions, symmetric breath sounds. He has no wheezes. He has no rales. Chest tube and sternal wound bandaged   Abdominal: Soft. Bowel sounds are normal. He exhibits no distension. There is no hepatosplenomegaly. There is no tenderness.   Musculoskeletal: He exhibits no edema or tenderness.   Neurological: He exhibits normal muscle tone.   Skin: Skin is dry. No cyanosis. No pallor. Nails show no clubbing.     STUDIES:  Telemetry reviewed.  Patient noted to have frequent PVC's  Pacemaker interrogated:  Appropriate sensing and capture noted. Battery RINA predicted in 9 months.    ASSESSMENT:  Pulmonary valve insufficiency  26 y.o. male with history of Down's syndrome, TOF s/p repair as infant, and sinus node dysfunction s/p pacemaker 2008, now s/p pulmonary valve replacement with a 27 mm St. Quinn epic bioprosthetic valve (5/2/17). His pacemaker is functioning appropriately but he is having frequent isolated PVC's.  He will be RINA within 9 months and possibly sooner.      PLAN:   1. Monitor electrolytes.   2. Monitor rhythm for further changes.  3. Will need outpatient pacemaker follow up to monitor for timing of pacemaker generator change.  4. Will follow peripherally, please call with any questions or concerns.        I hope this brings you up-to-date on Cipriano Fobbs  Please contact me with any questions or concerns.    Jose Ortiz MD  Pediatric and Adult Congenital Electrophysiologist  Pediatric Cardiologist

## 2017-05-05 NOTE — PROGRESS NOTES
Right IJ CVL removed per order, hemostasis obtained, pressure held for 10 min. Gauze with tegaderm applied., Site without hematoma, bruising.

## 2017-05-06 LAB
ALBUMIN SERPL BCP-MCNC: 3.6 G/DL
ALLENS TEST: ABNORMAL
ALP SERPL-CCNC: 206 U/L
ALT SERPL W/O P-5'-P-CCNC: 37 U/L
ANION GAP SERPL CALC-SCNC: 12 MMOL/L
AST SERPL-CCNC: 54 U/L
BASOPHILS # BLD AUTO: 0.05 K/UL
BASOPHILS NFR BLD: 0.8 %
BILIRUB SERPL-MCNC: 1 MG/DL
BUN SERPL-MCNC: 21 MG/DL
CALCIUM SERPL-MCNC: 9.5 MG/DL
CHLORIDE SERPL-SCNC: 94 MMOL/L
CO2 SERPL-SCNC: 32 MMOL/L
CREAT SERPL-MCNC: 1.3 MG/DL
DIFFERENTIAL METHOD: ABNORMAL
EOSINOPHIL # BLD AUTO: 0.2 K/UL
EOSINOPHIL NFR BLD: 3.3 %
ERYTHROCYTE [DISTWIDTH] IN BLOOD BY AUTOMATED COUNT: 15.4 %
EST. GFR  (AFRICAN AMERICAN): >60 ML/MIN/1.73 M^2
EST. GFR  (NON AFRICAN AMERICAN): >60 ML/MIN/1.73 M^2
GLUCOSE SERPL-MCNC: 152 MG/DL
HCO3 UR-SCNC: 38.4 MMOL/L (ref 24–28)
HCT VFR BLD AUTO: 31.3 %
HCT VFR BLD CALC: 32 %PCV (ref 36–54)
HGB BLD-MCNC: 10.6 G/DL
LYMPHOCYTES # BLD AUTO: 0.4 K/UL
LYMPHOCYTES NFR BLD: 6.3 %
MAGNESIUM SERPL-MCNC: 1.9 MG/DL
MCH RBC QN AUTO: 30.8 PG
MCHC RBC AUTO-ENTMCNC: 33.9 %
MCV RBC AUTO: 91 FL
MONOCYTES # BLD AUTO: 0.5 K/UL
MONOCYTES NFR BLD: 7.7 %
NEUTROPHILS # BLD AUTO: 5.4 K/UL
NEUTROPHILS NFR BLD: 81.3 %
PCO2 BLDA: 61.5 MMHG (ref 35–45)
PH SMN: 7.4 [PH] (ref 7.35–7.45)
PHOSPHATE SERPL-MCNC: 2.8 MG/DL
PLATELET # BLD AUTO: 121 K/UL
PMV BLD AUTO: 10 FL
PO2 BLDA: 119 MMHG (ref 80–100)
POC BE: 14 MMOL/L
POC IONIZED CALCIUM: 1.11 MMOL/L (ref 1.06–1.42)
POC SATURATED O2: 98 % (ref 95–100)
POC TCO2: 40 MMOL/L (ref 23–27)
POTASSIUM BLD-SCNC: 3.9 MMOL/L (ref 3.5–5.1)
POTASSIUM SERPL-SCNC: 4 MMOL/L
PROT SERPL-MCNC: 7.3 G/DL
PROVIDER CREDENTIALS: ABNORMAL
PROVIDER NOTIFIED: ABNORMAL
RBC # BLD AUTO: 3.44 M/UL
SAMPLE: ABNORMAL
SITE: ABNORMAL
SODIUM BLD-SCNC: 136 MMOL/L (ref 136–145)
SODIUM SERPL-SCNC: 138 MMOL/L
TIME NOTIFIED: 330
VERBAL RESULT READBACK PERFORMED: YES
WBC # BLD AUTO: 6.63 K/UL

## 2017-05-06 PROCEDURE — 63600175 PHARM REV CODE 636 W HCPCS: Performed by: PEDIATRICS

## 2017-05-06 PROCEDURE — 27000221 HC OXYGEN, UP TO 24 HOURS

## 2017-05-06 PROCEDURE — 82803 BLOOD GASES ANY COMBINATION: CPT

## 2017-05-06 PROCEDURE — 80053 COMPREHEN METABOLIC PANEL: CPT

## 2017-05-06 PROCEDURE — 99232 SBSQ HOSP IP/OBS MODERATE 35: CPT | Mod: ,,, | Performed by: PEDIATRICS

## 2017-05-06 PROCEDURE — 99291 CRITICAL CARE FIRST HOUR: CPT | Mod: ,,, | Performed by: PEDIATRICS

## 2017-05-06 PROCEDURE — 27100171 HC OXYGEN HIGH FLOW UP TO 24 HOURS

## 2017-05-06 PROCEDURE — 99900035 HC TECH TIME PER 15 MIN (STAT)

## 2017-05-06 PROCEDURE — 85025 COMPLETE CBC W/AUTO DIFF WBC: CPT

## 2017-05-06 PROCEDURE — 94660 CPAP INITIATION&MGMT: CPT

## 2017-05-06 PROCEDURE — 84100 ASSAY OF PHOSPHORUS: CPT

## 2017-05-06 PROCEDURE — 20600001 HC STEP DOWN PRIVATE ROOM

## 2017-05-06 PROCEDURE — 25000003 PHARM REV CODE 250: Performed by: PEDIATRICS

## 2017-05-06 PROCEDURE — 83735 ASSAY OF MAGNESIUM: CPT

## 2017-05-06 PROCEDURE — 84132 ASSAY OF SERUM POTASSIUM: CPT

## 2017-05-06 PROCEDURE — 84295 ASSAY OF SERUM SODIUM: CPT

## 2017-05-06 PROCEDURE — 94664 DEMO&/EVAL PT USE INHALER: CPT

## 2017-05-06 PROCEDURE — 97530 THERAPEUTIC ACTIVITIES: CPT

## 2017-05-06 PROCEDURE — 82330 ASSAY OF CALCIUM: CPT

## 2017-05-06 PROCEDURE — 85014 HEMATOCRIT: CPT

## 2017-05-06 PROCEDURE — 97116 GAIT TRAINING THERAPY: CPT

## 2017-05-06 PROCEDURE — 94761 N-INVAS EAR/PLS OXIMETRY MLT: CPT

## 2017-05-06 PROCEDURE — 25000003 PHARM REV CODE 250

## 2017-05-06 PROCEDURE — 37799 UNLISTED PX VASCULAR SURGERY: CPT

## 2017-05-06 PROCEDURE — 94799 UNLISTED PULMONARY SVC/PX: CPT

## 2017-05-06 RX ORDER — ACETAMINOPHEN 325 MG/1
650 TABLET ORAL EVERY 6 HOURS PRN
Status: DISPENSED | OUTPATIENT
Start: 2017-05-06 | End: 2017-05-08

## 2017-05-06 RX ORDER — IBUPROFEN 600 MG/1
600 TABLET ORAL ONCE
Status: COMPLETED | OUTPATIENT
Start: 2017-05-06 | End: 2017-05-06

## 2017-05-06 RX ORDER — ACETAMINOPHEN 325 MG/1
TABLET ORAL
Status: COMPLETED
Start: 2017-05-06 | End: 2017-05-06

## 2017-05-06 RX ORDER — FUROSEMIDE 10 MG/ML
20 INJECTION INTRAMUSCULAR; INTRAVENOUS 3 TIMES DAILY
Status: DISCONTINUED | OUTPATIENT
Start: 2017-05-06 | End: 2017-05-07

## 2017-05-06 RX ORDER — METOPROLOL TARTRATE 25 MG/1
25 TABLET, FILM COATED ORAL 2 TIMES DAILY
Status: DISCONTINUED | OUTPATIENT
Start: 2017-05-06 | End: 2017-05-12 | Stop reason: HOSPADM

## 2017-05-06 RX ADMIN — DOCUSATE SODIUM 100 MG: 100 CAPSULE, LIQUID FILLED ORAL at 08:05

## 2017-05-06 RX ADMIN — OXYCODONE HYDROCHLORIDE 5 MG: 5 TABLET ORAL at 09:05

## 2017-05-06 RX ADMIN — ACETAMINOPHEN 650 MG: 325 TABLET ORAL at 08:05

## 2017-05-06 RX ADMIN — ACETAMINOPHEN 650 MG: 325 TABLET ORAL at 02:05

## 2017-05-06 RX ADMIN — MAGNESIUM OXIDE TAB 400 MG (241.3 MG ELEMENTAL MG) 800 MG: 400 (241.3 MG) TAB at 09:05

## 2017-05-06 RX ADMIN — METOPROLOL TARTRATE 25 MG: 25 TABLET ORAL at 09:05

## 2017-05-06 RX ADMIN — FAMOTIDINE 20 MG: 20 TABLET, FILM COATED ORAL at 09:05

## 2017-05-06 RX ADMIN — MAGNESIUM OXIDE TAB 400 MG (241.3 MG ELEMENTAL MG) 800 MG: 400 (241.3 MG) TAB at 08:05

## 2017-05-06 RX ADMIN — DOCUSATE SODIUM 100 MG: 100 CAPSULE, LIQUID FILLED ORAL at 09:05

## 2017-05-06 RX ADMIN — MORPHINE SULFATE 2 MG: 2 INJECTION, SOLUTION INTRAMUSCULAR; INTRAVENOUS at 02:05

## 2017-05-06 RX ADMIN — FUROSEMIDE 20 MG: 10 INJECTION, SOLUTION INTRAVENOUS at 07:05

## 2017-05-06 RX ADMIN — MICONAZOLE NITRATE: 20 POWDER TOPICAL at 09:05

## 2017-05-06 RX ADMIN — MAGNESIUM SULFATE IN WATER 2 G: 40 INJECTION, SOLUTION INTRAVENOUS at 04:05

## 2017-05-06 RX ADMIN — FUROSEMIDE 20 MG: 10 INJECTION, SOLUTION INTRAVENOUS at 09:05

## 2017-05-06 RX ADMIN — FUROSEMIDE 20 MG: 10 INJECTION, SOLUTION INTRAVENOUS at 02:05

## 2017-05-06 RX ADMIN — MICONAZOLE NITRATE: 20 POWDER TOPICAL at 08:05

## 2017-05-06 RX ADMIN — LEVOTHYROXINE SODIUM 88 MCG: 88 TABLET ORAL at 07:05

## 2017-05-06 RX ADMIN — FAMOTIDINE 20 MG: 20 TABLET, FILM COATED ORAL at 08:05

## 2017-05-06 RX ADMIN — IBUPROFEN 600 MG: 600 TABLET, FILM COATED ORAL at 06:05

## 2017-05-06 NOTE — NURSING TRANSFER
Nursing Transfer Note      5/6/2017     Transfer From: Pediatric Card    Transfer via wheelchair    Transfer with 6L NC to O2, cardiac monitoring    Transported by Miesha ROSE    Medicines sent: yes    Chart send with patient: Yes    Notified: mother

## 2017-05-06 NOTE — SUBJECTIVE & OBJECTIVE
Interval History: Stable overnight.  CT's removed this morning.    Objective:     Vital Signs (Most Recent):  Temp: 99 °F (37.2 °C) (05/06/17 0746)  Pulse: 100 (05/06/17 0900)  Resp: (!) 36 (05/06/17 0900)  BP: 124/66 (05/06/17 0746)  SpO2: 97 % (05/06/17 0900) Vital Signs (24h Range):  Temp:  [98 °F (36.7 °C)-99.2 °F (37.3 °C)] 99 °F (37.2 °C)  Pulse:  [] 100  Resp:  [15-37] 36  SpO2:  [94 %-100 %] 97 %  BP: (124-138)/(57-66) 124/66  Arterial Line BP: (122-171)/(39-76) 137/55     Weight: 100.3 kg (221 lb 1.9 oz)  Body mass index is 35.69 kg/(m^2).     SpO2: 97 %  O2 Device (Oxygen Therapy): High Flow nasal Cannula 20 lpm  Oxygen Concentration (%):  [] 60      Intake/Output - Last 3 Shifts       05/04 0700 - 05/05 0659 05/05 0700 - 05/06 0659 05/06 0700 - 05/07 0659    P.O. 1530 1730 532    I.V. (mL/kg) 1609.3 (16) 351.8 (3.5) 9 (0.1)    IV Piggyback 285.2      Total Intake(mL/kg) 3424.5 (34.1) 2081.8 (20.8) 541 (5.4)    Urine (mL/kg/hr) 5025 (2.1) 4090 (1.7) 800 (2.5)    Drains       Blood 5 (0)      Chest Tube 82 (0) 84 (0) 0 (0)    Total Output 5112 4174 800    Net -1687.5 -2092.2 -259                 Lines/Drains/Airways     Arterial Line                 Arterial Line 05/02/17 0807 Right Radial 4 days          Peripheral Intravenous Line                 Peripheral IV - Single Lumen 05/02/17 0727 Right Forearm 4 days         Peripheral IV - Single Lumen 05/02/17 0807 Left Wrist 4 days                Scheduled Medications:    docusate sodium  100 mg Oral BID    famotidine  20 mg Oral BID    furosemide  20 mg Intravenous TID    levothyroxine  88 mcg Oral Before breakfast    magnesium oxide  800 mg Oral BID    miconazole NITRATE 2 %   Topical BID       Continuous Medications:    papervine / heparin 3 mL/hr at 05/06/17 0700       PRN Medications: heparin, porcine (PF), heparin, porcine (PF), magnesium sulfate IVPB, morphine, morphine, ondansetron, oxycodone, sodium bicarbonate    Physical  Exam    Constitutional: He appears well-developed and well-nourished. On BiPap in no acute distress.  HENT:   Head: Normocephalic and atraumatic.   Mouth/Throat: Oropharynx is clear and moist. Down's facies   Eyes: Conjunctivae are normal. Pupils are equal, round, and reactive to light.   Neck: Neck supple.   Cardiovascular: Normal rate, regular rhythm, S1 normal and S2 split.  Infrequent extrasystoles are present. Exam reveals no gallop and no friction rub.    Pulses:       Radial pulses are 1+ on the left side.        Dorsalis pedis pulses are 1+ on the left side.   There is a harsh 2/6 systolic ejection murmur heard best at the LUSB. Warm distal extremities.   Pulmonary/Chest: No tachypnea, no retractions, symmetric breath sounds. He has no wheezes. He has no rales. Able to speak in full sentences. Bandages C/D/I  Abdominal: Soft. Bowel sounds are normal. He exhibits no distension. There is no hepatosplenomegaly. There is no tenderness.   Musculoskeletal: He exhibits no edema or tenderness.   Neurological: He exhibits normal muscle tone.   Skin: Skin is dry. No cyanosis. No pallor. Nails show no clubbing.     Significant Labs:   ABG    Recent Labs  Lab 05/06/17  0320   PH 7.404   PO2 119*   PCO2 61.5*   HCO3 38.4*   BE 14     Lab Results   Component Value Date    WBC 6.63 05/06/2017    HGB 10.6 (L) 05/06/2017    HCT 32 (L) 05/06/2017    MCV 91 05/06/2017     (L) 05/06/2017     CMP  Sodium   Date Value Ref Range Status   05/06/2017 138 136 - 145 mmol/L Final     Potassium   Date Value Ref Range Status   05/06/2017 4.0 3.5 - 5.1 mmol/L Final     Chloride   Date Value Ref Range Status   05/06/2017 94 (L) 95 - 110 mmol/L Final     CO2   Date Value Ref Range Status   05/06/2017 32 (H) 23 - 29 mmol/L Final     Glucose   Date Value Ref Range Status   05/06/2017 152 (H) 70 - 110 mg/dL Final     BUN, Bld   Date Value Ref Range Status   05/06/2017 21 (H) 6 - 20 mg/dL Final     Creatinine   Date Value Ref Range  Status   05/06/2017 1.3 0.5 - 1.4 mg/dL Final     Calcium   Date Value Ref Range Status   05/06/2017 9.5 8.7 - 10.5 mg/dL Final     Total Protein   Date Value Ref Range Status   05/06/2017 7.3 6.0 - 8.4 g/dL Final     Albumin   Date Value Ref Range Status   05/06/2017 3.6 3.5 - 5.2 g/dL Final     Total Bilirubin   Date Value Ref Range Status   05/06/2017 1.0 0.1 - 1.0 mg/dL Final     Comment:     For infants and newborns, interpretation of results should be based  on gestational age, weight and in agreement with clinical  observations.  Premature Infant recommended reference ranges:  Up to 24 hours.............<8.0 mg/dL  Up to 48 hours............<12.0 mg/dL  3-5 days..................<15.0 mg/dL  6-29 days.................<15.0 mg/dL       Alkaline Phosphatase   Date Value Ref Range Status   05/06/2017 206 (H) 55 - 135 U/L Final     AST   Date Value Ref Range Status   05/06/2017 54 (H) 10 - 40 U/L Final     ALT   Date Value Ref Range Status   05/06/2017 37 10 - 44 U/L Final     Anion Gap   Date Value Ref Range Status   05/06/2017 12 8 - 16 mmol/L Final     eGFR if    Date Value Ref Range Status   05/06/2017 >60.0 >60 mL/min/1.73 m^2 Final     eGFR if non    Date Value Ref Range Status   05/06/2017 >60.0 >60 mL/min/1.73 m^2 Final     Comment:     Calculation used to obtain the estimated glomerular filtration  rate (eGFR) is the CKD-EPI equation. Since race is unknown   in our information system, the eGFR values for   -American and Non--American patients are given   for each creatinine result.           Significant Imaging:  CXR reviewed    JAKE (post): Pulmonary valve velocity <2.5 m/sec. Mild to moderate TR with RV pressure estimate of 46 mmHg. Mildly dilated RV with low normal systolic function. Low normal LV systolic function.

## 2017-05-06 NOTE — ASSESSMENT & PLAN NOTE
26 y.o. male with Down's syndrome, TOF s/p repair as infant, and sinus node dysfunction s/p pacemaker 2008, now s/p pulmonary valve replacement with a 27 mm St. Quinn epic bioprosthetic valve (5/2/17).  His chest tubes came out this morning and he is continuing to diurese.  His BP tends to drop with lasix dosing so no other medications were started.  Plan:   Neuro/Pain:  - Pain control prn  - Scheduled Tylenol  Respiratory:  - Cont NC during day, BiPap at night  - Goal sats >92%  Cardiovascular:  - Monitor BP's closely, goal normotensive   - Pacemaker interrogated by EP: frequent PVC's, no atrial arrhythmia, generator requires replacement in 6-9 months   - No current cardiac meds  FEN/GI:  - Regular diet  - Monitor electrolytes and replace as needed.   - Continue Lasix TID, goal negative fluid balance  - May require additional stool regimen (magnesium might help)  Renal:  - Monitor I/O's closely  Heme/ID:  - S/p Ancef x 48 hours post-op  - H/H stable. Monitor for bleeding  Plastics:  - Stable.  Will D/C art line.  Disposition:  - Wean respiratory support. Monitor blood pressures (Goal SBP < 130)

## 2017-05-06 NOTE — PLAN OF CARE
Problem: Patient Care Overview  Goal: Plan of Care Review  Outcome: Revised  Plan of care discussed with patient. Patient is free of fall/trauma/injury. Denies CP, SOB, or pain/discomfort. PRN tylenol given for temp=100.4F. All questions addressed. Will continue to monitor

## 2017-05-06 NOTE — NURSING TRANSFER
Nursing Transfer Note    Sending Transfer Note      5/6/2017 3:15 PM  Transfer via wheelchair  From PICU bed 22  to CSU room 346   Transfered with transport monitor, high flow nasal cannula, code sheet  Transported by: milton Landaverde  Report given as documented in PER Handoff on Doc Flowsheet  VS's per Doc Flowsheet  Medicines sent: Yes  Chart sent with patient: Yes  What caregiver / guardian was Notified of transfer: Mother  MILTON Landaverde  5/6/2017 3:15 PM

## 2017-05-06 NOTE — PLAN OF CARE
Problem: Patient Care Overview  Goal: Plan of Care Review  Outcome: Ongoing (interventions implemented as appropriate)  Mom at bedside throughout shift updated on plan of care and pt's status. Chest tubes pulled today and high flow weaned to 10 liters 100%. Pt ambulated around the unit twice and has been up in the chair throughout the shift. Plan to transfer pt to the cardiac step down unit later today. Cipriano is feeling much better and is happy his chest tubes are out.

## 2017-05-06 NOTE — PLAN OF CARE
Problem: Patient Care Overview  Goal: Plan of Care Review  Outcome: Ongoing (interventions implemented as appropriate)    05/06/17 9414   Coping/Psychosocial   Plan Of Care Reviewed With patient;mother      POC reviewed with pt and mom at bedside; all questions answered. Pt tolereated HFNC 20L; 100% at beginning of shift, then switched to BiPAP around 10PM. Only one episode of anxiety where the pt took BiPAP mask off; explained the importance of the mask and why he had to wear it; pt left mask on for the remainder of the shift. One episode of desat to 80% when pt had mask off; sats recovered once mask placed back on. HR and BP stable. CTs with minimal output. Scheduled tylenol continued. Morph X1; mag X1 (mag 1.9 on AM labs). No BM this shift. Mom remained at bedside all night.

## 2017-05-06 NOTE — PROGRESS NOTES
[facility]  Pediatric Cardiology  Progress Note    Patient Name: Cipriano Thompson  MRN: [id number]  Admission Date: 5/2/2017  Hospital Length of Stay: 4 days  Code Status: Full Code   Attending Physician: Bhargav Godfrey MD   Primary Care Physician: Princess KHUSHBOO Rodgers MD  Expected Discharge Date: 5/11/2017  Principal Problem:S/P pulmonary valve replacement    Subjective:       Interval History: Stable overnight.  CT's removed this morning.    Objective:     Vital Signs (Most Recent):  Temp: 99 °F (37.2 °C) (05/06/17 0746)  Pulse: 100 (05/06/17 0900)  Resp: (!) 36 (05/06/17 0900)  BP: 124/66 (05/06/17 0746)  SpO2: 97 % (05/06/17 0900) Vital Signs (24h Range):  Temp:  [98 °F (36.7 °C)-99.2 °F (37.3 °C)] 99 °F (37.2 °C)  Pulse:  [] 100  Resp:  [15-37] 36  SpO2:  [94 %-100 %] 97 %  BP: (124-138)/(57-66) 124/66  Arterial Line BP: (122-171)/(39-76) 137/55     Weight: 100.3 kg (221 lb 1.9 oz)  Body mass index is 35.69 kg/(m^2).     SpO2: 97 %  O2 Device (Oxygen Therapy): High Flow nasal Cannula 20 lpm  Oxygen Concentration (%):  [] 60      Intake/Output - Last 3 Shifts       05/04 0700 - 05/05 0659 05/05 0700 - 05/06 0659 05/06 0700 - 05/07 0659    P.O. 1530 1730 532    I.V. (mL/kg) 1609.3 (16) 351.8 (3.5) 9 (0.1)    IV Piggyback 285.2      Total Intake(mL/kg) 3424.5 (34.1) 2081.8 (20.8) 541 (5.4)    Urine (mL/kg/hr) 5025 (2.1) 4090 (1.7) 800 (2.5)    Drains       Blood 5 (0)      Chest Tube 82 (0) 84 (0) 0 (0)    Total Output 5112 4174 800    Net -1687.5 -2092.2 -259                 Lines/Drains/Airways     Arterial Line                 Arterial Line 05/02/17 0807 Right Radial 4 days          Peripheral Intravenous Line                 Peripheral IV - Single Lumen 05/02/17 0727 Right Forearm 4 days         Peripheral IV - Single Lumen 05/02/17 0807 Left Wrist 4 days                Scheduled Medications:    docusate sodium  100 mg Oral BID    famotidine  20 mg Oral BID    furosemide  20 mg  Intravenous TID    levothyroxine  88 mcg Oral Before breakfast    magnesium oxide  800 mg Oral BID    miconazole NITRATE 2 %   Topical BID       Continuous Medications:    papervine / heparin 3 mL/hr at 05/06/17 0700       PRN Medications: heparin, porcine (PF), heparin, porcine (PF), magnesium sulfate IVPB, morphine, morphine, ondansetron, oxycodone, sodium bicarbonate    Physical Exam    Constitutional: He appears well-developed and well-nourished. On BiPap in no acute distress.  HENT:   Head: Normocephalic and atraumatic.   Mouth/Throat: Oropharynx is clear and moist. Down's facies   Eyes: Conjunctivae are normal. Pupils are equal, round, and reactive to light.   Neck: Neck supple.   Cardiovascular: Normal rate, regular rhythm, S1 normal and S2 split.  Infrequent extrasystoles are present. Exam reveals no gallop and no friction rub.    Pulses:       Radial pulses are 1+ on the left side.        Dorsalis pedis pulses are 1+ on the left side.   There is a harsh 2/6 systolic ejection murmur heard best at the LUSB. Warm distal extremities.   Pulmonary/Chest: No tachypnea, no retractions, symmetric breath sounds. He has no wheezes. He has no rales. Able to speak in full sentences. Bandages C/D/I  Abdominal: Soft. Bowel sounds are normal. He exhibits no distension. There is no hepatosplenomegaly. There is no tenderness.   Musculoskeletal: He exhibits no edema or tenderness.   Neurological: He exhibits normal muscle tone.   Skin: Skin is dry. No cyanosis. No pallor. Nails show no clubbing.     Significant Labs:   ABG    Recent Labs  Lab 05/06/17  0320   PH 7.404   PO2 119*   PCO2 61.5*   HCO3 38.4*   BE 14     Lab Results   Component Value Date    WBC 6.63 05/06/2017    HGB 10.6 (L) 05/06/2017    HCT 32 (L) 05/06/2017    MCV 91 05/06/2017     (L) 05/06/2017     CMP  Sodium   Date Value Ref Range Status   05/06/2017 138 136 - 145 mmol/L Final     Potassium   Date Value Ref Range Status   05/06/2017 4.0 3.5 -  5.1 mmol/L Final     Chloride   Date Value Ref Range Status   05/06/2017 94 (L) 95 - 110 mmol/L Final     CO2   Date Value Ref Range Status   05/06/2017 32 (H) 23 - 29 mmol/L Final     Glucose   Date Value Ref Range Status   05/06/2017 152 (H) 70 - 110 mg/dL Final     BUN, Bld   Date Value Ref Range Status   05/06/2017 21 (H) 6 - 20 mg/dL Final     Creatinine   Date Value Ref Range Status   05/06/2017 1.3 0.5 - 1.4 mg/dL Final     Calcium   Date Value Ref Range Status   05/06/2017 9.5 8.7 - 10.5 mg/dL Final     Total Protein   Date Value Ref Range Status   05/06/2017 7.3 6.0 - 8.4 g/dL Final     Albumin   Date Value Ref Range Status   05/06/2017 3.6 3.5 - 5.2 g/dL Final     Total Bilirubin   Date Value Ref Range Status   05/06/2017 1.0 0.1 - 1.0 mg/dL Final     Comment:     For infants and newborns, interpretation of results should be based  on gestational age, weight and in agreement with clinical  observations.  Premature Infant recommended reference ranges:  Up to 24 hours.............<8.0 mg/dL  Up to 48 hours............<12.0 mg/dL  3-5 days..................<15.0 mg/dL  6-29 days.................<15.0 mg/dL       Alkaline Phosphatase   Date Value Ref Range Status   05/06/2017 206 (H) 55 - 135 U/L Final     AST   Date Value Ref Range Status   05/06/2017 54 (H) 10 - 40 U/L Final     ALT   Date Value Ref Range Status   05/06/2017 37 10 - 44 U/L Final     Anion Gap   Date Value Ref Range Status   05/06/2017 12 8 - 16 mmol/L Final     eGFR if    Date Value Ref Range Status   05/06/2017 >60.0 >60 mL/min/1.73 m^2 Final     eGFR if non    Date Value Ref Range Status   05/06/2017 >60.0 >60 mL/min/1.73 m^2 Final     Comment:     Calculation used to obtain the estimated glomerular filtration  rate (eGFR) is the CKD-EPI equation. Since race is unknown   in our information system, the eGFR values for   -American and Non--American patients are given   for each creatinine  result.           Significant Imaging:  CXR reviewed    JAKE (post): Pulmonary valve velocity <2.5 m/sec. Mild to moderate TR with RV pressure estimate of 46 mmHg. Mildly dilated RV with low normal systolic function. Low normal LV systolic function.       Assessment and Plan:   HPI: Cipriano is a 26 y.o. Male with history of Down's syndrome, TOF s/p complete repair as an infant with subsequent PVR in 1999 and pacemaker placement in 2008 for Sinus node dysfunction. He presented with exercise intolerance and moderate PS/PI so he was referred for repeat PVR. Also with history of hypertension and ventricular ectopy with frequent PVC's.     Cardiac  Pulmonary valve insufficiency  26 y.o. male with Down's syndrome, TOF s/p repair as infant, and sinus node dysfunction s/p pacemaker 2008, now s/p pulmonary valve replacement with a 27 mm [employer] epic bioprosthetic valve (5/2/17).  His chest tubes came out this morning and he is continuing to diurese.  His BP tends to drop with lasix dosing so no other medications were started.  Plan:   Neuro/Pain:  - Pain control prn  - Scheduled Tylenol  Respiratory:  - Cont NC during day, BiPap at night  - Goal sats >92%  Cardiovascular:  - Monitor BP's closely, goal normotensive   - Pacemaker interrogated by EP: frequent PVC's, no atrial arrhythmia, generator requires replacement in 6-9 months   - No current cardiac meds  FEN/GI:  - Regular diet  - Monitor electrolytes and replace as needed.   - Continue Lasix TID, goal negative fluid balance  - May require additional stool regimen (magnesium might help)  Renal:  - Monitor I/O's closely  Heme/ID:  - S/p Ancef x 48 hours post-op  - H/H stable. Monitor for bleeding  Plastics:  - Stable.  Will D/C art line.  Disposition:  - Wean respiratory support. Monitor blood pressures (Goal SBP < 130)      Jennifer Huerta MD  Pediatric Cardiology  [facility]

## 2017-05-06 NOTE — PROGRESS NOTES
[facility]  Pediatric Cardiology  Progress Note    Patient Name: Cipriano Thompson  MRN: [id number]  Admission Date: 5/2/2017  Hospital Length of Stay: 4 days  Code Status: Full Code   Attending Physician: Bhargav Godfrey MD   Primary Care Physician: Princess KHUSHBOO Rodgers MD  Expected Discharge Date: 5/11/2017  Principal Problem:S/P pulmonary valve replacement    Subjective:       No new subjective & objective note has been filed under this hospital service since the last note was generated.      Assessment and Plan:   HPI: Cipriano is a 26 y.o. Male with history of Down's syndrome, TOF s/p complete repair as an infant with subsequent PVR in 1999 and pacemaker placement in 2008 for Sinus node dysfunction. He presented with exercise intolerance and moderate PS/PI so he was referred for repeat PVR. Also with history of hypertension and ventricular ectopy with frequent PVC's.     No new Assessment & Plan notes have been filed under this hospital service since the last note was generated.  Service: Pediatric Cardiology      Jennifer Huerta MD  Pediatric Cardiology  [facility]

## 2017-05-06 NOTE — PROGRESS NOTES
[facility]  Pediatric Critical Care  Postoperative Note      Patient Name: Cipriano Thompson  MRN: [id number]  Admission Date: 5/2/2017  Code Status: Full Code   Attending Provider: Shaunna Villalba  Primary Care Physician: Princess KHUSHBOO Rodgers MD  Principal Problem:Pulmonary valve insufficiency    Subjective:     HPI: Cipriano Thompson is a 26 y.o. male w ho T21 TOF s/p repair as an infant with subsequent pulmonary valve replacement in 1999 and required pacemaker placement in 2008 due to sinus node dysfunction. He also has a hx of  hypertension and ventricular ectopy (PVCs, couplets and triplets).  He presented to us with mixed moderate sub PS (24mmhg), moderate PI and exercise intolerance (SOB with climbing stairs) is now s/p redo sternotomy and pulmonary valve replacement with 27mm porcine valve in 30 mm conduit POD#4      Overnight Night:  No major events overnight. Tolerated BIPAP better. Weaned on HFNC this am to 6L 100% Fi02   And doing well. Chest tubes removed this am and Lasix weaned to q 8 IV due to slight increase in elevated BUN/Cr.  Post Chest tube removal CXR without pneumothorax. Walking around the unit with minimal assistance.     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    Tetralogy of Fallot 05/1990       Past Surgical History:   Procedure Laterality Date    BUNIONECTOMY      CARDIAC SURGERY      open heart, ppm     INSERT / REPLACE / REMOVE PACEMAKER Left 10/2008    TONSILLECTOMY         Review of patient's allergies indicates:   Allergen Reactions    Latex, natural rubber     Sulfa (sulfonamide antibiotics)        Family History     Problem Relation (Age of Onset)    No Known Problems Mother, Sister          Social History Main Topics    Smoking status: Never Smoker    Smokeless tobacco: Not on file    Alcohol use No    Drug use: Not on file     Sexual activity: Not Currently       Review of Systems    Objective:     Vital Signs Range (Last 24H):  Temp:  [98 °F (36.7 °C)-99.2 °F (37.3 °C)]   Pulse:  [75-94]   Resp:  [15-37]   BP: (124-138)/(57-66)   SpO2:  [94 %-100 %]   Arterial Line BP: (122-171)/(39-76)     I & O (Last 24H):    Intake/Output Summary (Last 24 hours) at 05/06/17 0637  Last data filed at 05/06/17 0600   Gross per 24 hour   Intake          2081.83 ml   Output             4174 ml   Net         -2092.17 ml       Ventilator Data (Last 24H):     Oxygen Concentration (%):  [] 60    Hemodynamic Parameters (Last 24H):       Physical Exam:  Physical Exam  Gen: awake and alert  HEENT: MMM. PERRL. Down's facies  Resp: course bs decreased breath sounds b/l, no wheezes noted.  On Bipap/HFNC  CV: RRR. 3/6 systolic murmur best heard at LUSB. 2+ pulses, CR < 3sec   Abd: soft NTND.  Liver edge 3 cm below RCM.  Ext: dry skin    Lines/Drains/Airways     Drain                 Chest Tube 05/02/17 1602 Left Mediastinal 24 Fr. 3 days         Chest Tube 05/02/17 1602 Right Mediastinal 24 Fr. 3 days          Arterial Line                 Arterial Line 05/02/17 0807 Right Radial 3 days          Peripheral Intravenous Line                 Peripheral IV - Single Lumen 05/02/17 0727 Right Forearm 3 days         Peripheral IV - Single Lumen 05/02/17 0807 Left Wrist 3 days                Laboratory (Last 24H):   ABG:     Recent Labs  Lab 05/05/17  1158 05/05/17  1748 05/06/17  0320   PH 7.398 7.387 7.404   PCO2 56.5* 58.9* 61.5*   HCO3 34.8* 35.4* 38.4*   POCSATURATED 95 96 98   BE 10 10 14     CMP:     Recent Labs  Lab 05/06/17  0320      K 4.0   CL 94*   CO2 32*   *   BUN 21*   CREATININE 1.3   CALCIUM 9.5   PROT 7.3   ALBUMIN 3.6   BILITOT 1.0   ALKPHOS 206*   AST 54*   ALT 37   ANIONGAP 12   EGFRNONAA >60.0     CBC:   Recent Labs  Lab 05/05/17  0215  05/05/17  1748 05/06/17  0320 05/06/17  0320   WBC 7.38  --   --  6.63  --    HGB 10.3*  --   --   10.6*  --    HCT 30.6*  < > 32* 31.3* 32*   PLT 91*  --   --  121*  --    < > = values in this interval not displayed.    Chest X-Ray: I personally reviewed the films and findings are: Expiratory film with hazy LLL and cardiomegaly     Assessment/Plan:     Active Diagnoses:    Diagnosis Date Noted POA    PRINCIPAL PROBLEM:  Pulmonary valve insufficiency [I37.1] 05/02/2017 Yes    S/P pulmonary valve replacement [Z95.2]  Not Applicable    PVC's (premature ventricular contractions) [I49.3]  Unknown      Problems Resolved During this Admission:    Diagnosis Date Noted Date Resolved POA     Cipriano Thompson is a 26 y.o. male with T21 s/p TOF s/p repair s/p subsequent pulmonary valve replacement in 1999, with sinus node dysfunction requiring pacemaker placement in 2008 with symptomatic mixed moderate sub PS (24mmhg), moderate PI is now s/p redo sternotomy and pulmonary valve replacement with 27mm porcine valve and 30 mm conduit POD#4    Neuro:  Postoperative Sedation & Analgesia:  --  Tylenol prn for mild pain and Prn Oxycodone for moderate pain    Resp:  Respiratory support:  -- CXR  With poor technique . Unchanged from yesterday.  --  Continue nightly Bipap 16/12, while asleep, when awake on HFNC 6L  -- Continue IS/Acapella every 1 hour while awake  Post-obstructive Pulmonary Edema likely due to BIJAN:  -- continue diuresis and pulmonary toileting    CV:  Pulmonary Valve replacement with Porcine 27mm valve/30mm conduit:  Rhythm: ho junctional escape rhythm.  Medtronic pacer: A-paced, V-sensed low rate: 75, upper rate:150.  PVCs noted as per history.  Pacemaker interrogated no issues.  Will keep Mag greater than 2, will increase po Magnesium.   Preload: Regular diet, Lasix 20mg IV Q8,   Telemetry orders per cardiology  Contractility: none   Afterload: Currently off all BP medications. Continue holding home Carvedilol and Digoxin.  Keep a close eye on BP. Goal SBP 110s -130s     FEN/GI:  Nutrition/IVFs:  --Continue  regular  diet .   Electrolytes:  -- Continue Mag supplement  800 mg po BID  Prevention of Opiod induced Constipation:  - Continue on Colace BID. Hold for diarrhea  - Increase in magnesium should help  GI prophylaxis:  -- Famotidine    Renal:  -- Mild post bypass YAEL - improving and stable at Cr 1.3   -- Strict Is/Os.    Heme:  -- Will start ASA,   -- CBC daily to assess thrombocytopenia    ID:  Postoperative prophylaxis:  -- s/p Ancef for 72 hours   -- Afebrile today likely pulmonary source, has remained afebrile since, cultures all negative.     Endo:  H/o hypothyroidism:  - on home dose of levo    Social/Dispo:  Transfer to CTSU. Mom updated at bedside.  Critical Care Time: 60 minutes     [facility]  Pediatric Critical Care  [facility]

## 2017-05-06 NOTE — PLAN OF CARE
Problem: Patient Care Overview  Goal: Plan of Care Review  Cipriano Thompson tolerated treatment well this morning. Able to ambulate 375 ft around CVICU (2 laps) with SBA of therapist, 6 liters portable oxygen in tow with sats >91% throughout. No c/o abdominal pain which was a common limiting factor yesterday. In good spirits throughout, in BS chair before and after session. Plan for OT on Sunday, PT to resume Monday. Will cont to benefit from acute PT services.     Ric Fiore, PT  5/6/2017

## 2017-05-06 NOTE — PT/OT/SLP PROGRESS
Physical Therapy  Treatment    Cipriano Thompson   MRN: [id number]   Admitting Diagnosis: S/P pulmonary valve replacement, POD#4    PT Received On: 17  PT Start Time: 958     PT Stop Time:     PT Total Time (min): 24 min       Billable Minutes:  Gait Training 10 and Therapeutic Activity 14    Treatment Type: Treatment  PT/PTA: PT       General Precautions: Standard, fall, sternal, cardiac  Orthopedic Precautions: Sternal    Do you have any cultural, spiritual, Pentec[employer]alism conflicts, given your current situation?: Patient with Down's syndrome at baseline but is able to participate in spontaneous conversation with therapist. Mom sleeping throughout session.    Subjective:  Communicated with MILTON Whiteside prior to session, ok to see for treatment this morning.    Pt sitting up in BS chair playing video games upon my entry to room, agreeable to treatment.    Pain Ratin/10  Pain Rating Post-Intervention: 0/10    Objective:   Patient found with: arterial line, blood pressure cuff, pulse ox (continuous), oxygen via HFNC (at start of session), telemetry    Functional Mobility:    Bed Mobility:   Pt OOBTC before and after session    Transfers:  Sit <> Stand Assistance: Stand By Assistance x 1 trial from BS chair, pt hugging his sternal pillow  Sit <> Stand Assistive Device: No Assistive Device    Gait:   Gait Distance: 375 ft with SBA around CVICU on portable telemetry and 6 liters portable oxygen, sats in 93-94% throughout, lowest was to 91% at very end of session. No c/o fatigue, abdominal pain today (which was limiting factor yesterday).    Assistance 1: Stand by Assistance  Gait Assistive Device: No device  Gait Pattern: reciprocal  Gait Deviation(s): decreased lance, increased time in double stance, decreased velocity of limb motion, decreased step length, decreased stride length    AM-PAC 6 CLICK MOBILITY  How much help from another person does this patient currently need?   1 = Unable, Total/Dependent  Assistance  2 = A lot, Maximum/Moderate Assistance  3 = A little, Minimum/Contact Guard/Supervision  4 = None, Modified [place]/Independent    Turning over in bed (including adjusting bedclothes, sheets and blankets)?: 4  Sitting down on and standing up from a chair with arms (e.g., wheelchair, bedside commode, etc.): 4  Moving from lying on back to sitting on the side of the bed?: 3  Moving to and from a bed to a chair (including a wheelchair)?: 3  Need to walk in hospital room?: 4  Climbing 3-5 steps with a railing?: 3  Total Score: 21    AM-PAC Raw Score CMS G-Code Modifier Level of Impairment Assistance   6 % Total / Unable   7 - 9 CM 80 - 100% Maximal Assist   10 - 14 CL 60 - 80% Moderate Assist   15 - 19 CK 40 - 60% Moderate Assist   20 - 22 CJ 20 - 40% Minimal Assist   23 CI 1-20% SBA / CGA   24 CH 0% Independent/ Mod I     Patient left up in chair with all lines intact, call button in reach and RN present.    Assessment:  Cipriano Tohmpson tolerated treatment well this morning. Able to ambulate 375 ft around CVICU (2 laps) with SBA of therapist, 6 liters portable oxygen in tow with sats >91% throughout. No c/o abdominal pain which was a common limiting factor yesterday. In good spirits throughout, in BS chair before and after session. Plan for OT on Sunday, PT to resume Monday. Will cont to benefit from acute PT services.    Rehab identified problem list/impairments: weakness, impaired endurance, impaired self care skills, impaired functional mobilty, gait instability, impaired balance, decreased lower extremity function, impaired cardiopulmonary response to activity, orthopedic precautions, decreased safety awareness    Rehab potential is good.    Activity tolerance: Good    Discharge recommendations: home     Barriers to discharge: None    Equipment recommendations: none     GOALS:   Physical Therapy Goals        Problem: Physical Therapy Goal    Goal Priority Disciplines Outcome Goal Variances  Interventions   Physical Therapy Goal     PT/OT, PT Ongoing (interventions implemented as appropriate)     Description:  Goals to be met by: 5/10/17     Patient will increase functional independence with mobility by performin. Supine to sit with Stand-by Assistance - Not met  2. Sit to supine with Stand-by Assistance - Not met  3. Sit to stand transfer with Stand-By Assistance - MET ()  4. Bed to chair transfer with Stand-by Assistance - Not met  5. Gait x 200 feet with Stand-by Assistance - MET ()    6. Added on : Sit to stand mod (I) - Not met  7. Added on : Gait x 500 ft with supervision - Not met            PLAN:    Patient to be seen 6x/week to address the above listed problems via gait training, therapeutic activities, therapeutic exercises     Plan of Care expires: 17  Plan of Care reviewed with: patient     Ric Adebayo, PT  2017

## 2017-05-06 NOTE — NURSING
Patient's Wnfl=[id number]. MD Huerta notified. Order to give PRN Tylenol, however patient would be receiving more than the recommended 3,000mg/24hr. MD to put in for one time dose of Ibuprofen.

## 2017-05-07 LAB
ALBUMIN SERPL BCP-MCNC: 3.3 G/DL
ALP SERPL-CCNC: 204 U/L
ALT SERPL W/O P-5'-P-CCNC: 33 U/L
ANION GAP SERPL CALC-SCNC: 12 MMOL/L
AST SERPL-CCNC: 29 U/L
BASOPHILS # BLD AUTO: 0.05 K/UL
BASOPHILS NFR BLD: 0.6 %
BILIRUB SERPL-MCNC: 0.8 MG/DL
BUN SERPL-MCNC: 28 MG/DL
CALCIUM SERPL-MCNC: 9.8 MG/DL
CHLORIDE SERPL-SCNC: 94 MMOL/L
CO2 SERPL-SCNC: 33 MMOL/L
CREAT SERPL-MCNC: 1.1 MG/DL
DIFFERENTIAL METHOD: ABNORMAL
EOSINOPHIL # BLD AUTO: 0.2 K/UL
EOSINOPHIL NFR BLD: 2.2 %
ERYTHROCYTE [DISTWIDTH] IN BLOOD BY AUTOMATED COUNT: 15.5 %
EST. GFR  (AFRICAN AMERICAN): >60 ML/MIN/1.73 M^2
EST. GFR  (NON AFRICAN AMERICAN): >60 ML/MIN/1.73 M^2
GLUCOSE SERPL-MCNC: 117 MG/DL
HCT VFR BLD AUTO: 32.5 %
HGB BLD-MCNC: 11.3 G/DL
LYMPHOCYTES # BLD AUTO: 0.9 K/UL
LYMPHOCYTES NFR BLD: 11.5 %
MAGNESIUM SERPL-MCNC: 2.2 MG/DL
MCH RBC QN AUTO: 31 PG
MCHC RBC AUTO-ENTMCNC: 34.8 %
MCV RBC AUTO: 89 FL
MONOCYTES # BLD AUTO: 0.8 K/UL
MONOCYTES NFR BLD: 9.6 %
NEUTROPHILS # BLD AUTO: 6.1 K/UL
NEUTROPHILS NFR BLD: 75.4 %
PHOSPHATE SERPL-MCNC: 3.5 MG/DL
PLATELET # BLD AUTO: 147 K/UL
PMV BLD AUTO: 10.2 FL
POTASSIUM SERPL-SCNC: 4.1 MMOL/L
PROT SERPL-MCNC: 7.4 G/DL
RBC # BLD AUTO: 3.64 M/UL
SODIUM SERPL-SCNC: 139 MMOL/L
WBC # BLD AUTO: 8.06 K/UL

## 2017-05-07 PROCEDURE — 99900035 HC TECH TIME PER 15 MIN (STAT)

## 2017-05-07 PROCEDURE — 25000003 PHARM REV CODE 250: Performed by: PEDIATRICS

## 2017-05-07 PROCEDURE — 63600175 PHARM REV CODE 636 W HCPCS: Performed by: PEDIATRICS

## 2017-05-07 PROCEDURE — 80053 COMPREHEN METABOLIC PANEL: CPT

## 2017-05-07 PROCEDURE — 97530 THERAPEUTIC ACTIVITIES: CPT

## 2017-05-07 PROCEDURE — 94660 CPAP INITIATION&MGMT: CPT

## 2017-05-07 PROCEDURE — 94664 DEMO&/EVAL PT USE INHALER: CPT

## 2017-05-07 PROCEDURE — 36415 COLL VENOUS BLD VENIPUNCTURE: CPT

## 2017-05-07 PROCEDURE — 94799 UNLISTED PULMONARY SVC/PX: CPT

## 2017-05-07 PROCEDURE — 84100 ASSAY OF PHOSPHORUS: CPT

## 2017-05-07 PROCEDURE — 85025 COMPLETE CBC W/AUTO DIFF WBC: CPT

## 2017-05-07 PROCEDURE — 27100171 HC OXYGEN HIGH FLOW UP TO 24 HOURS

## 2017-05-07 PROCEDURE — 25000003 PHARM REV CODE 250: Performed by: THORACIC SURGERY (CARDIOTHORACIC VASCULAR SURGERY)

## 2017-05-07 PROCEDURE — 94761 N-INVAS EAR/PLS OXIMETRY MLT: CPT

## 2017-05-07 PROCEDURE — 27000221 HC OXYGEN, UP TO 24 HOURS

## 2017-05-07 PROCEDURE — 20600001 HC STEP DOWN PRIVATE ROOM

## 2017-05-07 PROCEDURE — 99232 SBSQ HOSP IP/OBS MODERATE 35: CPT | Mod: ,,, | Performed by: PEDIATRICS

## 2017-05-07 PROCEDURE — 83735 ASSAY OF MAGNESIUM: CPT

## 2017-05-07 RX ORDER — DIPHENHYDRAMINE HCL 25 MG
25 CAPSULE ORAL EVERY 8 HOURS PRN
Status: DISCONTINUED | OUTPATIENT
Start: 2017-05-07 | End: 2017-05-12 | Stop reason: HOSPADM

## 2017-05-07 RX ORDER — FUROSEMIDE 20 MG/1
20 TABLET ORAL EVERY 8 HOURS
Status: DISCONTINUED | OUTPATIENT
Start: 2017-05-07 | End: 2017-05-08

## 2017-05-07 RX ORDER — POTASSIUM CHLORIDE 20 MEQ/15ML
20 SOLUTION ORAL 2 TIMES DAILY
Status: DISCONTINUED | OUTPATIENT
Start: 2017-05-07 | End: 2017-05-08

## 2017-05-07 RX ADMIN — LEVOTHYROXINE SODIUM 88 MCG: 88 TABLET ORAL at 06:05

## 2017-05-07 RX ADMIN — DOCUSATE SODIUM 100 MG: 100 CAPSULE, LIQUID FILLED ORAL at 09:05

## 2017-05-07 RX ADMIN — FUROSEMIDE 20 MG: 20 TABLET ORAL at 02:05

## 2017-05-07 RX ADMIN — ACETAMINOPHEN 650 MG: 325 TABLET ORAL at 11:05

## 2017-05-07 RX ADMIN — FUROSEMIDE 20 MG: 10 INJECTION, SOLUTION INTRAVENOUS at 06:05

## 2017-05-07 RX ADMIN — METOPROLOL TARTRATE 25 MG: 25 TABLET ORAL at 08:05

## 2017-05-07 RX ADMIN — DOCUSATE SODIUM 100 MG: 100 CAPSULE, LIQUID FILLED ORAL at 08:05

## 2017-05-07 RX ADMIN — POTASSIUM CHLORIDE 20 MEQ: 20 SOLUTION ORAL at 09:05

## 2017-05-07 RX ADMIN — POTASSIUM CHLORIDE 20 MEQ: 20 SOLUTION ORAL at 11:05

## 2017-05-07 RX ADMIN — DIPHENHYDRAMINE HYDROCHLORIDE 25 MG: 25 CAPSULE ORAL at 09:05

## 2017-05-07 RX ADMIN — METOPROLOL TARTRATE 25 MG: 25 TABLET ORAL at 09:05

## 2017-05-07 RX ADMIN — FAMOTIDINE 20 MG: 20 TABLET, FILM COATED ORAL at 08:05

## 2017-05-07 RX ADMIN — MICONAZOLE NITRATE: 20 POWDER TOPICAL at 11:05

## 2017-05-07 RX ADMIN — MICONAZOLE NITRATE: 20 POWDER TOPICAL at 08:05

## 2017-05-07 RX ADMIN — MAGNESIUM OXIDE TAB 400 MG (241.3 MG ELEMENTAL MG) 800 MG: 400 (241.3 MG) TAB at 08:05

## 2017-05-07 RX ADMIN — FAMOTIDINE 20 MG: 20 TABLET, FILM COATED ORAL at 09:05

## 2017-05-07 RX ADMIN — FUROSEMIDE 20 MG: 20 TABLET ORAL at 09:05

## 2017-05-07 RX ADMIN — MAGNESIUM OXIDE TAB 400 MG (241.3 MG ELEMENTAL MG) 800 MG: 400 (241.3 MG) TAB at 09:05

## 2017-05-07 NOTE — PLAN OF CARE
Problem: Patient Care Overview  Goal: Plan of Care Review  Outcome: Revised  Plan of care discussed with patient. Patient is free of fall/trauma/injury. Chest tube site re-dressed, sternal dressing done. Denies CP, SOB, or pain/discomfort. All questions addressed. Will continue to monitor

## 2017-05-07 NOTE — PROGRESS NOTES
[facility]  Pediatric Cardiology  Progress Note    Patient Name: Cipriano Thompson  MRN: [id number]  Admission Date: 5/2/2017  Hospital Length of Stay: 5 days  Code Status: Full Code   Attending Physician: Bhargav Godfrey MD   Primary Care Physician: Princess KHUSHBOO Rodgers MD  Expected Discharge Date: 5/11/2017  Principal Problem:S/P pulmonary valve replacement    Subjective:       Interval History: Stable overnight.  Transferred to floor. Low grade temps and increased SBP so metoprolol started.    Objective:     Vital Signs (Most Recent):  Temp: 98.3 °F (36.8 °C) (05/07/17 0720)  Pulse: 75 (05/07/17 0720)  Resp: 20 (05/07/17 0720)  BP: 130/63 (05/07/17 0720)  SpO2: 97 % (05/07/17 0720) Vital Signs (24h Range):  Temp:  [98.2 °F (36.8 °C)-100.4 °F (38 °C)] 98.3 °F (36.8 °C)  Pulse:  [] 75  Resp:  [18-35] 20  SpO2:  [92 %-97 %] 97 %  BP: (114-146)/(53-67) 130/63     Weight: 98.7 kg (217 lb 9.5 oz)  Body mass index is 35.12 kg/(m^2).     SpO2: 97 %  O2 Device (Oxygen Therapy): High Flow nasal Cannula 6 lpm  Oxygen Concentration (%):  [100] 100      Intake/Output - Last 3 Shifts       05/05 0700 - 05/06 0659 05/06 0700 - 05/07 0659 05/07 0700 - 05/08 0659    P.O. 1730 1428     I.V. (mL/kg) 351.8 (3.5) 13.5 (0.1)     IV Piggyback       Total Intake(mL/kg) 2081.8 (20.8) 1441.5 (14.6)     Urine (mL/kg/hr) 4090 (1.7) 2850 (1.2)     Blood       Chest Tube 84 (0) 0 (0)     Total Output 4174 2850      Net -2092.2 -1408.5             Urine Occurrence   1 x    Stool Occurrence   1 x          Lines/Drains/Airways     Peripheral Intravenous Line                 Peripheral IV - Single Lumen 05/02/17 0727 Right Forearm 5 days         Peripheral IV - Single Lumen 05/02/17 0807 Left Wrist 5 days                Scheduled Medications:    docusate sodium  100 mg Oral BID    famotidine  20 mg Oral BID    furosemide  20 mg Oral Q8H    levothyroxine  88 mcg Oral Before breakfast    magnesium oxide  800 mg Oral BID     metoprolol tartrate  25 mg Oral BID    miconazole NITRATE 2 %   Topical BID    potassium chloride 10%  20 mEq Oral BID       Continuous Medications:        PRN Medications: acetaminophen, magnesium sulfate IVPB, oxycodone    Physical Exam    Constitutional: He appears well-developed and well-nourished. On BiPap in no acute distress.  HENT:   Head: Normocephalic and atraumatic.   Mouth/Throat: Oropharynx is clear and moist. Down's facies   Eyes: Conjunctivae are normal. Pupils are equal, round, and reactive to light.   Neck: Neck supple.   Cardiovascular: Normal rate, regular rhythm, S1 normal and S2 split.  Infrequent extrasystoles are present. Exam reveals no gallop and no friction rub.    Pulses:       Radial pulses are 1+ on the left side.        Dorsalis pedis pulses are 1+ on the left side.   There is a harsh 2/6 systolic ejection murmur heard best at the LUSB. Warm distal extremities.   Pulmonary/Chest: No tachypnea, no retractions, symmetric breath sounds. He has no wheezes. He has no rales. Able to speak in full sentences. Bandages C/D/I  Abdominal: Soft. Bowel sounds are normal. He exhibits no distension. There is no hepatosplenomegaly. There is no tenderness.   Musculoskeletal: He exhibits no edema or tenderness.   Neurological: He exhibits normal muscle tone.   Skin: Skin is dry. No cyanosis. No pallor. Nails show no clubbing.     Significant Labs:   ABG    Recent Labs  Lab 05/06/17  0320   PH 7.404   PO2 119*   PCO2 61.5*   HCO3 38.4*   BE 14     Lab Results   Component Value Date    WBC 8.06 05/07/2017    HGB 11.3 (L) 05/07/2017    HCT 32.5 (L) 05/07/2017    MCV 89 05/07/2017     (L) 05/07/2017     CMP  Sodium   Date Value Ref Range Status   05/07/2017 139 136 - 145 mmol/L Final     Potassium   Date Value Ref Range Status   05/07/2017 4.1 3.5 - 5.1 mmol/L Final     Chloride   Date Value Ref Range Status   05/07/2017 94 (L) 95 - 110 mmol/L Final     CO2   Date Value Ref Range Status   05/07/2017  33 (H) 23 - 29 mmol/L Final     Glucose   Date Value Ref Range Status   05/07/2017 117 (H) 70 - 110 mg/dL Final     BUN, Bld   Date Value Ref Range Status   05/07/2017 28 (H) 6 - 20 mg/dL Final     Creatinine   Date Value Ref Range Status   05/07/2017 1.1 0.5 - 1.4 mg/dL Final     Calcium   Date Value Ref Range Status   05/07/2017 9.8 8.7 - 10.5 mg/dL Final     Total Protein   Date Value Ref Range Status   05/07/2017 7.4 6.0 - 8.4 g/dL Final     Albumin   Date Value Ref Range Status   05/07/2017 3.3 (L) 3.5 - 5.2 g/dL Final     Total Bilirubin   Date Value Ref Range Status   05/07/2017 0.8 0.1 - 1.0 mg/dL Final     Comment:     For infants and newborns, interpretation of results should be based  on gestational age, weight and in agreement with clinical  observations.  Premature Infant recommended reference ranges:  Up to 24 hours.............<8.0 mg/dL  Up to 48 hours............<12.0 mg/dL  3-5 days..................<15.0 mg/dL  6-29 days.................<15.0 mg/dL       Alkaline Phosphatase   Date Value Ref Range Status   05/07/2017 204 (H) 55 - 135 U/L Final     AST   Date Value Ref Range Status   05/07/2017 29 10 - 40 U/L Final     ALT   Date Value Ref Range Status   05/07/2017 33 10 - 44 U/L Final     Anion Gap   Date Value Ref Range Status   05/07/2017 12 8 - 16 mmol/L Final     eGFR if    Date Value Ref Range Status   05/07/2017 >60.0 >60 mL/min/1.73 m^2 Final     eGFR if non    Date Value Ref Range Status   05/07/2017 >60.0 >60 mL/min/1.73 m^2 Final     Comment:     Calculation used to obtain the estimated glomerular filtration  rate (eGFR) is the CKD-EPI equation. Since race is unknown   in our information system, the eGFR values for   -American and Non--American patients are given   for each creatinine result.         No arrhythmia alarms on telemetry  Significant Imaging:    JAKE (post): Pulmonary valve velocity <2.5 m/sec. Mild to moderate TR with RV pressure  estimate of 46 mmHg. Mildly dilated RV with low normal systolic function. Low normal LV systolic function.       Assessment and Plan:   HPI: Cipriano is a 26 y.o. Male with history of Down's syndrome, TOF s/p complete repair as an infant with subsequent PVR in 1999 and pacemaker placement in 2008 for Sinus node dysfunction. He presented with exercise intolerance and moderate PS/PI so he was referred for repeat PVR. Also with history of hypertension and ventricular ectopy with frequent PVC's.     Cardiac  Pulmonary valve insufficiency  26 y.o. male with Down's syndrome, TOF s/p repair as infant, and sinus node dysfunction s/p pacemaker 2008, now s/p pulmonary valve replacement with a 27 mm [employer] Quinn epic bioprosthetic valve (5/2/17).  Currently weaning respiratory support and diuresing.  Plan:   Neuro/Pain:  - Pain control prn  Respiratory:  - Cont NC during day, BiPap at night  - Goal sats >92%  Cardiovascular:  - Monitor BP's closely, goal normotensive   - Pacemaker interrogated by EP: frequent PVC's, no atrial arrhythmia, generator requires replacement in 6-9 months   - Metoprolol started for BP control and history of frequent ventricular ectopy  FEN/GI:  - Regular diet  - Monitor electrolytes and replace as needed.   - Continue Lasix TID, goal negative fluid balance  - May require additional stool regimen (magnesium might help)  Renal:  - Monitor I/O's closely  Heme/ID:  - S/p Ancef x 48 hours post-op  - H/H stable. Monitor for bleeding  Disposition:  - Wean respiratory support. Monitor blood pressures (Goal SBP < 130)  - Encourage ambulation, IS      Jennifer Huerta MD  Pediatric Cardiology  [facility]

## 2017-05-07 NOTE — SUBJECTIVE & OBJECTIVE
Interval History: Stable overnight.  Transferred to floor. Low grade temps and increased SBP so metoprolol started.    Objective:     Vital Signs (Most Recent):  Temp: 98.3 °F (36.8 °C) (05/07/17 0720)  Pulse: 75 (05/07/17 0720)  Resp: 20 (05/07/17 0720)  BP: 130/63 (05/07/17 0720)  SpO2: 97 % (05/07/17 0720) Vital Signs (24h Range):  Temp:  [98.2 °F (36.8 °C)-100.4 °F (38 °C)] 98.3 °F (36.8 °C)  Pulse:  [] 75  Resp:  [18-35] 20  SpO2:  [92 %-97 %] 97 %  BP: (114-146)/(53-67) 130/63     Weight: 98.7 kg (217 lb 9.5 oz)  Body mass index is 35.12 kg/(m^2).     SpO2: 97 %  O2 Device (Oxygen Therapy): High Flow nasal Cannula 6 lpm  Oxygen Concentration (%):  [100] 100      Intake/Output - Last 3 Shifts       05/05 0700 - 05/06 0659 05/06 0700 - 05/07 0659 05/07 0700 - 05/08 0659    P.O. 1730 1428     I.V. (mL/kg) 351.8 (3.5) 13.5 (0.1)     IV Piggyback       Total Intake(mL/kg) 2081.8 (20.8) 1441.5 (14.6)     Urine (mL/kg/hr) 4090 (1.7) 2850 (1.2)     Blood       Chest Tube 84 (0) 0 (0)     Total Output 4174 2850      Net -2092.2 -1408.5             Urine Occurrence   1 x    Stool Occurrence   1 x          Lines/Drains/Airways     Peripheral Intravenous Line                 Peripheral IV - Single Lumen 05/02/17 0727 Right Forearm 5 days         Peripheral IV - Single Lumen 05/02/17 0807 Left Wrist 5 days                Scheduled Medications:    docusate sodium  100 mg Oral BID    famotidine  20 mg Oral BID    furosemide  20 mg Oral Q8H    levothyroxine  88 mcg Oral Before breakfast    magnesium oxide  800 mg Oral BID    metoprolol tartrate  25 mg Oral BID    miconazole NITRATE 2 %   Topical BID    potassium chloride 10%  20 mEq Oral BID       Continuous Medications:        PRN Medications: acetaminophen, magnesium sulfate IVPB, oxycodone    Physical Exam    Constitutional: He appears well-developed and well-nourished. On BiPap in no acute distress.  HENT:   Head: Normocephalic and atraumatic.    Mouth/Throat: Oropharynx is clear and moist. Down's facies   Eyes: Conjunctivae are normal. Pupils are equal, round, and reactive to light.   Neck: Neck supple.   Cardiovascular: Normal rate, regular rhythm, S1 normal and S2 split.  Infrequent extrasystoles are present. Exam reveals no gallop and no friction rub.    Pulses:       Radial pulses are 1+ on the left side.        Dorsalis pedis pulses are 1+ on the left side.   There is a harsh 2/6 systolic ejection murmur heard best at the LUSB. Warm distal extremities.   Pulmonary/Chest: No tachypnea, no retractions, symmetric breath sounds. He has no wheezes. He has no rales. Able to speak in full sentences. Bandages C/D/I  Abdominal: Soft. Bowel sounds are normal. He exhibits no distension. There is no hepatosplenomegaly. There is no tenderness.   Musculoskeletal: He exhibits no edema or tenderness.   Neurological: He exhibits normal muscle tone.   Skin: Skin is dry. No cyanosis. No pallor. Nails show no clubbing.     Significant Labs:   ABG    Recent Labs  Lab 05/06/17  0320   PH 7.404   PO2 119*   PCO2 61.5*   HCO3 38.4*   BE 14     Lab Results   Component Value Date    WBC 8.06 05/07/2017    HGB 11.3 (L) 05/07/2017    HCT 32.5 (L) 05/07/2017    MCV 89 05/07/2017     (L) 05/07/2017     CMP  Sodium   Date Value Ref Range Status   05/07/2017 139 136 - 145 mmol/L Final     Potassium   Date Value Ref Range Status   05/07/2017 4.1 3.5 - 5.1 mmol/L Final     Chloride   Date Value Ref Range Status   05/07/2017 94 (L) 95 - 110 mmol/L Final     CO2   Date Value Ref Range Status   05/07/2017 33 (H) 23 - 29 mmol/L Final     Glucose   Date Value Ref Range Status   05/07/2017 117 (H) 70 - 110 mg/dL Final     BUN, Bld   Date Value Ref Range Status   05/07/2017 28 (H) 6 - 20 mg/dL Final     Creatinine   Date Value Ref Range Status   05/07/2017 1.1 0.5 - 1.4 mg/dL Final     Calcium   Date Value Ref Range Status   05/07/2017 9.8 8.7 - 10.5 mg/dL Final     Total Protein    Date Value Ref Range Status   05/07/2017 7.4 6.0 - 8.4 g/dL Final     Albumin   Date Value Ref Range Status   05/07/2017 3.3 (L) 3.5 - 5.2 g/dL Final     Total Bilirubin   Date Value Ref Range Status   05/07/2017 0.8 0.1 - 1.0 mg/dL Final     Comment:     For infants and newborns, interpretation of results should be based  on gestational age, weight and in agreement with clinical  observations.  Premature Infant recommended reference ranges:  Up to 24 hours.............<8.0 mg/dL  Up to 48 hours............<12.0 mg/dL  3-5 days..................<15.0 mg/dL  6-29 days.................<15.0 mg/dL       Alkaline Phosphatase   Date Value Ref Range Status   05/07/2017 204 (H) 55 - 135 U/L Final     AST   Date Value Ref Range Status   05/07/2017 29 10 - 40 U/L Final     ALT   Date Value Ref Range Status   05/07/2017 33 10 - 44 U/L Final     Anion Gap   Date Value Ref Range Status   05/07/2017 12 8 - 16 mmol/L Final     eGFR if    Date Value Ref Range Status   05/07/2017 >60.0 >60 mL/min/1.73 m^2 Final     eGFR if non    Date Value Ref Range Status   05/07/2017 >60.0 >60 mL/min/1.73 m^2 Final     Comment:     Calculation used to obtain the estimated glomerular filtration  rate (eGFR) is the CKD-EPI equation. Since race is unknown   in our information system, the eGFR values for   -American and Non--American patients are given   for each creatinine result.         No arrhythmia alarms on telemetry  Significant Imaging:    JAKE (post): Pulmonary valve velocity <2.5 m/sec. Mild to moderate TR with RV pressure estimate of 46 mmHg. Mildly dilated RV with low normal systolic function. Low normal LV systolic function.

## 2017-05-07 NOTE — PT/OT/SLP PROGRESS
Occupational Therapy  Treatment    Cipriano Thompson   MRN: [id number]   Admitting Diagnosis: S/P pulmonary valve replacement    OT Date of Treatment: 17   OT Start Time: 1327  OT Stop Time: 1350  OT Total Time (min): 23 min    Billable Minutes:  Therapeutic Activity 23 min    General Precautions: Standard, fall, sternal (cardiac)  Orthopedic Precautions:  (sternal)  Braces: N/A    Do you have any cultural, spiritual, Lutheran conflicts, given your current situation?: None    Subjective:  Communicated with MILTON Duncan prior to session.  Pt agreeable to OT session.    Pain Ratin/10  Pain Rating Post-Intervention: 0/10    Objective:  Patient found with: oxygen, telemetry     Functional Mobility:  Bed Mobility:  Scooting/Bridging: Stand by Assistance (v/c for maintaining sternal precautions)  Supine to Sit: Stand by Assistance (v/c for maintaining sternal precautions)  Sit to Supine: Stand by Assistance (v/c for maintaining sternal precautions)    Transfers:   Sit <> Stand Assistance: Stand By Assistance (x2 trials from EOB)  Sit <> Stand Assistive Device: No Assistive Device    Functional Ambulation: Pt performed mobility within room and down hallway, approximately household distance with CGA to SBA. Pt with some decreased safety awareness as evidenced by some unsteadiness with dual tasks.    Activities of Daily Living:    UE Dressing Level of Assistance: Stand by assistance (to don/doff gown as robe)    LE Dressing Level of Assistance: Stand by assistance (to doff slippers)    Balance:   Static Sit: SBA  Static Stand: SBA  Dynamic stand: CGA to SBA    Therapeutic Activities and Exercises:  - Pt and mom educated on OT POC and sternal precautions in regards to mobility and transfers.    AM-PAC 6 CLICK ADL   How much help from another person does this patient currently need?   1 = Unable, Total/Dependent Assistance  2 = A lot, Maximum/Moderate Assistance  3 = A little, Minimum/Contact Guard/Supervision  4 = None,  Modified [place]/Independent    Putting on and taking off regular lower body clothing? : 3  Bathing (including washing, rinsing, drying)?: 2  Toileting, which includes using toilet, bedpan, or urinal? : 3  Putting on and taking off regular upper body clothing?: 3  Taking care of personal grooming such as brushing teeth?: 3  Eating meals?: 3  Total Score: 17     AM-PAC Raw Score CMS G-Code Modifier Level of Impairment Assistance   6 % Total / Unable   7 - 9 CM 80 - 100% Maximal Assist   10 - 14 CL 60 - 80% Moderate Assist   15 - 19 CK 40 - 60% Moderate Assist   20 - 22 CJ 20 - 40% Minimal Assist   23 CI 1-20% SBA / CGA   24 CH 0% Independent/ Mod I     Patient left supine with all lines intact and call button in reach    ASSESSMENT:  Cipriano Thompson is a 26 y.o. male with a medical diagnosis of S/P pulmonary valve replacement and presents with steadily improving mobility and (I) in ADLs. He tolerated session well and was in very good spirits. He would continue to benefit from skilled OT services to promote (I) in ADLs and return to valued roles and routines.    Rehab identified problem list/impairments: Rehab identified problem list/impairments: weakness, impaired endurance, impaired self care skills, orthopedic precautions, impaired cardiopulmonary response to activity, decreased safety awareness    Rehab potential is excellent.    Activity tolerance: Excellent    Discharge recommendations: Discharge Facility/Level Of Care Needs: home     Barriers to discharge: Barriers to Discharge: None    Equipment recommendations: none     GOALS:   Occupational Therapy Goals        Problem: Occupational Therapy Goal    Goal Priority Disciplines Outcome Interventions   Occupational Therapy Goal     OT, PT/OT Ongoing (interventions implemented as appropriate)    Description:  Goals to be met by: 5/13/17     Patient will increase functional independence with ADLs by performing:    UE Dressing with Supervision.  LE Dressing  with Supervision.  Grooming while standing at sink with Supervision.  Toileting from toilet with Supervision for hygiene and clothing management.   Supine to sit with Supervision.  Stand pivot transfers with Supervision.  Toilet transfer to toilet with Supervision.                Plan:  Patient to be seen 6 x/week to address the above listed problems via self-care/home management, therapeutic activities, therapeutic exercises  Plan of Care expires: 06/02/17  Plan of Care reviewed with: patient         DONNIE Lion  05/07/2017

## 2017-05-07 NOTE — PLAN OF CARE
Problem: Occupational Therapy Goal  Goal: Occupational Therapy Goal  Goals to be met by: 5/13/17     Patient will increase functional independence with ADLs by performing:    UE Dressing with Supervision.  LE Dressing with Supervision.  Grooming while standing at sink with Supervision.  Toileting from toilet with Supervision for hygiene and clothing management.   Supine to sit with Supervision.  Stand pivot transfers with Supervision.  Toilet transfer to toilet with Supervision.   Outcome: Ongoing (interventions implemented as appropriate)  Pt is continuing to make progress towards goals. Goals remain appropriate, continue POC.   DONNIE Jacobo  5/7/2017

## 2017-05-07 NOTE — PLAN OF CARE
Problem: Patient Care Overview  Goal: Plan of Care Review  Outcome: Ongoing (interventions implemented as appropriate)  Plan of care discussed with patient.  Patient ambulating independently, fall precautions in place. Continuing to encourage sternal precautions, IS, and ambulation. Patient has no complaints of pain. Temps elevated with previous shift, temps 99.5, 98.2 after PRN tylenol and ibuprofen admin. Patient has no questions at this time. Will continue to monitor.

## 2017-05-07 NOTE — ASSESSMENT & PLAN NOTE
26 y.o. male with Down's syndrome, TOF s/p repair as infant, and sinus node dysfunction s/p pacemaker 2008, now s/p pulmonary valve replacement with a 27 mm [employer] Quinn epic bioprosthetic valve (5/2/17).  Currently weaning respiratory support and diuresing.  Plan:   Neuro/Pain:  - Pain control prn  Respiratory:  - Cont NC during day, BiPap at night  - Goal sats >92%  Cardiovascular:  - Monitor BP's closely, goal normotensive   - Pacemaker interrogated by EP: frequent PVC's, no atrial arrhythmia, generator requires replacement in 6-9 months   - Metoprolol started for BP control and history of frequent ventricular ectopy  FEN/GI:  - Regular diet  - Monitor electrolytes and replace as needed.   - Continue Lasix TID, goal negative fluid balance  - May require additional stool regimen (magnesium might help)  Renal:  - Monitor I/O's closely  Heme/ID:  - S/p Ancef x 48 hours post-op  - H/H stable. Monitor for bleeding  Disposition:  - Wean respiratory support. Monitor blood pressures (Goal SBP < 130)  - Encourage ambulation, IS

## 2017-05-08 LAB
ALBUMIN SERPL BCP-MCNC: 3.2 G/DL
ALP SERPL-CCNC: 185 U/L
ALT SERPL W/O P-5'-P-CCNC: 29 U/L
ANION GAP SERPL CALC-SCNC: 8 MMOL/L
AST SERPL-CCNC: 22 U/L
BASOPHILS # BLD AUTO: 0.06 K/UL
BASOPHILS NFR BLD: 0.6 %
BILIRUB SERPL-MCNC: 0.9 MG/DL
BUN SERPL-MCNC: 27 MG/DL
CALCIUM SERPL-MCNC: 9.6 MG/DL
CHLORIDE SERPL-SCNC: 95 MMOL/L
CO2 SERPL-SCNC: 34 MMOL/L
CREAT SERPL-MCNC: 1.2 MG/DL
DIFFERENTIAL METHOD: ABNORMAL
EOSINOPHIL # BLD AUTO: 0.1 K/UL
EOSINOPHIL NFR BLD: 1.1 %
ERYTHROCYTE [DISTWIDTH] IN BLOOD BY AUTOMATED COUNT: 15.9 %
EST. GFR  (AFRICAN AMERICAN): >60 ML/MIN/1.73 M^2
EST. GFR  (NON AFRICAN AMERICAN): >60 ML/MIN/1.73 M^2
GLUCOSE SERPL-MCNC: 120 MG/DL
HCT VFR BLD AUTO: 34.3 %
HGB BLD-MCNC: 11.7 G/DL
LYMPHOCYTES # BLD AUTO: 1.7 K/UL
LYMPHOCYTES NFR BLD: 16.9 %
MAGNESIUM SERPL-MCNC: 1.9 MG/DL
MCH RBC QN AUTO: 30.5 PG
MCHC RBC AUTO-ENTMCNC: 34.1 %
MCV RBC AUTO: 90 FL
MONOCYTES # BLD AUTO: 1.2 K/UL
MONOCYTES NFR BLD: 11.9 %
NEUTROPHILS # BLD AUTO: 6.7 K/UL
NEUTROPHILS NFR BLD: 68.5 %
PHOSPHATE SERPL-MCNC: 3.5 MG/DL
PLATELET # BLD AUTO: 167 K/UL
PMV BLD AUTO: 9.6 FL
POTASSIUM SERPL-SCNC: 4.9 MMOL/L
PROT SERPL-MCNC: 7.5 G/DL
RBC # BLD AUTO: 3.83 M/UL
SODIUM SERPL-SCNC: 137 MMOL/L
WBC # BLD AUTO: 9.8 K/UL

## 2017-05-08 PROCEDURE — 93303 ECHO TRANSTHORACIC: CPT | Performed by: PEDIATRICS

## 2017-05-08 PROCEDURE — 27000221 HC OXYGEN, UP TO 24 HOURS

## 2017-05-08 PROCEDURE — 99233 SBSQ HOSP IP/OBS HIGH 50: CPT | Mod: ,,, | Performed by: PEDIATRICS

## 2017-05-08 PROCEDURE — 25000003 PHARM REV CODE 250: Performed by: PEDIATRICS

## 2017-05-08 PROCEDURE — 27100171 HC OXYGEN HIGH FLOW UP TO 24 HOURS

## 2017-05-08 PROCEDURE — 25000003 PHARM REV CODE 250: Performed by: PHYSICIAN ASSISTANT

## 2017-05-08 PROCEDURE — 93320 DOPPLER ECHO COMPLETE: CPT | Performed by: PEDIATRICS

## 2017-05-08 PROCEDURE — 83735 ASSAY OF MAGNESIUM: CPT

## 2017-05-08 PROCEDURE — 94664 DEMO&/EVAL PT USE INHALER: CPT

## 2017-05-08 PROCEDURE — 84100 ASSAY OF PHOSPHORUS: CPT

## 2017-05-08 PROCEDURE — 36415 COLL VENOUS BLD VENIPUNCTURE: CPT

## 2017-05-08 PROCEDURE — 20600001 HC STEP DOWN PRIVATE ROOM

## 2017-05-08 PROCEDURE — 93325 DOPPLER ECHO COLOR FLOW MAPG: CPT | Performed by: PEDIATRICS

## 2017-05-08 PROCEDURE — 80053 COMPREHEN METABOLIC PANEL: CPT

## 2017-05-08 PROCEDURE — 25000003 PHARM REV CODE 250: Performed by: THORACIC SURGERY (CARDIOTHORACIC VASCULAR SURGERY)

## 2017-05-08 PROCEDURE — 63600175 PHARM REV CODE 636 W HCPCS: Performed by: PHYSICIAN ASSISTANT

## 2017-05-08 PROCEDURE — 97116 GAIT TRAINING THERAPY: CPT

## 2017-05-08 PROCEDURE — 85025 COMPLETE CBC W/AUTO DIFF WBC: CPT

## 2017-05-08 PROCEDURE — 99900035 HC TECH TIME PER 15 MIN (STAT)

## 2017-05-08 RX ORDER — ACETAMINOPHEN 325 MG/1
650 TABLET ORAL EVERY 6 HOURS PRN
Status: DISCONTINUED | OUTPATIENT
Start: 2017-05-08 | End: 2017-05-12 | Stop reason: HOSPADM

## 2017-05-08 RX ORDER — FUROSEMIDE 10 MG/ML
20 INJECTION INTRAMUSCULAR; INTRAVENOUS ONCE
Status: COMPLETED | OUTPATIENT
Start: 2017-05-08 | End: 2017-05-08

## 2017-05-08 RX ORDER — FUROSEMIDE 40 MG/1
40 TABLET ORAL 2 TIMES DAILY
Status: DISCONTINUED | OUTPATIENT
Start: 2017-05-08 | End: 2017-05-12 | Stop reason: HOSPADM

## 2017-05-08 RX ORDER — FUROSEMIDE 40 MG/1
40 TABLET ORAL EVERY 8 HOURS
Status: DISCONTINUED | OUTPATIENT
Start: 2017-05-08 | End: 2017-05-08

## 2017-05-08 RX ADMIN — DOCUSATE SODIUM 100 MG: 100 CAPSULE, LIQUID FILLED ORAL at 09:05

## 2017-05-08 RX ADMIN — ACETAMINOPHEN 650 MG: 325 TABLET ORAL at 11:05

## 2017-05-08 RX ADMIN — MICONAZOLE NITRATE: 20 POWDER TOPICAL at 09:05

## 2017-05-08 RX ADMIN — LEVOTHYROXINE SODIUM 88 MCG: 88 TABLET ORAL at 05:05

## 2017-05-08 RX ADMIN — MAGNESIUM OXIDE TAB 400 MG (241.3 MG ELEMENTAL MG) 800 MG: 400 (241.3 MG) TAB at 09:05

## 2017-05-08 RX ADMIN — FUROSEMIDE 20 MG: 10 INJECTION, SOLUTION INTRAVENOUS at 09:05

## 2017-05-08 RX ADMIN — FAMOTIDINE 20 MG: 20 TABLET, FILM COATED ORAL at 09:05

## 2017-05-08 RX ADMIN — FUROSEMIDE 20 MG: 20 TABLET ORAL at 05:05

## 2017-05-08 RX ADMIN — METOPROLOL TARTRATE 25 MG: 25 TABLET ORAL at 09:05

## 2017-05-08 RX ADMIN — FUROSEMIDE 40 MG: 40 TABLET ORAL at 05:05

## 2017-05-08 NOTE — NURSING
"Notified BHARAT Hair about pt's elevated BP. Stated "his BP runs high, so I'm okay with his SBP running in the 140s-150s." Stated to call if SBP > 180. Will implement. Will continue to monitor.  "

## 2017-05-08 NOTE — PROGRESS NOTES
[facility]  Pediatric Cardiology  Progress Note    Patient Name: Cipriano Thompson  MRN: [id number]  Admission Date: 5/2/2017  Hospital Length of Stay: 6 days  Code Status: Full Code   Attending Physician: Bhargav Godfrey MD   Primary Care Physician: Princess KHUSHBOO Rodgers MD  Expected Discharge Date: 5/11/2017  Principal Problem:S/P pulmonary valve replacement    Subjective:     S/P 27 mm epic tissue valve   Patient had a stable night. BP elevated to the 150's.     Objective:  Vitals:    05/08/17 0700   BP:    Pulse: 79   Resp:    Temp:        SpO2: 97 %  O2 Device (Oxygen Therapy): High Flow nasal Cannula 6 lpm  Oxygen Concentration (%): [100] 100      Intake/Output Summary (Last 24 hours) at 05/08/17 0959  Last data filed at 05/08/17 0600   Gross per 24 hour   Intake              720 ml   Output             2250 ml   Net            -1530 ml       Constitutional: He appears well-developed and well-nourished. On BiPap in no acute distress.  HENT:   Head: Normocephalic and atraumatic.   Mouth/Throat: Oropharynx is clear and moist. Down's facies   Eyes: Conjunctivae are normal. Pupils are equal, round, and reactive to light.   Neck: Neck supple.   Cardiovascular: Normal rate, regular rhythm, S1 normal and S2 split. Infrequent extrasystoles are present. Exam reveals no gallop and no friction rub.   Pulses:  Radial pulses are 1+ on the left side.   Dorsalis pedis pulses are 1+ on the left side.   There is a harsh 2/6 systolic ejection murmur heard best at the LUSB. Warm distal extremities.   Pulmonary/Chest: No tachypnea, no retractions, symmetric breath sounds. He has no wheezes. He has no rales. Able to speak in full sentences. Bandages C/D/I  Abdominal: Soft. Bowel sounds are normal. He exhibits no distension. There is no hepatosplenomegaly. There is no tenderness.   Musculoskeletal: He exhibits no edema or tenderness.   Neurological: He exhibits normal muscle tone.   Skin: Skin is dry. No cyanosis. No  pallor. Nails show no clubbing.    Lab Results   Component Value Date    WBC 9.80 05/08/2017    HGB 11.7 (L) 05/08/2017    HCT 34.3 (L) 05/08/2017    MCV 90 05/08/2017     05/08/2017       BMP  Lab Results   Component Value Date     05/08/2017    K 4.9 05/08/2017    CL 95 05/08/2017    CO2 34 (H) 05/08/2017    BUN 27 (H) 05/08/2017    CREATININE 1.2 05/08/2017    CALCIUM 9.6 05/08/2017    ANIONGAP 8 05/08/2017    ESTGFRAFRICA >60.0 05/08/2017    EGFRNONAA >60.0 05/08/2017     JAKE (post): Pulmonary valve velocity <2.5 m/sec. Mild to moderate TR with RV pressure estimate of 46 mmHg. Mildly dilated RV with low normal systolic function. Low normal LV systolic function.     Assessment and Plan:   HPI: Cipriano is a 26 y.o. Male with history of Down's syndrome, TOF s/p complete repair as an infant with subsequent PVR in 1999 and pacemaker placement in 2008 for Sinus node dysfunction. He presented with exercise intolerance and moderate PS/PI so he was referred for repeat PVR. Also with history of hypertension and ventricular ectopy with frequent PVC's. S/P pulmonary valve replacement with a 27 mm epic valve.  His pulmonary edema on repeat CXR this am is improved. His main issue now is atelectasis.     Plan:   Neuro/Pain:  - Pain control prn  Respiratory:  - Wean support  - Goal sats >92%  Cardiovascular:  - Accept BP under systolic 180  - Pacemaker interrogated by EP: frequent PVC's, no atrial arrhythmia, generator requires replacement in 6-9 months   - Metoprolol started for BP control and history of frequent ventricular ectopy  -echocardiogram tomorrow  FEN/GI:  - Regular diet  - Monitor electrolytes and replace as needed.   - Lasix changed to 40 mg PO q12  Renal:  - Monitor I/O's closely  -Monitor BUN/CR-acute YAEL resolved  Heme/ID:  - S/p Ancef x 48 hours post-op-completed remains afebrile  - H/H stable.   Disposition:  - Wean respiratory support. Monitor blood pressures (Goal SBP < 180)  - Encourage  ambulation, IS, up in chair during the day  -Will not use bipap tonight-repeat CXR in am  -echo tomorrow   -discharge planning      BHARAT Cobb-C  Pediatric Cardiology  [facility]

## 2017-05-08 NOTE — PLAN OF CARE
Problem: Physical Therapy Goal  Goal: Physical Therapy Goal  Goals to be met by: 5/10/17     Patient will increase functional independence with mobility by performin. Supine to sit with Stand-by Assistance - Not met  2. Sit to supine with Stand-by Assistance - Not met  3. Sit to stand transfer with Stand-By Assistance - MET ()  4. Bed to chair transfer with Stand-by Assistance - Not met  5. Gait x 200 feet with Stand-by Assistance - MET ()    6. Added on : Sit to stand mod (I) - Not met  7. Added on : Gait x 500 ft with supervision - Not met   Outcome: Ongoing (interventions implemented as appropriate)  Pt is progressing toward goals.

## 2017-05-08 NOTE — PLAN OF CARE
Problem: Patient Care Overview  Goal: Plan of Care Review  Outcome: Ongoing (interventions implemented as appropriate)  Plan of care discussed with patient. Patient ambulating independently, fall precautions in place. Continuing to encourage sternal precautions, IS, and ambulation. Patient has no complaints of pain. Midsternal incision cleaned with chlorhexidine, dressed with Ag dressing. Patient has no questions at this time. Will continue to monitor.

## 2017-05-08 NOTE — PT/OT/SLP PROGRESS
"Physical Therapy  Treatment    Cipriano Thompson   MRN: [id number]   Admitting Diagnosis: S/P pulmonary valve replacement    PT Received On: 05/08/17  PT Start Time: 1103     PT Stop Time: 1115    PT Total Time (min): 12 min       Billable Minutes:  Gait Training 12    Treatment Type: Treatment  PT/PTA: PT         General Precautions: Standard, fall, sternal    Subjective:  Communicated with RN prior to session. Attempted to see pt x 2 in AM but he was eating breakfast then away at x-ray. He was available on 3rd attempt.   Pt reported "I want to sit up in the bed." Encouragement required to have to sit up in chair at end of session. Pt's mother reported that he slept well last night.     Pain Rating:  ("a little")  Location - Orientation: generalized  Location: sternal  Pain Addressed: Reposition, Distraction  Pain Rating Post-Intervention:  ("a little")    Objective:   Patient found with: oxygen, telemetry    Functional Mobility:  Bed Mobility:   Scooting/Bridging: Stand by Assistance (cues to maintain sternal precautions)  Supine to Sit: Stand by Assistance    Transfers:  Sit <> Stand Assistance: Stand By Assistance  Sit <> Stand Assistive Device: No Assistive Device    Gait:   Gait Distance: 400 feet  Assistance 1:  (Mainly stand by assist with occasional contact guard assist)  Gait Assistive Device: No device  Gait Pattern: reciprocal  Gait Deviation(s): decreased lance, increased time in double stance, decreased velocity of limb motion, decreased step length   Pt with c/o mild fatigue toward end of walk - reported ready to return to room    Balance:   Static Sit: GOOD: Takes MODERATE challenges from all directions  Dynamic Sit: GOOD: Maintains balance through MODERATE excursions of active trunk movement  Static Stand: FAIR+: Takes MINIMAL challenges from all directions  Dynamic stand: FAIR+: Needs CLOSE SUPERVISION during gait and is able to right self with minor LOB     Therapeutic Activities and Exercises:  PT " reinforced sternal precautions during bed mobility as pt occasionally using UEs to push himself to EOB     AM-PAC 6 CLICK MOBILITY  How much help from another person does this patient currently need?   1 = Unable, Total/Dependent Assistance  2 = A lot, Maximum/Moderate Assistance  3 = A little, Minimum/Contact Guard/Supervision  4 = None, Modified [place]/Independent    Turning over in bed (including adjusting bedclothes, sheets and blankets)?: 4  Sitting down on and standing up from a chair with arms (e.g., wheelchair, bedside commode, etc.): 3  Moving from lying on back to sitting on the side of the bed?: 3  Moving to and from a bed to a chair (including a wheelchair)?: 3  Need to walk in hospital room?: 3  Climbing 3-5 steps with a railing?: 3  Total Score: 19    AM-PAC Raw Score CMS G-Code Modifier Level of Impairment Assistance   6 % Total / Unable   7 - 9 CM 80 - 100% Maximal Assist   10 - 14 CL 60 - 80% Moderate Assist   15 - 19 CK 40 - 60% Moderate Assist   20 - 22 CJ 20 - 40% Minimal Assist   23 CI 1-20% SBA / CGA   24 CH 0% Independent/ Mod I     Patient left up in chair with all lines intact and call button in reach.    Assessment:  Cipriano Thompson is a 26 y.o. male with a medical diagnosis of S/P pulmonary valve replacement and presents with improving activity tolerance and functional mobility. He was limited by fatigue today. He would benefit from continued PT to progress mobility.    Rehab identified problem list/impairments: Rehab identified problem list/impairments: weakness, impaired endurance, impaired self care skills, impaired functional mobilty, impaired cardiopulmonary response to activity, decreased safety awareness    Rehab potential is good.    Activity tolerance: Good    Discharge recommendations: Discharge Facility/Level Of Care Needs: home     Barriers to discharge: Barriers to Discharge: None    Equipment recommendations: Equipment Needed After Discharge: none     GOALS:    Physical Therapy Goals        Problem: Physical Therapy Goal    Goal Priority Disciplines Outcome Goal Variances Interventions   Physical Therapy Goal     PT/OT, PT Ongoing (interventions implemented as appropriate)     Description:  Goals to be met by: 5/10/17     Patient will increase functional independence with mobility by performin. Supine to sit with Stand-by Assistance - Not met  2. Sit to supine with Stand-by Assistance - Not met  3. Sit to stand transfer with Stand-By Assistance - MET ()  4. Bed to chair transfer with Stand-by Assistance - Not met  5. Gait x 200 feet with Stand-by Assistance - MET ()    6. Added on : Sit to stand mod (I) - Not met  7. Added on : Gait x 500 ft with supervision - Not met              PLAN:    Patient to be seen 6 x/week  to address the above listed problems via gait training, therapeutic activities, therapeutic exercises  Plan of Care expires: 17  Plan of Care reviewed with: patient, mother     Grecia LeJeune, PT, DPT  [phone]

## 2017-05-08 NOTE — NURSING
Ambulated in hallway appox 200ft  accompaniedi by RN, 02 maintained at 3lnc, pulse ox 95-97%. Denies c/o of fatigue or shortness of breath

## 2017-05-08 NOTE — PLAN OF CARE
Problem: Patient Care Overview  Goal: Plan of Care Review  Outcome: Ongoing (interventions implemented as appropriate)  o2 weaned to 3l/nc, sating 95-97%. Ambulated in hallway, gait a little unsteady, denies complaints of sob/fatigue. Bedside echo obtained today. Encourage IS. No fall related injury, afebrile.

## 2017-05-09 LAB
ALBUMIN SERPL BCP-MCNC: 3.2 G/DL
ALP SERPL-CCNC: 179 U/L
ALT SERPL W/O P-5'-P-CCNC: 29 U/L
ANION GAP SERPL CALC-SCNC: 9 MMOL/L
AST SERPL-CCNC: 22 U/L
BACTERIA BLD CULT: NORMAL
BACTERIA BLD CULT: NORMAL
BASOPHILS # BLD AUTO: 0.06 K/UL
BASOPHILS NFR BLD: 0.6 %
BILIRUB SERPL-MCNC: 0.9 MG/DL
BUN SERPL-MCNC: 31 MG/DL
CALCIUM SERPL-MCNC: 9.9 MG/DL
CHLORIDE SERPL-SCNC: 94 MMOL/L
CO2 SERPL-SCNC: 34 MMOL/L
CREAT SERPL-MCNC: 1.3 MG/DL
DIFFERENTIAL METHOD: ABNORMAL
EOSINOPHIL # BLD AUTO: 0.1 K/UL
EOSINOPHIL NFR BLD: 1.3 %
ERYTHROCYTE [DISTWIDTH] IN BLOOD BY AUTOMATED COUNT: 16.1 %
EST. GFR  (AFRICAN AMERICAN): >60 ML/MIN/1.73 M^2
EST. GFR  (NON AFRICAN AMERICAN): >60 ML/MIN/1.73 M^2
GLUCOSE SERPL-MCNC: 105 MG/DL
HCT VFR BLD AUTO: 34.1 %
HGB BLD-MCNC: 11.5 G/DL
LYMPHOCYTES # BLD AUTO: 1.9 K/UL
LYMPHOCYTES NFR BLD: 19.6 %
MAGNESIUM SERPL-MCNC: 2.1 MG/DL
MCH RBC QN AUTO: 30.2 PG
MCHC RBC AUTO-ENTMCNC: 33.7 %
MCV RBC AUTO: 90 FL
MONOCYTES # BLD AUTO: 1.2 K/UL
MONOCYTES NFR BLD: 11.9 %
NEUTROPHILS # BLD AUTO: 6.4 K/UL
NEUTROPHILS NFR BLD: 66.6 %
PHOSPHATE SERPL-MCNC: 3.9 MG/DL
PLATELET # BLD AUTO: 194 K/UL
PLATELET BLD QL SMEAR: ABNORMAL
PMV BLD AUTO: 10 FL
POTASSIUM SERPL-SCNC: 4.3 MMOL/L
PROT SERPL-MCNC: 7.8 G/DL
RBC # BLD AUTO: 3.81 M/UL
SODIUM SERPL-SCNC: 137 MMOL/L
WBC # BLD AUTO: 9.82 K/UL

## 2017-05-09 PROCEDURE — 84100 ASSAY OF PHOSPHORUS: CPT

## 2017-05-09 PROCEDURE — 99900035 HC TECH TIME PER 15 MIN (STAT)

## 2017-05-09 PROCEDURE — 83735 ASSAY OF MAGNESIUM: CPT

## 2017-05-09 PROCEDURE — 97110 THERAPEUTIC EXERCISES: CPT

## 2017-05-09 PROCEDURE — 25000003 PHARM REV CODE 250: Performed by: PEDIATRICS

## 2017-05-09 PROCEDURE — 80053 COMPREHEN METABOLIC PANEL: CPT

## 2017-05-09 PROCEDURE — 25000003 PHARM REV CODE 250: Performed by: PHYSICIAN ASSISTANT

## 2017-05-09 PROCEDURE — 97116 GAIT TRAINING THERAPY: CPT

## 2017-05-09 PROCEDURE — 36415 COLL VENOUS BLD VENIPUNCTURE: CPT

## 2017-05-09 PROCEDURE — 94664 DEMO&/EVAL PT USE INHALER: CPT

## 2017-05-09 PROCEDURE — 20600001 HC STEP DOWN PRIVATE ROOM

## 2017-05-09 PROCEDURE — 85025 COMPLETE CBC W/AUTO DIFF WBC: CPT

## 2017-05-09 RX ADMIN — DOCUSATE SODIUM 100 MG: 100 CAPSULE, LIQUID FILLED ORAL at 09:05

## 2017-05-09 RX ADMIN — METOPROLOL TARTRATE 25 MG: 25 TABLET ORAL at 08:05

## 2017-05-09 RX ADMIN — FAMOTIDINE 20 MG: 20 TABLET, FILM COATED ORAL at 08:05

## 2017-05-09 RX ADMIN — MAGNESIUM OXIDE TAB 400 MG (241.3 MG ELEMENTAL MG) 800 MG: 400 (241.3 MG) TAB at 09:05

## 2017-05-09 RX ADMIN — LEVOTHYROXINE SODIUM 88 MCG: 88 TABLET ORAL at 06:05

## 2017-05-09 RX ADMIN — FUROSEMIDE 40 MG: 40 TABLET ORAL at 05:05

## 2017-05-09 RX ADMIN — MICONAZOLE NITRATE: 20 POWDER TOPICAL at 09:05

## 2017-05-09 RX ADMIN — FUROSEMIDE 40 MG: 40 TABLET ORAL at 09:05

## 2017-05-09 RX ADMIN — FAMOTIDINE 20 MG: 20 TABLET, FILM COATED ORAL at 09:05

## 2017-05-09 RX ADMIN — OXYCODONE HYDROCHLORIDE 5 MG: 5 TABLET ORAL at 11:05

## 2017-05-09 RX ADMIN — DOCUSATE SODIUM 100 MG: 100 CAPSULE, LIQUID FILLED ORAL at 08:05

## 2017-05-09 RX ADMIN — METOPROLOL TARTRATE 25 MG: 25 TABLET ORAL at 09:05

## 2017-05-09 RX ADMIN — MAGNESIUM OXIDE TAB 400 MG (241.3 MG ELEMENTAL MG) 800 MG: 400 (241.3 MG) TAB at 08:05

## 2017-05-09 RX ADMIN — OXYCODONE HYDROCHLORIDE 5 MG: 5 TABLET ORAL at 01:05

## 2017-05-09 NOTE — PROGRESS NOTES
Arrhythmia clinic  Late entry.  ON 5/2/17 @ 1241  Was called to OR to reprogram pt's PPM for ectopy.  Changed from DOO 75bpm to initial settings of AAIR -DDDR 75bpm.  6-9 years on Longevity.  Pt will follow up with Peds EP as prev directed.

## 2017-05-09 NOTE — PROGRESS NOTES
[facility]  Pediatric Cardiology  Progress Note    Patient Name: Cipriano Thompson  MRN: [id number]  Admission Date: 5/2/2017  Hospital Length of Stay: 6 days  Code Status: Full Code   Attending Physician: Bhargav Godfrey MD   Primary Care Physician: Princess KHUSHBOO Rodgers MD  Expected Discharge Date: 5/11/2017  Principal Problem:S/P pulmonary valve replacement    Subjective:       Interval History: Patient continues to diurese well, although CXR concerning for atelectasis +/- increased edema. CXR repeated due to poor technique. Repeat fill with improved aeration and small bilateral pleural effusions.     Objective:     Vital Signs (Most Recent):  Temp: 99.8 °F (37.7 °C) (05/08/17 1950)  Pulse: 100 (05/08/17 2015)  Resp: 20 (05/08/17 2015)  BP: (!) 138/59 (05/08/17 1950)  SpO2: (!) 94 % (05/08/17 2015) Vital Signs (24h Range):  Temp:  [98.2 °F (36.8 °C)-102.5 °F (39.2 °C)] 99.8 °F (37.7 °C)  Pulse:  [] 100  Resp:  [18-20] 20  SpO2:  [89 %-97 %] 94 %  BP: (122-138)/(59-67) 138/59     Weight: 98.8 kg (217 lb 13 oz)  Body mass index is 35.16 kg/(m^2).     SpO2: (!) 94 %  O2 Device (Oxygen Therapy): nasal cannula w/ humidification    Intake/Output - Last 3 Shifts       05/06 0700 - 05/07 0659 05/07 0700 - 05/08 0659 05/08 0700 - 05/09 0659    P.O. 1428 1370 1180    I.V. (mL/kg) 13.5 (0.1)      Total Intake(mL/kg) 1441.5 (14.6) 1370 (13.9) 1180 (11.9)    Urine (mL/kg/hr) 2850 (1.2) 2250 (0.9) 1175 (0.8)    Stool  0 (0) 0 (0)    Chest Tube 0 (0)      Total Output 2850 2250 1175    Net -1408.5 -880 +5           Urine Occurrence  302 x 2 x    Stool Occurrence  2 x 1 x          Lines/Drains/Airways     Peripheral Intravenous Line                 Peripheral IV - Single Lumen 05/02/17 0727 Right Forearm 6 days         Peripheral IV - Single Lumen 05/02/17 0807 Left Wrist 6 days                Scheduled Medications:    docusate sodium  100 mg Oral BID    famotidine  20 mg Oral BID    furosemide  40 mg Oral  BID    levothyroxine  88 mcg Oral Before breakfast    magnesium oxide  800 mg Oral BID    metoprolol tartrate  25 mg Oral BID    miconazole NITRATE 2 %   Topical BID       Continuous Medications:        PRN Medications: diphenhydrAMINE, magnesium sulfate IVPB, oxycodone    Physical Exam   Constitutional: He appears well-developed and well-nourished.   HENT:   Head: Normocephalic and atraumatic.   Mouth/Throat: Oropharynx is clear and moist.   Down's facies   Eyes: Conjunctivae are normal. Pupils are equal, round, and reactive to light.   Neck: Neck supple.   Cardiovascular: Normal rate, regular rhythm and S1 normal.  Exam reveals no gallop and no friction rub.    Pulses:       Radial pulses are 1+ on the left side.        Dorsalis pedis pulses are 1+ on the left side.   Split S2. There is a harsh 2/6 systolic ejection murmur heard best at the LUSB.    Pulmonary/Chest: Effort normal and breath sounds normal. He has no wheezes. He has no rales.   Chest tube sites and sternal wound bandaged   Abdominal: Soft. Bowel sounds are normal. He exhibits no distension. There is no hepatosplenomegaly. There is no tenderness.   Musculoskeletal: He exhibits no edema or tenderness.   Neurological: He exhibits normal muscle tone.   Skin: Skin is dry. No cyanosis. No pallor. Nails show no clubbing.       Significant Labs:   ABG:   POC PH (no units)   Date/Time Value Status   05/06/2017 03:20 AM 7.404 Final     POC PCO2 (mmHg)   Date/Time Value Status   05/06/2017 03:20 AM 61.5 (HH) Final     POC HCO3 (mmol/L)   Date/Time Value Status   05/06/2017 03:20 AM 38.4 (H) Final     POC SATURATED O2 (%)   Date/Time Value Status   05/06/2017 03:20 AM 98 Final     POC BE (mmol/L)   Date/Time Value Status   05/06/2017 03:20 AM 14 Final   , CMP   Sodium (mmol/L)   Date/Time Value Status   05/08/2017 05:42  Final     Potassium (mmol/L)   Date/Time Value Status   05/08/2017 05:42 AM 4.9 Final     Chloride (mmol/L)   Date/Time Value Status    05/08/2017 05:42 AM 95 Final     CO2 (mmol/L)   Date/Time Value Status   05/08/2017 05:42 AM 34 (H) Final     Glucose (mg/dL)   Date/Time Value Status   05/08/2017 05:42  (H) Final     BUN, Bld (mg/dL)   Date/Time Value Status   05/08/2017 05:42 AM 27 (H) Final     Creatinine (mg/dL)   Date/Time Value Status   05/08/2017 05:42 AM 1.2 Final     Calcium (mg/dL)   Date/Time Value Status   05/08/2017 05:42 AM 9.6 Final     Total Protein (g/dL)   Date/Time Value Status   05/08/2017 05:42 AM 7.5 Final     Albumin (g/dL)   Date/Time Value Status   05/08/2017 05:42 AM 3.2 (L) Final     Total Bilirubin (mg/dL)   Date/Time Value Status   05/08/2017 05:42 AM 0.9 Final     Alkaline Phosphatase (U/L)   Date/Time Value Status   05/08/2017 05:42  (H) Final     AST (U/L)   Date/Time Value Status   05/08/2017 05:42 AM 22 Final     ALT (U/L)   Date/Time Value Status   05/08/2017 05:42 AM 29 Final     Anion Gap (mmol/L)   Date/Time Value Status   05/08/2017 05:42 AM 8 Final     eGFR if African American (mL/min/1.73 m^2)   Date/Time Value Status   05/08/2017 05:42 AM >60.0 Final     eGFR if non African American (mL/min/1.73 m^2)   Date/Time Value Status   05/08/2017 05:42 AM >60.0 Final    and CBC   WBC (K/uL)   Date/Time Value Status   05/08/2017 05:42 AM 9.80 Final     Hemoglobin (g/dL)   Date/Time Value Status   05/08/2017 05:42 AM 11.7 (L) Final     POC Hematocrit (%PCV)   Date/Time Value Status   05/06/2017 03:20 AM 32 (L) Final     Hematocrit (%)   Date/Time Value Status   05/08/2017 05:42 AM 34.3 (L) Final     MCV (fL)   Date/Time Value Status   05/08/2017 05:42 AM 90 Final     Platelets (K/uL)   Date/Time Value Status   05/08/2017 05:42  Final       Significant Imaging:   CXR small bilateral pleural effusion, initial film with poor effort      Assessment and Plan:     Cardiac  Pulmonary valve insufficiency  26 y.o. male with Down's syndrome, TOF s/p repair as infant, and sinus node dysfunction s/p pacemaker  2008, now s/p pulmonary valve replacement with a 27 mm [place] epic bioprosthetic valve (5/2/17).  Currently weaning respiratory support and diuresing.  Plan:   Neuro/Pain:  - Pain control with tylenol prn, oxycodone prn  Respiratory:  - wean NC as tolerated, try off bipap tonight  - Goal sats >92%  Cardiovascular:  - Hypertensive - Metoprolol started for BP control and history of frequent ventricular ectopy,  will continue to monitor  - Pacemaker interrogated by EP: frequent PVC's, no atrial arrhythmia, generator requires replacement in 6-9 months  FEN/GI:  - Regular diet  - Monitor electrolytes and replace as needed, stable this morning   - Continue Lasix, change to 40mg PO bid (slight increase) as patient is responding well to lasix  - On docusate and magnesium for constipation, continue as no stool recorded post-op  Renal:  - Monitor I/O's closely  Heme/ID:  - S/p Ancef x 48 hours post-op  - H/H stable. Monitor for bleeding  Disposition:  - Wean respiratory support. Monitor blood pressures (Goal SBP < 130)  - Encourage ambulation, IS      Grecia Hansen MD  Pediatric Cardiology  [facility]

## 2017-05-09 NOTE — PLAN OF CARE
Problem: Patient Care Overview  Goal: Plan of Care Review  Outcome: Ongoing (interventions implemented as appropriate)  T. Max 102.6, received PO Tylenol 650mg x1 per KADI Tao, temp down to 98.3 this AM. All other VSS. O2 sats remain stable on 2.5-3L NC. Mid sternal incision cleansed with chlorhexidine, no complications noted. Old CT site cleansed and dressing changed. C/o incisional pain, given PRN pain med x1, pt reported relief. Pt encouraged/reminded to use sternal precautions and to use IS while awake, reinforcement needed. Fall precautions maintained, free of fall/trauma/injury or skin breakdown. Plan is to wean O2 as tolerated. Plan of care reviewed and updated with patient and mother, verbalizes understanding. JACEK. Pt resting quietly in bed voicing no complaints at this time. Will continue to monitor.

## 2017-05-09 NOTE — NURSING
Met with Cipriano and mother, Launn, to review discharge instructions in preparation for discharge.  Cipriano sitting up in chair eating lunch.  Ms Kong states had ambulated in hallway about 30 minutes prior to my arrival; this was 2nd time today.  Provided instruction on sternal precautions, incision care, activity restrictions, S&S of infection, and S&S to report to MD.  Answered questions. Provided copy of adult cardiovascular surgery discharge instructions handout.  Mother verbalized understanding.  Stressed importance of ambulating and performing IS with Cipriano and mother while in hospital and once discharged home.  Explained to Cipriano his bed is for sleeping at night not lying in all day.  Mother states has been having Cipriano perform IS.  After Cipriano finished eating had Cipriano perform IS 2 sets x5 reps.

## 2017-05-09 NOTE — PLAN OF CARE
Problem: Patient Care Overview  Goal: Plan of Care Review  Cipriano Thompson tolerated treatment well this morning. Noted increased ambulation distance, up to 700 ft with merely stand-by assist of therapist. Continues to require oxygen support, 2 liters. Pulse ox consistently ranging 90-91% while ambulating with oxygen, 94% at rest by end of session. Encouraged use of IS with patient, performing 3 sets x 5 reps at end of session. Safe to ambulate in hallways with nsg support. Will cont to benefit from acute PT services.     Ric Fiore, PT  5/9/2017

## 2017-05-09 NOTE — SUBJECTIVE & OBJECTIVE
Interval History: Patient continues to diurese well, although CXR concerning for atelectasis +/- increased edema. CXR repeated due to poor technique. Repeat fill with improved aeration and small bilateral pleural effusions.     Objective:     Vital Signs (Most Recent):  Temp: 99.8 °F (37.7 °C) (05/08/17 1950)  Pulse: 100 (05/08/17 2015)  Resp: 20 (05/08/17 2015)  BP: (!) 138/59 (05/08/17 1950)  SpO2: (!) 94 % (05/08/17 2015) Vital Signs (24h Range):  Temp:  [98.2 °F (36.8 °C)-102.5 °F (39.2 °C)] 99.8 °F (37.7 °C)  Pulse:  [] 100  Resp:  [18-20] 20  SpO2:  [89 %-97 %] 94 %  BP: (122-138)/(59-67) 138/59     Weight: 98.8 kg (217 lb 13 oz)  Body mass index is 35.16 kg/(m^2).     SpO2: (!) 94 %  O2 Device (Oxygen Therapy): nasal cannula w/ humidification    Intake/Output - Last 3 Shifts       05/06 0700 - 05/07 0659 05/07 0700 - 05/08 0659 05/08 0700 - 05/09 0659    P.O. 1428 1370 1180    I.V. (mL/kg) 13.5 (0.1)      Total Intake(mL/kg) 1441.5 (14.6) 1370 (13.9) 1180 (11.9)    Urine (mL/kg/hr) 2850 (1.2) 2250 (0.9) 1175 (0.8)    Stool  0 (0) 0 (0)    Chest Tube 0 (0)      Total Output 2850 2250 1175    Net -1408.5 -880 +5           Urine Occurrence  302 x 2 x    Stool Occurrence  2 x 1 x          Lines/Drains/Airways     Peripheral Intravenous Line                 Peripheral IV - Single Lumen 05/02/17 0727 Right Forearm 6 days         Peripheral IV - Single Lumen 05/02/17 0807 Left Wrist 6 days                Scheduled Medications:    docusate sodium  100 mg Oral BID    famotidine  20 mg Oral BID    furosemide  40 mg Oral BID    levothyroxine  88 mcg Oral Before breakfast    magnesium oxide  800 mg Oral BID    metoprolol tartrate  25 mg Oral BID    miconazole NITRATE 2 %   Topical BID       Continuous Medications:        PRN Medications: diphenhydrAMINE, magnesium sulfate IVPB, oxycodone    Physical Exam   Constitutional: He appears well-developed and well-nourished.   HENT:   Head: Normocephalic and  atraumatic.   Mouth/Throat: Oropharynx is clear and moist.   Down's facies   Eyes: Conjunctivae are normal. Pupils are equal, round, and reactive to light.   Neck: Neck supple.   Cardiovascular: Normal rate, regular rhythm and S1 normal.  Exam reveals no gallop and no friction rub.    Pulses:       Radial pulses are 1+ on the left side.        Dorsalis pedis pulses are 1+ on the left side.   Split S2. There is a harsh 2/6 systolic ejection murmur heard best at the LUSB.    Pulmonary/Chest: Effort normal and breath sounds normal. He has no wheezes. He has no rales.   Chest tube sites and sternal wound bandaged   Abdominal: Soft. Bowel sounds are normal. He exhibits no distension. There is no hepatosplenomegaly. There is no tenderness.   Musculoskeletal: He exhibits no edema or tenderness.   Neurological: He exhibits normal muscle tone.   Skin: Skin is dry. No cyanosis. No pallor. Nails show no clubbing.       Significant Labs:   ABG:   POC PH (no units)   Date/Time Value Status   05/06/2017 03:20 AM 7.404 Final     POC PCO2 (mmHg)   Date/Time Value Status   05/06/2017 03:20 AM 61.5 (HH) Final     POC HCO3 (mmol/L)   Date/Time Value Status   05/06/2017 03:20 AM 38.4 (H) Final     POC SATURATED O2 (%)   Date/Time Value Status   05/06/2017 03:20 AM 98 Final     POC BE (mmol/L)   Date/Time Value Status   05/06/2017 03:20 AM 14 Final   , CMP   Sodium (mmol/L)   Date/Time Value Status   05/08/2017 05:42  Final     Potassium (mmol/L)   Date/Time Value Status   05/08/2017 05:42 AM 4.9 Final     Chloride (mmol/L)   Date/Time Value Status   05/08/2017 05:42 AM 95 Final     CO2 (mmol/L)   Date/Time Value Status   05/08/2017 05:42 AM 34 (H) Final     Glucose (mg/dL)   Date/Time Value Status   05/08/2017 05:42  (H) Final     BUN, Bld (mg/dL)   Date/Time Value Status   05/08/2017 05:42 AM 27 (H) Final     Creatinine (mg/dL)   Date/Time Value Status   05/08/2017 05:42 AM 1.2 Final     Calcium (mg/dL)   Date/Time Value  Status   05/08/2017 05:42 AM 9.6 Final     Total Protein (g/dL)   Date/Time Value Status   05/08/2017 05:42 AM 7.5 Final     Albumin (g/dL)   Date/Time Value Status   05/08/2017 05:42 AM 3.2 (L) Final     Total Bilirubin (mg/dL)   Date/Time Value Status   05/08/2017 05:42 AM 0.9 Final     Alkaline Phosphatase (U/L)   Date/Time Value Status   05/08/2017 05:42  (H) Final     AST (U/L)   Date/Time Value Status   05/08/2017 05:42 AM 22 Final     ALT (U/L)   Date/Time Value Status   05/08/2017 05:42 AM 29 Final     Anion Gap (mmol/L)   Date/Time Value Status   05/08/2017 05:42 AM 8 Final     eGFR if African American (mL/min/1.73 m^2)   Date/Time Value Status   05/08/2017 05:42 AM >60.0 Final     eGFR if non African American (mL/min/1.73 m^2)   Date/Time Value Status   05/08/2017 05:42 AM >60.0 Final    and CBC   WBC (K/uL)   Date/Time Value Status   05/08/2017 05:42 AM 9.80 Final     Hemoglobin (g/dL)   Date/Time Value Status   05/08/2017 05:42 AM 11.7 (L) Final     POC Hematocrit (%PCV)   Date/Time Value Status   05/06/2017 03:20 AM 32 (L) Final     Hematocrit (%)   Date/Time Value Status   05/08/2017 05:42 AM 34.3 (L) Final     MCV (fL)   Date/Time Value Status   05/08/2017 05:42 AM 90 Final     Platelets (K/uL)   Date/Time Value Status   05/08/2017 05:42  Final       Significant Imaging:   CXR small bilateral pleural effusion, initial film with poor effort

## 2017-05-09 NOTE — NURSING
Ambulated in mora way approx 150ft, accompanied by Mom. Room air pulse ox 88-97, asymptomatic. Will leave on room air and spot check during noght

## 2017-05-09 NOTE — ASSESSMENT & PLAN NOTE
26 y.o. male with Down's syndrome, TOF s/p repair as infant, and sinus node dysfunction s/p pacemaker 2008, now s/p pulmonary valve replacement with a 27 mm [employer] Quinn epic bioprosthetic valve (5/2/17).  Currently weaning respiratory support and diuresing.  Plan:   Neuro/Pain:  - Pain control with tylenol prn, oxycodone prn  Respiratory:  - wean NC as tolerated, try off bipap tonight  - Goal sats >92%  Cardiovascular:  - Hypertensive - Metoprolol started for BP control and history of frequent ventricular ectopy,  will continue to monitor  - Pacemaker interrogated by EP: frequent PVC's, no atrial arrhythmia, generator requires replacement in 6-9 months  FEN/GI:  - Regular diet  - Monitor electrolytes and replace as needed, stable this morning   - Continue Lasix, change to 40mg PO bid (slight increase) as patient is responding well to lasix  - On docusate and magnesium for constipation, continue as no stool recorded post-op  Renal:  - Monitor I/O's closely  Heme/ID:  - S/p Ancef x 48 hours post-op  - H/H stable. Monitor for bleeding  Disposition:  - Wean respiratory support. Monitor blood pressures (Goal SBP < 130)  - Encourage ambulation, IS

## 2017-05-09 NOTE — PT/OT/SLP PROGRESS
"Physical Therapy  Treatment    Cipriano Thompson   MRN: [id number]   Admitting Diagnosis: S/P pulmonary valve replacement, POD#7    PT Received On: 17  PT Start Time: 922     PT Stop Time: 950    PT Total Time (min): 28 min       Billable Minutes:  Gait Training 14, Therapeutic Activity 4 and Therapeutic Exercise 10    Treatment Type: Treatment  PT/PTA: PT       General Precautions: Standard, fall, sternal, cardiac  Orthopedic Precautions: Sternal    Do you have any cultural, spiritual, [employer] conflicts, given your current situation?: Patient has no barriers to learning. Patient verbalizes understanding of his/her program and goals and demonstrates them correctly. No cultural, spiritual or educational needs identified.    Subjective:  Communicated with MILTON Tena prior to session, ok to see for treatment this morning.    Pt seated in BS chair with mom present upon PT entry to room, agreeable to treatment.    Pain Ratin/10 at rest at start of session  Location - Orientation: generalized  Location: chest  Pain Addressed: Reposition, Distraction  Pain Rating Post-Intervention: 4/10 near end of walking trial    Objective:   Patient found with: telemetry, 2 liters oxygen    Functional Mobility:    Bed Mobility:   Pt OOBTC before and after session    Transfers:  Sit <> Stand Assistance: Stand By Assistance x 2 trials from BS chair; requires cueing on each trial to not use (L) hand to push from armrest of chair, must hug sternal pillow with (B) hands  Sit <> Stand Assistive Device: No Assistive Device    Gait:   Gait Distance: 700 ft on 2 liters portable oxgyen, SBA of therapist, without rest breaks. Pulse ox intact throughout trial, consistently 90-91% while ambulating (92% at rest on 2 liters prior to trial). Able to carry on conversation with therapist while walkilng; did have c/o chest pain (specifically said "not SOB, but my chest hurts") towards end of walking. Pulse ox 94% on 2 liters at end of " trial    Assistance 1: Stand by Assistance  Gait Assistive Device: No device  Gait Pattern: reciprocal  Gait Deviation(s): decreased lance, increased time in double stance, decreased stride length    Therapeutic Activities and Exercises:  1. Once back to room at end of ambulation trial, pt participates in incentive spirometer 3 sets x 5 reps (consistent cueing from therapist to take 5-10 second rest breaks in between each rep) and then also in peak flow spirometer 1 set x 10 reps.     AM-PAC 6 CLICK MOBILITY  How much help from another person does this patient currently need?   1 = Unable, Total/Dependent Assistance  2 = A lot, Maximum/Moderate Assistance  3 = A little, Minimum/Contact Guard/Supervision  4 = None, Modified [place]/Independent    Turning over in bed (including adjusting bedclothes, sheets and blankets)?: 4  Sitting down on and standing up from a chair with arms (e.g., wheelchair, bedside commode, etc.): 4  Moving from lying on back to sitting on the side of the bed?: 4  Moving to and from a bed to a chair (including a wheelchair)?: 4  Need to walk in hospital room?: 4  Climbing 3-5 steps with a railing?: 3  Total Score: 23    AM-PAC Raw Score CMS G-Code Modifier Level of Impairment Assistance   6 % Total / Unable   7 - 9 CM 80 - 100% Maximal Assist   10 - 14 CL 60 - 80% Moderate Assist   15 - 19 CK 40 - 60% Moderate Assist   20 - 22 CJ 20 - 40% Minimal Assist   23 CI 1-20% SBA / CGA   24 CH 0% Independent/ Mod I     Patient left up in chair with all lines intact, call button in reach and mom present.    Assessment:  Cipriano Thompson tolerated treatment well this morning. Noted increased ambulation distance, up to 700 ft with merely stand-by assist of therapist. Continues to require oxygen support, 2 liters. Pulse ox consistently ranging 90-91% while ambulating with oxygen, 94% at rest by end of session. Encouraged use of IS with patient, performing 3 sets x 5 reps at end of session. Safe to  ambulate in hallways with nsg support. Will cont to benefit from acute PT services.    Rehab identified problem list/impairments: weakness, impaired endurance, impaired self care skills, impaired functional mobilty, gait instability, impaired balance, decreased lower extremity function, orthopedic precautions, pain, decreased safety awareness    Rehab potential is good.    Activity tolerance: Good    Discharge recommendations: home     Barriers to discharge: None    Equipment recommendations: none     GOALS:   Physical Therapy Goals        Problem: Physical Therapy Goal    Goal Priority Disciplines Outcome Goal Variances Interventions   Physical Therapy Goal     PT/OT, PT Ongoing (interventions implemented as appropriate)     Description:  Goals to be met by: 5/10/17     Patient will increase functional independence with mobility by performin. Supine to sit with Stand-by Assistance - Not met  2. Sit to supine with Stand-by Assistance - Not met  3. Sit to stand transfer with Stand-By Assistance - MET ()  4. Bed to chair transfer with Stand-by Assistance - Not met  5. Gait x 200 feet with Stand-by Assistance - MET ()  6. Sit to stand mod (I) - Not met  7. Gait x 500 ft with supervision - Not met             PLAN:    Patient to be seen 6x/week to address the above listed problems via gait training, therapeutic activities, therapeutic exercises     Plan of Care expires: 17  Plan of Care reviewed with: patient, mother     Ric Fiore, PT  2017

## 2017-05-09 NOTE — PROGRESS NOTES
BHARAT Hair for Congenital heart surgery team notified of pt temp 101.6. All other VSS. Instructed to give PO Tylenol 650mg x1 and continue to monitor. Order to be carried out. Will continue to monitor.

## 2017-05-10 DIAGNOSIS — I37.1 PULMONARY VALVE INSUFFICIENCY, UNSPECIFIED ETIOLOGY: ICD-10-CM

## 2017-05-10 DIAGNOSIS — Z95.2 S/P PULMONARY VALVE REPLACEMENT: ICD-10-CM

## 2017-05-10 DIAGNOSIS — Q21.3 TETRALOGY OF FALLOT: Primary | ICD-10-CM

## 2017-05-10 DIAGNOSIS — Z87.74 TETRALOGY OF FALLOT S/P REPAIR: ICD-10-CM

## 2017-05-10 DIAGNOSIS — Q90.9 TRISOMY 21: ICD-10-CM

## 2017-05-10 LAB
ALBUMIN SERPL BCP-MCNC: 3.1 G/DL
ALP SERPL-CCNC: 171 U/L
ALT SERPL W/O P-5'-P-CCNC: 29 U/L
ANION GAP SERPL CALC-SCNC: 11 MMOL/L
AST SERPL-CCNC: 22 U/L
BASOPHILS # BLD AUTO: 0.06 K/UL
BASOPHILS NFR BLD: 0.5 %
BILIRUB SERPL-MCNC: 0.7 MG/DL
BUN SERPL-MCNC: 34 MG/DL
CALCIUM SERPL-MCNC: 9.5 MG/DL
CHLORIDE SERPL-SCNC: 95 MMOL/L
CO2 SERPL-SCNC: 31 MMOL/L
CREAT SERPL-MCNC: 1.4 MG/DL
DIFFERENTIAL METHOD: ABNORMAL
EOSINOPHIL # BLD AUTO: 0.1 K/UL
EOSINOPHIL NFR BLD: 1 %
ERYTHROCYTE [DISTWIDTH] IN BLOOD BY AUTOMATED COUNT: 16.2 %
EST. GFR  (AFRICAN AMERICAN): >60 ML/MIN/1.73 M^2
EST. GFR  (NON AFRICAN AMERICAN): >60 ML/MIN/1.73 M^2
GLUCOSE SERPL-MCNC: 110 MG/DL
HCT VFR BLD AUTO: 32.5 %
HGB BLD-MCNC: 11 G/DL
LYMPHOCYTES # BLD AUTO: 1.5 K/UL
LYMPHOCYTES NFR BLD: 13.7 %
MAGNESIUM SERPL-MCNC: 2.1 MG/DL
MCH RBC QN AUTO: 30.2 PG
MCHC RBC AUTO-ENTMCNC: 33.8 %
MCV RBC AUTO: 89 FL
MONOCYTES # BLD AUTO: 1.2 K/UL
MONOCYTES NFR BLD: 11.1 %
NEUTROPHILS # BLD AUTO: 7.9 K/UL
NEUTROPHILS NFR BLD: 72.2 %
PHOSPHATE SERPL-MCNC: 3.7 MG/DL
PLATELET # BLD AUTO: 240 K/UL
PMV BLD AUTO: 10.2 FL
POTASSIUM SERPL-SCNC: 4.2 MMOL/L
PROT SERPL-MCNC: 7.4 G/DL
RBC # BLD AUTO: 3.64 M/UL
SODIUM SERPL-SCNC: 137 MMOL/L
WBC # BLD AUTO: 10.97 K/UL

## 2017-05-10 PROCEDURE — 36415 COLL VENOUS BLD VENIPUNCTURE: CPT

## 2017-05-10 PROCEDURE — 25000003 PHARM REV CODE 250: Performed by: PEDIATRICS

## 2017-05-10 PROCEDURE — 94760 N-INVAS EAR/PLS OXIMETRY 1: CPT

## 2017-05-10 PROCEDURE — 84100 ASSAY OF PHOSPHORUS: CPT

## 2017-05-10 PROCEDURE — 93304 ECHO TRANSTHORACIC: CPT | Performed by: PEDIATRICS

## 2017-05-10 PROCEDURE — 27000221 HC OXYGEN, UP TO 24 HOURS

## 2017-05-10 PROCEDURE — 97535 SELF CARE MNGMENT TRAINING: CPT

## 2017-05-10 PROCEDURE — 20600001 HC STEP DOWN PRIVATE ROOM

## 2017-05-10 PROCEDURE — 93325 DOPPLER ECHO COLOR FLOW MAPG: CPT | Performed by: PEDIATRICS

## 2017-05-10 PROCEDURE — 94664 DEMO&/EVAL PT USE INHALER: CPT

## 2017-05-10 PROCEDURE — 83735 ASSAY OF MAGNESIUM: CPT

## 2017-05-10 PROCEDURE — 25000003 PHARM REV CODE 250: Performed by: PHYSICIAN ASSISTANT

## 2017-05-10 PROCEDURE — 97530 THERAPEUTIC ACTIVITIES: CPT

## 2017-05-10 PROCEDURE — 97116 GAIT TRAINING THERAPY: CPT

## 2017-05-10 PROCEDURE — 85025 COMPLETE CBC W/AUTO DIFF WBC: CPT

## 2017-05-10 PROCEDURE — 80053 COMPREHEN METABOLIC PANEL: CPT

## 2017-05-10 PROCEDURE — 94799 UNLISTED PULMONARY SVC/PX: CPT

## 2017-05-10 PROCEDURE — 93321 DOPPLER ECHO F-UP/LMTD STD: CPT | Performed by: PEDIATRICS

## 2017-05-10 PROCEDURE — 99233 SBSQ HOSP IP/OBS HIGH 50: CPT | Mod: ,,, | Performed by: PEDIATRICS

## 2017-05-10 PROCEDURE — 99900035 HC TECH TIME PER 15 MIN (STAT)

## 2017-05-10 RX ADMIN — FUROSEMIDE 40 MG: 40 TABLET ORAL at 08:05

## 2017-05-10 RX ADMIN — FAMOTIDINE 20 MG: 20 TABLET, FILM COATED ORAL at 08:05

## 2017-05-10 RX ADMIN — MAGNESIUM OXIDE TAB 400 MG (241.3 MG ELEMENTAL MG) 800 MG: 400 (241.3 MG) TAB at 08:05

## 2017-05-10 RX ADMIN — METOPROLOL TARTRATE 25 MG: 25 TABLET ORAL at 08:05

## 2017-05-10 RX ADMIN — FUROSEMIDE 40 MG: 40 TABLET ORAL at 05:05

## 2017-05-10 RX ADMIN — MICONAZOLE NITRATE: 20 POWDER TOPICAL at 09:05

## 2017-05-10 RX ADMIN — LEVOTHYROXINE SODIUM 88 MCG: 88 TABLET ORAL at 06:05

## 2017-05-10 NOTE — PROGRESS NOTES
[facility]  Pediatric Cardiology  Progress Note    Patient Name: Cipriano Thompson  MRN: [id number]  Admission Date: 5/2/2017  Hospital Length of Stay: 8 days  Code Status: Full Code   Attending Physician: Bhargav Godfrey MD   Primary Care Physician: Princess KHUSHBOO Rodgers MD  Expected Discharge Date: 5/11/2017  Principal Problem:S/P pulmonary valve replacement    Subjective:   S/P 27 mm epic tissue valve   Patient had a stable night. Only required 1 L NC to maintain oxygen saturations. CXR improved today.            Vitals:    05/10/17 0800   BP: (!) 116/56   Pulse: 85   Resp: 19   Temp: 98.5 °F (36.9 °C)     SpO2: 93 on ra    Constitutional: He appears well-developed and well-nourished. On BiPap in no acute distress.  HENT:   Head: Normocephalic and atraumatic.   Mouth/Throat: Oropharynx is clear and moist. Down's facies   Eyes: Conjunctivae are normal. Pupils are equal, round, and reactive to light.   Neck: Neck supple.   Cardiovascular: Normal rate, regular rhythm, S1 normal and S2 split. Infrequent extrasystoles are present. Exam reveals no gallop and no friction rub.   Pulses:  Radial pulses are 1+ on the left side.   Dorsalis pedis pulses are 1+ on the left side.   There is a harsh 2/6 systolic ejection murmur heard best at the LUSB. Warm distal extremities.   Pulmonary/Chest: No tachypnea, no retractions, symmetric breath sounds. He has no wheezes. He has no rales. Able to speak in full sentences. Bandages C/D/I  Abdominal: Soft. Bowel sounds are normal. He exhibits no distension. There is no hepatosplenomegaly. There is no tenderness.   Musculoskeletal: He exhibits no edema or tenderness.   Neurological: He exhibits normal muscle tone.   Skin: Skin is dry. No cyanosis. No pallor. Nails show no clubbing.       I/O last 3 completed shifts:  In: 1650 [P.O.:1650]  Out: 2176 [Urine:2175; Stool:1]       Lab Results   Component Value Date    WBC 10.97 05/10/2017    HGB 11.0 (L) 05/10/2017    HCT 32.5  (L) 05/10/2017    MCV 89 05/10/2017     05/10/2017     BMP  Lab Results   Component Value Date     05/10/2017    K 4.2 05/10/2017    CL 95 05/10/2017    CO2 31 (H) 05/10/2017    BUN 34 (H) 05/10/2017    CREATININE 1.4 05/10/2017    CALCIUM 9.5 05/10/2017    ANIONGAP 11 05/10/2017    ESTGFRAFRICA >60.0 05/10/2017    EGFRNONAA >60.0 05/10/2017     JAKE (post): Pulmonary valve velocity <2.5 m/sec. Mild to moderate TR with RV pressure estimate of 46 mmHg. Mildly dilated RV with low normal systolic function. Low normal LV systolic function.   --  Echo 5-8-17  Tetralogy of Fallot s/p prior pulmonary valve replacement and s/p pacemaker  - s/p pulmonary valve replacement with 27mm porcine valve in 30 mm conduit  5/2/17, [place].  Technically difficult, suboptimal study.  Prosthetic pulmonary valve  No significant pulmonary insufficiency. Mildly increased pulmonary valve velocity of  2 m/sec, peak gradient 15mmHg. With continuous wave Doppler across the  RVOT, peak velocity of 2.8 m/sec, peak gradient 30mmHg, mean 20mmHg.  Pulmonary arteries not well visualized.  Mild tricuspid valve insufficiency.  Peak TR gradient of 69mmHg consistent with increased RV pressure.  Mild right atrial enlargement.  Dilated right ventricle, mild. Thickened right ventricle free wall, moderate. Qualative  mildly decreased right ventricular systolic function.  Normal left ventricle structure and size.  Significant septal dyskinesis with mildly diminished LV free wall motion. Unable to  measure EF due to poor image quality.  Overall impression of mildly decreased left ventricular systolic function.  No pericardial effusion      Assessment and Plan:     Cardiac  Pulmonary valve insufficiency  26 y.o. male with Down's syndrome, TOF s/p repair as infant, and sinus node dysfunction s/p pacemaker 2008, now s/p pulmonary valve replacement with a 27 mm [employer]. Quinn epic bioprosthetic valve (5/2/17).  Currently weaning respiratory support and  jessica.  Plan:   Neuro/Pain:  - Pain control with tylenol prn, oxycodone prn  Respiratory:  - wean NC as tolerated  - Goal sats >85%  Cardiovascular:  - Hypertensive - Metoprolol started for BP control and history of frequent ventricular ectopy,  will continue to monitor  - Pacemaker interrogated by EP: frequent PVC's, no atrial arrhythmia, generator requires replacement in 6-9 months  FEN/GI:  - Regular diet  - Monitor electrolytes and replace as needed, stable this morning   - Continue Lasix, change to 40mg PO bid (slight increase) as patient is responding well to lasix  - Onmagnesium for constipation  Renal:  - Monitor I/O's closely  -cr stable  Heme/ID:  - S/p Ancef x 48 hours post-op  - H/H stable.   Disposition:  - Wean respiratory support.   - Encourage ambulation, IS  -d/c home tomorrow      BHARAT Cobb-C  Pediatric Cardiology  [facility]

## 2017-05-10 NOTE — PLAN OF CARE
Problem: Patient Care Overview  Goal: Plan of Care Review  Outcome: Outcome(s) achieved Date Met:  05/10/17  Pt remain free of falls, injury, and complaints throughout the shift. Generalized skin remains CDI with  No edema noted; VSS. Sternal incision and chest tube sites WDL and healing nicely. Pt remain on RA throughout the shift. Pt to be discharged home today. Pt tolerating plan of care.

## 2017-05-10 NOTE — PT/OT/SLP PROGRESS
Physical Therapy  Treatment/Goals Re-Assessed    Cipriano Thompson   MRN: [id number]   Admitting Diagnosis: S/P pulmonary valve replacement; POD#8    PT Received On: 05/10/17  PT Start Time: 0936     PT Stop Time: 1016    PT Total Time (min): 40 min       Billable Minutes:  Gait Training 15 and Therapeutic Activity 25    Treatment Type: Treatment  PT/PTA: PT       General Precautions: Standard, fall, sternal, cardiac  Orthopedic Precautions: Sternal    Do you have any cultural, spiritual, Ep[employer] conflicts, given your current situation?: Mother has no barriers to learning. Mother verbalizes understanding of his/her program and goals and demonstrates them correctly. No cultural, spiritual or educational needs identified.    Subjective:  Communicated with RN prior to session, ok to see for treatment this morning.    Pt supine (HOB elevated) in bed with mom present upon PT entry to room, agreeable to treatment.    Pain Rating: rFLACC 4/10  Location - Side: Left  Location - Orientation: plantar  Location: foot  Pain Addressed: Reposition, Distraction  Pain Rating Post-Intervention: rFLACC 1/10    Objective:   Patient found with: telemetry    Functional Mobility:    Bed Mobility:   Scooting/Bridging: Stand by Assistance (towards HOB in sitting, cueing to hug sternal pillow)    Supine to Sit: Stand by Assistance with HOB elevated    Transfers:  Sit <> Stand Assistance: Stand By Assistance x 2 trials from EOB; c/o (L) plantar foot pain with each trial  Sit <> Stand Assistive Device: No Assistive Device    Gait:   Gait Distance: 800 ft on room air, hand-held assist of therapist (due to left plantar foot pain with w/b today) with 1 std rest break. Pulse ox reading as 92-93% on room air throughout trial. Pt able to carry on conversation with therapist while ambulating; no c/o chest pain. Pt with c/o (L) plantar foot pain with w/b today    Assistance 1: Contact Guard Assistance  Gait Assistive Device: Hand held assist  Gait Pattern:  "reciprocal  Gait Deviation(s): decreased lance, increased time in double stance, decreased velocity of limb motion, decreased step length, decreased stride length    Balance:   Static Sit: Mod (I) at EOB    Static Stand: SBA without device    Therapeutic Activities and Exercises:  1. Pt with urge to void once sitting at EOB with PT. Able to ambulate into bathroom with hand-held assist; req'd min (A) to pull pants down and manage urinal to void in (while standing). Total (A) to pull pants back up after voiding.     2. Up into chair at end of session. BHARAT Begum and Dr. Godfrey by and discussed need for heavy use of IS and ambulation today, hopeful for d/c tomorrow. Encouraged Cipriano to do these things, best motivation is that he wants to see his puppy "" at home.    AM-PAC 6 CLICK MOBILITY  How much help from another person does this patient currently need?   1 = Unable, Total/Dependent Assistance  2 = A lot, Maximum/Moderate Assistance  3 = A little, Minimum/Contact Guard/Supervision  4 = None, Modified [place]/Independent    Turning over in bed (including adjusting bedclothes, sheets and blankets)?: 4  Sitting down on and standing up from a chair with arms (e.g., wheelchair, bedside commode, etc.): 4  Moving from lying on back to sitting on the side of the bed?: 4  Moving to and from a bed to a chair (including a wheelchair)?: 4  Need to walk in hospital room?: 3  Climbing 3-5 steps with a railing?: 3  Total Score: 22    AM-PAC Raw Score CMS G-Code Modifier Level of Impairment Assistance   6 % Total / Unable   7 - 9 CM 80 - 100% Maximal Assist   10 - 14 CL 60 - 80% Moderate Assist   15 - 19 CK 40 - 60% Moderate Assist   20 - 22 CJ 20 - 40% Minimal Assist   23 CI 1-20% SBA / CGA   24 CH 0% Independent/ Mod I     Patient left up in chair with all lines intact, call button in reach and mom present.    Assessment:  Cipriano Thompson tolerated treatment well this morning. Now on room air, ambulates 800 ft " "with sats 92-93% throughout. Did require hand-held assist today but due to c/o (L) plantar surface foot pain with w/b. Able to carry on with conversation while walking, states he is tired but doesn't exhibit any SOB. Requiring more encouragement of PT today to get up and moving, to which mom states "this is more like the real Cipriano, he's getting better." PA Shy discussed with mom the need to ambulate 8x in hallway today, cont'd use of IS. Re-assessed goals today, ok to continue for 1 week (). Will cont to benefit from acute PT services.    Rehab identified problem list/impairments: weakness, impaired endurance, impaired functional mobilty, gait instability, impaired balance, pain, decreased safety awareness, decreased lower extremity function, orthopedic precautions, impaired cardiopulmonary response to activity    Rehab potential is good.    Activity tolerance: Good    Discharge recommendations: home     Barriers to discharge: None    Equipment recommendations: none     GOALS:   Physical Therapy Goals        Problem: Physical Therapy Goal    Goal Priority Disciplines Outcome Goal Variances Interventions   Physical Therapy Goal     PT/OT, PT Ongoing (interventions implemented as appropriate)     Description:  Goals re-assessed by PT on 5/10, continue goals  to be met by: 17     Patient will increase functional independence with mobility by performin. Supine to sit with Stand-by Assistance - MET (5/10)  2. Sit to supine with Stand-by Assistance - Not met  3. Sit to stand transfer with Stand-By Assistance - MET ()  4. Bed to chair transfer with Stand-by Assistance - Not met  5. Gait x 200 feet with Stand-by Assistance - MET ()  6. Sit to stand mod (I) - Not met  7. Gait x 500 ft with supervision - Not met              PLAN:    Patient to be seen 6x/week to address the above listed problems via gait training, therapeutic activities, therapeutic exercises     Plan of Care expires: 17  Plan " of Care reviewed with: patient, mother     Ric Fiore, PT  5/10/2017

## 2017-05-10 NOTE — PT/OT/SLP PROGRESS
Occupational Therapy  Treatment    Cipriano Thompson   MRN: [id number]   Admitting Diagnosis: S/P pulmonary valve replacement    OT Date of Treatment: 05/10/17   OT Start Time: 1046  OT Stop Time: 1120  OT Total Time (min): 34 min    Billable Minutes:  Self Care/Home Management 17 min and Therapeutic Activity 17 min    General Precautions: Standard, fall, sternal (cardiac)  Orthopedic Precautions: N/A (sternal precautions)  Braces: N/A    Do you have any cultural, spiritual, Adventism conflicts, given your current situation?: None    Subjective:  Communicated with MILTON Leyva prior to session.  Pt and mother agreeable to OT session.     Pain Ratin/10 (on FLACC scale)  Location - Side: Bilateral  Location - Orientation: generalized  Location: first toe  Pain Addressed: Reposition, Distraction  Pain Rating Post-Intervention: 8/10 (on FLACC scale)    Objective:  Patient found with: telemetry     Functional Mobility:  Bed Mobility:   Not performed, pt found UIC.    Transfers:   Sit <> Stand Assistance: Contact Guard Assistance (x1 trial from bedside chair)  Sit <> Stand Assistive Device: No Assistive Device (HHA)    Functional Ambulation: Pt required increased assist for mobility 2/2 L great toe pain. CGA and HHA required for balance to/from bathroom. Decreased WB observed to LLE with mobility.     Activities of Daily Living:    LE Dressing Level of Assistance: Minimum assistance (to initiate donning R sock, and SBA to doff B socks with Mod cues for encouragement)    Grooming Position: Standing at sink  Grooming Level of Assistance: Contact guard assistance (to perform oral hygiene and face washing)      Balance:   Static Sit: SBA for sitting UIC  Dynamic Sit: SBA for sitting while performing LB dressing  Static Stand: CGA  Dynamic stand: CGA    Therapeutic Activities and Exercises:   - Pt required Max encouragement for participation in OT session today 2/2 L toe pain.  - Pt performed grooming tasks while standing at sink  today. With mobility to/from bathroom and while performing ADL task, he required max v/c for maintaining sternal precautions. Pt demo'd poor to fair understanding of sternal precautions. Mom demo'd good understanding, also assisting with providing v/c.    AM-PAC 6 CLICK ADL   How much help from another person does this patient currently need?   1 = Unable, Total/Dependent Assistance  2 = A lot, Maximum/Moderate Assistance  3 = A little, Minimum/Contact Guard/Supervision  4 = None, Modified [place]/Independent    Putting on and taking off regular lower body clothing? : 3  Bathing (including washing, rinsing, drying)?: 2  Toileting, which includes using toilet, bedpan, or urinal? : 3  Putting on and taking off regular upper body clothing?: 3  Taking care of personal grooming such as brushing teeth?: 3  Eating meals?: 4  Total Score: 18     AM-PAC Raw Score CMS G-Code Modifier Level of Impairment Assistance   6 % Total / Unable   7 - 9 CM 80 - 100% Maximal Assist   10 - 14 CL 60 - 80% Moderate Assist   15 - 19 CK 40 - 60% Moderate Assist   20 - 22 CJ 20 - 40% Minimal Assist   23 CI 1-20% SBA / CGA   24 CH 0% Independent/ Mod I     Patient left up in chair with all lines intact and call button in reach    ASSESSMENT:  Cipriano Thompson is a 26 y.o. male with a medical diagnosis of S/P pulmonary valve replacement and presents with improved performance of ADL tasks, however with decreased functional mobility 2/2 toe pain. Pain was his greatest limiting factor today with performance of functional tasks. He would continue to benefit from skilled OT services to maximize return to PLOF of (I) as well as valued roles and routines.    Rehab identified problem list/impairments: Rehab identified problem list/impairments: weakness, impaired endurance, impaired functional mobilty, impaired balance, decreased upper extremity function, decreased lower extremity function, impaired cardiopulmonary response to activity,  orthopedic precautions, decreased safety awareness, impaired self care skills    Rehab potential is good.    Activity tolerance: Good    Discharge recommendations: Discharge Facility/Level Of Care Needs: home     Barriers to discharge: Barriers to Discharge: None    Equipment recommendations: none     GOALS:   Occupational Therapy Goals        Problem: Occupational Therapy Goal    Goal Priority Disciplines Outcome Interventions   Occupational Therapy Goal     OT, PT/OT Ongoing (interventions implemented as appropriate)    Description:  Goals to be met by: 5/13/17     Patient will increase functional independence with ADLs by performing:    UE Dressing with Supervision.  LE Dressing with Supervision.  Grooming while standing at sink with Supervision.  Toileting from toilet with Supervision for hygiene and clothing management.   Supine to sit with Supervision.  Stand pivot transfers with Supervision.  Toilet transfer to toilet with Supervision.                Plan:  Patient to be seen 3 x/week to address the above listed problems via self-care/home management, therapeutic activities, therapeutic exercises  Plan of Care expires: 06/02/17  Plan of Care reviewed with: patient, mother         DONNIE Lion  05/10/2017

## 2017-05-10 NOTE — PLAN OF CARE
"Problem: Patient Care Overview  Goal: Plan of Care Review  Cipriano Thompson tolerated treatment well this morning. Now on room air, ambulates 800 ft with sats 92-93% throughout. Did require hand-held assist today but due to c/o (L) plantar surface foot pain with w/b. Able to carry on with conversation while walking, states he is tired but doesn't exhibit any SOB. Requiring more encouragement of PT today to get up and moving, to which mom states "this is more like the real Cipriano, he's getting better." BHARAT Begum discussed with mom the need to ambulate 8x in hallway today, cont'd use of IS. Re-assessed goals today, ok to continue for 1 week (5/17). Will cont to benefit from acute PT services.     Ric Fiore, PT  5/10/2017      "

## 2017-05-10 NOTE — PROGRESS NOTES
O2 sats reading 85% on RA at rest, pt encouraged to deep breath and perform IS exercises. O2 sats improved to 92-93% on RA. Pt ambulated in hallway, O2 sats read 90-94% while ambulating and 92% once back in room. BHARAT Richards notified. Instructed to maintain O2 goal at or >92% and apply NC PRN to maintain. Pt not complaining of any SOB or difficulty breathing at this time. Current O2 sat 92@ on RA. NADN. Pt resting quietly in bed voicing no complaints at this time. Mother at the bedside. Will continue to monitor.

## 2017-05-10 NOTE — PLAN OF CARE
Problem: Patient Care Overview  Goal: Plan of Care Review  Outcome: Ongoing (interventions implemented as appropriate)  No significant events this shift. VSS. O2 sats remain stable on RA and 1.5L NC while asleep to maintain O2 sat >92%. Back on RA this AM with O2 sats 93%. Mid sternal incision cleansed with chlorhexidine, no complications noted. Old CT site cleansed and dressing changed. C/o incisional pain, given PRN pain med x1, pt reported relief. Pt encouraged/reminded to use sternal precautions and to use IS while awake, reinforcement needed. Fall precautions maintained, free of fall/trauma/injury or skin breakdown. Plan is for possible DC today. Plan of care reviewed and updated with patient and mother, verbalizes understanding. JACEK. Pt resting quietly in bed voicing no complaints at this time. Will continue to monitor.

## 2017-05-10 NOTE — PLAN OF CARE
Problem: Occupational Therapy Goal  Goal: Occupational Therapy Goal  Goals to be met by: 5/13/17     Patient will increase functional independence with ADLs by performing:    UE Dressing with Supervision.  LE Dressing with Supervision.  Grooming while standing at sink with Supervision.  Toileting from toilet with Supervision for hygiene and clothing management.   Supine to sit with Supervision.  Stand pivot transfers with Supervision.  Toilet transfer to toilet with Supervision.   Outcome: Ongoing (interventions implemented as appropriate)  Pt is continuing to make progress towards goals. Goals remain appropriate, continue POC.   DONNIE Jacobo  5/10/2017

## 2017-05-11 LAB
ALBUMIN SERPL BCP-MCNC: 2.9 G/DL
ALP SERPL-CCNC: 165 U/L
ALT SERPL W/O P-5'-P-CCNC: 27 U/L
ANION GAP SERPL CALC-SCNC: 13 MMOL/L
AST SERPL-CCNC: 21 U/L
BILIRUB SERPL-MCNC: 0.8 MG/DL
BUN SERPL-MCNC: 35 MG/DL
CALCIUM SERPL-MCNC: 9.3 MG/DL
CHLORIDE SERPL-SCNC: 96 MMOL/L
CO2 SERPL-SCNC: 26 MMOL/L
CREAT SERPL-MCNC: 1.3 MG/DL
EST. GFR  (AFRICAN AMERICAN): >60 ML/MIN/1.73 M^2
EST. GFR  (NON AFRICAN AMERICAN): >60 ML/MIN/1.73 M^2
GLUCOSE SERPL-MCNC: 110 MG/DL
MAGNESIUM SERPL-MCNC: 1.9 MG/DL
PHOSPHATE SERPL-MCNC: 3.7 MG/DL
POTASSIUM SERPL-SCNC: 4.1 MMOL/L
PROT SERPL-MCNC: 7.1 G/DL
SODIUM SERPL-SCNC: 135 MMOL/L

## 2017-05-11 PROCEDURE — 25000003 PHARM REV CODE 250: Performed by: PEDIATRICS

## 2017-05-11 PROCEDURE — 83735 ASSAY OF MAGNESIUM: CPT

## 2017-05-11 PROCEDURE — 25000003 PHARM REV CODE 250: Performed by: PHYSICIAN ASSISTANT

## 2017-05-11 PROCEDURE — 80053 COMPREHEN METABOLIC PANEL: CPT

## 2017-05-11 PROCEDURE — 20600001 HC STEP DOWN PRIVATE ROOM

## 2017-05-11 PROCEDURE — 36415 COLL VENOUS BLD VENIPUNCTURE: CPT

## 2017-05-11 PROCEDURE — 84100 ASSAY OF PHOSPHORUS: CPT

## 2017-05-11 RX ADMIN — OXYCODONE HYDROCHLORIDE 5 MG: 5 TABLET ORAL at 08:05

## 2017-05-11 RX ADMIN — MAGNESIUM OXIDE TAB 400 MG (241.3 MG ELEMENTAL MG) 800 MG: 400 (241.3 MG) TAB at 08:05

## 2017-05-11 RX ADMIN — LEVOTHYROXINE SODIUM 88 MCG: 88 TABLET ORAL at 06:05

## 2017-05-11 RX ADMIN — METOPROLOL TARTRATE 25 MG: 25 TABLET ORAL at 08:05

## 2017-05-11 RX ADMIN — FAMOTIDINE 20 MG: 20 TABLET, FILM COATED ORAL at 08:05

## 2017-05-11 RX ADMIN — FUROSEMIDE 40 MG: 40 TABLET ORAL at 08:05

## 2017-05-11 RX ADMIN — MICONAZOLE NITRATE: 20 POWDER TOPICAL at 08:05

## 2017-05-11 RX ADMIN — FUROSEMIDE 40 MG: 40 TABLET ORAL at 04:05

## 2017-05-11 RX ADMIN — ACETAMINOPHEN 650 MG: 325 TABLET ORAL at 09:05

## 2017-05-11 NOTE — PROGRESS NOTES
Heme lab reported CBC blood draw clotted and needed a redraw; pt refusing blood draw at this time.

## 2017-05-11 NOTE — ASSESSMENT & PLAN NOTE
26 y.o. male with Down's syndrome, TOF s/p repair as infant, and sinus node dysfunction s/p pacemaker 2008, now s/p pulmonary valve replacement with a 27 mm [employer] Quinn epic bioprosthetic valve (5/2/17).  Currently weaning respiratory support and diuresing.  Plan:   Neuro/Pain:  - Pain control with tylenol prn, oxycodone prn  Respiratory:  - currently on RA, continue IS and Acapella  - Goal sats >92%  Cardiovascular:  - Hypertension, improving - Metoprolol started for BP control and history of frequent ventricular ectopy,  will continue to monitor  - Pacemaker interrogated by EP: frequent PVC's, no atrial arrhythmia, generator requires replacement in 6-9 months  FEN/GI:  - Regular diet  - Electrolytes stable  - Continue Lasix 40mg PO bid as patient is responding well to lasix  - On magnesium for constipation, docusate d/c  Renal:  - Continued negative fluid balance  - Monitor I/O's closely  Heme/ID:  - S/p Ancef x 48 hours post-op  - H/H stable. No concerns for bleeding  Disposition:  - Wean respiratory support. Monitor blood pressures (Goal SBP < 130)  - Encourage ambulation, IS

## 2017-05-11 NOTE — PROGRESS NOTES
[facility]  Pediatric Cardiology  Progress Note    Patient Name: Cipriano Thompson  MRN: [id number]  Admission Date: 5/2/2017  Hospital Length of Stay: 8 days  Code Status: Full Code   Attending Physician: Bhargav Godfrey MD   Primary Care Physician: Princess KHUSHBOO Rodgers MD  Expected Discharge Date: 5/11/2017  Principal Problem:Pulmonary valve replaced    Subjective:       Interval History: Cipriano was able to wean significantly from respiratory support over the past two days. He did well without BiPap and is weaning on oxygen, on RA this morning. In addition, pain control overall improved and patient has had several bowel movements. He has walked and become more active with PT.     BP overall with improved control, continued ventricular ectopy.     Objective:     Vital Signs (Most Recent):  Temp: 98.6 °F (37 °C) (05/10/17 2030)  Pulse: 93 (05/10/17 2030)  Resp: 20 (05/10/17 2030)  BP: (!) 134/57 (05/10/17 2030)  SpO2: (!) 93 % (05/10/17 2107) Vital Signs (24h Range):  Temp:  [98.5 °F (36.9 °C)-99.7 °F (37.6 °C)] 98.6 °F (37 °C)  Pulse:  [] 93  Resp:  [19-20] 20  SpO2:  [88 %-99 %] 93 %  BP: (116-134)/(55-60) 134/57     Weight: 98.8 kg (217 lb 13 oz)  Body mass index is 35.16 kg/(m^2).     SpO2: (!) 93 %  O2 Device (Oxygen Therapy): room air    Intake/Output - Last 3 Shifts       05/08 0700 - 05/09 0659 05/09 0700 - 05/10 0659 05/10 0700 - 05/11 0659    P.O. 1300 1530 420    Total Intake(mL/kg) 1300 (13.2) 1530 (15.5) 420 (4.3)    Urine (mL/kg/hr) 2300 (1) 1050 (0.4) 400 (0.3)    Stool 0 (0) 1 (0) 0 (0)    Total Output 2300 1051 400    Net -1000 +479 +20           Urine Occurrence 2 x 2 x     Stool Occurrence 1 x  0 x          Lines/Drains/Airways     Peripheral Intravenous Line                 Peripheral IV - Single Lumen 05/09/17 0048 Right Antecubital 1 day                Scheduled Medications:    famotidine  20 mg Oral BID    furosemide  40 mg Oral BID    levothyroxine  88 mcg Oral Before  breakfast    magnesium oxide  800 mg Oral BID    metoprolol tartrate  25 mg Oral BID    miconazole NITRATE 2 %   Topical BID       Continuous Medications:        PRN Medications: acetaminophen, diphenhydrAMINE, magnesium sulfate IVPB, oxycodone    Physical Exam   Constitutional: He appears well-developed and well-nourished.   HENT:   Head: Normocephalic and atraumatic.   Mouth/Throat: Oropharynx is clear and moist.   Down's facies   Eyes: Conjunctivae are normal. Pupils are equal, round, and reactive to light.   Neck: Neck supple.   Cardiovascular: Normal rate, regular rhythm and S1 normal.  Exam reveals no gallop and no friction rub.    Pulses:       Radial pulses are 1+ on the left side.        Dorsalis pedis pulses are 1+ on the left side.   Split S2. There is a harsh 2/6 systolic ejection murmur heard best at the LUSB.    Pulmonary/Chest: Effort normal. He has decreased breath sounds in the right lower field and the left lower field. He has no wheezes. He has rales in the right lower field and the left lower field.   Chest tube sites and sternal wound healing well, no erythema or edema   Abdominal: Soft. Bowel sounds are normal. He exhibits no distension. There is no hepatosplenomegaly. There is no tenderness.   Musculoskeletal: He exhibits no edema or tenderness.   Neurological: He exhibits normal muscle tone.   Skin: Skin is dry. No cyanosis. No pallor. Nails show no clubbing.       Significant Labs:   Lab Results   Component Value Date    WBC 10.97 05/10/2017    HGB 11.0 (L) 05/10/2017    HCT 32.5 (L) 05/10/2017    MCV 89 05/10/2017     05/10/2017     CMP  Sodium   Date Value Ref Range Status   05/10/2017 137 136 - 145 mmol/L Final     Potassium   Date Value Ref Range Status   05/10/2017 4.2 3.5 - 5.1 mmol/L Final     Chloride   Date Value Ref Range Status   05/10/2017 95 95 - 110 mmol/L Final     CO2   Date Value Ref Range Status   05/10/2017 31 (H) 23 - 29 mmol/L Final     Glucose   Date Value Ref  Range Status   05/10/2017 110 70 - 110 mg/dL Final     BUN, Bld   Date Value Ref Range Status   05/10/2017 34 (H) 6 - 20 mg/dL Final     Creatinine   Date Value Ref Range Status   05/10/2017 1.4 0.5 - 1.4 mg/dL Final     Calcium   Date Value Ref Range Status   05/10/2017 9.5 8.7 - 10.5 mg/dL Final     Total Protein   Date Value Ref Range Status   05/10/2017 7.4 6.0 - 8.4 g/dL Final     Albumin   Date Value Ref Range Status   05/10/2017 3.1 (L) 3.5 - 5.2 g/dL Final     Total Bilirubin   Date Value Ref Range Status   05/10/2017 0.7 0.1 - 1.0 mg/dL Final     Comment:     For infants and newborns, interpretation of results should be based  on gestational age, weight and in agreement with clinical  observations.  Premature Infant recommended reference ranges:  Up to 24 hours.............<8.0 mg/dL  Up to 48 hours............<12.0 mg/dL  3-5 days..................<15.0 mg/dL  6-29 days.................<15.0 mg/dL       Alkaline Phosphatase   Date Value Ref Range Status   05/10/2017 171 (H) 55 - 135 U/L Final     AST   Date Value Ref Range Status   05/10/2017 22 10 - 40 U/L Final     ALT   Date Value Ref Range Status   05/10/2017 29 10 - 44 U/L Final     Anion Gap   Date Value Ref Range Status   05/10/2017 11 8 - 16 mmol/L Final     eGFR if    Date Value Ref Range Status   05/10/2017 >60.0 >60 mL/min/1.73 m^2 Final     eGFR if non    Date Value Ref Range Status   05/10/2017 >60.0 >60 mL/min/1.73 m^2 Final     Comment:     Calculation used to obtain the estimated glomerular filtration  rate (eGFR) is the CKD-EPI equation. Since race is unknown   in our information system, the eGFR values for   -American and Non--American patients are given   for each creatinine result.           Significant Imaging: X-Ray: CXR: X-Ray Chest PA and Lateral (CXR):   Results for orders placed or performed during the hospital encounter of 05/02/17   X-Ray Chest PA And Lateral    Narrative    2 views:  There is a pacer and cardiomegaly.  There is prominence of the pulmonary outflow tract but may be postoperative.  There is postsurgical change.  Lungs are gradually improving.  There is small pleural effusions and mild bibasal edema.      Electronically signed by: PATRICIA LECHUGA  Date:     05/10/17  Time:    16:08        Assessment and Plan:       Cardiac  Pulmonary valve insufficiency  26 y.o. male with Down's syndrome, TOF s/p repair as infant, and sinus node dysfunction s/p pacemaker 2008, now s/p pulmonary valve replacement with a 27 mm [employer] Quinn epic bioprosthetic valve (5/2/17).  Currently weaning respiratory support and diuresing.  Plan:   Neuro/Pain:  - Pain control with tylenol prn, oxycodone prn  Respiratory:  - currently on RA, continue IS and Acapella  - Goal sats >92%  Cardiovascular:  - Hypertension, improving - Metoprolol started for BP control and history of frequent ventricular ectopy,  will continue to monitor  - Pacemaker interrogated by EP: frequent PVC's, no atrial arrhythmia, generator requires replacement in 6-9 months  FEN/GI:  - Regular diet  - Electrolytes stable  - Continue Lasix 40mg PO bid as patient is responding well to lasix  - On magnesium for constipation, docusate d/c  Renal:  - Continued negative fluid balance  - Monitor I/O's closely  Heme/ID:  - S/p Ancef x 48 hours post-op  - H/H stable. No concerns for bleeding  Disposition:  - Wean respiratory support. Monitor blood pressures (Goal SBP < 130)  - Encourage ambulation, IS      Grecia Hansen MD  Pediatric Cardiology  [facility]

## 2017-05-11 NOTE — PT/OT/SLP PROGRESS
Physical Therapy      Cipriano Thompson  MRN: [id number]    Patient not seen today secondary to pt being transported to ultrasound. Will follow-up at a later time.    Jessica L Lejeune, PT

## 2017-05-11 NOTE — SUBJECTIVE & OBJECTIVE
Interval History: Cipriano was able to wean significantly from respiratory support over the past two days. He did well without BiPap and is weaning on oxygen, on RA this morning. In addition, pain control overall improved and patient has had several bowel movements. He has walked and become more active with PT.     BP overall with improved control, continued ventricular ectopy.     Objective:     Vital Signs (Most Recent):  Temp: 98.6 °F (37 °C) (05/10/17 2030)  Pulse: 93 (05/10/17 2030)  Resp: 20 (05/10/17 2030)  BP: (!) 134/57 (05/10/17 2030)  SpO2: (!) 93 % (05/10/17 2107) Vital Signs (24h Range):  Temp:  [98.5 °F (36.9 °C)-99.7 °F (37.6 °C)] 98.6 °F (37 °C)  Pulse:  [] 93  Resp:  [19-20] 20  SpO2:  [88 %-99 %] 93 %  BP: (116-134)/(55-60) 134/57     Weight: 98.8 kg (217 lb 13 oz)  Body mass index is 35.16 kg/(m^2).     SpO2: (!) 93 %  O2 Device (Oxygen Therapy): room air    Intake/Output - Last 3 Shifts       05/08 0700 - 05/09 0659 05/09 0700 - 05/10 0659 05/10 0700 - 05/11 0659    P.O. 1300 1530 420    Total Intake(mL/kg) 1300 (13.2) 1530 (15.5) 420 (4.3)    Urine (mL/kg/hr) 2300 (1) 1050 (0.4) 400 (0.3)    Stool 0 (0) 1 (0) 0 (0)    Total Output 2300 1051 400    Net -1000 +479 +20           Urine Occurrence 2 x 2 x     Stool Occurrence 1 x  0 x          Lines/Drains/Airways     Peripheral Intravenous Line                 Peripheral IV - Single Lumen 05/09/17 0048 Right Antecubital 1 day                Scheduled Medications:    famotidine  20 mg Oral BID    furosemide  40 mg Oral BID    levothyroxine  88 mcg Oral Before breakfast    magnesium oxide  800 mg Oral BID    metoprolol tartrate  25 mg Oral BID    miconazole NITRATE 2 %   Topical BID       Continuous Medications:        PRN Medications: acetaminophen, diphenhydrAMINE, magnesium sulfate IVPB, oxycodone    Physical Exam   Constitutional: He appears well-developed and well-nourished.   HENT:   Head: Normocephalic and atraumatic.   Mouth/Throat:  Oropharynx is clear and moist.   Down's facies   Eyes: Conjunctivae are normal. Pupils are equal, round, and reactive to light.   Neck: Neck supple.   Cardiovascular: Normal rate, regular rhythm and S1 normal.  Exam reveals no gallop and no friction rub.    Pulses:       Radial pulses are 1+ on the left side.        Dorsalis pedis pulses are 1+ on the left side.   Split S2. There is a harsh 2/6 systolic ejection murmur heard best at the LUSB.    Pulmonary/Chest: Effort normal. He has decreased breath sounds in the right lower field and the left lower field. He has no wheezes. He has rales in the right lower field and the left lower field.   Chest tube sites and sternal wound healing well, no erythema or edema   Abdominal: Soft. Bowel sounds are normal. He exhibits no distension. There is no hepatosplenomegaly. There is no tenderness.   Musculoskeletal: He exhibits no edema or tenderness.   Neurological: He exhibits normal muscle tone.   Skin: Skin is dry. No cyanosis. No pallor. Nails show no clubbing.       Significant Labs:   Lab Results   Component Value Date    WBC 10.97 05/10/2017    HGB 11.0 (L) 05/10/2017    HCT 32.5 (L) 05/10/2017    MCV 89 05/10/2017     05/10/2017     CMP  Sodium   Date Value Ref Range Status   05/10/2017 137 136 - 145 mmol/L Final     Potassium   Date Value Ref Range Status   05/10/2017 4.2 3.5 - 5.1 mmol/L Final     Chloride   Date Value Ref Range Status   05/10/2017 95 95 - 110 mmol/L Final     CO2   Date Value Ref Range Status   05/10/2017 31 (H) 23 - 29 mmol/L Final     Glucose   Date Value Ref Range Status   05/10/2017 110 70 - 110 mg/dL Final     BUN, Bld   Date Value Ref Range Status   05/10/2017 34 (H) 6 - 20 mg/dL Final     Creatinine   Date Value Ref Range Status   05/10/2017 1.4 0.5 - 1.4 mg/dL Final     Calcium   Date Value Ref Range Status   05/10/2017 9.5 8.7 - 10.5 mg/dL Final     Total Protein   Date Value Ref Range Status   05/10/2017 7.4 6.0 - 8.4 g/dL Final      Albumin   Date Value Ref Range Status   05/10/2017 3.1 (L) 3.5 - 5.2 g/dL Final     Total Bilirubin   Date Value Ref Range Status   05/10/2017 0.7 0.1 - 1.0 mg/dL Final     Comment:     For infants and newborns, interpretation of results should be based  on gestational age, weight and in agreement with clinical  observations.  Premature Infant recommended reference ranges:  Up to 24 hours.............<8.0 mg/dL  Up to 48 hours............<12.0 mg/dL  3-5 days..................<15.0 mg/dL  6-29 days.................<15.0 mg/dL       Alkaline Phosphatase   Date Value Ref Range Status   05/10/2017 171 (H) 55 - 135 U/L Final     AST   Date Value Ref Range Status   05/10/2017 22 10 - 40 U/L Final     ALT   Date Value Ref Range Status   05/10/2017 29 10 - 44 U/L Final     Anion Gap   Date Value Ref Range Status   05/10/2017 11 8 - 16 mmol/L Final     eGFR if    Date Value Ref Range Status   05/10/2017 >60.0 >60 mL/min/1.73 m^2 Final     eGFR if non    Date Value Ref Range Status   05/10/2017 >60.0 >60 mL/min/1.73 m^2 Final     Comment:     Calculation used to obtain the estimated glomerular filtration  rate (eGFR) is the CKD-EPI equation. Since race is unknown   in our information system, the eGFR values for   -American and Non--American patients are given   for each creatinine result.           Significant Imaging: X-Ray: CXR: X-Ray Chest PA and Lateral (CXR):   Results for orders placed or performed during the hospital encounter of 05/02/17   X-Ray Chest PA And Lateral    Narrative    2 views: There is a pacer and cardiomegaly.  There is prominence of the pulmonary outflow tract but may be postoperative.  There is postsurgical change.  Lungs are gradually improving.  There is small pleural effusions and mild bibasal edema.      Electronically signed by: PATRICIA LECHUGA  Date:     05/10/17  Time:    16:08

## 2017-05-11 NOTE — PLAN OF CARE
Problem: Patient Care Overview  Goal: Plan of Care Review  Outcome: Outcome(s) achieved Date Met:  05/11/17  Pt remain free of falls, injury, and complaints throughout the shift. Generalized skin remains CDI with trace generalized edema noted; VSS. Pt being diuresed with PO lasix; diuresing well. Pt s/p PV replacement; midsternal incision WDL. CXR and mediastinal US completed today. Pl an for pending discharge today.

## 2017-05-11 NOTE — PLAN OF CARE
Problem: Patient Care Overview  Goal: Plan of Care Review  Outcome: Ongoing (interventions implemented as appropriate)  No significant events this shift. VSS. O2 sats remain stable on RA, >92%. Mid sternal incision without complications noted. Old CT site and mid sternal incision cleansed. Pt encouraged/reminded to use sternal precautions and to use IS while awake, reinforcement needed. Fall precautions maintained, free of fall/trauma/injury or skin breakdown. Plan is for possible DC today. Plan of care reviewed and updated with patient and mother, verbalizes understanding. JACEK. Pt resting quietly in bed voicing no complaints at this time. Will continue to monitor.

## 2017-05-11 NOTE — PROGRESS NOTES
Pt am vitals: temp 101.7, 101bpm, 92% O2 RA, bp 123/55, MAP 74. Notified BHARAT Tao. No new orders at this time; will continue to monitor.

## 2017-05-11 NOTE — PROGRESS NOTES
[facility]  Pediatric Cardiology  Progress Note    Patient Name: Cipriano Thompson  MRN: [id number]  Admission Date: 5/2/2017  Hospital Length of Stay: 9 days  Code Status: Full Code   Attending Physician: Bhargav Godfrey MD   Primary Care Physician: Princess KHUSHBOO Rodgers MD  Expected Discharge Date: 5/11/2017  Principal Problem:Pulmonary valve replaced    Subjective:   S/P 27 mm epic tissue valve   Patient had a stable night. CXR with atelectasis this am. Patient has remained off of nc oxygen. Temp 101.7 this am.           Vitals:    05/11/17 1156   BP: (!) 133/59   Pulse: 83   Resp: 20   Temp: 96.2 °F (35.7 °C)     SpO2: 90 on ra    Constitutional: He appears well-developed and well-nourished. On BiPap in no acute distress.  HENT:   Head: Normocephalic and atraumatic.   Mouth/Throat: Oropharynx is clear and moist. Down's facies   Eyes: Conjunctivae are normal. Pupils are equal, round, and reactive to light.   Neck: Neck supple.   Cardiovascular: Normal rate, regular rhythm, S1 normal and S2 split. Infrequent extrasystoles are present. Exam reveals no gallop and no friction rub.   Pulses:  Radial pulses are 1+ on the left side.   Dorsalis pedis pulses are 1+ on the left side.   There is a harsh 2/6 systolic ejection murmur heard best at the LUSB. Warm distal extremities.   Pulmonary/Chest: No tachypnea, no retractions, symmetric breath sounds. He has no wheezes. He has no rales.decreased bs to bases  Abdominal: Soft. Bowel sounds are normal. He exhibits no distension. There is no hepatosplenomegaly. There is no tenderness.   Musculoskeletal: He exhibits no edema or tenderness.   Neurological: He exhibits normal muscle tone.   Skin: Skin is dry. No cyanosis. No pallor. Nails show no clubbing.       I/O last 3 completed shifts:  In: 1560 [P.O.:1560]  Out: 1801 [Urine:1800; Stool:1]  I/O this shift:  In: 480 [P.O.:480]  Out: 1275 [Urine:1275]    Lab Results   Component Value Date    WBC 10.97 05/10/2017     HGB 11.0 (L) 05/10/2017    HCT 32.5 (L) 05/10/2017    MCV 89 05/10/2017     05/10/2017     BMP  Lab Results   Component Value Date     (L) 05/11/2017    K 4.1 05/11/2017    CL 96 05/11/2017    CO2 26 05/11/2017    BUN 35 (H) 05/11/2017    CREATININE 1.3 05/11/2017    CALCIUM 9.3 05/11/2017    ANIONGAP 13 05/11/2017    ESTGFRAFRICA >60.0 05/11/2017    EGFRNONAA >60.0 05/11/2017     JAKE (post): Pulmonary valve velocity <2.5 m/sec. Mild to moderate TR with RV pressure estimate of 46 mmHg. Mildly dilated RV with low normal systolic function. Low normal LV systolic function.   --  Echo 5-8-17  Tetralogy of Fallot s/p prior pulmonary valve replacement and s/p pacemaker  - s/p pulmonary valve replacement with 27mm porcine valve in 30 mm conduit  5/2/17, Bekah.  Technically difficult, suboptimal study.  Prosthetic pulmonary valve  No significant pulmonary insufficiency. Mildly increased pulmonary valve velocity of  2 m/sec, peak gradient 15mmHg. With continuous wave Doppler across the  RVOT, peak velocity of 2.8 m/sec, peak gradient 30mmHg, mean 20mmHg.  Pulmonary arteries not well visualized.  Mild tricuspid valve insufficiency.  Peak TR gradient of 69mmHg consistent with increased RV pressure.  Mild right atrial enlargement.  Dilated right ventricle, mild. Thickened right ventricle free wall, moderate. Qualative  mildly decreased right ventricular systolic function.  Normal left ventricle structure and size.  Significant septal dyskinesis with mildly diminished LV free wall motion. Unable to  measure EF due to poor image quality.  Overall impression of mildly decreased left ventricular systolic function.  No pericardial effusion      Assessment and Plan:     Cardiac  Pulmonary valve insufficiency  26 y.o. male with Down's syndrome, TOF s/p repair as infant, and sinus node dysfunction s/p pacemaker 2008, now s/p pulmonary valve replacement with a 27 mm St. Quinn epic bioprosthetic valve (5/2/17).  Currently  weaning respiratory support and diuresing.  Plan:   Neuro/Pain:  - Pain control with tylenol prn, oxycodone prn  Respiratory:  - wean NC as tolerated  - Goal sats >85%  -us of chest this am to determine if there are pleural effusions versus atelectasis  Cardiovascular:  - Hypertensive - Metoprolol started for BP control and history of frequent ventricular ectopy,  will continue to monitor  - Pacemaker interrogated by EP: frequent PVC's, no atrial arrhythmia, generator requires replacement in 6-9 months  FEN/GI:  - Regular diet  - Monitor electrolytes and replace as needed, stable this morning   - Continue Lasix, change to 40mg PO bid (slight increase) as patient is responding well to lasix  - Onmagnesium for constipation  Renal:  - Monitor I/O's closely  -cr stable  Heme/ID:  - S/p Ancef x 48 hours post-op  - H/H stable.   Disposition:  - Wean respiratory support.   - Encourage ambulation, IS-still with atelectasis  -d/c home tomorrow  -will need outpatient sleep study for BIJAN      BHARAT Cobb-C  Pediatric Cardiology  [facility]

## 2017-05-12 VITALS
WEIGHT: 217.81 LBS | RESPIRATION RATE: 18 BRPM | HEART RATE: 70 BPM | TEMPERATURE: 98 F | OXYGEN SATURATION: 96 % | DIASTOLIC BLOOD PRESSURE: 98 MMHG | HEIGHT: 66 IN | BODY MASS INDEX: 35.01 KG/M2 | SYSTOLIC BLOOD PRESSURE: 131 MMHG

## 2017-05-12 LAB
ALBUMIN SERPL BCP-MCNC: 2.8 G/DL
ALP SERPL-CCNC: 163 U/L
ALT SERPL W/O P-5'-P-CCNC: 26 U/L
ANION GAP SERPL CALC-SCNC: 10 MMOL/L
AST SERPL-CCNC: 23 U/L
BASOPHILS # BLD AUTO: 0.08 K/UL
BASOPHILS NFR BLD: 0.8 %
BILIRUB SERPL-MCNC: 0.8 MG/DL
BUN SERPL-MCNC: 32 MG/DL
CALCIUM SERPL-MCNC: 9.2 MG/DL
CHLORIDE SERPL-SCNC: 97 MMOL/L
CO2 SERPL-SCNC: 29 MMOL/L
CREAT SERPL-MCNC: 1.3 MG/DL
DIFFERENTIAL METHOD: ABNORMAL
EOSINOPHIL # BLD AUTO: 0.1 K/UL
EOSINOPHIL NFR BLD: 1.2 %
ERYTHROCYTE [DISTWIDTH] IN BLOOD BY AUTOMATED COUNT: 16.4 %
EST. GFR  (AFRICAN AMERICAN): >60 ML/MIN/1.73 M^2
EST. GFR  (NON AFRICAN AMERICAN): >60 ML/MIN/1.73 M^2
GLUCOSE SERPL-MCNC: 108 MG/DL
HCT VFR BLD AUTO: 28.5 %
HGB BLD-MCNC: 9.6 G/DL
LYMPHOCYTES # BLD AUTO: 1.2 K/UL
LYMPHOCYTES NFR BLD: 12 %
MAGNESIUM SERPL-MCNC: 2.3 MG/DL
MCH RBC QN AUTO: 30.3 PG
MCHC RBC AUTO-ENTMCNC: 33.7 %
MCV RBC AUTO: 90 FL
MONOCYTES # BLD AUTO: 0.8 K/UL
MONOCYTES NFR BLD: 7.8 %
NEUTROPHILS # BLD AUTO: 7.5 K/UL
NEUTROPHILS NFR BLD: 78.2 %
PHOSPHATE SERPL-MCNC: 4 MG/DL
PLATELET # BLD AUTO: 326 K/UL
PLATELET BLD QL SMEAR: ABNORMAL
PMV BLD AUTO: 10.4 FL
POTASSIUM SERPL-SCNC: 4.1 MMOL/L
PROT SERPL-MCNC: 7 G/DL
RBC # BLD AUTO: 3.17 M/UL
SODIUM SERPL-SCNC: 136 MMOL/L
WBC # BLD AUTO: 9.67 K/UL

## 2017-05-12 PROCEDURE — 25000003 PHARM REV CODE 250: Performed by: PEDIATRICS

## 2017-05-12 PROCEDURE — 80053 COMPREHEN METABOLIC PANEL: CPT

## 2017-05-12 PROCEDURE — 97530 THERAPEUTIC ACTIVITIES: CPT

## 2017-05-12 PROCEDURE — 36415 COLL VENOUS BLD VENIPUNCTURE: CPT

## 2017-05-12 PROCEDURE — 25000003 PHARM REV CODE 250: Performed by: PHYSICIAN ASSISTANT

## 2017-05-12 PROCEDURE — 83735 ASSAY OF MAGNESIUM: CPT

## 2017-05-12 PROCEDURE — 85025 COMPLETE CBC W/AUTO DIFF WBC: CPT

## 2017-05-12 PROCEDURE — 84100 ASSAY OF PHOSPHORUS: CPT

## 2017-05-12 PROCEDURE — 94664 DEMO&/EVAL PT USE INHALER: CPT

## 2017-05-12 RX ORDER — OXYCODONE HYDROCHLORIDE 5 MG/1
5 TABLET ORAL EVERY 6 HOURS PRN
Qty: 30 TABLET | Refills: 0 | Status: SHIPPED | OUTPATIENT
Start: 2017-05-12 | End: 2017-09-12

## 2017-05-12 RX ORDER — METOPROLOL TARTRATE 25 MG/1
25 TABLET, FILM COATED ORAL 2 TIMES DAILY
Qty: 60 TABLET | Refills: 0 | Status: ON HOLD | OUTPATIENT
Start: 2017-05-12 | End: 2017-09-13

## 2017-05-12 RX ORDER — FUROSEMIDE 40 MG/1
40 TABLET ORAL 2 TIMES DAILY
Qty: 60 TABLET | Refills: 0 | Status: SHIPPED | OUTPATIENT
Start: 2017-05-12 | End: 2017-05-31 | Stop reason: SDUPTHER

## 2017-05-12 RX ADMIN — LEVOTHYROXINE SODIUM 88 MCG: 88 TABLET ORAL at 05:05

## 2017-05-12 RX ADMIN — MAGNESIUM OXIDE TAB 400 MG (241.3 MG ELEMENTAL MG) 800 MG: 400 (241.3 MG) TAB at 09:05

## 2017-05-12 RX ADMIN — FUROSEMIDE 40 MG: 40 TABLET ORAL at 09:05

## 2017-05-12 RX ADMIN — FAMOTIDINE 20 MG: 20 TABLET, FILM COATED ORAL at 09:05

## 2017-05-12 RX ADMIN — METOPROLOL TARTRATE 25 MG: 25 TABLET ORAL at 09:05

## 2017-05-12 RX ADMIN — OXYCODONE HYDROCHLORIDE 5 MG: 5 TABLET ORAL at 05:05

## 2017-05-12 NOTE — PLAN OF CARE
Problem: Patient Care Overview  Goal: Plan of Care Review  Cipriano Thompson tolerated treatment poorly this morning. Mobility is quite limited by (L) foot pain, requires max (A) to stand and to pivot into BS chair. Unable to ambulate away from bed due to foot pain. Mom confident she can get him to ambulate once time to discharge this PM, not interested in PT attempting to obtain rental wheelchair for home use. Pt discharged to home in PM, has no further needs. D/c from acute PT services.     Ric Fiore, PT  5/12/2017

## 2017-05-12 NOTE — DISCHARGE SUMMARY
[facility]  Discharge Summary      Admit Date: 5/2/2017    Discharge Date and Time:  05/12/2017 9:13 AM    Attending Physician: Bhargav Godfrey MD     Reason for Admission: Pulmonary valve insufficiency    Procedures Performed: Procedure(s) (LRB):  REPLACEMENT-VALVE-PULMONARY (N/A) 27 mm epic tissue valve    Hospital Course (synopsis of major diagnoses, care, treatment, and services provided during the course of the hospital stay):     Cipriano is a 26 year old with TOF and Trisomy 21. He underwent complete TOF repair at 1 year of age at [facility]. He required a bioprosthetic pulmonary valve (27 mm Free Style Medtronic Valve) for severe PI placed in 1999 at [facility]. He subsequently required a dual-chamber pacemaker for sinus node dysfunction in 2008 at [facility]. In 2010 he had a EPS for ventricular ectopy at [facility]. In the last year or so, Mom reports Cipriano has had increasing SOB walking up stairs. No complaints of chest pain or palpitations. Cipriano also has, a history of hypothyroidism and is on Synthroid. He was referred by his cardiologist for possible Jeanna valve implantation. Unfortunately due to his coronary artery anatomy he was not a candidate. He was referred for surgical valve replacement.    He underwent a redo sternotomy and pulmonary valve replacement with a  27 mm St. Quinn Epic valve and 30 mm Dacron   Conduit on 5-2-17. He was transferred to the CVICU in stable but critical condition. He was extubated on POD one. On POD 2 he became desaturated into the 80's. CXR showed pulmonary edema and atelectasis. BIPAP was initiated and his oxygen saturations improved. He was transitioned to 20 L high flow nc the following day. His lines and chest tubes were discontinued. His respiratory status improved and he was able to wean off of his nasal cannula oxygen. He was transferred to the floor where he continued to work on pulmonary toilet and  ambulation. Metoprolol was started for high blood pressure and his frequent PVC's. His CXR remains with atelectasis. An ultrasound was performed to evaluate for pleural effusions which may be contributing to his atelectasis. He was found to have no pleural effusions. We continued to encourage deep breathing and ambulation. His oxygen saturations remain in the low 90's on room air and would drop into the 80's while sleeping.  It is very likely that Cipriano has obstructive sleep apnea and would benefit from bipap at night. We discussed repeating a sleep study as an outpatient. He will also need his pacemaker generator changed in approximately six months.     His discharge echocardiogram showed:   Tetralogy of Fallot s/p prior pulmonary valve replacement and s/p pacemaker  - s/p pulmonary valve replacement with 27mm porcine valve in 30 mm conduit  5/2/17, Bekah.  Technically difficult, suboptimal study.  1. The prosthetic pulmonary valve is not well visualized by 2D. Continuous wave  Doppler across the right ventricular outflow tract and the pulmonary valve  demonstrates a peak velocity of 3 m/sec, peak gradient of 36 mmHg and a mean  of 17 mmHg. No significant pulmonary insufficiency.  2. Pulmonary arteries not well visualized by 2D. No right pulmonary artery  stenosis.  3. Mild right atrial enlargement.  4. Mild to moderate tricuspid valve insufficiency. Normal tricuspid valve velocity.  5. Normal left ventricle structure and size. Significant septal dyskinesis with mildly  diminished left ventricular free wall motion. Overall impression of mildly decreased  left ventricular systolic function.  6. Dilated right ventricle, moderate. Thickened right ventricle free wall, moderate.  Qualitative mildly decreased right ventricular systolic function.  7. The tricuspid regurgitant jet peak velocity is 3.5 m/sec, estimating a right  ventricular pressure of 49 mmHg above the right atrial pressure.  8. No pericardial  effusion.      Consults: pediatric cardiology  Cardiac intensivist    Significant Diagnostic Studies: Labs:   CMP   Recent Labs  Lab 05/11/17  0433 05/12/17  0539   * 136   K 4.1 4.1   CL 96 97   CO2 26 29    108   BUN 35* 32*   CREATININE 1.3 1.3   CALCIUM 9.3 9.2   PROT 7.1 7.0   ALBUMIN 2.9* 2.8*   BILITOT 0.8 0.8   ALKPHOS 165* 163*   AST 21 23   ALT 27 26   ANIONGAP 13 10   ESTGFRAFRICA >60.0 >60.0   EGFRNONAA >60.0 >60.0     Lab Results   Component Value Date    WBC 10.97 05/10/2017    HGB 9.6 (L) 05/12/2017    HCT 28.5 (L) 05/12/2017    MCV 90 05/12/2017     05/12/2017         Final Diagnoses:    Principal Problem: Pulmonary valve replaced   Secondary Diagnoses:   Active Hospital Problems    Diagnosis  POA    *Pulmonary valve replaced [Z95.2]  Not Applicable    PVC's (premature ventricular contractions) [I49.3]  Yes    Pulmonary valve insufficiency [I37.1]  Yes    Pacemaker [Z95.0]  Yes      Resolved Hospital Problems    Diagnosis Date Resolved POA   No resolved problems to display.       Discharged Condition: stable    Disposition: Home or Self Care    Follow Up/Patient Instructions:   Follow-up Information     Follow up with Augustine Dean MD On 5/15/2017.    Specialty:  Pediatric Cardiology    Why:  at one pm    Contact information:    [address]  [address]  [facility] [address]  [phone]          Follow up with Bhargav Godfrey MD On 5/26/2017.    Specialty:  Cardiothoracic Surgery    Contact information:    [address]  [facility] [address]  [phone]            Follow up with Jose GREENE for generator change in three months    Sternal precautions given.   Patient may shower, no baths or swimming    Medications:  Reconciled Home Medications:   Current Discharge Medication List      START taking these medications    Details   furosemide (LASIX) 40 MG tablet Take 1 tablet (40 mg total) by mouth 2 (two) times daily.  Qty: 60 tablet, Refills: 0       metoprolol tartrate (LOPRESSOR) 25 MG tablet Take 1 tablet (25 mg total) by mouth 2 (two) times daily.  Qty: 60 tablet, Refills: 0      oxycodone (ROXICODONE) 5 MG immediate release tablet Take 1 tablet (5 mg total) by mouth every 6 (six) hours as needed.  Qty: 30 tablet, Refills: 0         CONTINUE these medications which have NOT CHANGED    Details   levothyroxine (SYNTHROID) 88 MCG tablet          STOP taking these medications       carvedilol (COREG) 25 MG tablet Comments:   Reason for Stopping:         digoxin (LANOXIN) 250 mcg tablet Comments:   Reason for Stopping:         lisinopril 10 MG tablet Comments:   Reason for Stopping:               Discharge Procedure Orders  Diet general     Shower on day dressing removed (No bath)   Scheduling Instructions: MAY SHOWER DAILY     Lifting restrictions   Scheduling Instructions: No heavy lifting for 6 weeks     Call MD for:  severe uncontrolled pain     Call MD for:  redness, tenderness, or signs of infection (pain, swelling, redness, odor or green/yellow discharge around incision site)     Call MD for:  difficulty breathing or increased cough     Call MD for:  increased confusion or weakness     No dressing needed       Follow-up Information     Follow up with Augustine Dean MD On 5/15/2017.    Specialty:  Pediatric Cardiology    Why:  at one pm    Contact information:    [address]  [address]  [facility] [address]  [phone]          Follow up with Bhargav Godfrey MD On 5/26/2017.    Specialty:  Cardiothoracic Surgery    Contact information:    [address]  [facility] [address]  [phone]

## 2017-05-12 NOTE — NURSING
Pt d/c home per MD order. Telemetry removed. Telemetry room notified. IV access removed and intact x2. VSS. No distress noted. No complaints noted at this time. Pt given and explained medication list and prescriptions. Pt verbalizes complete understanding of all discharge instructions and follow up care. Pt given printed AVS. Sign, copied, placed, and charted.  Family at bedside.  Awaiting escort. Will continue to monitor.

## 2017-05-12 NOTE — PLAN OF CARE
Problem: Patient Care Overview  Goal: Plan of Care Review  Outcome: Ongoing (interventions implemented as appropriate)  Pt remained stable throughout the night. No acute distress noted. Pt remained free from injury. VSS. Pt denies any chest pain or SOB. Keep sats above 85%. Pt understands plan of care. Will continue to monitor.

## 2017-05-12 NOTE — PT/OT/SLP PROGRESS
Physical Therapy  Treatment/Discharge    Cipriano Thompson   MRN: [id number]   Admitting Diagnosis: s/p pulmonary valve replacement, POD #10    PT Received On: 17  PT Start Time: 0934     PT Stop Time: 1000    PT Total Time (min): 26 min       Billable Minutes:  Therapeutic Activity 26    Treatment Type: Treatment  PT/PTA: PT        General Precautions: Standard, fall, sternal, cardiac  Orthopedic Precautions: Sternal    Do you have any cultural, spiritual, Christian conflicts, given your current situation?: Mother has no barriers to learning. Mother verbalizes understanding of his/her program and goals and demonstrates them correctly. No cultural, spiritual or educational needs identified.    Subjective:  Communicated with RN James prior to session, ok to see for treatment this morning.    Pt supine in bed with mom present upon PT entry to room, excited about [birth year] birthday tomorrow but very upset about having to get up and walk or get to chair.    Pain Ratin/10 (FLACC pain rating)  Location - Side: Left  Location - Orientation: generalized  Location: foot  Pain Addressed: Reposition, Distraction, Nurse notified  Pain Rating Post-Intervention: 9/10 (FLACC)    Objective:   Patient found with: telemetry    Functional Mobility:    Bed Mobility:   Supine to Sit: Contact Guard Assistance    Transfers:  Sit <> Stand Assistance: Maximum Assistance x 2 trials from EOB  Sit <> Stand Assistive Device: No Assistive Device    Bed <> Chair Technique: Stand Pivot  Bed <> Chair Assistance: Maximum Assistance x 1 trial from EOB to BS chair, limited by (L) foot pain  Bed <> Chair Assistive Device: No Assistive Device    Gait:   Gait Distance: Pt unable to ambulate due to (L) foot pain, refusing to try. Req'd max (A) of therapist to stand pivot from bed to chair, very agitated about having to be up in chair.    Assistance 1: Maximum assistance  Gait Assistive Device: No device  Gait Pattern: 2-point gait  Gait Deviation(s): decreased  "lance, increased time in double stance, decreased velocity of limb motion, decreased stride length, decreased step length    Balance:   Static Sit: SBA at EOB    Static Stand: max (A), limited by (L) foot pain    Therapeutic Activities and Exercises:  1. Pt very resistive to walking due to (L) foot pain. Mom confident she can get Cipriano to walk, believes it is mostly behavior that he doesn't want to walk. Asked mom if she wanted PT to pursue getting a wheelchair for home use and she said "No way, we'll be fine. I can get him to walk, trust me." Mom with no further needs, patient agitated about having to be up in chair but easily distracted by drawing his art.     AM-PAC 6 CLICK MOBILITY  How much help from another person does this patient currently need?   1 = Unable, Total/Dependent Assistance  2 = A lot, Maximum/Moderate Assistance  3 = A little, Minimum/Contact Guard/Supervision  4 = None, Modified [place]/Independent    Turning over in bed (including adjusting bedclothes, sheets and blankets)?: 4  Sitting down on and standing up from a chair with arms (e.g., wheelchair, bedside commode, etc.): 2  Moving from lying on back to sitting on the side of the bed?: 4  Moving to and from a bed to a chair (including a wheelchair)?: 2  Need to walk in hospital room?: 2  Climbing 3-5 steps with a railing?: 1  Total Score: 15    AM-PAC Raw Score CMS G-Code Modifier Level of Impairment Assistance   6 % Total / Unable   7 - 9 CM 80 - 100% Maximal Assist   10 - 14 CL 60 - 80% Moderate Assist   15 - 19 CK 40 - 60% Moderate Assist   20 - 22 CJ 20 - 40% Minimal Assist   23 CI 1-20% SBA / CGA   24 CH 0% Independent/ Mod I     Patient left up in chair with all lines intact, call button in reach and RN, mom present.    Assessment:  Cipriano Thompson tolerated treatment poorly this morning. Mobility is quite limited by (L) foot pain, requires max (A) to stand and to pivot into BS chair. Unable to ambulate away from bed due to " foot pain. Mom confident she can get him to ambulate once time to discharge this PM, not interested in PT attempting to obtain rental wheelchair for home use. Pt discharged to home in PM, has no further needs. D/c from acute PT services.    Rehab identified problem list/impairments: weakness, impaired endurance, impaired functional mobilty, impaired self care skills, pain, orthopedic precautions, gait instability, impaired balance    Rehab potential is fair.    Activity tolerance: Poor due to (L) foot pain    Discharge recommendations: home     Barriers to discharge: None    Equipment recommendations: none     GOALS:   Physical Therapy Goals        Problem: Physical Therapy Goal    Goal Priority Disciplines Outcome Goal Variances Interventions   Physical Therapy Goal     PT/OT, PT      Description:  Pt discharged to home on , see progress towards acute care goals below:    Patient will increase functional independence with mobility by performin. Supine to sit with Stand-by Assistance - MET (5/10)  2. Sit to supine with Stand-by Assistance - Not met  3. Sit to stand transfer with Stand-By Assistance - MET ()  4. Bed to chair transfer with Stand-by Assistance - Not met  5. Gait x 200 feet with Stand-by Assistance - MET ()  6. Sit to stand mod (I) - Not met  7. Gait x 500 ft with supervision - Not met               PLAN:    Discharge from acute PT services.    Plan of Care reviewed with: patient, mother     Ric Fiore, PT  2017

## 2017-05-12 NOTE — PROGRESS NOTES
[facility]  Pediatric Cardiology  Progress Note    Patient Name: Cipriano Thompson  MRN: [id number]  Admission Date: 5/2/2017  Hospital Length of Stay: 10 days  Code Status: Full Code   Attending Physician: Bhargav Godfrey MD   Primary Care Physician: Princess KHUSHBOO Rodgers MD  Expected Discharge Date: 5/12/2017  Principal Problem:Pulmonary valve replaced    Subjective:   S/P 27 mm epic tissue valve   Patient had a stable night.  Patient has remained off of nc oxygen.  Remained afebrile          Vitals:    05/12/17 0700   BP:    Pulse: 76   Resp:    Temp:      SpO2: 90 on ra    Constitutional: He appears well-developed and well-nourished. On BiPap in no acute distress.  HENT:   Head: Normocephalic and atraumatic.   Mouth/Throat: Oropharynx is clear and moist. Down's facies   Eyes: Conjunctivae are normal. Pupils are equal, round, and reactive to light.   Neck: Neck supple.   Cardiovascular: Normal rate, regular rhythm, S1 normal and S2 split. Infrequent extrasystoles are present. Exam reveals no gallop and no friction rub.   Pulses:  Radial pulses are 1+ on the left side.   Dorsalis pedis pulses are 1+ on the left side.   There is a harsh 2/6 systolic ejection murmur heard best at the LUSB. Warm distal extremities.   Pulmonary/Chest: No tachypnea, no retractions, symmetric breath sounds. He has no wheezes. He has no rales.decreased bs to bases  Abdominal: Soft. Bowel sounds are normal. He exhibits no distension. There is no hepatosplenomegaly. There is no tenderness.   Musculoskeletal: He exhibits no edema or tenderness.   Neurological: He exhibits normal muscle tone.   Skin: Skin is dry. No cyanosis. No pallor. Nails show no clubbing.       I/O last 3 completed shifts:  In: 1740 [P.O.:1740]  Out: 3375 [Urine:3375]       Lab Results   Component Value Date    WBC 10.97 05/10/2017    HGB 9.6 (L) 05/12/2017    HCT 28.5 (L) 05/12/2017    MCV 90 05/12/2017     05/12/2017     BMP  Lab Results    Component Value Date     05/12/2017    K 4.1 05/12/2017    CL 97 05/12/2017    CO2 29 05/12/2017    BUN 32 (H) 05/12/2017    CREATININE 1.3 05/12/2017    CALCIUM 9.2 05/12/2017    ANIONGAP 10 05/12/2017    ESTGFRAFRICA >60.0 05/12/2017    EGFRNONAA >60.0 05/12/2017     JAKE (post): Pulmonary valve velocity <2.5 m/sec. Mild to moderate TR with RV pressure estimate of 46 mmHg. Mildly dilated RV with low normal systolic function. Low normal LV systolic function.   --  Echo 5-8-17  Tetralogy of Fallot s/p prior pulmonary valve replacement and s/p pacemaker  - s/p pulmonary valve replacement with 27mm porcine valve in 30 mm conduit  5/2/17, [place].  Technically difficult, suboptimal study.  Prosthetic pulmonary valve  No significant pulmonary insufficiency. Mildly increased pulmonary valve velocity of  2 m/sec, peak gradient 15mmHg. With continuous wave Doppler across the  RVOT, peak velocity of 2.8 m/sec, peak gradient 30mmHg, mean 20mmHg.  Pulmonary arteries not well visualized.  Mild tricuspid valve insufficiency.  Peak TR gradient of 69mmHg consistent with increased RV pressure.  Mild right atrial enlargement.  Dilated right ventricle, mild. Thickened right ventricle free wall, moderate. Qualative  mildly decreased right ventricular systolic function.  Normal left ventricle structure and size.  Significant septal dyskinesis with mildly diminished LV free wall motion. Unable to  measure EF due to poor image quality.  Overall impression of mildly decreased left ventricular systolic function.  No pericardial effusion      Assessment and Plan:     Cardiac  Pulmonary valve insufficiency  26 y.o. male with Down's syndrome, TOF s/p repair as infant, and sinus node dysfunction s/p pacemaker 2008, now s/p pulmonary valve replacement with a 27 mm St. Quinn epic bioprosthetic valve (5/2/17).  Currently weaning respiratory support and diuresing.  Plan:   Neuro/Pain:  - Pain control with tylenol prn, oxycodone  prn  Respiratory:  - wean NC as tolerated  - Goal sats >85%  -us of chest-no pleural effusion  Cardiovascular:  - Hypertensive - Metoprolol started for BP control and history of frequent ventricular ectopy,  will continue to monitor  - Pacemaker interrogated by EP: frequent PVC's, no atrial arrhythmia, generator requires replacement in 6-9 months  FEN/GI:  - Regular diet  - Monitor electrolytes and replace as needed, stable this morning   - Continue Lasix, change to 40mg PO bid   Renal:  - Monitor I/O's closely  -cr stable  Heme/ID:  - S/p Ancef x 48 hours post-op  - H/H stable.   Disposition:    -d/c home today  -will need outpatient sleep study for BIJAN      BHARAT Cobb-C  Pediatric Cardiology  [facility]

## 2017-05-15 ENCOUNTER — HOSPITAL ENCOUNTER (OUTPATIENT)
Dept: RADIOLOGY | Facility: OTHER | Age: 27
Discharge: HOME | End: 2017-05-15
Attending: PEDIATRICS
Payer: MEDICAID

## 2017-05-15 DIAGNOSIS — Z95.2 PULMONARY VALVE REPLACED: ICD-10-CM

## 2017-05-15 DIAGNOSIS — Q90.9 DOWN SYNDROME: ICD-10-CM

## 2017-05-15 DIAGNOSIS — E03.1 CONGENITAL HYPOTHYROIDISM WITHOUT GOITER: ICD-10-CM

## 2017-05-15 PROBLEM — E03.9 HYPOTHYROID: Status: ACTIVE | Noted: 2017-05-15

## 2017-05-15 PROCEDURE — 71020 XR CHEST PA AND LATERAL: CPT | Mod: 26,,, | Performed by: RADIOLOGY

## 2017-05-15 PROCEDURE — 73630 X-RAY EXAM OF FOOT: CPT | Mod: 50,TC

## 2017-05-15 PROCEDURE — 73630 X-RAY EXAM OF FOOT: CPT | Mod: 26,50,, | Performed by: RADIOLOGY

## 2017-05-15 PROCEDURE — 71020 XR CHEST PA AND LATERAL: CPT | Mod: TC

## 2017-05-16 ENCOUNTER — PATIENT OUTREACH (OUTPATIENT)
Dept: ADMINISTRATIVE | Facility: CLINIC | Age: 27
End: 2017-05-16
Payer: MEDICAID

## 2017-05-16 NOTE — PATIENT INSTRUCTIONS
Heart Valve Surgery  Once a valve problem has been diagnosed and surgery scheduled, youll have some things to do. Some preparations will help make your surgery go smoothly. Some will help you get set up for your return home from the hospital. And others will help you feel more at ease. Your doctor will talk with you about the risks. Write down all your questions in advance so you dont forget to ask them.  The week before surgery  Steps you take before your surgery can help make both the surgery and recovery go smoothly. Follow your doctors instructions.  · Ask your doctor about scheduling any dental work you might need before your surgery. Ask your doctor if you need to take antibiotics when you have dental work. Dental work could let bacteria into your bloodstream. This can cause infection on a new valve.  · Give your doctor a list of every medicine you take. This includes supplements and over-the-counter medicines. Your doctor may have you stop taking some of them or start taking others before surgery.  · If you smoke, quit right away. You will do better during and after surgery.  · Arrange for an adult family member or friend to drive you home from the hospital. Have a helper available for your first week or two at home.  · Prepare and freeze food. Or arrange to have food brought in while you recover.  · Make changes around your home to make it easier to get around. One example is to reduce the need to climb stairs.  The day before surgery  · You may be asked to wash with special soap the night before surgery. The morning of surgery, dont use deodorant, lotion, or perfume.  · Dont eat or drink anything after midnight, the night before surgery.  During your surgery  · Your surgeon will first gain access to your heart. To get to the heart, the surgeon usually makes a cut (incision) down the center of the chest. The breastbone (sternum) is then . If your surgeon plans to reach your heart by a  different means, he or she will discuss it with you.  · A heart-lung machine will put oxygen in your blood so your heart and lungs can be still during the surgery.  · The surgeon will either fix or replace the problem valve. If you have another heart problem, the surgeon may do a second procedure at the same time to take care of it, too.  · After surgery is done, the surgeon rejoins the breastbone with wires. The incision is then closed. In many cases, the breastbone heals in 6-8 weeks.  3 ways to treat problem valves  Different problems call for different treatments. Your doctor will talk with you in advance about the treatment that is best for you. In some cases, though, the plan may need to change once surgery has begun. The 3 basic ways to treat valve problems during surgery are:  · Repair of the valve. Whenever they can, surgeons prefer to fix a valve rather than replace it. The most common kind of repair involves sewing a ring around the entrance to a valve to improve its size or shape. Another involves cutting tissue to let leaflets open or close better. When repair isnt possible, the valve will be replaced.  · Replacement with a mechanical valve. Mechanical valves are made of metal or hard carbon. There are many designs. Valves can last for decades. But blood tends to stick to them, forming clots. So if you get a mechanical valve, you have to take warfarin for life. This is an anticoagulant medicine that prevents blood clots.  · Replacement with a tissue valve. A tissue valve usually comes from a pig or a cow. Blood does not clot as easily on tissue valves. So patients getting tissue valves may need warfarin for only a short time. Aspirin is sometimes used instead. Tissue valves may wear out faster than mechanical valves. So they may have to be replaced sooner.         Recovering after surgery: In the hospital  After surgery, youll spend at least a day in the intensive care unit (ICU). Highly trained  nurses will monitor you closely. When youre ready, you will be moved to a general care room. Youll stay there for 5-6 days. While there, youll recover further and get ready to go home.  Recovering after surgery: At home  Youve just come through one of the major events of your life. So give yourself time to get better little by little. Expect good days and bad days. At first you may tire easily. But being active will help you recover. Find the right balance between rest and activity. And follow all instructions youre given.  Risks and complications of heart valve surgery  Most valve surgeries have an excellent outcome. But any major surgery carries risk. Valve surgery risks include:  · Bleeding, or the need for a transfusion  · Infection  · Blood clot  · Heart rhythm problems, stroke, heart attack, or death  · Problems in the lungs or kidneys  · Failure of the new or repaired valve  · Damage to the heart      When to call your doctor  During your recovery, call your doctor if you:  · Are short of breath while resting, or after only a little exertion  · Notice your heart beating fast or slow or skipping beats (palpitations)  · Gain more than 2 pounds in 1 day(s) or 5 pounds in 7 days, or your legs swell (retaining fluids)  · Feel dizzy or lightheaded  · Have nausea or vomiting that doesn't go away  · Have a fever of 100°F (38°C) or higher, or as directed by your healthcare provider  · Notice changes in your incision, such as swelling, oozing, or getting red or tender  · Have pain in your chest or shoulder that gets worse instead of better  · Have clicking or grinding in your breastbone   Date Last Reviewed: 10/1/2016  © 6731-5472 [employer]. [address] [place] [address], [place] [address]. All rights reserved. This information is not intended as a substitute for professional medical care. Always follow your healthcare professional's instructions.

## 2017-05-26 ENCOUNTER — DOCUMENTATION ONLY (OUTPATIENT)
Dept: CARDIAC SURGERY | Facility: CLINIC | Age: 27
End: 2017-05-26

## 2017-05-26 ENCOUNTER — HOSPITAL ENCOUNTER (OUTPATIENT)
Dept: RADIOLOGY | Facility: HOSPITAL | Age: 27
Discharge: HOME | End: 2017-05-26
Attending: THORACIC SURGERY (CARDIOTHORACIC VASCULAR SURGERY)
Payer: MEDICAID

## 2017-05-26 ENCOUNTER — OFFICE VISIT (OUTPATIENT)
Dept: PEDIATRIC CARDIOLOGY | Facility: CLINIC | Age: 27
End: 2017-05-26
Payer: MEDICAID

## 2017-05-26 ENCOUNTER — HOSPITAL ENCOUNTER (OUTPATIENT)
Dept: PEDIATRIC CARDIOLOGY | Facility: CLINIC | Age: 27
Discharge: HOME | End: 2017-05-26
Payer: MEDICAID

## 2017-05-26 ENCOUNTER — OFFICE VISIT (OUTPATIENT)
Dept: VASCULAR SURGERY | Facility: CLINIC | Age: 27
End: 2017-05-26
Payer: MEDICAID

## 2017-05-26 VITALS
SYSTOLIC BLOOD PRESSURE: 120 MMHG | HEIGHT: 68 IN | BODY MASS INDEX: 33.58 KG/M2 | DIASTOLIC BLOOD PRESSURE: 75 MMHG | HEART RATE: 101 BPM | HEIGHT: 68 IN | OXYGEN SATURATION: 97 % | HEART RATE: 101 BPM | WEIGHT: 221.56 LBS | DIASTOLIC BLOOD PRESSURE: 75 MMHG | OXYGEN SATURATION: 97 % | BODY MASS INDEX: 33.58 KG/M2 | SYSTOLIC BLOOD PRESSURE: 120 MMHG | WEIGHT: 221.56 LBS

## 2017-05-26 DIAGNOSIS — Q90.9 TRISOMY 21: ICD-10-CM

## 2017-05-26 DIAGNOSIS — Q21.3 TETRALOGY OF FALLOT: ICD-10-CM

## 2017-05-26 DIAGNOSIS — Q90.9 DOWN SYNDROME: ICD-10-CM

## 2017-05-26 DIAGNOSIS — Z87.74 TETRALOGY OF FALLOT S/P REPAIR: ICD-10-CM

## 2017-05-26 DIAGNOSIS — E03.9 HYPOTHYROIDISM, UNSPECIFIED TYPE: Primary | ICD-10-CM

## 2017-05-26 DIAGNOSIS — I11.9 RIGHT VENTRICULAR HYPERTENSION: ICD-10-CM

## 2017-05-26 DIAGNOSIS — I37.1 PULMONARY VALVE INSUFFICIENCY, UNSPECIFIED ETIOLOGY: ICD-10-CM

## 2017-05-26 DIAGNOSIS — Z95.0 PACEMAKER: ICD-10-CM

## 2017-05-26 DIAGNOSIS — Z87.74 S/P SURGERY FOR COMPLEX CONGENITAL HEART DISEASE: ICD-10-CM

## 2017-05-26 DIAGNOSIS — Z95.2 S/P PULMONARY VALVE REPLACEMENT: ICD-10-CM

## 2017-05-26 DIAGNOSIS — I37.2 PULMONARY VALVE STENOSIS WITH INSUFFICIENCY, UNSPECIFIED ETIOLOGY: Primary | ICD-10-CM

## 2017-05-26 DIAGNOSIS — I49.3 PVC'S (PREMATURE VENTRICULAR CONTRACTIONS): ICD-10-CM

## 2017-05-26 DIAGNOSIS — I37.9 PULMONARY VALVE DISORDER: ICD-10-CM

## 2017-05-26 DIAGNOSIS — Z95.2 PULMONARY VALVE REPLACED: ICD-10-CM

## 2017-05-26 PROCEDURE — 99999 PR PBB SHADOW E&M-EST. PATIENT-LVL III: CPT | Mod: PBBFAC,,, | Performed by: PHYSICIAN ASSISTANT

## 2017-05-26 PROCEDURE — 93320 DOPPLER ECHO COMPLETE: CPT | Mod: 26,S$PBB,, | Performed by: PEDIATRICS

## 2017-05-26 PROCEDURE — 99024 POSTOP FOLLOW-UP VISIT: CPT | Mod: ,,, | Performed by: PHYSICIAN ASSISTANT

## 2017-05-26 PROCEDURE — 99999 PR PBB SHADOW E&M-EST. PATIENT-LVL III: CPT | Mod: PBBFAC,,, | Performed by: PEDIATRICS

## 2017-05-26 PROCEDURE — 99213 OFFICE O/P EST LOW 20 MIN: CPT | Mod: PBBFAC,25,27,PO | Performed by: PEDIATRICS

## 2017-05-26 PROCEDURE — 99213 OFFICE O/P EST LOW 20 MIN: CPT | Mod: 25,S$PBB,, | Performed by: PEDIATRICS

## 2017-05-26 PROCEDURE — 71020 XR CHEST PA AND LATERAL: CPT | Mod: 26,,, | Performed by: RADIOLOGY

## 2017-05-26 PROCEDURE — 93303 ECHO TRANSTHORACIC: CPT | Mod: 26,S$PBB,, | Performed by: PEDIATRICS

## 2017-05-26 PROCEDURE — 99213 OFFICE O/P EST LOW 20 MIN: CPT | Mod: PBBFAC,25,PO | Performed by: PHYSICIAN ASSISTANT

## 2017-05-26 PROCEDURE — 93325 DOPPLER ECHO COLOR FLOW MAPG: CPT | Mod: 26,S$PBB,, | Performed by: PEDIATRICS

## 2017-05-26 NOTE — PROGRESS NOTES
"Cipriano here today with mother for post op visit. Midline incision healing well approximated, no erythema, no edema, no discharge noted. CT sites intact, sutures in place to r site, no erythema, no edema no discharge noted.  Generally "cranky" c/o chest soreness, last dose of oxycodone on 5/24/17 evening, taking aleve with good results. Good appetite, active and continuing to do IS at home. Currently not on synthroid. Checking level today per pediatric cardiology--Dr Diggs.  "

## 2017-05-28 NOTE — PROGRESS NOTES
Cipriano Thompson is a 27 y.o. male status-post PULMONARY VALVE REPLACEMENT. He has done very well since discharge. He was discharged home on his thyroid medication but did not take it for a week. Mom has no concerns.     Medications and Allergies:  Reviewed and updated today.    Vitals:  Vitals:    05/26/17 0849   BP: 120/75   Pulse: 101       Physical Exam:  CV: I/VI MARIELA  RESP: lungs clear b/l  Abd: soft, ND/NT  Skin: Sternum stable, MSI healing well        Plan:    Cipriano is a 27 year old s/p pulmonary valve replacement. He has done very well since discharge. His CXR has improved. His MSI is healing well. There is no need for further surgical follow up. He should continue to follow up with cardiology. We discussed the importance of taking his medication. All questions and concerns were addressed.   Alexia Tao PA-C

## 2017-05-31 RX ORDER — FUROSEMIDE 40 MG/1
40 TABLET ORAL DAILY
Qty: 60 TABLET | Refills: 0 | Status: ON HOLD | OUTPATIENT
Start: 2017-05-31 | End: 2017-09-13

## 2017-05-31 NOTE — PROGRESS NOTES
[facility]  Cipriano Thompson  1990      HPI:   I had the pleasure of evaluating Cipriano, who is here today with his mother for follow up of tetralogy of Fallot s/p pulmonary valve replacement. Cipriano is a 27 year old with TOF and Trisomy 21. He underwent complete TOF repair at 1 year of age at [facility]. He required a bioprosthetic pulmonary valve (27 mm Free Style Medtronic Valve) for severe PI placed in 1999 at [facility]. He subsequently required a dual-chamber pacemaker for sinus node dysfunction in 2008 at [facility] [place]. Mom reported Cipriano had increasing SOB walking up stairs. No complaints of chest pain or palpitations. Cipriano also has, a history of hypothyroidism and is on Synthroid. He was referred by his cardiologist for possible Jeanna valve implantation. Unfortunately due to his coronary artery anatomy he was not a candidate. He was referred for surgical valve replacement. He underwent a redo sternotomy and pulmonary valve replacement with a  27 mm St. Quinn Epic valve and 30 mm Dacron Conduit on 5-2-17. Please discharge summary for details on the hospitalization but in short he developed flash pulmonary edema on POD2 that improved on BiPap and recommendations were made upon discharge to continue BiPap at home. While in the hospital he was also started on Metoprolol for hypertension and increased ectopy.   His discharge echocardiogram on 5/10 demonstrated:   1. The prosthetic pulmonary valve is not well visualized by 2D. Continuous wave  Doppler across the right ventricular outflow tract and the pulmonary valve  demonstrates a peak velocity of 3 m/sec, peak gradient of 36 mmHg and a mean  of 17 mmHg. No significant pulmonary insufficiency.  2. Pulmonary arteries not well visualized by 2D. No right pulmonary artery  stenosis.  3. Mild right atrial enlargement.  4. Mild to moderate tricuspid valve insufficiency. Normal tricuspid valve velocity.  5. Normal left  ventricle structure and size. Significant septal dyskinesis with mildly  diminished left ventricular free wall motion. Overall impression of mildly decreased  left ventricular systolic function.  6. Dilated right ventricle, moderate. Thickened right ventricle free wall, moderate.  Qualitative mildly decreased right ventricular systolic function.  7. The tricuspid regurgitant jet peak velocity is 3.5 m/sec, estimating a right  ventricular pressure of 49 mmHg above the right atrial pressure.  8. No pericardial effusion.    He has since been evaluated by his primary cardiologist Dr. Dean on 5/15 and was generally well except for complaints of foot pain and issues with medication compliance. A foot xray was reportedly negative and they are managing his hypothyroidism. Mom reports that he still occasionally complains of foot pain and chest soreness, though he is not following sternal precautions and is picking up his heavy dog. Otherwise he has not required anything stronger than ibuprofen over the last several days and she denies fever, shortness of breath, fatigue or appetite changes. He does seem to be crankier than usual and is still off his thyroid medications. He refuses to wear BiPap at home.  There are no reports of cyanosis, dyspnea, palpitations, syncope and tachypnea. No other cardiovascular or medical concerns are reported.     Medications:   Current Outpatient Prescriptions on File Prior to Visit   Medication Sig    furosemide (LASIX) 40 MG tablet Take 1 tablet (40 mg total) by mouth 2 (two) times daily.    metoprolol tartrate (LOPRESSOR) 25 MG tablet Take 1 tablet (25 mg total) by mouth 2 (two) times daily.    oxycodone (ROXICODONE) 5 MG immediate release tablet Take 1 tablet (5 mg total) by mouth every 6 (six) hours as needed.    levothyroxine (SYNTHROID) 88 MCG tablet      No current facility-administered medications on file prior to visit.      Allergies:   Review of patient's allergies  "indicates:   Allergen Reactions    Latex, natural rubber     Sulfa (sulfonamide antibiotics)        ROS:     Review of Systems   Constitutional: Negative for activity change, appetite change, fatigue and fever.   HENT: Negative for congestion.    Respiratory: Negative for cough and shortness of breath.    Cardiovascular: Positive for chest pain. Negative for palpitations.   Gastrointestinal: Negative for abdominal pain, diarrhea and vomiting.   Skin: Negative for color change and pallor.   Neurological: Negative for dizziness, syncope and headaches.     Remainder of review of systems is negative except as noted in the HPI.    Objective:   Vitals:    05/26/17 0843   BP: 120/75   BP Location: Left arm   Pulse: 101   SpO2: 97%   Weight: 100.5 kg (221 lb 9 oz)   Height: 5' 7.52" (1.715 m)       Physical Exam   Constitutional: He is oriented to person, place, and time. He appears well-developed and well-nourished. No distress.   Down's facies   HENT:   Head: Normocephalic.   Eyes: Conjunctivae are normal. Pupils are equal, round, and reactive to light.   Neck: Neck supple. No thyromegaly present.   Cardiovascular: Normal rate, regular rhythm and S1 normal.  Exam reveals no gallop and no friction rub.    No murmur heard.  Pulses:       Radial pulses are 2+ on the left side.        Dorsalis pedis pulses are 2+ on the left side.   Split S2   Pulmonary/Chest: Effort normal and breath sounds normal. He has no wheezes. He has no rales. He exhibits no tenderness.   Sternal wound c/d/i   Abdominal: Soft. He exhibits no distension and no mass. There is no tenderness. There is no guarding.   Unable to palpate liver edge   Musculoskeletal: Normal range of motion. He exhibits no edema.   Neurological: He is alert and oriented to person, place, and time. He exhibits normal muscle tone.   Skin: Skin is warm and dry. Capillary refill takes less than 2 seconds. He is not diaphoretic.   Psychiatric: He has a normal mood and affect. "       Tests:   I evaluated the following:   Echocardiogram:   1. The prosthetic pulmonary valve leaflets appear mobile. Continuous wave Doppler across the right ventricular outflow tract and the pulmonary valve  demonstrates a peak velocity of 2.3 m/sec, peak gradient of 21 mmHg and a mean of 15 mmHg. No significant pulmonary insufficiency.  2. Pulmonary arteries not well visualized by 2D. No right or left pulmonary artery stenosis.  3. Mild right atrial enlargement.  4. Mild tricuspid valve insufficiency. Normal tricuspid valve velocity.  5. Normal left ventricle structure and size. There is septal dyskinesis with normal left ventricular systolic function.  6. Dilated right ventricle, moderate. Thickened right ventricle free wall, moderate. Qualitative mildly decreased right ventricular systolic function.  7. The tricuspid regurgitant jet peak velocity is 2.4 m/sec, estimating a right ventricular pressure of 23 mmHg above the right atrial pressure.  8. No pericardial effusion.  (Full report in electronic medical record)    CXR: No pleural effusion, pulmonary edema or focal consolidation      Assessment:   Diagnosis:  1. Tetralogy of Fallot  - s/p pulmonary valve replacement with a  27 mm St. Quinn Epic valve and 30 mm Dacron Conduit (5/2/17)  2. Trisomy 21  3. Hypothyroidism  - Not currently on medication  4. Frequent ectopy  - On metoprolol  5. Hypertension  6. Suspected obstructive sleep apnea   - history of BIJAN when younger diagnosed by sleep study    Cipriano is a 26 yo male with the above problems. He is currently afebrile and hemodynamically stable with echocardiographic evidence of improving RV pressure and no significant pulmonary valve stenosis or regurgitation. His right heart function is difficult to evaluate given the poor acoustic windows but it appears unchanged so I believe the gradients we obtained were real and not secondary to decreased RV contractility. He appears to be recovering well from surgery  and his primary cardiologist are working on his thyroid issues that could be contributing to his irritability. He would significantly benefit from using BiPap at night but refuses to wear it. In the meantime his CXR looks relatively clear and he can go to daily lasix for now.     Plan:   1. Decrease Lasix to qd  2. Continue Metoprolol  3. Reinforce sternal precautions  4. Will require SBE prophylaxis  5. Schedule to see Dr. Ortiz with EP Shira Diggs MD  Pediatric Cardiology  [facility] [address] [place]  [address]  [phone]

## 2017-06-01 NOTE — PT/OT/SLP DISCHARGE
Occupational Therapy Discharge Summary    Cipriano Thompson  MRN: [id number]   Pulmonary valve replaced   Patient Discharged from acute Occupational Therapy on 5/12/17.  Please refer to prior OT note dated on 5/10/17 for functional status.     Assessment:   Patient appropriate for care in another setting.  GOALS:    Occupational Therapy Goals     Not on file          Multidisciplinary Problems (Resolved)        Problem: Occupational Therapy Goal    Goal Priority Disciplines Outcome Interventions   Occupational Therapy Goal   (Resolved)     OT, PT/OT Outcome(s) achieved    Description:  Goals to be met by: 5/13/17     Patient will increase functional independence with ADLs by performing:    UE Dressing with Supervision.  LE Dressing with Supervision.  Grooming while standing at sink with Supervision.  Toileting from toilet with Supervision for hygiene and clothing management.   Supine to sit with Supervision.  Stand pivot transfers with Supervision.  Toilet transfer to toilet with Supervision.                    Reasons for Discontinuation of Therapy Services  Transfer to alternate level of care.      Plan:  Patient Discharged to: Home.    DONNIE Jacobo  6/1/2017

## 2017-06-06 DIAGNOSIS — Z95.0 PACEMAKER: Primary | ICD-10-CM

## 2017-06-15 ENCOUNTER — OFFICE VISIT (OUTPATIENT)
Dept: PEDIATRIC CARDIOLOGY | Facility: CLINIC | Age: 27
End: 2017-06-15
Attending: PEDIATRICS
Payer: MEDICAID

## 2017-06-15 VITALS
SYSTOLIC BLOOD PRESSURE: 127 MMHG | OXYGEN SATURATION: 96 % | BODY MASS INDEX: 33.48 KG/M2 | DIASTOLIC BLOOD PRESSURE: 83 MMHG | HEIGHT: 67 IN | WEIGHT: 213.31 LBS | HEART RATE: 109 BPM

## 2017-06-15 DIAGNOSIS — Z87.74 S/P SURGERY FOR COMPLEX CONGENITAL HEART DISEASE: ICD-10-CM

## 2017-06-15 DIAGNOSIS — Z95.0 PACEMAKER: ICD-10-CM

## 2017-06-15 DIAGNOSIS — Q21.3 TETRALOGY OF FALLOT: Primary | ICD-10-CM

## 2017-06-15 DIAGNOSIS — Q90.9 DOWN SYNDROME: ICD-10-CM

## 2017-06-15 DIAGNOSIS — I49.3 PVC'S (PREMATURE VENTRICULAR CONTRACTIONS): ICD-10-CM

## 2017-06-15 DIAGNOSIS — Q24.9 ADULT CONGENITAL HEART DISEASE: ICD-10-CM

## 2017-06-15 PROCEDURE — 99214 OFFICE O/P EST MOD 30 MIN: CPT | Mod: 25,S$GLB,, | Performed by: PEDIATRICS

## 2017-06-15 PROCEDURE — 93280 PM DEVICE PROGR EVAL DUAL: CPT | Mod: 26,S$PBB,, | Performed by: PEDIATRICS

## 2017-06-15 NOTE — LETTER
June 18, 2017        Augustine Dean MD  [address]  [address]  [facility] [address]             [facility] - Pediatric [facility]iology  [address] [place] [address] [address] [place] [id number]  Phone: [phone]  Fax: [phone]   Patient: Cipriano Thompson   MR Number: [id number]   YOB: 1990   Date of Visit: 6/15/2017       Dear Dr. Dean:    Thank you for referring Cipriano Thompson to me for evaluation. Attached you will find relevant portions of my assessment and plan of care.    If you have questions, please do not hesitate to call me. I look forward to following Cipriano Thompson along with you.    Sincerely,      Jose Ortiz MD            CC  MD Kenisha Hallosure

## 2017-06-18 PROBLEM — Q24.9 ADULT CONGENITAL HEART DISEASE: Status: ACTIVE | Noted: 2017-06-18

## 2017-06-19 NOTE — PROGRESS NOTES
Thank you for referring your patient Cipriano Thompson to the electrophysiology clinic for consultation. The patient is accompanied by his mother. Please review my findings below.    CHIEF COMPLAINT: Outpatient EP follow up    HISTORY OF PRESENT ILLNESS:   Cipriano is a 27 y.o. Male with Down's syndrome, TOF s/p complete repair as an infant with subsequent PVR in 1999 for right ventricular dilation and pacemaker placement in 2008 for Sinus node dysfunction. He presented with exercise intolerance and moderate PS/PI so he was referred for repeat PVR. Also with history of hypertension and ventricular ectopy with frequent PVC's. His home medication regimen includes Carvedilol 25 mg daily, Digoxin 250 mcg qd, Lisinopril 10 mg qd and Synthroid. He had a cardiac catheterization for possible Jeanna valve implantation on 4/4/17 and were unable to secondary to coronary artery anatomy. He underwent surgical pulmonary valve placement on 5/2/17.  He had frequent PVC's in the post op period.  He had his pacemaker interrogated post op but pacemaker was working appropriately although showed ~9months to RINA.    He presents today for EP follow up at the [facility].  He reports clinically feeling well.  He is not complaining of chest pain or palpitations.  He has no difficulty breathing by report.  He did have intermittent foot swelling by report of unclear etiology.  He has no interval syncope.  He is taking Metoprolol tartate 25 mg po BID since discharge.      REVIEW OF SYSTEMS:     GENERAL: No fever, chills, fatigability or weight loss.  SKIN: No rashes, itching or changes in color or texture of skin.  CHEST: Denies ECHEVERRIA, cyanosis, wheezing, cough and sputum production.  CARDIOVASCULAR: Denies chest pain or reduced exercise tolerance.  ABDOMEN: Appetite fine. No weight loss. Denies diarrhea, abdominal pain, or vomiting.  PERIPHERAL VASCULAR: No claudication or cyanosis.  MUSCULOSKELETAL: No joint stiffness or swelling.   NEUROLOGIC: No  "history of seizures,  alteration of gait or coordination.    PAST MEDICAL HISTORY:   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    Tetralogy of Fallot 05/1990           FAMILY HISTORY:   Family History   Problem Relation Age of Onset    No Known Problems Mother     No Known Problems Sister          SOCIAL HISTORY:   Social History     Social History    Marital status: Single     Spouse name: N/A    Number of children: N/A    Years of education: N/A     Occupational History    Not on file.     Social History Main Topics    Smoking status: Never Smoker    Smokeless tobacco: Not on file    Alcohol use No    Drug use: Unknown    Sexual activity: Not Currently     Other Topics Concern    Not on file     Social History Narrative    Lives with mother, 1 dog (inside)       ALLERGIES:  Review of patient's allergies indicates:   Allergen Reactions    Latex, natural rubber     Sulfa (sulfonamide antibiotics)        MEDICATIONS:    Current Outpatient Prescriptions:     furosemide (LASIX) 40 MG tablet, Take 1 tablet (40 mg total) by mouth once daily. (Patient taking differently: Take 20 mg by mouth once daily. ), Disp: 60 tablet, Rfl: 0    levothyroxine (SYNTHROID) 88 MCG tablet, , Disp: , Rfl:     metoprolol tartrate (LOPRESSOR) 25 MG tablet, Take 1 tablet (25 mg total) by mouth 2 (two) times daily., Disp: 60 tablet, Rfl: 0    oxycodone (ROXICODONE) 5 MG immediate release tablet, Take 1 tablet (5 mg total) by mouth every 6 (six) hours as needed., Disp: 30 tablet, Rfl: 0      PHYSICAL EXAM:   Vitals:    06/15/17 1219   BP: 127/83   Pulse: 109   SpO2: 96%   Weight: 96.7 kg (213 lb 4.7 oz)   Height: 5' 7" (1.702 m)         GENERAL: Awake, well-developed well-nourished, no apparent distress. +Down's facies.  HEENT: mucous membranes moist and pink, normocephalic atraumatic, no cranial " or carotid bruits, sclera anicteric  NECK: no jugular venous distention, no lymphadenopathy  CHEST: Good air movement, clear to auscultation bilaterally  CARDIOVASCULAR: Quiet precordium, regular rate and rhythm, S1S2, no rubs or gallops. 1/6 MARIELA at LSB.  ABDOMEN: Soft, nontender nondistended, no hepatomegaly, no aortic bruits  EXTREMITIES: Warm well perfused, 2+ radial/pedal pulses, capillary refill 2 seconds, no clubbing, cyanosis, or edema  NEURO: Alert and oriented, cooperative with exam, face symmetric, moves all extremities well    STUDIES:  ECG: Sinus rhythm with right bundle branch block and occasional PVC's    Pacemaker:  MDT Adapta Pacemaker. Battery 2.65V/9 mos avg longevity. P-wave >2.8mV, Imp 400ohms, threshold 0.5V @ 0.4ms. R-wave 5.6-16.0mV, Imp 547ohms, threshold 1.5V @ 0.4ms. Ap 25%,  1.0%.    ASSESSMENT:  Encounter Diagnoses   Name Primary?    Pacemaker      26 y/o male with TOF s/p repair and s/p recent pulmonary valve replacement.  He has pacemaker for sinus node dysfunction.  He has approximately 9months on battery remaining.    PLAN:   1. F/u pacemaker check in EP clinic at [facility] in 3 months.  2. Continue current dose of metoprolol  3. Continue to monitor amount of PVC's and monitor for other arrhythmias.  4. Keep follow up with Dr. Dean as planned.  5. Call for syncope, palpitations, chest pain, difficulty breathing, fatigue, or any other questions or concerns in the interim.      Time Spent: 40 (min) with over 50% in direct patient and family consultation regarding the management of pacemaker nearing RNIA and frequent PVC's in post-operative period.      The patient's doctor will be notified via EPIC/FAX    I hope this brings you up-to-date on Cipriano Fobbs  Please contact me with any questions or concerns.    Jose Ortiz MD  Pediatric and Adult Congenital Electrophysiologist  Pediatric Cardiologist

## 2017-07-11 ENCOUNTER — DOCUMENTATION ONLY (OUTPATIENT)
Dept: RESEARCH | Facility: HOSPITAL | Age: 27
End: 2017-07-11

## 2017-07-11 NOTE — PROGRESS NOTES
DISCHARGE/READMISSION   Date of Hospital Discharge:  (mm/dd/yyyy) 05/12/2017      Mortality Status at Hospital  Discharge: Alive      (If Alive ?) Discharge Location: Home   VAD Discharge Status: No VAD this admission   Date of Database Discharge:  (mm/dd/yyyy) 05/12/2017       Mortality Status at Database Discharge: Alive  (If Alive, continue below)     Readmission within 30 days: No (If Yes ?)             Readmission Date: (mm/dd/yyyy) N/A        (If Yes ?) Primary Readmission Reason (select one):                   [] Thrombotic Complication [] Neurologic Complication                                                                []  Hemorrhagic Complication [] Respiratory Complication/Airway Complication                   []  Stenotic Complication [] Septic/Infectious Complication                   [] Arrhythmia [] Cardiovascular Device Complications                   [] Congestive Heart Failure [] Residual/Recurrent Cardiovascular Defects                   [] Embolic Complication [] Failure to Thrive                   [] Cardiac Transplant Rejection [] VAD Complications                    [] Myocardial Ischemia [] Gastrointestinal Complication                   [] Renal Failure [] Other Cardiovascular Complication                   [] Pericardial Effusion and/or Tamponade [] Other - Readmission related to this index operation                   [] Pleural Effusion [] Other - Readmission not related to this index operation      Status at 30 days after surgery: Alive   30 Day Status Method of Verification: Office visit to provider greater than or equal to 30 days post-op   Operative Mortality: No                                  Mortality assigned to this operation: No                          STS Congenital Surgery              30 Day Follow-Up

## 2017-09-12 ENCOUNTER — HOSPITAL ENCOUNTER (OUTPATIENT)
Facility: HOSPITAL | Age: 27
Discharge: HOME | DRG: 309 | End: 2017-09-13
Attending: EMERGENCY MEDICINE | Admitting: HOSPITALIST
Payer: MEDICAID

## 2017-09-12 ENCOUNTER — ANESTHESIA EVENT (OUTPATIENT)
Dept: MEDSURG UNIT | Facility: HOSPITAL | Age: 27
DRG: 309 | End: 2017-09-12
Payer: MEDICAID

## 2017-09-12 DIAGNOSIS — Q90.9 DOWN SYNDROME: ICD-10-CM

## 2017-09-12 DIAGNOSIS — Z95.0 PACEMAKER: ICD-10-CM

## 2017-09-12 DIAGNOSIS — I48.3 TYPICAL ATRIAL FLUTTER: Primary | ICD-10-CM

## 2017-09-12 DIAGNOSIS — Q21.3 TETRALOGY OF FALLOT: ICD-10-CM

## 2017-09-12 DIAGNOSIS — Z87.74 S/P SURGERY FOR COMPLEX CONGENITAL HEART DISEASE: ICD-10-CM

## 2017-09-12 DIAGNOSIS — I48.91 ATRIAL FIBRILLATION STATUS POST CARDIOVERSION: ICD-10-CM

## 2017-09-12 DIAGNOSIS — Z95.2 PULMONARY VALVE REPLACED: ICD-10-CM

## 2017-09-12 DIAGNOSIS — I37.1 NONRHEUMATIC PULMONARY VALVE INSUFFICIENCY: ICD-10-CM

## 2017-09-12 DIAGNOSIS — I48.92 ATRIAL FLUTTER: ICD-10-CM

## 2017-09-12 DIAGNOSIS — I49.3 PVC'S (PREMATURE VENTRICULAR CONTRACTIONS): ICD-10-CM

## 2017-09-12 DIAGNOSIS — I37.9 PULMONARY VALVE DISORDER: ICD-10-CM

## 2017-09-12 DIAGNOSIS — I48.91 ATRIAL FIBRILLATION: ICD-10-CM

## 2017-09-12 DIAGNOSIS — I37.2 PULMONARY VALVE STENOSIS WITH INSUFFICIENCY, UNSPECIFIED ETIOLOGY: ICD-10-CM

## 2017-09-12 DIAGNOSIS — E03.1 CONGENITAL HYPOTHYROIDISM WITHOUT GOITER: ICD-10-CM

## 2017-09-12 DIAGNOSIS — Q24.9 ADULT CONGENITAL HEART DISEASE: ICD-10-CM

## 2017-09-12 LAB
ALBUMIN SERPL BCP-MCNC: 3.5 G/DL
ALP SERPL-CCNC: 120 U/L
ALT SERPL W/O P-5'-P-CCNC: 20 U/L
ANION GAP SERPL CALC-SCNC: 10 MMOL/L
AST SERPL-CCNC: 21 U/L
BASOPHILS # BLD AUTO: 0.04 K/UL
BASOPHILS # BLD AUTO: 0.04 K/UL
BASOPHILS NFR BLD: 0.6 %
BASOPHILS NFR BLD: 0.7 %
BILIRUB SERPL-MCNC: 0.4 MG/DL
BNP SERPL-MCNC: 120 PG/ML
BUN SERPL-MCNC: 16 MG/DL
CALCIUM SERPL-MCNC: 9.2 MG/DL
CHLORIDE SERPL-SCNC: 106 MMOL/L
CO2 SERPL-SCNC: 24 MMOL/L
CREAT SERPL-MCNC: 1.3 MG/DL
DIFFERENTIAL METHOD: ABNORMAL
DIFFERENTIAL METHOD: ABNORMAL
EOSINOPHIL # BLD AUTO: 0.1 K/UL
EOSINOPHIL # BLD AUTO: 0.1 K/UL
EOSINOPHIL NFR BLD: 1.5 %
EOSINOPHIL NFR BLD: 1.7 %
ERYTHROCYTE [DISTWIDTH] IN BLOOD BY AUTOMATED COUNT: 16.3 %
ERYTHROCYTE [DISTWIDTH] IN BLOOD BY AUTOMATED COUNT: 16.3 %
EST. GFR  (AFRICAN AMERICAN): >60 ML/MIN/1.73 M^2
EST. GFR  (NON AFRICAN AMERICAN): >60 ML/MIN/1.73 M^2
FACT X PPP CHRO-ACNC: 0.46 IU/ML
FACT X PPP CHRO-ACNC: <0.1 IU/ML
GLUCOSE SERPL-MCNC: 96 MG/DL
HCT VFR BLD AUTO: 41.8 %
HCT VFR BLD AUTO: 44.2 %
HGB BLD-MCNC: 14 G/DL
HGB BLD-MCNC: 15.1 G/DL
INR PPP: 1
LYMPHOCYTES # BLD AUTO: 1.4 K/UL
LYMPHOCYTES # BLD AUTO: 1.6 K/UL
LYMPHOCYTES NFR BLD: 23.3 %
LYMPHOCYTES NFR BLD: 24.5 %
MAGNESIUM SERPL-MCNC: 1.9 MG/DL
MCH RBC QN AUTO: 29.7 PG
MCH RBC QN AUTO: 30.3 PG
MCHC RBC AUTO-ENTMCNC: 33.5 G/DL
MCHC RBC AUTO-ENTMCNC: 34.2 G/DL
MCV RBC AUTO: 89 FL
MCV RBC AUTO: 89 FL
MONOCYTES # BLD AUTO: 0.4 K/UL
MONOCYTES # BLD AUTO: 0.4 K/UL
MONOCYTES NFR BLD: 6.2 %
MONOCYTES NFR BLD: 6.3 %
NEUTROPHILS # BLD AUTO: 4.1 K/UL
NEUTROPHILS # BLD AUTO: 4.4 K/UL
NEUTROPHILS NFR BLD: 66.5 %
NEUTROPHILS NFR BLD: 67.9 %
PLATELET # BLD AUTO: 232 K/UL
PLATELET # BLD AUTO: 235 K/UL
PMV BLD AUTO: 9.4 FL
PMV BLD AUTO: 9.4 FL
POTASSIUM SERPL-SCNC: 4.2 MMOL/L
PROT SERPL-MCNC: 7.7 G/DL
PROTHROMBIN TIME: 10.5 SEC
RBC # BLD AUTO: 4.71 M/UL
RBC # BLD AUTO: 4.98 M/UL
SODIUM SERPL-SCNC: 140 MMOL/L
T4 FREE SERPL-MCNC: 0.84 NG/DL
TROPONIN I SERPL DL<=0.01 NG/ML-MCNC: <0.006 NG/ML
TSH SERPL DL<=0.005 MIU/L-ACNC: 4.82 UIU/ML
WBC # BLD AUTO: 6.06 K/UL
WBC # BLD AUTO: 6.65 K/UL

## 2017-09-12 PROCEDURE — 99284 EMERGENCY DEPT VISIT MOD MDM: CPT | Mod: ,,, | Performed by: PHYSICIAN ASSISTANT

## 2017-09-12 PROCEDURE — 25000003 PHARM REV CODE 250: Performed by: PHYSICIAN ASSISTANT

## 2017-09-12 PROCEDURE — 85610 PROTHROMBIN TIME: CPT

## 2017-09-12 PROCEDURE — 93010 ELECTROCARDIOGRAM REPORT: CPT | Mod: ,,, | Performed by: INTERNAL MEDICINE

## 2017-09-12 PROCEDURE — 85520 HEPARIN ASSAY: CPT | Mod: 91

## 2017-09-12 PROCEDURE — 85025 COMPLETE CBC W/AUTO DIFF WBC: CPT | Mod: 91

## 2017-09-12 PROCEDURE — 84484 ASSAY OF TROPONIN QUANT: CPT

## 2017-09-12 PROCEDURE — 20600001 HC STEP DOWN PRIVATE ROOM

## 2017-09-12 PROCEDURE — 93005 ELECTROCARDIOGRAM TRACING: CPT

## 2017-09-12 PROCEDURE — 84443 ASSAY THYROID STIM HORMONE: CPT

## 2017-09-12 PROCEDURE — 25000003 PHARM REV CODE 250: Performed by: EMERGENCY MEDICINE

## 2017-09-12 PROCEDURE — 36415 COLL VENOUS BLD VENIPUNCTURE: CPT

## 2017-09-12 PROCEDURE — 83880 ASSAY OF NATRIURETIC PEPTIDE: CPT

## 2017-09-12 PROCEDURE — 96368 THER/DIAG CONCURRENT INF: CPT

## 2017-09-12 PROCEDURE — 85520 HEPARIN ASSAY: CPT

## 2017-09-12 PROCEDURE — 96365 THER/PROPH/DIAG IV INF INIT: CPT

## 2017-09-12 PROCEDURE — 84439 ASSAY OF FREE THYROXINE: CPT

## 2017-09-12 PROCEDURE — 99233 SBSQ HOSP IP/OBS HIGH 50: CPT | Mod: ,,, | Performed by: PEDIATRICS

## 2017-09-12 PROCEDURE — 99291 CRITICAL CARE FIRST HOUR: CPT | Mod: 25

## 2017-09-12 PROCEDURE — 83735 ASSAY OF MAGNESIUM: CPT

## 2017-09-12 PROCEDURE — 63600175 PHARM REV CODE 636 W HCPCS: Performed by: PHYSICIAN ASSISTANT

## 2017-09-12 PROCEDURE — 99223 1ST HOSP IP/OBS HIGH 75: CPT | Mod: ,,, | Performed by: HOSPITALIST

## 2017-09-12 PROCEDURE — G0378 HOSPITAL OBSERVATION PER HR: HCPCS

## 2017-09-12 PROCEDURE — 63600175 PHARM REV CODE 636 W HCPCS: Performed by: EMERGENCY MEDICINE

## 2017-09-12 PROCEDURE — 80053 COMPREHEN METABOLIC PANEL: CPT

## 2017-09-12 PROCEDURE — 96366 THER/PROPH/DIAG IV INF ADDON: CPT

## 2017-09-12 PROCEDURE — 96375 TX/PRO/DX INJ NEW DRUG ADDON: CPT

## 2017-09-12 RX ORDER — FUROSEMIDE 10 MG/ML
40 INJECTION INTRAMUSCULAR; INTRAVENOUS
Status: COMPLETED | OUTPATIENT
Start: 2017-09-12 | End: 2017-09-12

## 2017-09-12 RX ORDER — CARVEDILOL 25 MG/1
25 TABLET ORAL 2 TIMES DAILY WITH MEALS
Status: ON HOLD | COMMUNITY
End: 2017-09-13

## 2017-09-12 RX ORDER — HEPARIN SODIUM,PORCINE/D5W 25000/250
17 INTRAVENOUS SOLUTION INTRAVENOUS CONTINUOUS
Status: DISCONTINUED | OUTPATIENT
Start: 2017-09-12 | End: 2017-09-13

## 2017-09-12 RX ORDER — DILTIAZEM HCL/D5W 125 MG/125
10 PLASTIC BAG, INJECTION (ML) INTRAVENOUS CONTINUOUS
Status: DISCONTINUED | OUTPATIENT
Start: 2017-09-12 | End: 2017-09-13

## 2017-09-12 RX ORDER — DILTIAZEM HYDROCHLORIDE 5 MG/ML
10 INJECTION INTRAVENOUS
Status: COMPLETED | OUTPATIENT
Start: 2017-09-12 | End: 2017-09-12

## 2017-09-12 RX ORDER — DILTIAZEM HYDROCHLORIDE 5 MG/ML
10 INJECTION INTRAVENOUS
Status: DISCONTINUED | OUTPATIENT
Start: 2017-09-12 | End: 2017-09-12

## 2017-09-12 RX ADMIN — FUROSEMIDE 40 MG: 10 INJECTION, SOLUTION INTRAVENOUS at 03:09

## 2017-09-12 RX ADMIN — HEPARIN SODIUM AND DEXTROSE 17 UNITS/KG/HR: 10000; 5 INJECTION INTRAVENOUS at 04:09

## 2017-09-12 RX ADMIN — DILTIAZEM HYDROCHLORIDE 10 MG: 5 INJECTION INTRAVENOUS at 03:09

## 2017-09-12 RX ADMIN — DILTIAZEM HYDROCHLORIDE 5 MG/HR: 5 INJECTION INTRAVENOUS at 05:09

## 2017-09-12 NOTE — PROVIDER PROGRESS NOTES - EMERGENCY DEPT.
Encounter Date: 9/12/2017    ED Physician Progress Notes         EKG - STEMI Decision  Initial Reading: No STEMI present.  Response: patient moved to monitored bed.

## 2017-09-12 NOTE — ED NOTES
Spoke with pharmacy on the phone the diltiazem vial was broken. Waiting to receive new medication.

## 2017-09-12 NOTE — H&P
History & Physical  Hospital Medicine - [facility] [place]      SUBJECTIVE:     Chief Complaint/Reason for Admission:   Atrial flutter, new onset    History of Present Illness:  Mr Cipriano Thompson is a 27 y.o. man with Down syndrome, tetralogy of fallot, systolic CHF (LVEF 47%), SSS s/p DC PPM '08, progressive obstruction and insufficiency of a previously placed bioprosthetic pulmonary valve s/p recent bioprosthetic pulmonary valve placement by Dr Godfrey in May (27 mm [employer] Epic Bioprosthetic valve in the pulmonary position; 30 mm Dacron conduit), who went for routine follow up in his adult congenital heart disease clinic with Dr Dean and was found to be in atrial flutter with 2:1 block.      He feels well and denies all Sx - no palpitations, CP, SOB, lightheadedness, syncope.      Started on Diltiazem and heparin drips, admitted to [facility]    Review of Systems:  Constitutional: no fever or chills  Eyes: no visual changes  ENT: no nasal congestion or sore throat  Respiratory: no cough or shortness of breath  Cardiovascular: no chest pain or palpitations  Gastrointestinal: no nausea or vomiting, no abdominal pain or change in bowel habits  Genitourinary: no hematuria or dysuria  Integument/Breast: no rash or pruritis  Hematologic/Lymphatic: no easy bruising or lymphadenopathy  Allergy/Immunology: none  Musculoskeletal: no arthralgias or myalgias  Neurological: no seizures or tremors  Behavioral/Psych: no auditory or visual hallucinations  Endocrine: no heat or cold intolerance    HISTORY:     Past Medical History:   Diagnosis Date    CHF (congestive heart failure)     Down syndrome     Encounter for blood transfusion     S/P surgery for complex congenital heart disease 1/30/2017    Tetralogy of Fallot 05/1990       Past Surgical History:   Procedure Laterality Date    BUNIONECTOMY      CARDIAC SURGERY      open heart, ppm     INSERT / REPLACE / REMOVE PACEMAKER Left 10/2008    TONSILLECTOMY         Family History   Problem  Relation Age of Onset    No Known Problems Mother     No Known Problems Sister        Social History     Social History    Marital status: Single     Spouse name: N/A    Number of children: N/A    Years of education: N/A     Social History Main Topics    Smoking status: Never Smoker    Smokeless tobacco: Never Used    Alcohol use No    Drug use: Unknown    Sexual activity: Not Currently     Other Topics Concern    None     Social History Narrative    Lives with mother, 1 dog (inside)       MEDICATIONS & ALLERGIES:     Current Facility-Administered Medications   Medication    diltiaZEM 125 mg in dextrose 5% 125 mL infusion (non-titrating)    heparin 25,000 units in dextrose 5% 250 mL (100 units/mL) bolus from bag; ADDITIONAL PRN BOLUS    heparin 25,000 units in dextrose 5% 250 mL (100 units/mL) bolus from bag; ADDITIONAL PRN BOLUS    heparin 25,000 units in dextrose 5% 250 mL (100 units/mL) infusion     Current Outpatient Prescriptions   Medication Sig    carvedilol (COREG) 25 MG tablet Take 25 mg by mouth 2 (two) times daily with meals.    furosemide (LASIX) 40 MG tablet Take 1 tablet (40 mg total) by mouth once daily. (Patient taking differently: Take 20 mg by mouth once daily. )    metoprolol tartrate (LOPRESSOR) 25 MG tablet Take 1 tablet (25 mg total) by mouth 2 (two) times daily.    levothyroxine (SYNTHROID) 88 MCG tablet        Review of patient's allergies indicates:   Allergen Reactions    Adhesive     Latex, natural rubber     Sulfa (sulfonamide antibiotics)        OBJECTIVE:     Vital Signs:  Temp:  [97.7 °F (36.5 °C)] 97.7 °F (36.5 °C)  Pulse:  [113-143] 139  Resp:  [16-20] 17  SpO2:  [95 %-97 %] 95 %  BP: (117-139)/(60-89) 129/80    Physical Exam:  General: well developed, well nourished, no distress  Eyes: conjunctivae/corneas clear. PERRL..  HENT: Head:normocephalic, atraumatic. Ears:bilateral TM's and external ear canals normal. Nose: Nares normal. Septum midline. Mucosa normal.  No drainage or sinus tenderness., no discharge. Throat: lips, mucosa, and tongue normal; teeth and gums normal and no throat erythema.  Neck: supple, symmetrical, trachea midline, no JVD and thyroid not enlarged, symmetric, no tenderness/mass/nodules  Lungs:  clear to auscultation bilaterally and normal respiratory effort  Cardiovascular: Heart: rate irregularly irregular   Chest Wall: no tenderness. Extremities: no cyanosis or edema, or clubbing. Pulses: 2+ and symmetric.  Abdomen/Rectal: Abdomen: soft, non-tender non-distented; bowel sounds normal; no masses,  no organomegaly. Rectal: not examined  Skin: Skin color, texture, turgor normal. No rashes or lesions  Musculoskeletal:normal gait and no clubbing, cyanosis  Lymph Nodes: No cervical or supraclavicular adenopathy  Neurologic: Normal strength and tone. No focal numbness or weakness  Psych/Behavioral:  Alert and oriented, appropriate affect., Speech: appropriate quality, quantity and organization of sentences and Thought content: normal    Laboratory  Recent Results (from the past 24 hour(s))   CBC auto differential    Collection Time: 09/12/17  3:33 PM   Result Value Ref Range    WBC 6.06 3.90 - 12.70 K/uL    RBC 4.71 4.60 - 6.20 M/uL    Hemoglobin 14.0 14.0 - 18.0 g/dL    Hematocrit 41.8 40.0 - 54.0 %    MCV 89 82 - 98 fL    MCH 29.7 27.0 - 31.0 pg    MCHC 33.5 32.0 - 36.0 g/dL    RDW 16.3 (H) 11.5 - 14.5 %    Platelets 235 150 - 350 K/uL    MPV 9.4 9.2 - 12.9 fL    Gran # 4.1 1.8 - 7.7 K/uL    Lymph # 1.4 1.0 - 4.8 K/uL    Mono # 0.4 0.3 - 1.0 K/uL    Eos # 0.1 0.0 - 0.5 K/uL    Baso # 0.04 0.00 - 0.20 K/uL    Gran% 67.9 38.0 - 73.0 %    Lymph% 23.3 18.0 - 48.0 %    Mono% 6.3 4.0 - 15.0 %    Eosinophil% 1.5 0.0 - 8.0 %    Basophil% 0.7 0.0 - 1.9 %    Differential Method Automated    Comprehensive metabolic panel    Collection Time: 09/12/17  3:33 PM   Result Value Ref Range    Sodium 140 136 - 145 mmol/L    Potassium 4.2 3.5 - 5.1 mmol/L    Chloride 106 95  "- 110 mmol/L    CO2 24 23 - 29 mmol/L    Glucose 96 70 - 110 mg/dL    BUN, Bld 16 6 - 20 mg/dL    Creatinine 1.3 0.5 - 1.4 mg/dL    Calcium 9.2 8.7 - 10.5 mg/dL    Total Protein 7.7 6.0 - 8.4 g/dL    Albumin 3.5 3.5 - 5.2 g/dL    Total Bilirubin 0.4 0.1 - 1.0 mg/dL    Alkaline Phosphatase 120 55 - 135 U/L    AST 21 10 - 40 U/L    ALT 20 10 - 44 U/L    Anion Gap 10 8 - 16 mmol/L    eGFR if African American >60.0 >60 mL/min/1.73 m^2    eGFR if non African American >60.0 >60 mL/min/1.73 m^2   Brain natriuretic peptide    Collection Time: 09/12/17  3:33 PM   Result Value Ref Range     (H) 0 - 99 pg/mL   Magnesium    Collection Time: 09/12/17  3:33 PM   Result Value Ref Range    Magnesium 1.9 1.6 - 2.6 mg/dL   Protime-INR    Collection Time: 09/12/17  3:33 PM   Result Value Ref Range    Prothrombin Time 10.5 9.0 - 12.5 sec    INR 1.0 0.8 - 1.2   Troponin I    Collection Time: 09/12/17  3:33 PM   Result Value Ref Range    Troponin I <0.006 0.000 - 0.026 ng/mL   TSH    Collection Time: 09/12/17  3:33 PM   Result Value Ref Range    TSH 4.822 (H) 0.400 - 4.000 uIU/mL   Anti-Xa Heparin Monitoring    Collection Time: 09/12/17  3:33 PM   Result Value Ref Range    Heparin Anti-Xa <0.10 (L) 0.30 - 0.70 IU/mL   T4, free    Collection Time: 09/12/17  3:33 PM   Result Value Ref Range    Free T4 0.84 0.71 - 1.51 ng/dL       Radiologic Results:  No new results    Cardiology results:  A flutter. Rate 140. Reviewed by MD.     9/12 2D Echo with CFD  Per Dr Augustine Dean, "An echocardiogram was obtained today. Patient appears to not be in sinus rhythm   No clot or vegetation seen.  This study showed four normally related cardiac chambers.  No evidence for residual atrial septal   defect or ventricular septal defect.  No right or left ventricular outflow tract   obstruction is seen.  There is paradoxical motion of the interventricular   septum. The bioprosthetic valve is in good position, and quite echogenic. Annulus ~ 2.5 cm.   " "No pericardial effusion.     Left ventricular internal diastolic dimension is 4.4 cm.  The right ventricle   was 3.7 cm.  The aortic root measures 3.2 cm.  The left atrium is 3.7 cm.    Interventricular septal thickness during diastole is 1.1 cm and the posterior   wall is 1.2 cm.  Ejection fraction is 47% (was 53%).  Pacing wires are seen in   the right atrium and right ventricle.     DOPPLER ANALYSIS:  There is a moderate degree of tricuspid insufficiency seen.    Peak pressure drop is 30 mmHg.    No insufficiency of the bioprosthetic pulmonary valve.  No mitral   insufficiency.  No aortic insufficiency.  Peak pressure drop across the aortic   valve is 4 mmHg.  Peak pressure drop across the bioprosthetic pulmonary valve is   only ~ 20 mmHg."          ASSESSMENT & PLAN:     Aflutter, new onset.  PPM interrogation revealed "Evidence of atrial tachycardia with mostly 2:1 block. Episodes found with A-rate 296 and V-rate 140."  - rec'd dilt 10mg IV x 1 in ED  - increase dilt IV gtt to 7.5 mg/hr (from 5mg/hr, as HR remains 130s after an hour on this dose) for rate control  - anticoagulation with hep gtt --> will likely switch to warfarin upon d/c (unclear if able to use DOAC such as Xarelto as the aflutter may be related to valvulopathy, will discuss with cardiology)  - continue home metoprolol 25 BID  - discussed with Dr. Ortiz (Adult Congenital Heart Disease/Pediatric EP)    Bioprosthetic pulmonary valve  - Antibiotic prophylaxis for any invasive procedures.    Chronic systolic CHF  - decrease home Lasix to 20mg PO daily (from 40mg PO BID) per Dr Dean's note  - home metoprolol tartrate 25 BID    Hypothyroidism - TFTs unremarjkable with TSH slightly elevated at 4.82 and FT4 wnl  - re-check TFTS when out of acute setting  - continue home synthroid 88    Prophylaxis- on hep as above    Advance directives- full code    Post-acute care- pending clinical condition, home with mother Luann    Discussed plan of care with " pt and his aunt     Shanda Rogers MD  [facility] Medicine Staff

## 2017-09-12 NOTE — ED PROVIDER NOTES
"Encounter Date: 9/12/2017       History     Chief Complaint   Patient presents with    Atrial Flutter     Sent from cards clinic. Family member states "He's in Atrial Flutter."      Pt is a 27 yr old male with hx Down syndrome, tetralogy of fallot, CHF, and recent pulmonary valve replacement who presents to the emergency department with rapid heart rate.  Mother accompanies him here in the emergency department.  She states they were going to the cardiology clinic today for a follow-up appointment.  She states patient has been feeling well and has been asymptomatic.  She reports no fatigue, decrease in appetite, or shortness of breath.  Patient denies any pain or symptoms.  While in the cardiology clinic, they performed an EKG and advised them to come down to the emergency department because he had an abnormal finding on EKG.  Mother states patient is not compliant with medications.      The history is provided by the patient and a parent.     Review of patient's allergies indicates:   Allergen Reactions    Adhesive     Latex, natural rubber     Sulfa (sulfonamide antibiotics)      Past Medical History:   Diagnosis Date    CHF (congestive heart failure)     Down syndrome     Encounter for blood transfusion     S/P surgery for complex congenital heart disease 1/30/2017    Tetralogy of Fallot 05/1990     Past Surgical History:   Procedure Laterality Date    BUNIONECTOMY      CARDIAC SURGERY      open heart, ppm     INSERT / REPLACE / REMOVE PACEMAKER Left 10/2008    TONSILLECTOMY       Family History   Problem Relation Age of Onset    No Known Problems Mother     No Known Problems Sister      Social History   Substance Use Topics    Smoking status: Never Smoker    Smokeless tobacco: Never Used    Alcohol use No     Review of Systems   Constitutional: Negative for activity change, appetite change, chills, fatigue and fever.   HENT: Negative for congestion, ear discharge, ear pain, postnasal drip, " rhinorrhea, sore throat and trouble swallowing.    Respiratory: Negative for cough and shortness of breath.    Cardiovascular: Negative for chest pain and leg swelling.   Gastrointestinal: Negative for abdominal pain, constipation and vomiting.   Genitourinary: Negative for decreased urine volume, difficulty urinating and dysuria.   Musculoskeletal: Negative for back pain, neck pain and neck stiffness.   Skin: Negative for rash and wound.   Neurological: Negative for dizziness, weakness and headaches.       Physical Exam     Initial Vitals [09/12/17 1458]   BP Pulse Resp Temp SpO2   122/60 (!) 143 20 97.7 °F (36.5 °C) 96 %      MAP       80.67         Physical Exam    Nursing note and vitals reviewed.  Constitutional: He appears well-developed and well-nourished. He is not diaphoretic.  Non-toxic appearance. He does not have a sickly appearance. No distress.   HENT:   Head: Normocephalic.   Right Ear: Hearing and external ear normal.   Left Ear: Hearing and external ear normal.   Nose: Nose normal.   Mouth/Throat: Uvula is midline, oropharynx is clear and moist and mucous membranes are normal. No trismus in the jaw. No uvula swelling. No oropharyngeal exudate.   Eyes: Conjunctivae are normal. Pupils are equal, round, and reactive to light.   Neck: Normal range of motion.   Cardiovascular:   Murmur heard.  tachycardic   Pulmonary/Chest: Breath sounds normal. No respiratory distress. He has no wheezes. He has no rhonchi. He has no rales. He exhibits no tenderness.   Abdominal: Soft. Bowel sounds are normal. He exhibits no distension and no mass. There is no tenderness. There is no rebound and no guarding.   Lymphadenopathy:     He has no cervical adenopathy.   Neurological: He is alert and oriented to person, place, and time.   Skin: Skin is warm and dry. Capillary refill takes less than 2 seconds.   Psychiatric: He has a normal mood and affect.         ED Course   Procedures  Labs Reviewed   CBC W/ AUTO DIFFERENTIAL  - Abnormal; Notable for the following:        Result Value    RDW 16.3 (*)     All other components within normal limits   ANTI-XA HEPARIN MONITORING   CBC W/ AUTO DIFFERENTIAL   COMPREHENSIVE METABOLIC PANEL   B-TYPE NATRIURETIC PEPTIDE   MAGNESIUM   PROTIME-INR   TROPONIN I   TSH     EKG Readings: (Independently Interpreted)   Initial Reading: No STEMI. Rhythm: Atrial Flutter. Heart Rate: 146.          Medical Decision Making:   Initial Assessment:   Urgent evaluation of a 27-year-old male with history of Down syndrome, CHF, tetralogy of fallot, and recent pulmonary valve replacement who presents to the emergency department with abnormal EKG.  Patient is afebrile, nontoxic appearing, and hemodynamically stable.  He is resting comfortably on exam.  He is asymptomatic.  No lower externally edema.  Patient has not been compliant with medications.  EKG here shows patient to be in atrial flutter.    ED Management:  4:07 PM  Spoke with cardiology who advised diltiazem drip with heparin drip.  Patient be admitted to hospital medicine consult to cardiology.  Other:   I have discussed this case with another health care provider.       <> Summary of the Discussion: Dr. Pedro                   ED Course      Clinical Impression:   The encounter diagnosis was Typical atrial flutter.                           Brittny Jordan PA-C  09/12/17 [id number]

## 2017-09-12 NOTE — ED TRIAGE NOTES
Pt had open heart surgery may 1. Pt was at the doctor for a follow up and was in atrial flutter. Pt family reports 2 years ago he had an episode of atrial flutter. Pt reports he feels nervous.

## 2017-09-12 NOTE — CONSULTS
Consulting Physician:  Shanda Rogers MD    CHIEF COMPLAINT: Arrhythmia management for Adult Congenital Heart Patient    HISTORY OF PRESENT ILLNESS:   Cipriano is a 27 y.o. male with Down's syndrome, TOF s/p complete repair as an infant with subsequent PVR in 1999 for right ventricular dilation and pacemaker placement in 2008 for Sinus node dysfunction. He presented with exercise intolerance and moderate PS/PI so he was referred for repeat PVR. Also with history of hypertension and ventricular ectopy with frequent PVC's. His home medication regimen includes Carvedilol 25 mg daily, Digoxin 250 mcg qd, Lisinopril 10 mg qd and Synthroid. He had a cardiac catheterization for possible Jeanna valve implantation on 4/4/17 and were unable to secondary to coronary artery anatomy. He underwent surgical pulmonary valve placement on 5/2/17.  He had frequent PVC's in the post op period.  He had his pacemaker interrogated post op but pacemaker was working appropriately although showed ~9months to RINA.      Cipriano was last seen by me in June 2017.  He was taking Metoprolol BID at that time and clinically doing well.  He presented today for clinic follow up with Dr. Dean and was found to be in atrial flutter.  He has been asymptomatic by report.  He denies swelling.  He denies difficulty breathing.  He denies chest pain.  He denies palpitations.  He denies other acute changes.  He has no cough and no recent fever to report.  His function was depressed per Dr. Dean in clinic today and he was referred to the ER at [facility] for further management and plan for cardioversion tomorrow.  His pacemaker was interrogated in Dr. Dean's office and he was found to be in atrial flutter for extended time period by report.        REVIEW OF SYSTEMS:     GENERAL: No fever, chills, fatigability or weight loss.  SKIN: No rashes, itching or changes in color or texture of skin.  CHEST: Denies ECHEVERRIA, cyanosis, wheezing, cough and sputum  production.  CARDIOVASCULAR: Denies chest pain or reduced exercise tolerance.  ABDOMEN: Appetite fine. No weight loss. Denies diarrhea, abdominal pain, or vomiting.  PERIPHERAL VASCULAR: No claudication or cyanosis.  MUSCULOSKELETAL: No joint stiffness or swelling.   NEUROLOGIC: No history of seizures,  alteration of gait or coordination.    PAST MEDICAL HISTORY:   Past Medical History:   Diagnosis Date    CHF (congestive heart failure)     Down syndrome     Encounter for blood transfusion     S/P surgery for complex congenital heart disease 1/30/2017    Tetralogy of Fallot 05/1990           FAMILY HISTORY:   Family History   Problem Relation Age of Onset    No Known Problems Mother     No Known Problems Sister          SOCIAL HISTORY:   Social History     Social History    Marital status: Single     Spouse name: N/A    Number of children: N/A    Years of education: N/A     Occupational History    Not on file.     Social History Main Topics    Smoking status: Never Smoker    Smokeless tobacco: Never Used    Alcohol use No    Drug use: Unknown    Sexual activity: Not Currently     Other Topics Concern    Not on file     Social History Narrative    Lives with mother, 1 dog (inside)       ALLERGIES:  Review of patient's allergies indicates:   Allergen Reactions    Adhesive     Latex, natural rubber     Sulfa (sulfonamide antibiotics)        MEDICATIONS:    Current Facility-Administered Medications:     diltiaZEM 125 mg in dextrose 5% 125 mL infusion (non-titrating), 5 mg/hr, Intravenous, Continuous, Brittny Jordan PA-C, Last Rate: 5 mL/hr at 09/12/17 1757, 5 mg/hr at 09/12/17 1757    heparin 25,000 units in dextrose 5% 250 mL (100 units/mL) bolus from bag; ADDITIONAL PRN BOLUS, 30 Units/kg (Adjusted), Intravenous, PRN, Brittny Jordan PA-C    heparin 25,000 units in dextrose 5% 250 mL (100 units/mL) bolus from bag; ADDITIONAL PRN BOLUS, 15 Units/kg (Adjusted),  Intravenous, PRN, Brittny Jordan PA-C    heparin 25,000 units in dextrose 5% 250 mL (100 units/mL) infusion, 17 Units/kg/hr (Adjusted), Intravenous, Continuous, Brittny Jordan PA-C, Last Rate: 13.8 mL/hr at 09/12/17 1629, 17 Units/kg/hr at 09/12/17 1629    Current Outpatient Prescriptions:     carvedilol (COREG) 25 MG tablet, Take 25 mg by mouth 2 (two) times daily with meals., Disp: , Rfl:     furosemide (LASIX) 40 MG tablet, Take 1 tablet (40 mg total) by mouth once daily. (Patient taking differently: Take 20 mg by mouth once daily. ), Disp: 60 tablet, Rfl: 0    metoprolol tartrate (LOPRESSOR) 25 MG tablet, Take 1 tablet (25 mg total) by mouth 2 (two) times daily., Disp: 60 tablet, Rfl: 0    levothyroxine (SYNTHROID) 88 MCG tablet, , Disp: , Rfl:       PHYSICAL EXAM:   Vitals:    09/12/17 1732 09/12/17 1738 09/12/17 1802 09/12/17 1806   BP: 129/89  129/80    Pulse:  (!) 141  (!) 139   Resp:  18  17   Temp:       TempSrc:       SpO2: 95%   95%   Weight:       Height:             GENERAL: Awake, well-developed well-nourished, no apparent distress. +Down's facies.  HEENT: mucous membranes moist and pink, normocephalic atraumatic, no cranial or carotid bruits, sclera anicteric  NECK: no jugular venous distention, no lymphadenopathy  CHEST: Good air movement, clear to auscultation bilaterally  CARDIOVASCULAR: Quiet precordium, regular rate and rhythm, S1S2, no rubs or gallops. 1/6 MARIELA at LSB.  ABDOMEN: Soft, nontender nondistended, no hepatomegaly, no aortic bruits  EXTREMITIES: Warm well perfused, 2+ radial/pedal pulses, capillary refill 2 seconds, no clubbing, cyanosis, or edema  NEURO: Alert and oriented, cooperative with exam, face symmetric, moves all extremities well    STUDIES:  ECG:  Atrial flutter with ventricular rate of 146 bpm with right bundle branch block.    ASSESSMENT:  26 y/o male with TOF s/p repair and s/p recent pulmonary valve replacement.  He has pacemaker for  sinus node dysfunction. He is currently in atrial flutter with ventricular function decreased from previous study.      PLAN:   Patient admitted to Dr. Rogers with hospital medicine  Patient started on Diltiazem for rate control  Patient started on heparin drip for acute anticoagulation  Plan for JAKE with congenital team and cardioversion with anesthesia in am tomorrow.  Will need to reassess home anti-arrhythmic plan after cardioversion.  Will need anticoagulation at home.  Peds and Adult Congenital Arrhythmia team will continue to follow along.  Discussed with Dr. Rogers by phone at time of consultation    Time Spent: 60 (min) with over 50% in direct patient and family consultation regarding management of atrial flutter.      The patient's doctor will be notified via EPIC    I hope this brings you up-to-date on Cipriano Fobbs  Please contact me with any questions or concerns.    Jose Ortiz MD  Pediatric and Adult Congenital Electrophysiologist  Pediatric Cardiologist

## 2017-09-12 NOTE — ANESTHESIA PREPROCEDURE EVALUATION
09/12/2017  Pre-operative evaluation for Procedure(s) (LRB):  CARDIOVERSION (N/A)  TRANSESOPHAGEAL ECHOCARDIOGRAM (JAKE) (N/A)    Cipriano Thompson is a 27 y.o. male with a hx of Down syndrome, tetralogy of Fallot s/p repair with a bioprosthetic pulmonary valve placed in 1999, and then a dual chamber PPM was placed in 2008 due to sinus node dysfunction. Most recently in May 2017 he developed pulmonary valve stenosis and insufficiency and had a bioprosthetic valve placed. In his follow up clinic visit today he was noted to be in atrial tachycardia/flutter. He is scheduled for the above procedure.     Previous airway - DL with Grade I view, easy mask    L AC 20g    Patient Active Problem List   Diagnosis    Pulmonary valve disorder    Pacemaker    Right ventricular hypertension    S/P surgery for complex congenital heart disease    Pulmonary valve stenosis with insufficiency    Tetralogy of Fallot    Pulmonary valve insufficiency    PVC's (premature ventricular contractions)    Pulmonary valve replaced    Hypothyroid    Down syndrome    Adult congenital heart disease       Review of patient's allergies indicates:   Allergen Reactions    Adhesive     Latex, natural rubber     Sulfa (sulfonamide antibiotics)        No current facility-administered medications on file prior to encounter.      Current Outpatient Prescriptions on File Prior to Encounter   Medication Sig Dispense Refill    furosemide (LASIX) 40 MG tablet Take 1 tablet (40 mg total) by mouth once daily. (Patient taking differently: Take 20 mg by mouth once daily. ) 60 tablet 0    metoprolol tartrate (LOPRESSOR) 25 MG tablet Take 1 tablet (25 mg total) by mouth 2 (two) times daily. 60 tablet 0    levothyroxine (SYNTHROID) 88 MCG tablet          Past Surgical History:   Procedure Laterality Date    BUNIONECTOMY      CARDIAC SURGERY       open heart, ppm     INSERT / REPLACE / REMOVE PACEMAKER Left 10/2008    TONSILLECTOMY         Social History     Social History    Marital status: Single     Spouse name: N/A    Number of children: N/A    Years of education: N/A     Occupational History    Not on file.     Social History Main Topics    Smoking status: Never Smoker    Smokeless tobacco: Never Used    Alcohol use No    Drug use: Unknown    Sexual activity: Not Currently     Other Topics Concern    Not on file     Social History Narrative    Lives with mother, 1 dog (inside)       Diagnostic Studies:      EKD Echo:        Anesthesia Evaluation    I have reviewed the Patient Summary Reports.     I have reviewed the Medications.     Review of Systems  Anesthesia Hx:  Hx of Anesthetic complications Emergence delirium  History of prior surgery of interest to airway management or planning: heart surgery. Previous anesthesia: General Airway issues documented on chart review include mask, easy, GETA, easy direct laryngoscopy  Denies Family Hx of Anesthesia complications.  Personal Hx of Anesthesia complications (Emergence delirium per chart review)   Hematology/Oncology:  Hematology Normal   Oncology Normal     EENT/Dental:EENT/Dental Normal   Cardiovascular:   Exercise tolerance: good CHF    Pulmonary:  Pulmonary Normal    Renal/:  Renal/ Normal     Hepatic/GI:  Hepatic/GI Normal    Musculoskeletal:  Musculoskeletal Normal    OB/GYN/PEDS:  Down Syndrome    Endocrine:   Hypothyroidism        Physical Exam  General:  Well nourished, Obesity    Airway/Jaw/Neck:  Airway Findings: Mouth Opening: Normal Tongue: Normal  General Airway Assessment: Adult  Mallampati: III  Improves to II with phonation.  TM Distance: Normal, at least 6 cm      Dental:  Dental Findings: In tact   Chest/Lungs:  Chest/Lungs Findings: Normal Respiratory Rate     Heart/Vascular:  Heart Findings: Rate: Tachycardia  Rhythm: Regular Rhythm  Vascular Findings:   Vascular Access: Peripheral IV(s)        Mental Status:  Mental Status Findings:  Cooperative         Anesthesia Plan  Type of Anesthesia, risks & benefits discussed:  Anesthesia Type:  MAC, general  Patient's Preference:   Intra-op Monitoring Plan: standard ASA monitors  Intra-op Monitoring Plan Comments:   Post Op Pain Control Plan:   Post Op Pain Control Plan Comments:   Induction:   IV  Beta Blocker:  Patient is on a Beta-Blocker and has received one dose within the past 24 hours (No further documentation required).       Informed Consent: Patient representative understands risks and agrees with Anesthesia plan.  Questions answered. Anesthesia consent signed with patient representative.  ASA Score: 3     Day of Surgery Review of History & Physical:    H&P update referred to the surgeon.         Ready For Surgery From Anesthesia Perspective.

## 2017-09-13 ENCOUNTER — ANESTHESIA (OUTPATIENT)
Dept: MEDSURG UNIT | Facility: HOSPITAL | Age: 27
DRG: 309 | End: 2017-09-13
Payer: MEDICAID

## 2017-09-13 ENCOUNTER — SURGERY (OUTPATIENT)
Age: 27
End: 2017-09-13

## 2017-09-13 VITALS
OXYGEN SATURATION: 93 % | TEMPERATURE: 98 F | DIASTOLIC BLOOD PRESSURE: 64 MMHG | SYSTOLIC BLOOD PRESSURE: 120 MMHG | WEIGHT: 219.56 LBS | HEIGHT: 67 IN | RESPIRATION RATE: 18 BRPM | HEART RATE: 75 BPM | BODY MASS INDEX: 34.46 KG/M2

## 2017-09-13 DIAGNOSIS — I48.3 TYPICAL ATRIAL FLUTTER: ICD-10-CM

## 2017-09-13 DIAGNOSIS — Q24.9 ADULT CONGENITAL HEART DISEASE: Primary | ICD-10-CM

## 2017-09-13 DIAGNOSIS — I37.9 PULMONARY VALVE DISORDER: ICD-10-CM

## 2017-09-13 DIAGNOSIS — Z87.74 S/P SURGERY FOR COMPLEX CONGENITAL HEART DISEASE: ICD-10-CM

## 2017-09-13 PROBLEM — Z79.01 ANTICOAGULATION GOAL OF INR 2 TO 3: Status: ACTIVE | Noted: 2017-09-13

## 2017-09-13 PROBLEM — Z51.81 ANTICOAGULATION GOAL OF INR 2 TO 3: Status: ACTIVE | Noted: 2017-09-13

## 2017-09-13 LAB
BASOPHILS # BLD AUTO: 0.06 K/UL
BASOPHILS NFR BLD: 0.9 %
DIFFERENTIAL METHOD: ABNORMAL
EOSINOPHIL # BLD AUTO: 0.1 K/UL
EOSINOPHIL NFR BLD: 1.9 %
ERYTHROCYTE [DISTWIDTH] IN BLOOD BY AUTOMATED COUNT: 16.5 %
FACT X PPP CHRO-ACNC: 0.43 IU/ML
HCT VFR BLD AUTO: 40.7 %
HGB BLD-MCNC: 14.2 G/DL
INR PPP: 1.1
LYMPHOCYTES # BLD AUTO: 1.7 K/UL
LYMPHOCYTES NFR BLD: 24.9 %
MCH RBC QN AUTO: 30.1 PG
MCHC RBC AUTO-ENTMCNC: 34.9 G/DL
MCV RBC AUTO: 86 FL
MONOCYTES # BLD AUTO: 0.5 K/UL
MONOCYTES NFR BLD: 6.6 %
NEUTROPHILS # BLD AUTO: 4.5 K/UL
NEUTROPHILS NFR BLD: 65.3 %
PLATELET # BLD AUTO: 229 K/UL
PMV BLD AUTO: 9.2 FL
PROTHROMBIN TIME: 11.3 SEC
RBC # BLD AUTO: 4.71 M/UL
WBC # BLD AUTO: 6.87 K/UL

## 2017-09-13 PROCEDURE — 92960 CARDIOVERSION ELECTRIC EXT: CPT | Mod: ,,, | Performed by: PEDIATRICS

## 2017-09-13 PROCEDURE — D9220A PRA ANESTHESIA: Mod: ANES,,, | Performed by: ANESTHESIOLOGY

## 2017-09-13 PROCEDURE — 85025 COMPLETE CBC W/AUTO DIFF WBC: CPT

## 2017-09-13 PROCEDURE — D9220A PRA ANESTHESIA: Mod: CRNA,,, | Performed by: NURSE ANESTHETIST, CERTIFIED REGISTERED

## 2017-09-13 PROCEDURE — 25000003 PHARM REV CODE 250: Performed by: HOSPITALIST

## 2017-09-13 PROCEDURE — 93315 ECHO TRANSESOPHAGEAL: CPT | Performed by: PEDIATRICS

## 2017-09-13 PROCEDURE — 37000008 HC ANESTHESIA 1ST 15 MINUTES: Performed by: PEDIATRICS

## 2017-09-13 PROCEDURE — 63600175 PHARM REV CODE 636 W HCPCS: Performed by: PHYSICIAN ASSISTANT

## 2017-09-13 PROCEDURE — 37000009 HC ANESTHESIA EA ADD 15 MINS: Performed by: PEDIATRICS

## 2017-09-13 PROCEDURE — 93010 ELECTROCARDIOGRAM REPORT: CPT | Mod: ,,, | Performed by: INTERNAL MEDICINE

## 2017-09-13 PROCEDURE — 93325 DOPPLER ECHO COLOR FLOW MAPG: CPT | Performed by: PEDIATRICS

## 2017-09-13 PROCEDURE — 63600175 PHARM REV CODE 636 W HCPCS: Performed by: NURSE ANESTHETIST, CERTIFIED REGISTERED

## 2017-09-13 PROCEDURE — 27100006 CARDIOVERSION (DCCV)

## 2017-09-13 PROCEDURE — 93280 PM DEVICE PROGR EVAL DUAL: CPT

## 2017-09-13 PROCEDURE — 63600175 PHARM REV CODE 636 W HCPCS: Performed by: HOSPITALIST

## 2017-09-13 PROCEDURE — 93005 ELECTROCARDIOGRAM TRACING: CPT | Mod: 59

## 2017-09-13 PROCEDURE — 85610 PROTHROMBIN TIME: CPT

## 2017-09-13 PROCEDURE — 99239 HOSP IP/OBS DSCHRG MGMT >30: CPT | Mod: ,,, | Performed by: HOSPITALIST

## 2017-09-13 PROCEDURE — 93320 DOPPLER ECHO COMPLETE: CPT | Performed by: PEDIATRICS

## 2017-09-13 PROCEDURE — 36415 COLL VENOUS BLD VENIPUNCTURE: CPT

## 2017-09-13 PROCEDURE — 25000003 PHARM REV CODE 250: Performed by: NURSE ANESTHETIST, CERTIFIED REGISTERED

## 2017-09-13 PROCEDURE — G0378 HOSPITAL OBSERVATION PER HR: HCPCS

## 2017-09-13 PROCEDURE — 93280 PM DEVICE PROGR EVAL DUAL: CPT | Mod: 26,,, | Performed by: PEDIATRICS

## 2017-09-13 PROCEDURE — 85520 HEPARIN ASSAY: CPT

## 2017-09-13 RX ORDER — ENOXAPARIN SODIUM 150 MG/ML
150 INJECTION SUBCUTANEOUS DAILY
Qty: 5 SYRINGE | Refills: 1 | Status: ON HOLD | OUTPATIENT
Start: 2017-09-13 | End: 2017-11-30 | Stop reason: HOSPADM

## 2017-09-13 RX ORDER — SODIUM CHLORIDE 9 MG/ML
INJECTION, SOLUTION INTRAVENOUS CONTINUOUS PRN
Status: DISCONTINUED | OUTPATIENT
Start: 2017-09-13 | End: 2017-09-13

## 2017-09-13 RX ORDER — FUROSEMIDE 20 MG/1
20 TABLET ORAL DAILY
Status: DISCONTINUED | OUTPATIENT
Start: 2017-09-13 | End: 2017-09-13 | Stop reason: HOSPADM

## 2017-09-13 RX ORDER — ENOXAPARIN SODIUM 100 MG/ML
1 INJECTION SUBCUTANEOUS 2 TIMES DAILY
Qty: 10 SYRINGE | Refills: 0 | Status: SHIPPED | OUTPATIENT
Start: 2017-09-13 | End: 2017-09-13 | Stop reason: ALTCHOICE

## 2017-09-13 RX ORDER — LEVOTHYROXINE SODIUM 88 UG/1
88 TABLET ORAL
Status: DISCONTINUED | OUTPATIENT
Start: 2017-09-13 | End: 2017-09-13 | Stop reason: HOSPADM

## 2017-09-13 RX ORDER — ENOXAPARIN SODIUM 150 MG/ML
150 INJECTION SUBCUTANEOUS
Status: DISCONTINUED | OUTPATIENT
Start: 2017-09-13 | End: 2017-09-13 | Stop reason: HOSPADM

## 2017-09-13 RX ORDER — GLYCOPYRROLATE 0.2 MG/ML
INJECTION INTRAMUSCULAR; INTRAVENOUS
Status: DISCONTINUED | OUTPATIENT
Start: 2017-09-13 | End: 2017-09-13

## 2017-09-13 RX ORDER — METOPROLOL TARTRATE 50 MG/1
50 TABLET ORAL 2 TIMES DAILY
Qty: 60 TABLET | Refills: 0 | Status: SHIPPED | OUTPATIENT
Start: 2017-09-13 | End: 2017-09-13 | Stop reason: HOSPADM

## 2017-09-13 RX ORDER — IBUPROFEN 200 MG
16 TABLET ORAL
Status: DISCONTINUED | OUTPATIENT
Start: 2017-09-13 | End: 2017-09-13 | Stop reason: HOSPADM

## 2017-09-13 RX ORDER — METOPROLOL TARTRATE 50 MG/1
50 TABLET ORAL 2 TIMES DAILY
Status: DISCONTINUED | OUTPATIENT
Start: 2017-09-13 | End: 2017-09-13 | Stop reason: HOSPADM

## 2017-09-13 RX ORDER — WARFARIN SODIUM 5 MG/1
5 TABLET ORAL DAILY
Qty: 45 TABLET | Refills: 3 | Status: SHIPPED | OUTPATIENT
Start: 2017-09-13 | End: 2019-03-14

## 2017-09-13 RX ORDER — IBUPROFEN 200 MG
24 TABLET ORAL
Status: DISCONTINUED | OUTPATIENT
Start: 2017-09-13 | End: 2017-09-13 | Stop reason: HOSPADM

## 2017-09-13 RX ORDER — LIDOCAINE HCL/PF 100 MG/5ML
SYRINGE (ML) INTRAVENOUS
Status: DISCONTINUED | OUTPATIENT
Start: 2017-09-13 | End: 2017-09-13

## 2017-09-13 RX ORDER — METOPROLOL SUCCINATE 25 MG/1
50 TABLET, EXTENDED RELEASE ORAL 2 TIMES DAILY
Status: CANCELLED | OUTPATIENT
Start: 2017-09-13

## 2017-09-13 RX ORDER — PROPOFOL 10 MG/ML
VIAL (ML) INTRAVENOUS
Status: DISCONTINUED | OUTPATIENT
Start: 2017-09-13 | End: 2017-09-13

## 2017-09-13 RX ORDER — FUROSEMIDE 40 MG/1
20 TABLET ORAL DAILY
Start: 2017-09-13 | End: 2018-04-05

## 2017-09-13 RX ORDER — METOPROLOL SUCCINATE 50 MG/1
50 TABLET, EXTENDED RELEASE ORAL 2 TIMES DAILY
Qty: 60 TABLET | Refills: 0 | Status: ON HOLD | OUTPATIENT
Start: 2017-09-13 | End: 2017-11-30 | Stop reason: HOSPADM

## 2017-09-13 RX ORDER — PROPOFOL 10 MG/ML
VIAL (ML) INTRAVENOUS CONTINUOUS PRN
Status: DISCONTINUED | OUTPATIENT
Start: 2017-09-13 | End: 2017-09-13

## 2017-09-13 RX ADMIN — GLYCOPYRROLATE 0.1 MG: 0.2 INJECTION, SOLUTION INTRAMUSCULAR; INTRAVENOUS at 08:09

## 2017-09-13 RX ADMIN — ENOXAPARIN SODIUM 150 MG: 150 INJECTION SUBCUTANEOUS at 02:09

## 2017-09-13 RX ADMIN — LEVOTHYROXINE SODIUM 88 MCG: 88 TABLET ORAL at 11:09

## 2017-09-13 RX ADMIN — PROPOFOL 100 MCG/KG/MIN: 10 INJECTION, EMULSION INTRAVENOUS at 08:09

## 2017-09-13 RX ADMIN — DILTIAZEM HYDROCHLORIDE 15 MG/HR: 5 INJECTION INTRAVENOUS at 03:09

## 2017-09-13 RX ADMIN — TOPICAL ANESTHETIC 1 EACH: 200 SPRAY DENTAL; PERIODONTAL at 08:09

## 2017-09-13 RX ADMIN — HEPARIN SODIUM AND DEXTROSE 17 UNITS/KG/HR: 10000; 5 INJECTION INTRAVENOUS at 11:09

## 2017-09-13 RX ADMIN — PROPOFOL 30 MG: 10 INJECTION, EMULSION INTRAVENOUS at 08:09

## 2017-09-13 RX ADMIN — SODIUM CHLORIDE: 0.9 INJECTION, SOLUTION INTRAVENOUS at 08:09

## 2017-09-13 RX ADMIN — METOPROLOL TARTRATE 50 MG: 50 TABLET, FILM COATED ORAL at 11:09

## 2017-09-13 RX ADMIN — FUROSEMIDE 20 MG: 20 TABLET ORAL at 11:09

## 2017-09-13 RX ADMIN — LIDOCAINE HYDROCHLORIDE 50 MG: 20 INJECTION, SOLUTION INTRAVENOUS at 08:09

## 2017-09-13 NOTE — TRANSFER OF CARE
"Anesthesia Transfer of Care Note    Patient: Cipriano Thompson    Procedure(s) Performed: Procedure(s) (LRB):  CARDIOVERSION (N/A)  TRANSESOPHAGEAL ECHOCARDIOGRAM (JAKE) (N/A)    Patient location: PACU    Anesthesia Type: general    Transport from OR: Transported from OR on room air with adequate spontaneous ventilation    Post pain: adequate analgesia    Post assessment: no apparent anesthetic complications    Post vital signs: stable    Level of consciousness: awake, alert and oriented    Nausea/Vomiting: no nausea/vomiting    Complications: none    Transfer of care protocol was followed      Last vitals:   Visit Vitals  /84 (BP Location: Right arm, Patient Position: Lying)   Pulse 89   Temp 36.4 °C (97.6 °F) (Oral)   Resp 14   Ht 5' 7" (1.702 m)   Wt 99.7 kg (219 lb 12.8 oz)   SpO2 (!) 91%   BMI 34.43 kg/m²     "

## 2017-09-13 NOTE — ANESTHESIA RELEASE NOTE
"Anesthesia Release from PACU Note    Patient: Cipriano Thompson    Procedure(s) Performed: Procedure(s) (LRB):  CARDIOVERSION (N/A)  TRANSESOPHAGEAL ECHOCARDIOGRAM (JAKE) (N/A)    Anesthesia type: general    Post pain: Adequate analgesia    Post assessment: no apparent anesthetic complications, tolerated procedure well and no evidence of recall    Last Vitals:   Visit Vitals  /60 (BP Location: Right arm, Patient Position: Lying)   Pulse 75   Temp 36.2 °C (97.2 °F) (Oral)   Resp 18   Ht 5' 7" (1.702 m)   Wt 99.7 kg (219 lb 12.8 oz)   SpO2 99%   BMI 34.43 kg/m²       Post vital signs: stable    Level of consciousness: awake, alert  and oriented    Nausea/Vomiting: no nausea/no vomiting    Complications: none    Airway Patency: patent    Respiratory: unassisted, spontaneous ventilation, room air    Cardiovascular: stable and blood pressure at baseline    Hydration: euvolemic  "

## 2017-09-13 NOTE — PROGRESS NOTES
Progress Note  Hospital Medicine - [facility] [place]      Admit Date: 9/12/2017    SUBJECTIVE:     Follow-up For:  Typical atrial flutter    HPI: Mr Cipriano Thompson is a 27 y.o. man with Down syndrome, tetralogy of fallot, systolic CHF (LVEF 47%), SSS s/p DC PPM '08, progressive obstruction and insufficiency of a previously placed bioprosthetic pulmonary valve s/p recent bioprosthetic pulmonary valve placement by Dr Godfrey in May (27 mm [employer] Epic Bioprosthetic valve in the pulmonary position; 30 mm Dacron conduit), who went for routine follow up in his adult congenital heart disease clinic with Dr Dean and was found to be in atrial flutter with 2:1 block.       He feels well and denies all Sx - no palpitations, CP, SOB, lightheadedness, syncope.     Hospital Course: rec'd dilt 10mg IV x 1 in ED.  Also rec'd empiric lasix 40 IV x 1 (BNP was slightly elevated at 120 but no CXR done), though clinically not volume overloaded. Trop negative at <0.006. TSH slightly elevated at 4.822 but free T4 wnl.  CMP and CBC wnl. Started on Diltiazem and heparin drips, admitted to [facility] [place].  Rate remained in the 130s after one hour on dilt IV gtt at 5 mg/hr, so increased to 7.5mg/hr and rate remained unchanged, so increased to 10mg/hr and rate improved to 100s overnight.  NPO after midnight for JAKE/cardioversion.     Interval History: Went for JAKE and cardioversion with Peds and Adult Congenital Arrhythmia team this morning, now in NSR with rate in the 70s, stopped dilt gtt, will stop heparin gtt and start Lovenox 1mg/kg q24 bridge to coumadin, PharmD Yahaira Garcia assisting.  Aim for d/c home later today after lovenox and coumadin teaching.  Long-term plan is for elective outpatient ablation in a month or two, after being anticoagulated for a while.      Review of Systems:  Pain Scale: 0 /10   Constitutional: no fever or chills  Respiratory: no cough or shortness of breath  Cardiovascular: no chest pain or palpitations  Gastrointestinal: no nausea  or vomiting, no abdominal pain or change in bowel habits  Genitourinary: no hematuria or dysuria  Integument/Breast: no rash or pruritis  Hematologic/Lymphatic: no easy bruising or lymphadenopathy  Musculoskeletal: no arthralgias or myalgias  Neurological: no seizures or tremors  Behavioral/Psych: no depression or anxiety    OBJECTIVE:     Vital Signs Range (Last 24H):  Temp:  [97.2 °F (36.2 °C)-98.4 °F (36.9 °C)]   Pulse:  []   Resp:  [14-22]   BP: (104-139)/(54-89)   SpO2:  [91 %-99 %]     I & O (Last 24H):  Intake/Output Summary (Last 24 hours) at 09/13/17 1006  Last data filed at 09/13/17 0948   Gross per 24 hour   Intake              240 ml   Output             2620 ml   Net            -2380 ml       Physical Exam:  General appearance: no distress  Mental status: Alert and oriented x 3, pleasant  HEENT:  conjunctivae/corneas clear, PERRL  Neck: supple, thyroid not enlarged  Pulm:   normal respiratory effort, CTA B, no c/w/r  Card: RRR, faint systolic ejection murmur at LSB,   Abd: soft, NT, ND, BS present; no masses, no organomegaly  Ext: no c/c/e  Pulses: 2+, symmetric  Skin: color, texture, turgor normal. No rashes or lesions  Neuro: CN II-XII grossly intact, no focal numbness or weakness, normal strength and tone     Diagnostic Results:  Labs reviewed    Recent Results (from the past 24 hour(s))   CBC auto differential    Collection Time: 09/12/17  3:33 PM   Result Value Ref Range    WBC 6.06 3.90 - 12.70 K/uL    RBC 4.71 4.60 - 6.20 M/uL    Hemoglobin 14.0 14.0 - 18.0 g/dL    Hematocrit 41.8 40.0 - 54.0 %    MCV 89 82 - 98 fL    MCH 29.7 27.0 - 31.0 pg    MCHC 33.5 32.0 - 36.0 g/dL    RDW 16.3 (H) 11.5 - 14.5 %    Platelets 235 150 - 350 K/uL    MPV 9.4 9.2 - 12.9 fL    Gran # 4.1 1.8 - 7.7 K/uL    Lymph # 1.4 1.0 - 4.8 K/uL    Mono # 0.4 0.3 - 1.0 K/uL    Eos # 0.1 0.0 - 0.5 K/uL    Baso # 0.04 0.00 - 0.20 K/uL    Gran% 67.9 38.0 - 73.0 %    Lymph% 23.3 18.0 - 48.0 %    Mono% 6.3 4.0 - 15.0 %     Eosinophil% 1.5 0.0 - 8.0 %    Basophil% 0.7 0.0 - 1.9 %    Differential Method Automated    Comprehensive metabolic panel    Collection Time: 09/12/17  3:33 PM   Result Value Ref Range    Sodium 140 136 - 145 mmol/L    Potassium 4.2 3.5 - 5.1 mmol/L    Chloride 106 95 - 110 mmol/L    CO2 24 23 - 29 mmol/L    Glucose 96 70 - 110 mg/dL    BUN, Bld 16 6 - 20 mg/dL    Creatinine 1.3 0.5 - 1.4 mg/dL    Calcium 9.2 8.7 - 10.5 mg/dL    Total Protein 7.7 6.0 - 8.4 g/dL    Albumin 3.5 3.5 - 5.2 g/dL    Total Bilirubin 0.4 0.1 - 1.0 mg/dL    Alkaline Phosphatase 120 55 - 135 U/L    AST 21 10 - 40 U/L    ALT 20 10 - 44 U/L    Anion Gap 10 8 - 16 mmol/L    eGFR if African American >60.0 >60 mL/min/1.73 m^2    eGFR if non African American >60.0 >60 mL/min/1.73 m^2   Brain natriuretic peptide    Collection Time: 09/12/17  3:33 PM   Result Value Ref Range     (H) 0 - 99 pg/mL   Magnesium    Collection Time: 09/12/17  3:33 PM   Result Value Ref Range    Magnesium 1.9 1.6 - 2.6 mg/dL   Protime-INR    Collection Time: 09/12/17  3:33 PM   Result Value Ref Range    Prothrombin Time 10.5 9.0 - 12.5 sec    INR 1.0 0.8 - 1.2   Troponin I    Collection Time: 09/12/17  3:33 PM   Result Value Ref Range    Troponin I <0.006 0.000 - 0.026 ng/mL   TSH    Collection Time: 09/12/17  3:33 PM   Result Value Ref Range    TSH 4.822 (H) 0.400 - 4.000 uIU/mL   Anti-Xa Heparin Monitoring    Collection Time: 09/12/17  3:33 PM   Result Value Ref Range    Heparin Anti-Xa <0.10 (L) 0.30 - 0.70 IU/mL   T4, free    Collection Time: 09/12/17  3:33 PM   Result Value Ref Range    Free T4 0.84 0.71 - 1.51 ng/dL   CBC with Auto Differential    Collection Time: 09/12/17 11:12 PM   Result Value Ref Range    WBC 6.65 3.90 - 12.70 K/uL    RBC 4.98 4.60 - 6.20 M/uL    Hemoglobin 15.1 14.0 - 18.0 g/dL    Hematocrit 44.2 40.0 - 54.0 %    MCV 89 82 - 98 fL    MCH 30.3 27.0 - 31.0 pg    MCHC 34.2 32.0 - 36.0 g/dL    RDW 16.3 (H) 11.5 - 14.5 %    Platelets 232 150 -  "350 K/uL    MPV 9.4 9.2 - 12.9 fL    Gran # 4.4 1.8 - 7.7 K/uL    Lymph # 1.6 1.0 - 4.8 K/uL    Mono # 0.4 0.3 - 1.0 K/uL    Eos # 0.1 0.0 - 0.5 K/uL    Baso # 0.04 0.00 - 0.20 K/uL    Gran% 66.5 38.0 - 73.0 %    Lymph% 24.5 18.0 - 48.0 %    Mono% 6.2 4.0 - 15.0 %    Eosinophil% 1.7 0.0 - 8.0 %    Basophil% 0.6 0.0 - 1.9 %    Differential Method Automated    Anti-Xa Heparin Monitoring    Collection Time: 09/12/17 11:12 PM   Result Value Ref Range    Heparin Anti-Xa 0.46 0.30 - 0.70 IU/mL   CBC auto differential    Collection Time: 09/13/17  3:40 AM   Result Value Ref Range    WBC 6.87 3.90 - 12.70 K/uL    RBC 4.71 4.60 - 6.20 M/uL    Hemoglobin 14.2 14.0 - 18.0 g/dL    Hematocrit 40.7 40.0 - 54.0 %    MCV 86 82 - 98 fL    MCH 30.1 27.0 - 31.0 pg    MCHC 34.9 32.0 - 36.0 g/dL    RDW 16.5 (H) 11.5 - 14.5 %    Platelets 229 150 - 350 K/uL    MPV 9.2 9.2 - 12.9 fL    Gran # 4.5 1.8 - 7.7 K/uL    Lymph # 1.7 1.0 - 4.8 K/uL    Mono # 0.5 0.3 - 1.0 K/uL    Eos # 0.1 0.0 - 0.5 K/uL    Baso # 0.06 0.00 - 0.20 K/uL    Gran% 65.3 38.0 - 73.0 %    Lymph% 24.9 18.0 - 48.0 %    Mono% 6.6 4.0 - 15.0 %    Eosinophil% 1.9 0.0 - 8.0 %    Basophil% 0.9 0.0 - 1.9 %    Differential Method Automated    Anti-Xa Heparin Monitoring    Collection Time: 09/13/17  3:40 AM   Result Value Ref Range    Heparin Anti-Xa 0.43 0.30 - 0.70 IU/mL           ASSESSMENT/PLAN:     Atrial flutter, typical, new onset.  PPM interrogation revealed "Evidence of atrial tachycardia with mostly 2:1 block. Episodes found with A-rate 296 and V-rate 140."  - increase home metoprolol to 50 BID (from 25 BID)  - anticoagulation: change from heparin gtt to lovenox bridging to coumadin (needs coumadin rather than DOAC, as this is valvular atrial flutter)\  - outpatient ablation per Dr. Ortiz (Adult Congenital Heart Disease/Pediatric EP)  - discussed with Dr Ortiz, appreciate consult    Chronic systolic CHF  - decreased home Lasix to 20mg PO daily (from 40mg PO BID) " per Dr Dean's note  - BB as above     Bioprosthetic pulmonary valve. No acute issues.   - Antibiotic prophylaxis for any invasive procedures.     Hypothyroidism - TFTs unremarkable with TSH slightly elevated at 4.82 and FT4 wnl  - re-check TFTS when out of acute setting  - continue home synthroid 88     Prophylaxis- on hep as above     Advance directives- full code     Post-acute care- home with mother Luann and new Lovenox bridging to coumadin. F/u with Peds and Adult Congenital Cardiology and Arrhythmia team : Dr Yaniv Dean and Dr Jose Ortiz.      Time spent in care of patient: 50 mins    Shanda Rogers MD  Hospital Medicine Staff

## 2017-09-13 NOTE — NURSING
Per MD Dillard as long as patient is asymptomatic cardiology is ok with HR being in the 140's. Will continue with plan of cardioversion in the morning.

## 2017-09-13 NOTE — NURSING
Notified MD Rogers patient's  on monitor. Diltazem gtt increased from 7.5mg/hr to 10mg/hr. Will continue to monitor.

## 2017-09-13 NOTE — NURSING
Enoxaparin education given to mother at bedside. Mother is aware and knows how to properly place and give shots daily per discharge.

## 2017-09-13 NOTE — NURSING
Notified MD Dillard patient continues to be in the 140's HR on monitor despite increasing diltiazem gtt to 12.5mg/hr. Will increase gtt to 15mg/hr per telephone verbal order. Will continue to monitor.

## 2017-09-13 NOTE — NURSING
Notified MD Dillard patient's HR is not coming down despite increasing Diltiazem gtt to max floor rate of 15 mg/ml/hr.  MD notified RN she is going to consult pediatric cardiology and will notify RN of POC changes

## 2017-09-13 NOTE — PROGRESS NOTES
Patient admitted to recovery see [facility] for complete assessment pacu bcg' s maintained safety measures verified patient instructed on pain scale and patient verbalized understanding. Also called for ekg and called patient's mom and updated on patient location.

## 2017-09-13 NOTE — ANESTHESIA POSTPROCEDURE EVALUATION
"Anesthesia Post Evaluation    Patient: Cipriano Thompson    Procedure(s) Performed: Procedure(s) (LRB):  CARDIOVERSION (N/A)  TRANSESOPHAGEAL ECHOCARDIOGRAM (JAKE) (N/A)    Final Anesthesia Type: general  Patient location during evaluation: PACU  Patient participation: Yes- Able to Participate  Level of consciousness: awake and alert, awake and oriented  Post-procedure vital signs: reviewed and stable  Pain management: adequate  Airway patency: patent  PONV status at discharge: No PONV  Anesthetic complications: no      Cardiovascular status: blood pressure returned to baseline, stable and hemodynamically stable  Respiratory status: unassisted, spontaneous ventilation and room air  Hydration status: euvolemic  Follow-up not needed.        Visit Vitals  /60 (BP Location: Right arm, Patient Position: Lying)   Pulse 75   Temp 36.2 °C (97.2 °F) (Oral)   Resp 18   Ht 5' 7" (1.702 m)   Wt 99.7 kg (219 lb 12.8 oz)   SpO2 99%   BMI 34.43 kg/m²       Pain/Justo Score: Pain Assessment Performed: Yes (9/13/2017  9:47 AM)  Presence of Pain: denies (9/13/2017  9:47 AM)  Justo Score: 10 (9/13/2017  9:47 AM)      "

## 2017-09-13 NOTE — ED NOTES
Care assumed. Patient resting in stretcher and is in NAD at this time. Pt is awake alert and oriented x4, respirations even and unlabored. Pt denies pain at this time. Family at bedside. Pt and family aware of POC. IV to bilateral ACs both infusing well at this time - dressings clean dry and intact - no redness or swelling noted at either site. Bed low and locked with side rails up x2, call bell in pt reach.

## 2017-09-13 NOTE — NURSING
Notified MD Dillard patient's HR continues to be 140 on monitor. Verbal order to increase gtt to 12.5mg/hr. Will continue to monitor.

## 2017-09-13 NOTE — PLAN OF CARE
09/13/17 1121   Final Note   Assessment Type Final Discharge Note   Discharge Disposition Home   Hospital Follow Up  Appt(s) scheduled? No

## 2017-09-13 NOTE — CONSULTS
[facility]  Cardiology  Consult Note    Patient Name: Cipriano Thompson  MRN: [id number]  Admission Date: 9/12/2017  Hospital Length of Stay: 0 days  Code Status: Full Code   Attending Provider: Luda Lemus MD  Consulting Provider: Max Boyd MD  Primary Care Physician: Princess KHUSHBOO Rodgers MD  Principal Problem: Atrial Flutter, MD  Patient information was obtained from relative(s) and past medical records.     Consults AFL  Subjective:     Chief Complaint:      HPI: Cipriano is a 27-year-old -American male with Down syndrome.  He was originally diagnosed to have tetralogy of Fallot.  He underwent complete repair using a transannular patch.  He then required   placement of a 27 mm Freestyle Medtronic Bioprosthetic pulmonary valve in 1999.  He subsequently developed sinus node dysfunction and had placement of a dual chamber endocardial pacemaker in 2008.  He had developed progressive pulmonary valve stenosis and insufficiency and recently underwent surgical repair.  Dr. Godfrey placed a 27 mm St. Quinn Epic Bioprosthetic valve in the pulmonary position.  This was conducted in conjunction with placement of a 30 mm Dacron conduit.  Surgery was in May 2017.   On a routine clinic visit today it was found that he had Atrial Flutter/A Tach. He was sent to [facility] for JAKE guided DCCV.     Past Medical History:   Diagnosis Date    CHF (congestive heart failure)     Down syndrome     Encounter for blood transfusion     S/P surgery for complex congenital heart disease 1/30/2017    Tetralogy of Fallot 05/1990       Past Surgical History:   Procedure Laterality Date    BUNIONECTOMY      CARDIAC SURGERY      open heart, ppm     INSERT / REPLACE / REMOVE PACEMAKER Left 10/2008    TONSILLECTOMY         Review of patient's allergies indicates:   Allergen Reactions    Adhesive     Latex, natural rubber     Sulfa (sulfonamide antibiotics)        No current facility-administered medications on file  prior to encounter.      Current Outpatient Prescriptions on File Prior to Encounter   Medication Sig    furosemide (LASIX) 40 MG tablet Take 1 tablet (40 mg total) by mouth once daily. (Patient taking differently: Take 20 mg by mouth once daily. )    metoprolol tartrate (LOPRESSOR) 25 MG tablet Take 1 tablet (25 mg total) by mouth 2 (two) times daily.    levothyroxine (SYNTHROID) 88 MCG tablet     [DISCONTINUED] oxycodone (ROXICODONE) 5 MG immediate release tablet Take 1 tablet (5 mg total) by mouth every 6 (six) hours as needed.     Family History     Problem Relation (Age of Onset)    No Known Problems Mother, Sister        Social History Main Topics    Smoking status: Never Smoker    Smokeless tobacco: Never Used    Alcohol use No    Drug use: Unknown    Sexual activity: Not Currently     Review of Systems   HENT: Negative for congestion and hoarse voice.    Eyes: Negative for blurred vision and visual disturbance.   Cardiovascular: Negative for chest pain, irregular heartbeat and palpitations.   Respiratory: Positive for shortness of breath. Negative for cough and sputum production.    Endocrine: Negative.    Skin: Negative.    Musculoskeletal: Negative.    Gastrointestinal: Negative for bowel incontinence.   Genitourinary: Negative.    Neurological: Negative for dizziness, focal weakness and loss of balance.        Objective:     Vital Signs (Most Recent):  Temp: 97.7 °F (36.5 °C) (09/12/17 1458)  Pulse: (!) 139 (09/12/17 1929)  Resp: 19 (09/12/17 1929)  BP: 104/80 (09/12/17 1903)  SpO2: 95 % (09/12/17 1929) Vital Signs (24h Range):  Temp:  [97.7 °F (36.5 °C)] 97.7 °F (36.5 °C)  Pulse:  [113-143] 139  Resp:  [14-20] 19  SpO2:  [95 %-97 %] 95 %  BP: (104-139)/(60-89) 104/80     Weight: 103.3 kg (227 lb 11.8 oz)  Body mass index is 35.67 kg/m².    SpO2: 95 %  O2 Device (Oxygen Therapy): room air      Intake/Output Summary (Last 24 hours) at 09/12/17 2009  Last data filed at 09/12/17 7408   Gross per 24  hour   Intake                0 ml   Output             1770 ml   Net            -1770 ml       Physical Exam   Constitutional: He is oriented to person, place, and time. He appears well-developed and well-nourished.   Down's features   HENT:   Head: Normocephalic and atraumatic.   Eyes: Conjunctivae are normal. Pupils are equal, round, and reactive to light.   Neck: Normal range of motion. Neck supple. No JVD present. No thyromegaly present.   Cardiovascular: Intact distal pulses.  Exam reveals no friction rub.    No murmur heard.  Tachycardia, irregular   Pulmonary/Chest: Effort normal and breath sounds normal. No respiratory distress.   Abdominal: Soft. Bowel sounds are normal. He exhibits no distension.   Musculoskeletal: Normal range of motion.   Neurological: He is alert and oriented to person, place, and time.   Skin: Skin is warm and dry.   Psychiatric: He has a normal mood and affect. His behavior is normal.       Significant Labs:   CMP   Recent Labs  Lab 09/12/17  1533      K 4.2      CO2 24   GLU 96   BUN 16   CREATININE 1.3   CALCIUM 9.2   PROT 7.7   ALBUMIN 3.5   BILITOT 0.4   ALKPHOS 120   AST 21   ALT 20   ANIONGAP 10   ESTGFRAFRICA >60.0   EGFRNONAA >60.0   , CBC   Recent Labs  Lab 09/12/17  1533   WBC 6.06   HGB 14.0   HCT 41.8       and Troponin   Recent Labs  Lab 09/12/17  1533   TROPONINI <0.006       Significant Imaging: Echocardiogram: 2D echo with color flow doppler: 9/12/17    An echocardiogram was obtained today. Patient appears to not be in sinus rhythm   No clot or vegetation seen.This study showed four normally related cardiac chambers.  No evidence for residual atrial septal   defect or ventricular septal defect.  No right or left ventricular outflow tract   obstruction is seen.  There is paradoxical motion of the interventricular   septum. The bioprosthetic valve is in good position, and quite echogenic. Annulus ~ 2.5 cm.   No pericardial effusion. Left ventricular  internal diastolic dimension is 4.4 cm.  The right ventricle   was 3.7 cm.  The aortic root measures 3.2 cm.  The left atrium is 3.7 cm.    Interventricular septal thickness during diastole is 1.1 cm and the posterior   wall is 1.2 cm.  Ejection fraction is 47% (was 53%).  Pacing wires are seen in   the right atrium and right ventricle.     PACEMAKER Programming:  Evidence of atrial tachycardia with mostly 2:1 block.  Episodes found with A-rate 296 and V-rate 140    Assessment and Plan:     Active Diagnoses:    Diagnosis Date Noted POA    Typical atrial flutter [I48.3] 09/12/2017 Yes      Problems Resolved During this Admission:    Diagnosis Date Noted Date Resolved POA       27 year old male patient with adult congenital Heart disease- TOF with S/p 3 cardiac surgeries in the past- most recent in May 2017 with a St Quinn prosthesis in Pulm valve position. Patient does not have new symptoms per mother. He has chronic SOB/ECHEVERRIA no change since surgery. Has a stable Blood pressure in 120s    Rate control with CCB and AC with IV heparin infusion. JAKE guided DCCV planned in AM. EP consulted. Possible RFA in the near future.    Thank you for your consult.     Attending Addendum to follow.    Max Boyd MD  Cardiology   [facility]

## 2017-09-13 NOTE — PLAN OF CARE
09/13/17 0945   Discharge Assessment   Assessment Type Discharge Planning Assessment   Confirmed/corrected address and phone number on facesheet? Yes   Assessment information obtained from? Caregiver;Medical Record   Expected Length of Stay (days) 2   Communicated expected length of stay with patient/caregiver yes   Prior to hospitilization cognitive status: Alert/Oriented   Prior to hospitalization functional status: Independent   Current cognitive status: Alert/Oriented   Current Functional Status: Independent   Lives With parent(s)   Able to Return to Prior Arrangements yes   Is patient able to care for self after discharge? Yes   Patient's perception of discharge disposition home or selfcare   Readmission Within The Last 30 Days no previous admission in last 30 days   Patient currently being followed by outpatient case management? No   Patient currently receives any other outside agency services? No   Equipment Currently Used at Home walker, rolling   Do you have any problems affording any of your prescribed medications? No   Is the patient taking medications as prescribed? yes   Does the patient have transportation home? Yes   Transportation Available family or friend will provide   Does the patient receive services at the Coumadin Clinic? No   Discharge Plan A Home with family   Patient/Family In Agreement With Plan yes   Admitted with Av. Pt off the floor for DCCV. Information obtained from the pt's mother. Lives with mother and is independent in his ADLs. Plan is to DC home. No DC needs identified.

## 2017-09-13 NOTE — NURSING TRANSFER
Nursing Transfer Note      9/13/2017     Transfer To: 358    Transfer via bed    Transfer with cardiac monitoring    Transported by rn    Medicines sent: silvadene cream    Chart send with patient: Yes    Notified: floor nurse and telemetry tech    Patient reassessed at: see epic (date, time)

## 2017-09-13 NOTE — PLAN OF CARE
Problem: Patient Care Overview  Goal: Plan of Care Review  Outcome: Ongoing (interventions implemented as appropriate)  Pt remains free from falls and injury. Plan of care reviewed with pt. Pt understands plan. Pt denies pain, VSS. Pt was cardioverted once today. Pt converted back to paced rhythm in the 60s.Pt remains on heparin drip at 17 units. Xa  0.43. Will continue to monitor.

## 2017-09-14 ENCOUNTER — ANTI-COAG VISIT (OUTPATIENT)
Dept: CARDIOLOGY | Facility: CLINIC | Age: 27
End: 2017-09-14

## 2017-09-14 DIAGNOSIS — I48.3 TYPICAL ATRIAL FLUTTER: ICD-10-CM

## 2017-09-14 DIAGNOSIS — Z79.01 LONG TERM (CURRENT) USE OF ANTICOAGULANTS: Primary | ICD-10-CM

## 2017-09-14 NOTE — PROGRESS NOTES
"26yo M recently admitted from 9/12 through 9/13 for Typical atrial flutter.  Pts PMHx: Down syndrome, tetralogy of fallot, systolic CHF, SSS (s/p DC PPM '08), hypothyroidism, right ventricular HTN, PVCs, progressive obstruction and insufficiency of a previously placed bioprosthetic pulmonary valve s/p recent bioprosthetic pulmonary valve placement by Dr Godfrey in May (27 mm St. Quinn Epic Bioprosthetic valve in the pulmonary position; 30 mm Dacron conduit).  While admitted, the pt underwent a DCCV on 9/13/17.  Per his med card, he was discharged with Warfarin 5mg tablets and Lovenox 150mg daily.  The pt is 26yo M with Height: 5'7", Weight: 220lbs./99.6kg, Scr: 1.3 and CrCl: 79.8mL/min.  He will need help with lovenox administration, which is most likely why it was dosed Q24hrs.    I was able to reach the pt's mother today.  His mother confirmed his warfarin and lovenox doses.  He has not yet started the warfarin, but will do so today.  He knows to take it in the evenings and they have our contact info.  He will continue his lovenox for the current time.  He will go for an INR at the lab on 9/18 and is booked for a new pt education session at the [facility] on 9/29.  "

## 2017-09-14 NOTE — DISCHARGE SUMMARY
[facility] Hospital Medicine  Discharge Summary      Patient Name: Cipriano Thompson  MRN: [id number]  Admission Date: 9/12/2017  Hospital Length of Stay: 1 days  Discharge Date and Time: 9/13/2017  8:39 PM  Attending Physician: Shanda Rogers MD  Discharging Provider: Shanda Rogers MD  Primary Care Provider: Princess KHUSHBOO Rodgers MD    [facility] Medicine Team: [facility] MED  Shanda Rogers MD    HPI: Mr Cipriano Thompson is a pleasant 27 y.o. man with Down syndrome, tetralogy of fallot, systolic CHF (LVEF 47%), SSS s/p DC PPM '08, progressive obstruction and insufficiency of a previously placed bioprosthetic pulmonary valve s/p recent bioprosthetic pulmonary valve placement by Dr Godfrey in May (27 mm St. Quinn Epic Bioprosthetic valve in the pulmonary position; 30 mm Dacron conduit), who went for routine follow up in his adult congenital heart disease clinic with Dr Dean and was found to be in atrial flutter with 2:1 block.       He feels well and denies all Sx - no palpitations, CP, SOB, lightheadedness, syncope.      Hospital Course: rec'd dilt 10mg IV x 1 in ED.  Also rec'd empiric lasix 40 IV x 1 (BNP was slightly elevated at 120 but no CXR done), though clinically not volume overloaded. Trop negative at <0.006. TSH slightly elevated at 4.822 but free T4 wnl.  CMP and CBC wnl. Started on Diltiazem and heparin drips, [place] [facility]  Rate remained in the 130s after one hour on dilt IV gtt at 5 mg/hr, so increased to 7.5mg/hr and rate remained unchanged, so increased to 10mg/hr and rate improved to 100s overnight.  NPO after midnight for JAKE/cardioversion.   The next day, he went for JAKE and cardioversion with Peds and Adult Congenital Arrhythmia team, which was successful.  He converted to NSR with rate in the 70s.  Sstopped dilt gtt.  Changed home BB to Toprol XL 50 BID (from metoprolol tartrate 25 BID).  stop heparin gtt and start Lovenox 1mg/kg q24 bridge to coumadin - PharmD Yahaira Garcia assisted.  "D/c-ed home after lovenox and coumadin teaching, and after his ride arrived at 730pm to pick him and his mother up.  Long-term plan is for elective outpatient ablation in a month or two, after being anticoagulated for a while.      A/P:  Atrial flutter, typical, new onset.  PPM interrogation revealed "Evidence of atrial tachycardia with mostly 2:1 block. Episodes found with A-rate 296 and V-rate 140."  - changed home metoprolol to toprol XL 50 BID (from lopressor 25 BID)  - anticoagulation: change from heparin gtt to lovenox bridging to coumadin (needs coumadin rather than DOAC, as this is valvular atrial flutter)\  - outpatient ablation per Dr. Ortiz (Adult Congenital Heart Disease/Pediatric EP)  - discussed with Dr Ortiz, appreciate consult     Chronic systolic CHF   - decreased home Lasix to 20mg PO daily (from 40mg PO BID) per Dr Dean's note  - BB as above     Bioprosthetic pulmonary valve. No acute issues.   - Antibiotic prophylaxis for any invasive procedures.     Hypothyroidism - TFTs unremarkable with TSH slightly elevated at 4.82 and FT4 wnl  - re-check TFTS when out of acute setting  - continue home synthroid 88     Prophylaxis- on hep as above     Advance directives- full code     Post-acute care- home with mother Luann and new Lovenox bridging to coumadin. F/u with Peds and Adult Congenital Cardiology and Arrhythmia team : Dr Yaniv Dean and Dr Jose Ortiz.         Procedure(s) (LRB):  CARDIOVERSION (N/A)  TRANSESOPHAGEAL ECHOCARDIOGRAM (JAKE) (N/A)      Indwelling Lines/Drains at time of discharge:   Lines/Drains/Airways          No matching active lines, drains, or airways        Consults:   Consults         Status Ordering Provider     Inpatient consult to Cardiology  Once     Provider:  (Not yet assigned)    Completed JHONATAN DHILLON     Inpatient consult to Pediatric Electrophysiology  Once     Provider:  (Not yet assigned)    Completed JHONATAN DHILLON        Significant Diagnostic " Studies: see above    Pending Diagnostic Studies:     None        Final Active Diagnoses:    Diagnosis Date Noted POA    PRINCIPAL PROBLEM:  Typical atrial flutter [I48.3] 09/12/2017 Yes    Anticoagulation goal of INR 2 to 3 [Z51.81, Z79.01] 09/13/2017 Not Applicable    Adult congenital heart disease [Q24.9] 06/18/2017 Not Applicable    Down syndrome [Q90.9] 05/15/2017 Not Applicable    Hypothyroid [E03.9] 05/15/2017 Yes    Pulmonary valve replaced [Z95.2]  Not Applicable    Tetralogy of Fallot [Q21.3]  Not Applicable    Pacemaker [Z95.0] 01/30/2017 Yes    Pulmonary valve disorder [I37.9] 01/30/2017 Yes    S/P surgery for complex congenital heart disease [Z98.890, Z87.74] 01/30/2017 Not Applicable      Problems Resolved During this Admission:    Diagnosis Date Noted Date Resolved POA      Discharged Condition: good    Disposition: Home or Self Care    Follow Up:  Follow-up Information     Augustine Dean MD.    Specialty:  Pediatric Cardiology  Contact information:  [address]  [address]  [place] [address]  [phone]             Jose Ortiz MD.    Specialty:  Pediatric Cardiology  Why:  for elective outpatient atrial flutter ablation in 1-2 months.    Contact information:  [address] [address]  [phone]                 Patient Instructions:     Ambulatory referral to Anticoagulation Monitoring   Referral Priority: Routine Referral Type: Consultation   Referral Reason: Specialty Services Required    Requested Specialty: Cardiology    Number of Visits Requested: 1      Diet Cardiac     Call MD for:  difficulty breathing or increased cough     Call MD for:  persistent dizziness, light-headedness, or visual disturbances     Call MD for:  increased confusion or weakness       Medications:  Reconciled Home Medications:   Discharge Medication List as of 9/13/2017  4:48 PM      START taking these medications    Details   enoxaparin (LOVENOX) 150 mg/mL Syrg Inject 1 mL  (150 mg total) into the skin once daily., Starting Wed 9/13/2017, Normal      warfarin (COUMADIN) 5 MG tablet Take 1 tablet (5 mg total) by mouth once daily. Or as instructed by coumadin clinic, Starting Wed 9/13/2017, Until Thu 9/13/2018, Normal         CONTINUE these medications which have CHANGED    Details   furosemide (LASIX) 40 MG tablet Take 0.5 tablets (20 mg total) by mouth once daily., Starting Wed 9/13/2017, Until Thu 9/13/2018, No Print      metoprolol tartrate (LOPRESSOR) 50 MG tablet Take 1 tablet (50 mg total) by mouth 2 (two) times daily. BEDSIDE DELIVERY, Starting Wed 9/13/2017, Until Thu 9/13/2018, Print         CONTINUE these medications which have NOT CHANGED    Details   levothyroxine (SYNTHROID) 88 MCG tablet Starting 12/6/2016, Until Discontinued, Historical Med           Time spent on the discharge of patient: 45 minutes         Shanda Rogers MD  Department of Hospital Medicine  [facility]

## 2017-09-18 ENCOUNTER — LAB VISIT (OUTPATIENT)
Dept: LAB | Facility: HOSPITAL | Age: 27
End: 2017-09-18
Payer: MEDICAID

## 2017-09-18 ENCOUNTER — ANTI-COAG VISIT (OUTPATIENT)
Dept: CARDIOLOGY | Facility: CLINIC | Age: 27
End: 2017-09-18

## 2017-09-18 DIAGNOSIS — Z79.01 LONG TERM (CURRENT) USE OF ANTICOAGULANTS: ICD-10-CM

## 2017-09-18 DIAGNOSIS — I48.3 TYPICAL ATRIAL FLUTTER: ICD-10-CM

## 2017-09-18 LAB
INR PPP: 1.4
PROTHROMBIN TIME: 14.5 SEC

## 2017-09-18 PROCEDURE — 85610 PROTHROMBIN TIME: CPT

## 2017-09-18 PROCEDURE — 36415 COLL VENOUS BLD VENIPUNCTURE: CPT

## 2017-09-29 ENCOUNTER — CONFERENCE (OUTPATIENT)
Dept: PEDIATRIC CARDIOLOGY | Facility: CLINIC | Age: 27
End: 2017-09-29

## 2017-10-03 NOTE — PROGRESS NOTES
Cipriano Borjasstuarts underwent  successful DCCV on 09/13/2017.His case was reviewed in our [facility] on 09/29/2017 He should follow up with Dr. Dean.

## 2017-10-06 ENCOUNTER — ANTI-COAG VISIT (OUTPATIENT)
Dept: CARDIOLOGY | Facility: CLINIC | Age: 27
End: 2017-10-06

## 2017-10-06 DIAGNOSIS — I48.3 TYPICAL ATRIAL FLUTTER: ICD-10-CM

## 2017-10-06 NOTE — PROGRESS NOTES
The pt has transferred his warfarin monitoring to his local physician.  I am discharging him from the CC at this time.

## 2017-10-11 ENCOUNTER — TELEPHONE (OUTPATIENT)
Dept: ELECTROPHYSIOLOGY | Facility: CLINIC | Age: 27
End: 2017-10-11

## 2017-11-17 ENCOUNTER — TELEPHONE (OUTPATIENT)
Dept: ELECTROPHYSIOLOGY | Facility: CLINIC | Age: 27
End: 2017-11-17

## 2017-11-17 NOTE — TELEPHONE ENCOUNTER
ABLATION EDUCATION CHECKLIST    11/29/17 @ 6 AM  Report to Cardiology Waiting Room on 3rd floor of the Hospital    (Do not report to clinic)  Directions for Reporting to Cardiology Waiting Area in the Hospital  If you park in the Parking Garage:  Take elevators to the 2nd floor  Walk up ramp and turn right by Gold Elevators  Take elevator to the 3rd floor  Upon exiting the elevator, turn away from the clinic areas  Walk long mora around to front of hospital to area with windows [facility]  Check in at Reception Desk  OR  If family is dropping you off:  Have them drop you off at the front of the Hospital  (Near the ER, where all the flags are hung).  Take the E elevators to the 3rd floor.  Check in at the Reception Desk in the waiting room.    Do not eat or drink anything after: 12 mn on the night before your procedure    Medications:   Hold your fluid pill: Lasix (Furosemide) on the morning of your procedure  You will continue your coumadin throughout your procedure.   You may take your other usual morning medications with a sip of water    You will be spending the night after your procedure  You will need someone to drive you home the day after your procedure.    Your pain during your procedure will be managed by the anesthesia team.     THE ABOVE INSTRUCTIONS WERE GIVEN TO THE PATIENT VERBALLY AND THEY VERBALIZED UNDERSTANDING.  THEY DO NOT REQUIRE ANY SPECIAL NEEDS AND DO NOT HAVE ANY LEARNING BARRIERS.    Any need to reschedule or cancel procedures, or any questions regarding your procedures should be addressed directly with the Arrhythmia Department Nurses at the following phone number: [phone]

## 2017-11-28 ENCOUNTER — ANESTHESIA EVENT (OUTPATIENT)
Dept: MEDSURG UNIT | Facility: HOSPITAL | Age: 27
End: 2017-11-28
Payer: MEDICAID

## 2017-11-29 ENCOUNTER — ANESTHESIA (OUTPATIENT)
Dept: MEDSURG UNIT | Facility: HOSPITAL | Age: 27
End: 2017-11-29
Payer: MEDICAID

## 2017-11-29 ENCOUNTER — HOSPITAL ENCOUNTER (OUTPATIENT)
Facility: HOSPITAL | Age: 27
Discharge: HOME | End: 2017-11-30
Attending: INTERNAL MEDICINE | Admitting: INTERNAL MEDICINE
Payer: MEDICAID

## 2017-11-29 ENCOUNTER — SURGERY (OUTPATIENT)
Age: 27
End: 2017-11-29

## 2017-11-29 DIAGNOSIS — Q24.9 ADULT CONGENITAL HEART DISEASE: ICD-10-CM

## 2017-11-29 DIAGNOSIS — I48.3 TYPICAL ATRIAL FLUTTER: Primary | ICD-10-CM

## 2017-11-29 DIAGNOSIS — I48.92 ATRIAL FLUTTER: ICD-10-CM

## 2017-11-29 DIAGNOSIS — Z95.0 PACEMAKER: ICD-10-CM

## 2017-11-29 DIAGNOSIS — I37.9 NONRHEUMATIC PULMONARY VALVE DISORDER: ICD-10-CM

## 2017-11-29 DIAGNOSIS — Q21.3 TETRALOGY OF FALLOT: ICD-10-CM

## 2017-11-29 DIAGNOSIS — Z87.74 S/P SURGERY FOR COMPLEX CONGENITAL HEART DISEASE: ICD-10-CM

## 2017-11-29 LAB
ABO + RH BLD: NORMAL
ANION GAP SERPL CALC-SCNC: 9 MMOL/L
APTT BLDCRRT: 26.1 SEC
BASOPHILS # BLD AUTO: 0.06 K/UL
BASOPHILS NFR BLD: 1.1 %
BLD GP AB SCN CELLS X3 SERPL QL: NORMAL
BUN SERPL-MCNC: 18 MG/DL
CALCIUM SERPL-MCNC: 9.2 MG/DL
CHLORIDE SERPL-SCNC: 111 MMOL/L
CO2 SERPL-SCNC: 25 MMOL/L
CREAT SERPL-MCNC: 1.3 MG/DL
DIFFERENTIAL METHOD: ABNORMAL
EOSINOPHIL # BLD AUTO: 0.1 K/UL
EOSINOPHIL NFR BLD: 1.1 %
ERYTHROCYTE [DISTWIDTH] IN BLOOD BY AUTOMATED COUNT: 14.8 %
EST. GFR  (AFRICAN AMERICAN): >60 ML/MIN/1.73 M^2
EST. GFR  (NON AFRICAN AMERICAN): >60 ML/MIN/1.73 M^2
GLUCOSE SERPL-MCNC: 108 MG/DL
HCT VFR BLD AUTO: 44.1 %
HGB BLD-MCNC: 14.3 G/DL
IMM GRANULOCYTES # BLD AUTO: 0.01 K/UL
IMM GRANULOCYTES NFR BLD AUTO: 0.2 %
INR PPP: 1.1
LYMPHOCYTES # BLD AUTO: 1.1 K/UL
LYMPHOCYTES NFR BLD: 20.1 %
MCH RBC QN AUTO: 30 PG
MCHC RBC AUTO-ENTMCNC: 32.4 G/DL
MCV RBC AUTO: 93 FL
MONOCYTES # BLD AUTO: 0.5 K/UL
MONOCYTES NFR BLD: 9.2 %
NEUTROPHILS # BLD AUTO: 3.9 K/UL
NEUTROPHILS NFR BLD: 68.3 %
NRBC BLD-RTO: 0 /100 WBC
PLATELET # BLD AUTO: 223 K/UL
PMV BLD AUTO: 9.7 FL
POTASSIUM SERPL-SCNC: 4.3 MMOL/L
PROTHROMBIN TIME: 11.2 SEC
RBC # BLD AUTO: 4.77 M/UL
SODIUM SERPL-SCNC: 145 MMOL/L
WBC # BLD AUTO: 5.66 K/UL

## 2017-11-29 PROCEDURE — 63600175 PHARM REV CODE 636 W HCPCS

## 2017-11-29 PROCEDURE — 25000003 PHARM REV CODE 250: Performed by: NURSE PRACTITIONER

## 2017-11-29 PROCEDURE — 85730 THROMBOPLASTIN TIME PARTIAL: CPT

## 2017-11-29 PROCEDURE — 93653 COMPRE EP EVAL TX SVT: CPT

## 2017-11-29 PROCEDURE — D9220A PRA ANESTHESIA: Mod: ANES,,, | Performed by: ANESTHESIOLOGY

## 2017-11-29 PROCEDURE — 93317 ECHO TRANSESOPHAGEAL: CPT | Performed by: PEDIATRICS

## 2017-11-29 PROCEDURE — 93286 PERI-PX EVAL PM/LDLS PM IP: CPT | Mod: 26,,, | Performed by: INTERNAL MEDICINE

## 2017-11-29 PROCEDURE — 93655 ICAR CATH ABLTJ DSCRT ARRHYT: CPT | Mod: ,,, | Performed by: INTERNAL MEDICINE

## 2017-11-29 PROCEDURE — 37000009 HC ANESTHESIA EA ADD 15 MINS: Performed by: INTERNAL MEDICINE

## 2017-11-29 PROCEDURE — 80048 BASIC METABOLIC PNL TOTAL CA: CPT

## 2017-11-29 PROCEDURE — 85610 PROTHROMBIN TIME: CPT

## 2017-11-29 PROCEDURE — 93005 ELECTROCARDIOGRAM TRACING: CPT | Mod: 59

## 2017-11-29 PROCEDURE — 25000003 PHARM REV CODE 250

## 2017-11-29 PROCEDURE — 25000003 PHARM REV CODE 250: Performed by: INTERNAL MEDICINE

## 2017-11-29 PROCEDURE — 93653 COMPRE EP EVAL TX SVT: CPT | Mod: ,,, | Performed by: INTERNAL MEDICINE

## 2017-11-29 PROCEDURE — 93623 PRGRMD STIMJ&PACG IV RX NFS: CPT

## 2017-11-29 PROCEDURE — 63600175 PHARM REV CODE 636 W HCPCS: Performed by: NURSE ANESTHETIST, CERTIFIED REGISTERED

## 2017-11-29 PROCEDURE — 63600175 PHARM REV CODE 636 W HCPCS: Performed by: NURSE PRACTITIONER

## 2017-11-29 PROCEDURE — 25000003 PHARM REV CODE 250: Performed by: ANESTHESIOLOGY

## 2017-11-29 PROCEDURE — 00537 ANES CARDIAC EP PROCEDURES: CPT | Performed by: INTERNAL MEDICINE

## 2017-11-29 PROCEDURE — 85025 COMPLETE CBC W/AUTO DIFF WBC: CPT

## 2017-11-29 PROCEDURE — 99900035 HC TECH TIME PER 15 MIN (STAT)

## 2017-11-29 PROCEDURE — 93313 ECHO TRANSESOPHAGEAL: CPT | Mod: 59,,, | Performed by: ANESTHESIOLOGY

## 2017-11-29 PROCEDURE — 86901 BLOOD TYPING SEROLOGIC RH(D): CPT

## 2017-11-29 PROCEDURE — 93010 ELECTROCARDIOGRAM REPORT: CPT | Mod: ,,, | Performed by: INTERNAL MEDICINE

## 2017-11-29 PROCEDURE — 25000003 PHARM REV CODE 250: Performed by: NURSE ANESTHETIST, CERTIFIED REGISTERED

## 2017-11-29 PROCEDURE — 37000008 HC ANESTHESIA 1ST 15 MINUTES: Performed by: INTERNAL MEDICINE

## 2017-11-29 PROCEDURE — 93613 INTRACARDIAC EPHYS 3D MAPG: CPT | Mod: ,,, | Performed by: INTERNAL MEDICINE

## 2017-11-29 PROCEDURE — D9220A PRA ANESTHESIA: Mod: CRNA,,, | Performed by: NURSE ANESTHETIST, CERTIFIED REGISTERED

## 2017-11-29 PROCEDURE — 86900 BLOOD TYPING SEROLOGIC ABO: CPT

## 2017-11-29 PROCEDURE — 25500020 PHARM REV CODE 255

## 2017-11-29 PROCEDURE — 93623 PRGRMD STIMJ&PACG IV RX NFS: CPT | Mod: 26,,, | Performed by: INTERNAL MEDICINE

## 2017-11-29 PROCEDURE — 94799 UNLISTED PULMONARY SVC/PX: CPT

## 2017-11-29 PROCEDURE — 94761 N-INVAS EAR/PLS OXIMETRY MLT: CPT | Mod: 59

## 2017-11-29 PROCEDURE — 93010 ELECTROCARDIOGRAM REPORT: CPT | Mod: 77,,, | Performed by: INTERNAL MEDICINE

## 2017-11-29 PROCEDURE — 93325 DOPPLER ECHO COLOR FLOW MAPG: CPT | Performed by: PEDIATRICS

## 2017-11-29 PROCEDURE — 93320 DOPPLER ECHO COMPLETE: CPT | Performed by: PEDIATRICS

## 2017-11-29 PROCEDURE — 93621 COMP EP EVL L PAC&REC C SINS: CPT | Mod: 26,,, | Performed by: INTERNAL MEDICINE

## 2017-11-29 RX ORDER — CARVEDILOL 25 MG/1
25 TABLET ORAL 2 TIMES DAILY WITH MEALS
Status: DISCONTINUED | OUTPATIENT
Start: 2017-11-29 | End: 2017-11-30 | Stop reason: HOSPADM

## 2017-11-29 RX ORDER — MIDAZOLAM HYDROCHLORIDE 1 MG/ML
INJECTION, SOLUTION INTRAMUSCULAR; INTRAVENOUS
Status: DISCONTINUED | OUTPATIENT
Start: 2017-11-29 | End: 2017-11-29

## 2017-11-29 RX ORDER — LEVOTHYROXINE SODIUM 88 UG/1
88 TABLET ORAL
Status: DISCONTINUED | OUTPATIENT
Start: 2017-11-30 | End: 2017-11-30 | Stop reason: HOSPADM

## 2017-11-29 RX ORDER — SODIUM CHLORIDE 0.9 % (FLUSH) 0.9 %
3 SYRINGE (ML) INJECTION
Status: DISCONTINUED | OUTPATIENT
Start: 2017-11-29 | End: 2017-11-30 | Stop reason: HOSPADM

## 2017-11-29 RX ORDER — HYDROMORPHONE HYDROCHLORIDE 2 MG/ML
0.2 INJECTION, SOLUTION INTRAMUSCULAR; INTRAVENOUS; SUBCUTANEOUS EVERY 5 MIN PRN
Status: DISCONTINUED | OUTPATIENT
Start: 2017-11-29 | End: 2017-11-30 | Stop reason: HOSPADM

## 2017-11-29 RX ORDER — DEXMEDETOMIDINE HYDROCHLORIDE 4 UG/ML
0.5 INJECTION, SOLUTION INTRAVENOUS CONTINUOUS
Status: DISCONTINUED | OUTPATIENT
Start: 2017-11-29 | End: 2017-11-30 | Stop reason: HOSPADM

## 2017-11-29 RX ORDER — WARFARIN SODIUM 5 MG/1
5 TABLET ORAL DAILY
Status: DISCONTINUED | OUTPATIENT
Start: 2017-11-29 | End: 2017-11-30 | Stop reason: HOSPADM

## 2017-11-29 RX ORDER — CEFAZOLIN SODIUM 2 G/50ML
2 SOLUTION INTRAVENOUS
Status: COMPLETED | OUTPATIENT
Start: 2017-11-29 | End: 2017-11-29

## 2017-11-29 RX ORDER — SODIUM CHLORIDE 9 MG/ML
INJECTION, SOLUTION INTRAVENOUS CONTINUOUS
Status: DISCONTINUED | OUTPATIENT
Start: 2017-11-29 | End: 2017-11-30 | Stop reason: HOSPADM

## 2017-11-29 RX ORDER — NEOSTIGMINE METHYLSULFATE 1 MG/ML
INJECTION, SOLUTION INTRAVENOUS
Status: DISCONTINUED | OUTPATIENT
Start: 2017-11-29 | End: 2017-11-29

## 2017-11-29 RX ORDER — ROCURONIUM BROMIDE 10 MG/ML
INJECTION, SOLUTION INTRAVENOUS
Status: DISCONTINUED | OUTPATIENT
Start: 2017-11-29 | End: 2017-11-29

## 2017-11-29 RX ORDER — PHENYLEPHRINE HYDROCHLORIDE 10 MG/ML
INJECTION INTRAVENOUS
Status: DISCONTINUED | OUTPATIENT
Start: 2017-11-29 | End: 2017-11-29

## 2017-11-29 RX ORDER — LIDOCAINE HYDROCHLORIDE 20 MG/ML
INJECTION, SOLUTION INFILTRATION; PERINEURAL
Status: DISCONTINUED | OUTPATIENT
Start: 2017-11-29 | End: 2017-11-29

## 2017-11-29 RX ORDER — GLYCOPYRROLATE 0.2 MG/ML
INJECTION INTRAMUSCULAR; INTRAVENOUS
Status: DISCONTINUED | OUTPATIENT
Start: 2017-11-29 | End: 2017-11-29

## 2017-11-29 RX ORDER — PROPOFOL 10 MG/ML
VIAL (ML) INTRAVENOUS
Status: DISCONTINUED | OUTPATIENT
Start: 2017-11-29 | End: 2017-11-29

## 2017-11-29 RX ORDER — ONDANSETRON 2 MG/ML
INJECTION INTRAMUSCULAR; INTRAVENOUS
Status: DISCONTINUED | OUTPATIENT
Start: 2017-11-29 | End: 2017-11-29

## 2017-11-29 RX ORDER — FENTANYL CITRATE 50 UG/ML
INJECTION, SOLUTION INTRAMUSCULAR; INTRAVENOUS
Status: DISCONTINUED | OUTPATIENT
Start: 2017-11-29 | End: 2017-11-29

## 2017-11-29 RX ADMIN — DEXMEDETOMIDINE HYDROCHLORIDE 0.5 MCG/KG/HR: 4 INJECTION, SOLUTION INTRAVENOUS at 01:11

## 2017-11-29 RX ADMIN — NEOSTIGMINE METHYLSULFATE 4 MG: 1 INJECTION INTRAVENOUS at 11:11

## 2017-11-29 RX ADMIN — PROPOFOL 50 MG: 10 INJECTION, EMULSION INTRAVENOUS at 11:11

## 2017-11-29 RX ADMIN — MIDAZOLAM 0.5 MG: 1 INJECTION INTRAMUSCULAR; INTRAVENOUS at 12:11

## 2017-11-29 RX ADMIN — PROPOFOL 130 MG: 10 INJECTION, EMULSION INTRAVENOUS at 08:11

## 2017-11-29 RX ADMIN — EPHEDRINE SULFATE 10 MG: 50 INJECTION, SOLUTION INTRAMUSCULAR; INTRAVENOUS; SUBCUTANEOUS at 10:11

## 2017-11-29 RX ADMIN — PROPOFOL 30 MG: 10 INJECTION, EMULSION INTRAVENOUS at 12:11

## 2017-11-29 RX ADMIN — SODIUM CHLORIDE 1000 ML: 0.9 INJECTION, SOLUTION INTRAVENOUS at 06:11

## 2017-11-29 RX ADMIN — MIDAZOLAM 2 MG: 1 INJECTION INTRAMUSCULAR; INTRAVENOUS at 08:11

## 2017-11-29 RX ADMIN — PHENYLEPHRINE HYDROCHLORIDE 200 MCG: 10 INJECTION INTRAVENOUS at 09:11

## 2017-11-29 RX ADMIN — ROCURONIUM BROMIDE 50 MG: 10 INJECTION, SOLUTION INTRAVENOUS at 08:11

## 2017-11-29 RX ADMIN — FENTANYL CITRATE 25 MCG: 50 INJECTION, SOLUTION INTRAMUSCULAR; INTRAVENOUS at 12:11

## 2017-11-29 RX ADMIN — SODIUM CHLORIDE: 0.9 INJECTION, SOLUTION INTRAVENOUS at 11:11

## 2017-11-29 RX ADMIN — CARVEDILOL 25 MG: 25 TABLET, FILM COATED ORAL at 06:11

## 2017-11-29 RX ADMIN — GLYCOPYRROLATE 0.4 MG: 0.2 INJECTION, SOLUTION INTRAMUSCULAR; INTRAVENOUS at 11:11

## 2017-11-29 RX ADMIN — PHENYLEPHRINE HYDROCHLORIDE 100 MCG: 10 INJECTION INTRAVENOUS at 09:11

## 2017-11-29 RX ADMIN — SODIUM CHLORIDE, SODIUM GLUCONATE, SODIUM ACETATE, POTASSIUM CHLORIDE, MAGNESIUM CHLORIDE, SODIUM PHOSPHATE, DIBASIC, AND POTASSIUM PHOSPHATE: .53; .5; .37; .037; .03; .012; .00082 INJECTION, SOLUTION INTRAVENOUS at 08:11

## 2017-11-29 RX ADMIN — ONDANSETRON 4 MG: 2 INJECTION INTRAMUSCULAR; INTRAVENOUS at 11:11

## 2017-11-29 RX ADMIN — CEFAZOLIN SODIUM 2 G: 2 SOLUTION INTRAVENOUS at 09:11

## 2017-11-29 RX ADMIN — MIDAZOLAM 1 MG: 1 INJECTION INTRAMUSCULAR; INTRAVENOUS at 12:11

## 2017-11-29 RX ADMIN — WARFARIN SODIUM 5 MG: 5 TABLET ORAL at 06:11

## 2017-11-29 RX ADMIN — EPHEDRINE SULFATE 10 MG: 50 INJECTION, SOLUTION INTRAMUSCULAR; INTRAVENOUS; SUBCUTANEOUS at 09:11

## 2017-11-29 RX ADMIN — EPHEDRINE SULFATE 10 MG: 50 INJECTION, SOLUTION INTRAMUSCULAR; INTRAVENOUS; SUBCUTANEOUS at 11:11

## 2017-11-29 RX ADMIN — ROCURONIUM BROMIDE 20 MG: 10 INJECTION, SOLUTION INTRAVENOUS at 10:11

## 2017-11-29 RX ADMIN — ISOPROTERENOL HYDROCHLORIDE 2 MCG/MIN: 0.2 INJECTION, SOLUTION INTRACARDIAC; INTRAMUSCULAR; INTRAVENOUS; SUBCUTANEOUS at 11:11

## 2017-11-29 RX ADMIN — LIDOCAINE HYDROCHLORIDE 100 MG: 20 INJECTION, SOLUTION INFILTRATION; PERINEURAL at 08:11

## 2017-11-29 RX ADMIN — SODIUM CHLORIDE: 0.9 INJECTION, SOLUTION INTRAVENOUS at 12:11

## 2017-11-29 NOTE — PROGRESS NOTES
Report received from MILTON Padron. Pt is asleep, resting comfortably. SaO2 98% on 2L O2 NC. Precedex gtt infusing to PIV at prescribed rate. Mother and MD to BS. +2 DPP bilaterally. R groin safeguard in place, no drainage. Bedrest until approximately 1700, will titrate precedex accordingly. Will continue to monitor.

## 2017-11-29 NOTE — PLAN OF CARE
amb with nurse, diana well. Safeguard removed. Gauze and tagederm placed and dressing cdi. Will monitor.

## 2017-11-29 NOTE — PROGRESS NOTES
Report given to MILTON Padron and Debby RN. RNs to transport patient to PACU for continuous monitoring while on precedex gtt. Pt VSS.

## 2017-11-29 NOTE — BRIEF OP NOTE
"    Post EP Procedure Note  Referring Physician: Eduardo Milligan MD  Procedure: ABLATION (Bilateral), TRANSESOPHAGEAL ECHOCARDIOGRAM (JAKE) (N/A)         Access: Right CFV    Intervention:     Successful CTI and Scar Flutter RFA  Confirmed bidirectional block post-RFA    See full report for further details    Post Cath Exam:   /72 (BP Location: Left arm, Patient Position: Lying)   Pulse 75   Temp 97.4 °F (36.3 °C) (Oral)   Resp 18   Ht 5' 5" (1.651 m)   Wt 96.6 kg (213 lb)   SpO2 96%   BMI 35.45 kg/m²   No unusual pain, hematoma, thrill or bruit at vascular access site.  Distal pulse present without signs of ischemia.    Recommendations:   - Routine post-cath care  - Resume Coreg and Coumadin today  - No need to bridge Coumadin    Signed:  Alexi Ng MD  Cardiology Fellow, PGY-5  11/29/2017 11:34 AM  "

## 2017-11-29 NOTE — TRANSFER OF CARE
"Anesthesia Transfer of Care Note    Patient: Cipriano Thompson    Procedure(s) Performed: Procedure(s) (LRB):  ABLATION (Bilateral)  TRANSESOPHAGEAL ECHOCARDIOGRAM (JAKE) (N/A)    Patient location: PACU    Anesthesia Type: general    Transport from OR: Transported from OR on 6-10 L/min O2 by face mask with adequate spontaneous ventilation    Post pain: adequate analgesia    Post assessment: no apparent anesthetic complications and tolerated procedure well    Post vital signs: stable    Level of consciousness: responds to stimulation and sedated    Nausea/Vomiting: no nausea/vomiting    Complications: none    Transfer of care protocol was followedComments: EP PACU      Last vitals:   Visit Vitals  /72 (BP Location: Left arm, Patient Position: Lying)   Pulse 75   Temp 36.3 °C (97.4 °F) (Oral)   Resp 18   Ht 5' 5" (1.651 m)   Wt 96.6 kg (213 lb)   SpO2 96%   BMI 35.45 kg/m²     "

## 2017-11-29 NOTE — ANESTHESIA POSTPROCEDURE EVALUATION
"Anesthesia Post Evaluation    Patient: Cipriano Thompson    Procedure(s) Performed: Procedure(s) (LRB):  ABLATION (Bilateral)  TRANSESOPHAGEAL ECHOCARDIOGRAM (JAKE) (N/A)    Final Anesthesia Type: general  Patient location during evaluation: PACU  Patient participation: Yes- Able to Participate  Level of consciousness: awake and alert, awake and oriented  Post-procedure vital signs: reviewed and stable  Pain management: adequate  Airway patency: patent  PONV status at discharge: No PONV  Anesthetic complications: no      Cardiovascular status: blood pressure returned to baseline, stable and hemodynamically stable  Respiratory status: unassisted, spontaneous ventilation and room air  Hydration status: euvolemic  Follow-up not needed.        Visit Vitals  BP (!) 100/57   Pulse 75   Temp 36.5 °C (97.7 °F) (Temporal)   Resp 14   Ht 5' 5" (1.651 m)   Wt 96.6 kg (213 lb)   SpO2 97%   BMI 35.45 kg/m²       Pain/Justo Score: Pain Assessment Performed: Yes (11/29/2017  3:15 PM)  Presence of Pain: non-verbal indicators absent (11/29/2017  4:30 PM)  Pain Assessment Performed: Yes (11/29/2017  4:00 PM)  Presence of Pain: non-verbal indicators absent (11/29/2017  4:00 PM)  Pain Rating Prior to Med Admin: 0 (11/29/2017  4:30 PM)  Justo Score: 8 (11/29/2017  4:30 PM)      "

## 2017-11-29 NOTE — PLAN OF CARE
Received report from MILTON Covington. Patient s/p rfa, AAOx3. VSS, no c/o pain or discomfort at this time, resp even and unlabored. Safeguard to r groin is CDI. No active bleeding. No hematoma noted. Post procedure protocol reviewed with patient and patient's family. Understanding verbalized. Family members at bedside. Nurse call bell within reach. Will continue to monitor per post procedure protocol.

## 2017-11-29 NOTE — PROGRESS NOTES
Pt drowsy, but arousable, pt wakes up and starts to stretch/bend lower extremities, pt repositioned and given verbal instructions to try to remain flat and still. Pt coughed and I immediately applied pressure, when pressure was removed, the right groin site began to ooze bright red blood. Continuous pressure was applied, and Dr. Prater with anesthesia was called. See MAR  VSS. Will continue to monitor.

## 2017-11-29 NOTE — ANESTHESIA PROCEDURE NOTES
Monitor JAKE    Diagnosis: Afib  Patient location during procedure: OR  Procedure start time: 11/29/2017 8:32 AM  Timeout: 11/29/2017 8:32 AM  Procedure end time: 11/29/2017 8:33 AM  Surgery related to: Arrhythmias  Exam type: Monitor Only  Staffing  Anesthesiologist: ARI LEVINE  Performed: anesthesiologist   Preanesthetic Checklist  Completed: patient identified, surgical consent, pre-op evaluation, timeout performed, risks and benefits discussed, monitors and equipment checked, anesthesia consent given, oxygen available, suction available, hand hygiene performed and patient being monitored  Setup & Induction  Patient preparation: bite block inserted  Probe Insertion: easy  Exam: incomplete    Exam                                      Effusions    Summary    Other Findings  JAKE PLACEMENT BY DR. LEVINE, JAKE EXAM AND INTERPRETATION BY DR. DE LOS SANTOS

## 2017-11-29 NOTE — PROGRESS NOTES
Safeguard applied to right groin . 40 cc air instilled. No drainage or hematoma noted. Pt re instructed on importance of laying flat and keeping RLE immobilized. VSS. Will continue to monitor.

## 2017-11-29 NOTE — ANESTHESIA RELEASE NOTE
"Anesthesia Release from PACU Note    Patient: Cipriano Thompson    Procedure(s) Performed: Procedure(s) (LRB):  ABLATION (Bilateral)  TRANSESOPHAGEAL ECHOCARDIOGRAM (JAKE) (N/A)    Anesthesia type: general    Post pain: Adequate analgesia    Post assessment: no apparent anesthetic complications, tolerated procedure well and no evidence of recall    Last Vitals:   Visit Vitals  BP (!) 100/57   Pulse 75   Temp 36.5 °C (97.7 °F) (Temporal)   Resp 14   Ht 5' 5" (1.651 m)   Wt 96.6 kg (213 lb)   SpO2 97%   BMI 35.45 kg/m²       Post vital signs: stable    Level of consciousness: awake, alert  and oriented    Nausea/Vomiting: no nausea/no vomiting    Complications: none    Airway Patency: patent    Respiratory: unassisted, spontaneous ventilation, room air    Cardiovascular: stable and blood pressure at baseline    Hydration: euvolemic  "

## 2017-11-29 NOTE — ANESTHESIA PREPROCEDURE EVALUATION
11/29/2017  Cipriano Thompson is a 27 y.o., male with Down syndrome, tetralogy of fallot, systolic CHF (LVEF 47%), SSS s/p DC PPM implanted in 2008, progressive obstruction and insufficiency of a previously placed bioprosthetic pulmonary valve s/p recent bioprosthetic pulmonary valve placement by Dr Godfrey in May (27 mm St. Quinn Epic Bioprosthetic valve in the pulmonary position; 30 mm Dacron conduit), who was recently found to be in atrial flutter with 2:1 block.    Anesthesia Evaluation    I have reviewed the Patient Summary Reports.     I have reviewed the Nursing Notes.   I have reviewed the Medications.     Review of Systems  Anesthesia Hx:  Hx of Anesthetic complications  Denies Family Hx of Anesthesia complications.  Personal Hx of Anesthesia complications Slow To Awaken/Delayed Emergence and mild, somewhat sensitive to sedation / narcotics   Social:  Non-Smoker, No Alcohol Use    Hematology/Oncology:  Hematology Normal   Oncology Normal     EENT/Dental:EENT/Dental Normal   Cardiovascular:   CHF ECG has been reviewed.  Functional Capacity low / < 4 METS   Congenital Heart Disease    Congenital Heart Disease:  Tetralogy of Fallot (TOF), s/p surgical repair  Congestive Heart Failure (CHF) , Chronic Congestive Heart Failure , compensated, recently started Rx Disorder of Cardiac Rhythm, Atrial Fibrillation, Hx of Atrial Fibrillation, Atrial Flutter, Chronic Atrial Flutter, S/P electrical cardioversion    Pulmonary:   Sleep Apnea, CPAP    Renal/:  Renal/ Normal     Hepatic/GI:  Hepatic/GI Normal    Neurological:   Downs syndrome   Endocrine:   Hypothyroidism    Dermatological:  Skin Normal    Psych:   anxiety          Physical Exam  General:  Obesity    Airway/Jaw/Neck:  Airway Findings: Mouth Opening: Normal Tongue: Normal  General Airway Assessment: Adult  Mallampati: III      Dental:  Dental Findings:  Periodontal disease, Mild   Chest/Lungs:  Chest/Lungs Findings: Clear to auscultation, Normal Respiratory Rate     Heart/Vascular:  Heart Findings: Rate: Normal  Rhythm: Regular Rhythm        Mental Status:  Mental Status Findings:  Normally Active child, Alert and Oriented, Cooperative         Anesthesia Plan  Type of Anesthesia, risks & benefits discussed:  Anesthesia Type:  general  Patient's Preference:   Intra-op Monitoring Plan: standard ASA monitors  Intra-op Monitoring Plan Comments:   Post Op Pain Control Plan:   Post Op Pain Control Plan Comments:   Induction:   IV  Beta Blocker:  Patient is not currently on a Beta-Blocker (No further documentation required).       Informed Consent: Patient representative understands risks and agrees with Anesthesia plan.  Questions answered. Anesthesia consent signed with patient representative.  ASA Score: 3     Day of Surgery Review of History & Physical:    H&P update referred to the surgeon.         Ready For Surgery From Anesthesia Perspective.

## 2017-11-30 VITALS
SYSTOLIC BLOOD PRESSURE: 131 MMHG | OXYGEN SATURATION: 97 % | BODY MASS INDEX: 35.49 KG/M2 | HEART RATE: 75 BPM | HEIGHT: 65 IN | RESPIRATION RATE: 20 BRPM | TEMPERATURE: 97 F | WEIGHT: 213 LBS | DIASTOLIC BLOOD PRESSURE: 65 MMHG

## 2017-11-30 LAB
INR PPP: 1.4
PROTHROMBIN TIME: 14.1 SEC

## 2017-11-30 PROCEDURE — 36415 COLL VENOUS BLD VENIPUNCTURE: CPT

## 2017-11-30 PROCEDURE — 25000003 PHARM REV CODE 250: Performed by: INTERNAL MEDICINE

## 2017-11-30 PROCEDURE — 85610 PROTHROMBIN TIME: CPT

## 2017-11-30 RX ORDER — CARVEDILOL 25 MG/1
25 TABLET ORAL 2 TIMES DAILY WITH MEALS
Qty: 60 TABLET | Refills: 11 | Status: SHIPPED | OUTPATIENT
Start: 2017-11-30 | End: 2020-12-11

## 2017-11-30 RX ADMIN — CARVEDILOL 25 MG: 25 TABLET, FILM COATED ORAL at 08:11

## 2017-11-30 RX ADMIN — LEVOTHYROXINE SODIUM 88 MCG: 88 TABLET ORAL at 06:11

## 2017-11-30 NOTE — PLAN OF CARE
Problem: Patient Care Overview  Goal: Plan of Care Review  Outcome: Ongoing (interventions implemented as appropriate)  Plan of care reviewed with pt and mother. Fall risk reviewed. Side rails up x2. Call light within reach. Bed low and locked. Instructed to call for any assistance.

## 2017-11-30 NOTE — NURSING
Pt d/c'd to home via w/c accompanied by mother per md orders.  r groin site remains cdi.  0 bleed, 0 hematoma.  Telemetry monitor removed and pivs d/c'd intact and without difficulty.  Instructed pt and mother on home medications, post procedure precautions and follow up visits.  Pt and mother verbalizes understanding.  Pt in no acute distress and verbalizes no complaints.

## 2017-11-30 NOTE — DISCHARGE SUMMARY
Discharge Summary  Electrophysiology      Admit Date: 11/29/2017    Discharge Date:  11/30/2017    Attending Physician: Eduardo Milligan MD    Discharge Physician: Alexi Ng MD    Principal Diagnoses: Atrial flutter  Indication for Admission: ABLATION (Bilateral), TRANSESOPHAGEAL ECHOCARDIOGRAM (JAKE) (N/A)    Discharged Condition: Good    Hospital Course:   Mr. Cipriano Thompson is a pleasant 27 y.o. man with Down syndrome, tetralogy of fallot, systolic CHF (LVEF 47%), SSS s/p DC PPM implanted in 2008, progressive obstruction and insufficiency of a previously placed bioprosthetic pulmonary valve s/p recent bioprosthetic pulmonary valve placement by Dr Godfrey in May (27 mm St. Quinn Epic Bioprosthetic valve in the pulmonary position; 30 mm Dacron conduit), who was recently found to be in atrial flutter with 2:1 block.     He was admitted in 9/2017 and initially rate controlled with diltiazem, later underwent JAKE/DCCV and changed rate control regimen to BBlocker. He was discharged on Lovenox with Coumadin bridging, and planned for elective OP ablation in 1-2 months.     Patient now presented for outpatient RFA for AFlutter which went without complication. He was found to have dual loop typical CTI-dependent and atypical R atriotomy scar flutter, both were successfully ablated. Post-procedure ECG with NSR and known RBBB. Patient had a some bleeding from R groin access site post-procedure, had SafeGuard applied which achieved hemostasis yesterday afternoon. He was monitored overnight, telemetry with NSR in the 70's. This morning his R groin access site was c/d/i, no hematoma. Patient was feeling well this morning, both he and his mother anticipating discharge home later today.    Outpatient Plan:  - Post RFA 6 week follow-up with EKG  - There were no medication changes    Diet: Cardiac diet    Activity: Ad tameka, wound care instructions provided    Disposition: Home or Self Care    Discharge Medications:       Medication List      CONTINUE taking these medications    carvedilol 25 MG tablet  Commonly known as:  COREG     furosemide 40 MG tablet  Commonly known as:  LASIX  Take 0.5 tablets (20 mg total) by mouth once daily.     levothyroxine 88 MCG tablet  Commonly known as:  SYNTHROID     warfarin 5 MG tablet  Commonly known as:  COUMADIN  Take 1 tablet (5 mg total) by mouth once daily. Or as instructed by coumadin clinic        STOP taking these medications    enoxaparin 150 mg/mL Syrg  Commonly known as:  LOVENOX     metoprolol succinate 50 MG 24 hr tablet  Commonly known as:  TOPROL-XL          Follow Up:  Follow-up Information     Jose Ortiz MD On 12/14/2017.    Specialty:  Pediatric Cardiology  Contact information:  [address] [place] [address] [id number] [phone]

## 2017-12-01 ENCOUNTER — CONFERENCE (OUTPATIENT)
Dept: PEDIATRIC CARDIOLOGY | Facility: CLINIC | Age: 27
End: 2017-12-01

## 2017-12-01 NOTE — PROGRESS NOTES
Cipriano Aleksandrs underwent  EP study and successful ablation of pathway on 11/29/2017. .His case was reviewed in our [facility] on 12/1/2017. He should follow up with Dr. Ortiz on 12/14/2017.

## 2018-01-22 DIAGNOSIS — I48.91 ATRIAL FIBRILLATION, UNSPECIFIED TYPE: Primary | ICD-10-CM

## 2018-01-22 DIAGNOSIS — I48.92 ATRIAL FLUTTER, UNSPECIFIED TYPE: ICD-10-CM

## 2018-02-01 DIAGNOSIS — Z45.010 PACEMAKER AT END OF BATTERY LIFE: ICD-10-CM

## 2018-02-01 DIAGNOSIS — Q21.3 TOF (TETRALOGY OF FALLOT): Primary | ICD-10-CM

## 2018-02-21 NOTE — PROGRESS NOTES
"HISTORY OF PRESENT ILLNESS:  Cipriano is a 28-year-old -American male with Down syndrome.  He was originally diagnosed to have Tetralogy of Fallot.  He underwent complete repair using a transannular patch.  He then required placement of a 27 mm Freestyle Medtronic Bioprosthetic pulmonary valve in 1999.  He subsequently developed sinus node dysfunction and had placement of a dual chamber endocardial pacemaker in 2008. 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  Dr. Godfrey placed a 27 mm St. Quinn Epic Bioprosthetic valve in the pulmonary position.  This was conducted in conjunction with placement of a 30 mm Dacron conduit. The operation was in May 2017.   Cipriano has done well.  He comes to Cardiology Clinic today, accompanied by his mother, for follow-up.  More recently, Cipriano presented to our clinic in atrial flutter with 2:1 block. He was admitted to the [facility] and underwent electrical cardioversion to sinus rhythm. Then, on Nov 29, 2017, he underwent: 1) Successful atrial scar flutter ablation by Dr Milligan and 2) Device reprogramming. When last checked, the pacemaker battery was appraching "end of life".  He will be seen later today for pacemaker evaluation.    Mom reports Cipriano is currently doing well.  She denies he is having any problems with SOB, chest pain, palpitations, rapid or slow heart rates, or inability to conduct his usual but limited activities. Cipriano complains of being tired all the time.  He currently is on Coreg 25 mg q12 hrs, Synthroid 88 mcg qday for hypothyroidism, and Warfarin 5 mg qd.         REVIEW OF SYSTEMS:  Cipriano has no problems with vision or hearing deficits. No lightheadedness or seizures. No breathing disorder. History of snoring. No coughing up blood.  No swallowing difficulty. No abdominal pain.  Bowel movements are normal. No bloody stools. No pelvic pain.  Urination is " normal. No  bleeding disorder.  No known arterial disease. No HBP. No DM. No lipid disorder.  No known intrinsic problem with the lungs, liver or kidneys.     PHYSICAL EXAMINATION:  GENERAL:  An alert,  Healthy-looking 28 -year-old with the stigmata of Down syndrome. Weight is 105.3 kg or 231 lbs and height is 1.70 meters or 5 ' 7 ''. Heart rate is 76 bpm and regular.   Saturation in room air is 98 %.  Blood pressure in the right arm is 128/80 mmHg.  Color and perfusion are good.  HEENT:  Normal eye movements. PERR to light. No bruits over the head. No upper airway obstruction.  No jugular venous distention.  The mucous membranes are moist and pink. NECK:  Supple and the thyroid is not enlarged.  Pacemaker palpated in the L-subclavicular region.  CHEST:  Well-healed midline scar.  Normal chest movements with breathing.  Good air entry bilaterally.  Breath sounds are clear.  No wheezes, rhonchi or rales.  CARDIOVASCULAR:  Normal precordial activity. Heart rate ~ 76 bpm. S1 is normal and S2 is prominent.  There is a grade I/VI systolic ejection murmur heard up the left sternal border and base.  Diastole is clear.  No gallop, rub or click.  ABDOMEN:  Exogenous obesity.  Liver is percussed down about 3-4 fingerbreadths at the right costal margin. It is non-tender and non-pulsatile.  Bowel sounds are normal.  No masses appreciated.  EXTREMITIES:  Appears to have an infected boil in the left axilla.  Full ROM. Pulses are 2+ throughout.  Capillary refill is good.  There is no peripheral edema.  No cyanosis or clubbing. No rashes or bruising.                  An ECG was obtained.  It showed ventricular paced rhythm.   ms. No Pwaves seen. Abnormal R-wave progression in the precordial leads. Normal T waves.        An ECHO was obtained.  The study showed:  No pericardial effusion. No clots or vegetations. Pacing wires are seen in the RA and RV. No residual ASD or VSD. Bioprosthetic pulmonary valve is in good position  (somewhat echogenic). Logan opening appears to have decreasedNo arch obstruction. M-mode measurements are LV 4.4 cm, RV 3.2 cm, Ao 3.2 cm, LA 3.1 cm.  Sep 1.2 cm, PW 1.2 cm, EF 53 %.  Doppler analysis reveals  mild(+) TR with a jet of 53 mmHg, no PI, peak pressure drop across the bioprosthetic pulmonary valve is 36 mmHg, trace MR, trace AI, and peak pressure drop across the aortic valve is 6 mmHg.  PVs are to the LA.                         ASSESSMENT:  In summary, we have a 28-year-old patient with Down syndrome who recently underwent heart surgery for progressive obstruction and insufficiency of a previously placed bioprosthetic pulmonary valve.  The current surgery was performed by Dr. Bhargav Godfrey in May 2017.  The surgery entailed placement of a 27 mm St. Quinn Epic Bioprosthetic valve in the pulmonary position.  He has done well postoperatively. Also, he recently underwent successful catheter ablation for atrial flutter. When last checked, transvenous pacemaker generator will need to be changed. Of concern is that the TR jet is 53 mmHg.  No sub PS seen.  Will need to follow. Pulmonary valve opening appears to be reduced.        PLAN:  Given our findings, our suggestions are as follow  1. Check TFTs.  1 ) Now on Synthroid 88 mcg qd.  2). Continue on Coreg 25 mg q12 hrs and Warfarin 5 mg qd.  ? May not need the Warfarin. Would obtain a Holter monitor and if no atrial flutter change to 81 mg ASA qd.  3)   Antibiotic prophylaxis is in order for any invasive procedures.  4). Pacemaker check later today.  5) Start Keflex 500 mg q 12 hrs x 7 days.         Augustine Dean PhD, MD.

## 2018-02-22 ENCOUNTER — CLINICAL SUPPORT (OUTPATIENT)
Dept: PEDIATRIC CARDIOLOGY | Facility: CLINIC | Age: 28
End: 2018-02-22
Attending: PEDIATRICS
Payer: MEDICAID

## 2018-02-22 ENCOUNTER — OFFICE VISIT (OUTPATIENT)
Dept: CARDIOLOGY | Facility: CLINIC | Age: 28
End: 2018-02-22
Payer: MEDICAID

## 2018-02-22 ENCOUNTER — OFFICE VISIT (OUTPATIENT)
Dept: CARDIOLOGY | Facility: CLINIC | Age: 28
End: 2018-02-22
Attending: PEDIATRICS
Payer: MEDICAID

## 2018-02-22 ENCOUNTER — HOSPITAL ENCOUNTER (OUTPATIENT)
Dept: CARDIOLOGY | Facility: CLINIC | Age: 28
Discharge: HOME | End: 2018-02-22
Payer: MEDICAID

## 2018-02-22 ENCOUNTER — CLINICAL SUPPORT (OUTPATIENT)
Dept: CARDIOLOGY | Facility: CLINIC | Age: 28
End: 2018-02-22
Payer: MEDICAID

## 2018-02-22 VITALS
BODY MASS INDEX: 38.67 KG/M2 | HEART RATE: 76 BPM | WEIGHT: 232.13 LBS | SYSTOLIC BLOOD PRESSURE: 130 MMHG | OXYGEN SATURATION: 98 % | HEIGHT: 65 IN | DIASTOLIC BLOOD PRESSURE: 76 MMHG

## 2018-02-22 VITALS
BODY MASS INDEX: 37.02 KG/M2 | SYSTOLIC BLOOD PRESSURE: 112 MMHG | HEIGHT: 67 IN | HEART RATE: 65 BPM | DIASTOLIC BLOOD PRESSURE: 63 MMHG | WEIGHT: 235.88 LBS | OXYGEN SATURATION: 96 %

## 2018-02-22 DIAGNOSIS — Z95.3 HISTORY OF PULMONARY VALVE REPLACEMENT WITH BIOPROSTHETIC VALVE: ICD-10-CM

## 2018-02-22 DIAGNOSIS — Q21.3 TOF (TETRALOGY OF FALLOT): ICD-10-CM

## 2018-02-22 DIAGNOSIS — I48.4 ATYPICAL ATRIAL FLUTTER: Primary | ICD-10-CM

## 2018-02-22 DIAGNOSIS — Z95.0 PACEMAKER: ICD-10-CM

## 2018-02-22 DIAGNOSIS — I48.92 ATRIAL FLUTTER, UNSPECIFIED TYPE: ICD-10-CM

## 2018-02-22 DIAGNOSIS — Q21.3 TETRALOGY OF FALLOT: Primary | ICD-10-CM

## 2018-02-22 DIAGNOSIS — Z45.010 PACEMAKER AT END OF BATTERY LIFE: ICD-10-CM

## 2018-02-22 DIAGNOSIS — I48.91 ATRIAL FIBRILLATION, UNSPECIFIED TYPE: ICD-10-CM

## 2018-02-22 DIAGNOSIS — Q90.9 DOWN SYNDROME: ICD-10-CM

## 2018-02-22 DIAGNOSIS — Q24.9 ADULT CONGENITAL HEART DISEASE: ICD-10-CM

## 2018-02-22 DIAGNOSIS — I48.91 ATRIAL FIBRILLATION, UNSPECIFIED TYPE: Primary | ICD-10-CM

## 2018-02-22 DIAGNOSIS — I48.4 ATYPICAL ATRIAL FLUTTER: ICD-10-CM

## 2018-02-22 DIAGNOSIS — Z87.74 S/P SURGERY FOR COMPLEX CONGENITAL HEART DISEASE: ICD-10-CM

## 2018-02-22 PROCEDURE — 99999 PR PBB SHADOW E&M-EST. PATIENT-LVL I: CPT | Mod: PBBFAC,,,

## 2018-02-22 PROCEDURE — 3008F BODY MASS INDEX DOCD: CPT | Mod: S$GLB,,, | Performed by: PEDIATRICS

## 2018-02-22 PROCEDURE — 99211 OFF/OP EST MAY X REQ PHY/QHP: CPT | Mod: PBBFAC,25

## 2018-02-22 PROCEDURE — 93280 PM DEVICE PROGR EVAL DUAL: CPT | Mod: PBBFAC | Performed by: PEDIATRICS

## 2018-02-22 PROCEDURE — 99214 OFFICE O/P EST MOD 30 MIN: CPT | Mod: 25,S$GLB,, | Performed by: PEDIATRICS

## 2018-02-22 PROCEDURE — 99215 OFFICE O/P EST HI 40 MIN: CPT | Mod: 25,S$PBB,, | Performed by: PEDIATRICS

## 2018-02-22 PROCEDURE — 93320 DOPPLER ECHO COMPLETE: CPT | Mod: TC,S$GLB,, | Performed by: PEDIATRICS

## 2018-02-22 PROCEDURE — 93227 XTRNL ECG REC<48 HR R&I: CPT | Mod: ,,, | Performed by: PEDIATRICS

## 2018-02-22 PROCEDURE — 99213 OFFICE O/P EST LOW 20 MIN: CPT | Mod: PBBFAC,25,27 | Performed by: PEDIATRICS

## 2018-02-22 PROCEDURE — 93010 ELECTROCARDIOGRAM REPORT: CPT | Mod: S$PBB,,, | Performed by: INTERNAL MEDICINE

## 2018-02-22 PROCEDURE — 99999 PR PBB SHADOW E&M-EST. PATIENT-LVL III: CPT | Mod: PBBFAC,,, | Performed by: PEDIATRICS

## 2018-02-22 PROCEDURE — 93000 ELECTROCARDIOGRAM COMPLETE: CPT | Mod: 59,S$GLB,, | Performed by: PEDIATRICS

## 2018-02-22 PROCEDURE — 93005 ELECTROCARDIOGRAM TRACING: CPT | Mod: PBBFAC | Performed by: INTERNAL MEDICINE

## 2018-02-22 PROCEDURE — 93325 DOPPLER ECHO COLOR FLOW MAPG: CPT | Mod: TC,S$GLB,, | Performed by: PEDIATRICS

## 2018-02-22 PROCEDURE — 93303 ECHO TRANSTHORACIC: CPT | Mod: TC,S$GLB,, | Performed by: PEDIATRICS

## 2018-02-22 PROCEDURE — 3008F BODY MASS INDEX DOCD: CPT | Mod: ,,, | Performed by: PEDIATRICS

## 2018-02-22 NOTE — LETTER
February 28, 2018        Augustine Dean MD  [address]  [address]  [facility] [address]             [facility]  [address]  [place] [address]  Phone: [phone]   Patient: Cipriano Thompson   MR Number: [id number]   YOB: 1990   Date of Visit: 2/22/2018       Dear Dr. eDan:    Thank you for referring Cipriano Thompson to me for evaluation. Attached you will find relevant portions of my assessment and plan of care.    If you have questions, please do not hesitate to call me. I look forward to following Cipriano Thompson along with you.    Sincerely,      Jose Ortiz MD            CC  Princess RADHA Rodgers MD    Enclosure

## 2018-02-22 NOTE — PROGRESS NOTES
Thank you for referring your patient Cipriano Thompson to the electrophysiology clinic for consultation. The patient is accompanied by his mother. Please review my findings below.    CHIEF COMPLAINT: Outpatient EP follow up    HISTORY OF PRESENT ILLNESS:   Cipriano is a 27 y.o. Male with Down's syndrome, TOF s/p complete repair as an infant with subsequent PVR in 1999 for right ventricular dilation and pacemaker placement in 2008 for Sinus node dysfunction. He presented with exercise intolerance and moderate PS/PI so he was referred for repeat PVR. Also with history of hypertension and ventricular ectopy with frequent PVC's. His home medication regimen includes Carvedilol 25 mg daily, Digoxin 250 mcg qd, Lisinopril 10 mg qd and Synthroid. He had a cardiac catheterization for possible Jeanna valve implantation on 4/4/17 and were unable to secondary to coronary artery anatomy. He underwent surgical pulmonary valve placement on 5/2/17.  He had frequent PVC's in the post op period.  He had his pacemaker interrogated post op but pacemaker was working appropriately although showed ~9months to [facility]INA.    Cipriano underwent atrial flutter ablation in November 2017.  He returns today for follow up.  He has clinically been doing well.  He has no fatigue or activity intolerance.  He has no difficulty breathing.  He has no chest pain or palpitations.  He has no syncope.        REVIEW OF SYSTEMS:     GENERAL: No fever, chills, fatigability or weight loss.  SKIN: No rashes, itching or changes in color or texture of skin.  CHEST: Denies ECHEVERRIA, cyanosis, wheezing, cough and sputum production.  CARDIOVASCULAR: Denies chest pain or reduced exercise tolerance.  ABDOMEN: Appetite fine. No weight loss. Denies diarrhea, abdominal pain, or vomiting.  PERIPHERAL VASCULAR: No claudication or cyanosis.  MUSCULOSKELETAL: No joint stiffness or swelling.   NEUROLOGIC: No history of seizures,  alteration of gait or coordination.    PAST MEDICAL HISTORY:   Past  "Medical History:   Diagnosis Date    CHF (congestive heart failure)     Down syndrome     Encounter for blood transfusion     S/P surgery for complex congenital heart disease 1/30/2017    Tetralogy of Fallot 05/1990           FAMILY HISTORY:   Family History   Problem Relation Age of Onset    No Known Problems Mother     No Known Problems Sister          SOCIAL HISTORY:   Social History     Social History    Marital status: Single     Spouse name: N/A    Number of children: N/A    Years of education: N/A     Occupational History    Not on file.     Social History Main Topics    Smoking status: Never Smoker    Smokeless tobacco: Never Used    Alcohol use No    Drug use: Unknown    Sexual activity: Not Currently     Other Topics Concern    Not on file     Social History Narrative    Lives with mother, 1 dog (inside)       ALLERGIES:  Review of patient's allergies indicates:   Allergen Reactions    Latex, natural rubber     Sulfa (sulfonamide antibiotics)        MEDICATIONS:    Current Outpatient Prescriptions:     carvedilol (COREG) 25 MG tablet, Take 1 tablet (25 mg total) by mouth 2 (two) times daily with meals., Disp: 60 tablet, Rfl: 11    furosemide (LASIX) 40 MG tablet, Take 0.5 tablets (20 mg total) by mouth once daily., Disp: , Rfl:     levothyroxine (SYNTHROID) 88 MCG tablet, Take 88 mcg by mouth before breakfast. , Disp: , Rfl:     warfarin (COUMADIN) 5 MG tablet, Take 1 tablet (5 mg total) by mouth once daily. Or as instructed by coumadin clinic, Disp: 45 tablet, Rfl: 3      PHYSICAL EXAM:   Vitals:    02/22/18 1406 02/22/18 1409   BP: (!) 119/59 112/63   BP Location: Right arm Left arm   Patient Position: Sitting Sitting   BP Method: Large (Automatic) Large (Automatic)   Pulse: 65 65   SpO2: 96%    Weight: 107 kg (235 lb 14.3 oz)    Height: 5' 7" (1.702 m)          GENERAL: Awake, well-developed well-nourished, no apparent distress. +Down's facies.  HEENT: mucous membranes moist and " pink, normocephalic atraumatic, no cranial or carotid bruits, sclera anicteric  NECK: no jugular venous distention, no lymphadenopathy  CHEST: Good air movement, clear to auscultation bilaterally  CARDIOVASCULAR: Quiet precordium, regular rate and rhythm, S1S2, no rubs or gallops. 1/6 MARIELA at LSB.  ABDOMEN: Soft, nontender nondistended, no hepatomegaly, no aortic bruits  EXTREMITIES: Warm well perfused, 2+ radial/pedal pulses, capillary refill 2 seconds, no clubbing, cyanosis, or edema  NEURO: Alert and oriented, cooperative with exam, face symmetric, moves all extremities well    STUDIES:  ECG: ventricular pacing with 100% capture at rate of 65 bpm.    Pacemaker:  MDT PPM. RINA reached 1/23/2018. In backup mode VVI 65bpm. R-wave 5.6-8.0%, Imp 510 ohms, threshold 0.75V @ 0.ms.  5.1%.    ASSESSMENT:  Encounter Diagnoses   Name Primary?    Pacemaker      26 y/o male with TOF s/p repair and s/p recent pulmonary valve replacement.  He has pacemaker for sinus node dysfunction.  He is s/p flutter ablation without signs of recurrent flutter.  His pacemaker is at [facility].    PLAN:   1. Schedule pacemaker generator change.  2. Place Holter today  3. Stop coumadin  4. Continue carvedilol.  5. Keep follow up with Dr. Dean as planned.  6. Call for syncope, palpitations, chest pain, difficulty breathing, fatigue, or any other questions or concerns in the interim.      Time Spent: 40 (min) with over 50% in direct patient and family consultation regarding the management of pacemaker and history of atrial flutter s/p ablation.      The patient's doctor will be notified via EPIC/FAX    I hope this brings you up-to-date on Cipriano Fobbs  Please contact me with any questions or concerns.    Jsoe Ortiz MD  Pediatric and Adult Congenital Electrophysiologist  Pediatric Cardiologist

## 2018-02-26 ENCOUNTER — TELEPHONE (OUTPATIENT)
Dept: PEDIATRIC CARDIOLOGY | Facility: CLINIC | Age: 28
End: 2018-02-26

## 2018-02-26 NOTE — TELEPHONE ENCOUNTER
Left message for mom to call back. Need to reschedule surgery date for Cipriano's pacemaker changeout.

## 2018-02-27 ENCOUNTER — TELEPHONE (OUTPATIENT)
Dept: PEDIATRIC CARDIOLOGY | Facility: CLINIC | Age: 28
End: 2018-02-27

## 2018-02-27 NOTE — TELEPHONE ENCOUNTER
Left message that we need to reschedule Cipriano's surgery from Friday. Phone number left for call back.

## 2018-02-27 NOTE — TELEPHONE ENCOUNTER
----- Message from Bridger Villegas sent at 2/27/2018  3:21 PM CST -----  Contact: Alisha Kong [phone]  Mom is returning a missed call.

## 2018-03-21 ENCOUNTER — TELEPHONE (OUTPATIENT)
Dept: PEDIATRIC CARDIOLOGY | Facility: CLINIC | Age: 28
End: 2018-03-21

## 2018-03-21 NOTE — TELEPHONE ENCOUNTER
Spoke with mom. Scheduled procedure time changed for Friday, March 23rd. Patient instructed to come in for 9:30am and can have clear liquids til 7:30am.

## 2018-03-23 ENCOUNTER — HOSPITAL ENCOUNTER (OUTPATIENT)
Facility: HOSPITAL | Age: 28
Discharge: HOME | End: 2018-03-23
Attending: PEDIATRICS | Admitting: PEDIATRICS
Payer: MEDICAID

## 2018-03-23 ENCOUNTER — ANESTHESIA (OUTPATIENT)
Dept: MEDSURG UNIT | Facility: HOSPITAL | Age: 28
End: 2018-03-23
Payer: MEDICAID

## 2018-03-23 ENCOUNTER — ANESTHESIA EVENT (OUTPATIENT)
Dept: MEDSURG UNIT | Facility: HOSPITAL | Age: 28
End: 2018-03-23
Payer: MEDICAID

## 2018-03-23 VITALS
HEIGHT: 65 IN | SYSTOLIC BLOOD PRESSURE: 106 MMHG | OXYGEN SATURATION: 93 % | RESPIRATION RATE: 18 BRPM | WEIGHT: 218 LBS | TEMPERATURE: 97 F | DIASTOLIC BLOOD PRESSURE: 64 MMHG | BODY MASS INDEX: 36.32 KG/M2 | HEART RATE: 75 BPM

## 2018-03-23 DIAGNOSIS — Z45.010 PACEMAKER AT END OF BATTERY LIFE: ICD-10-CM

## 2018-03-23 DIAGNOSIS — Z95.0 PACEMAKER: Primary | ICD-10-CM

## 2018-03-23 DIAGNOSIS — I37.9 NONRHEUMATIC PULMONARY VALVE DISORDER: ICD-10-CM

## 2018-03-23 DIAGNOSIS — Z45.018 ENCOUNTER FOR ADJUSTMENT AND MANAGEMENT OF OTHER PART OF CARDIAC PACEMAKER: ICD-10-CM

## 2018-03-23 LAB
ANION GAP SERPL CALC-SCNC: 8 MMOL/L
APTT BLDCRRT: 24.4 SEC
BASOPHILS # BLD AUTO: 0.07 K/UL
BASOPHILS NFR BLD: 1.2 %
BUN SERPL-MCNC: 18 MG/DL
CALCIUM SERPL-MCNC: 9 MG/DL
CHLORIDE SERPL-SCNC: 105 MMOL/L
CO2 SERPL-SCNC: 25 MMOL/L
CREAT SERPL-MCNC: 1 MG/DL
DIFFERENTIAL METHOD: ABNORMAL
EOSINOPHIL # BLD AUTO: 0.1 K/UL
EOSINOPHIL NFR BLD: 1.8 %
ERYTHROCYTE [DISTWIDTH] IN BLOOD BY AUTOMATED COUNT: 15.3 %
EST. GFR  (AFRICAN AMERICAN): >60 ML/MIN/1.73 M^2
EST. GFR  (NON AFRICAN AMERICAN): >60 ML/MIN/1.73 M^2
GLUCOSE SERPL-MCNC: 94 MG/DL
HCT VFR BLD AUTO: 41.2 %
HGB BLD-MCNC: 13.5 G/DL
IMM GRANULOCYTES # BLD AUTO: 0.05 K/UL
IMM GRANULOCYTES NFR BLD AUTO: 0.8 %
INR PPP: 0.9
LYMPHOCYTES # BLD AUTO: 1.3 K/UL
LYMPHOCYTES NFR BLD: 22 %
MCH RBC QN AUTO: 30.3 PG
MCHC RBC AUTO-ENTMCNC: 32.8 G/DL
MCV RBC AUTO: 93 FL
MONOCYTES # BLD AUTO: 0.5 K/UL
MONOCYTES NFR BLD: 8.2 %
NEUTROPHILS # BLD AUTO: 4 K/UL
NEUTROPHILS NFR BLD: 66 %
NRBC BLD-RTO: 0 /100 WBC
PLATELET # BLD AUTO: 294 K/UL
PMV BLD AUTO: 9.4 FL
POTASSIUM SERPL-SCNC: 4 MMOL/L
PROTHROMBIN TIME: 9.9 SEC
RBC # BLD AUTO: 4.45 M/UL
SODIUM SERPL-SCNC: 138 MMOL/L
WBC # BLD AUTO: 6.08 K/UL

## 2018-03-23 PROCEDURE — 33228 REMV&REPLC PM GEN DUAL LEAD: CPT | Mod: ,,, | Performed by: PEDIATRICS

## 2018-03-23 PROCEDURE — D9220A PRA ANESTHESIA: Mod: CRNA,,, | Performed by: NURSE ANESTHETIST, CERTIFIED REGISTERED

## 2018-03-23 PROCEDURE — 85025 COMPLETE CBC W/AUTO DIFF WBC: CPT

## 2018-03-23 PROCEDURE — 27200651 HC AIRWAY, LMA: Performed by: NURSE ANESTHETIST, CERTIFIED REGISTERED

## 2018-03-23 PROCEDURE — 25000003 PHARM REV CODE 250

## 2018-03-23 PROCEDURE — A4216 STERILE WATER/SALINE, 10 ML: HCPCS | Performed by: NURSE ANESTHETIST, CERTIFIED REGISTERED

## 2018-03-23 PROCEDURE — 01480 ANES OPEN PX LOWER L/A/F NOS: CPT | Performed by: PEDIATRICS

## 2018-03-23 PROCEDURE — 93005 ELECTROCARDIOGRAM TRACING: CPT

## 2018-03-23 PROCEDURE — 85610 PROTHROMBIN TIME: CPT

## 2018-03-23 PROCEDURE — 85730 THROMBOPLASTIN TIME PARTIAL: CPT

## 2018-03-23 PROCEDURE — 37000008 HC ANESTHESIA 1ST 15 MINUTES: Performed by: PEDIATRICS

## 2018-03-23 PROCEDURE — D9220A PRA ANESTHESIA: Mod: ANES,,, | Performed by: ANESTHESIOLOGY

## 2018-03-23 PROCEDURE — 63600175 PHARM REV CODE 636 W HCPCS: Performed by: NURSE ANESTHETIST, CERTIFIED REGISTERED

## 2018-03-23 PROCEDURE — 63600175 PHARM REV CODE 636 W HCPCS: Performed by: PEDIATRICS

## 2018-03-23 PROCEDURE — 63600175 PHARM REV CODE 636 W HCPCS

## 2018-03-23 PROCEDURE — 94761 N-INVAS EAR/PLS OXIMETRY MLT: CPT | Mod: 59

## 2018-03-23 PROCEDURE — 27000221 HC OXYGEN, UP TO 24 HOURS

## 2018-03-23 PROCEDURE — 25000003 PHARM REV CODE 250: Performed by: NURSE ANESTHETIST, CERTIFIED REGISTERED

## 2018-03-23 PROCEDURE — 37000009 HC ANESTHESIA EA ADD 15 MINS: Performed by: PEDIATRICS

## 2018-03-23 PROCEDURE — 80048 BASIC METABOLIC PNL TOTAL CA: CPT

## 2018-03-23 PROCEDURE — 33228 REMV&REPLC PM GEN DUAL LEAD: CPT

## 2018-03-23 PROCEDURE — 27100006 EP LAB PROCEDURE

## 2018-03-23 PROCEDURE — 93010 ELECTROCARDIOGRAM REPORT: CPT | Mod: 76,,, | Performed by: INTERNAL MEDICINE

## 2018-03-23 PROCEDURE — 25000003 PHARM REV CODE 250: Performed by: ANESTHESIOLOGY

## 2018-03-23 RX ORDER — FENTANYL CITRATE 50 UG/ML
INJECTION, SOLUTION INTRAMUSCULAR; INTRAVENOUS
Status: DISCONTINUED | OUTPATIENT
Start: 2018-03-23 | End: 2018-03-23

## 2018-03-23 RX ORDER — HYDROCODONE BITARTRATE AND ACETAMINOPHEN 5; 325 MG/1; MG/1
1 TABLET ORAL EVERY 6 HOURS PRN
Qty: 12 TABLET | Refills: 0 | Status: SHIPPED | OUTPATIENT
Start: 2018-03-23 | End: 2019-03-14

## 2018-03-23 RX ORDER — DEXMEDETOMIDINE HYDROCHLORIDE 4 UG/ML
0.5 INJECTION, SOLUTION INTRAVENOUS CONTINUOUS
Status: DISCONTINUED | OUTPATIENT
Start: 2018-03-23 | End: 2018-03-23 | Stop reason: HOSPADM

## 2018-03-23 RX ORDER — PROPOFOL 10 MG/ML
VIAL (ML) INTRAVENOUS
Status: DISCONTINUED | OUTPATIENT
Start: 2018-03-23 | End: 2018-03-23

## 2018-03-23 RX ORDER — CEFAZOLIN SODIUM 1 G/3ML
2 INJECTION, POWDER, FOR SOLUTION INTRAMUSCULAR; INTRAVENOUS
Status: COMPLETED | OUTPATIENT
Start: 2018-03-23 | End: 2018-03-23

## 2018-03-23 RX ORDER — SUCCINYLCHOLINE CHLORIDE 20 MG/ML
INJECTION INTRAMUSCULAR; INTRAVENOUS
Status: DISCONTINUED | OUTPATIENT
Start: 2018-03-23 | End: 2018-03-23

## 2018-03-23 RX ORDER — FENTANYL CITRATE 50 UG/ML
50 INJECTION, SOLUTION INTRAMUSCULAR; INTRAVENOUS EVERY 5 MIN PRN
Status: DISCONTINUED | OUTPATIENT
Start: 2018-03-23 | End: 2018-03-23 | Stop reason: HOSPADM

## 2018-03-23 RX ORDER — ROCURONIUM BROMIDE 10 MG/ML
INJECTION, SOLUTION INTRAVENOUS
Status: DISCONTINUED | OUTPATIENT
Start: 2018-03-23 | End: 2018-03-23

## 2018-03-23 RX ORDER — HYDROCODONE BITARTRATE AND ACETAMINOPHEN 5; 325 MG/1; MG/1
1 TABLET ORAL EVERY 4 HOURS PRN
Status: DISCONTINUED | OUTPATIENT
Start: 2018-03-23 | End: 2018-03-23 | Stop reason: HOSPADM

## 2018-03-23 RX ORDER — ONDANSETRON 2 MG/ML
INJECTION INTRAMUSCULAR; INTRAVENOUS
Status: DISCONTINUED | OUTPATIENT
Start: 2018-03-23 | End: 2018-03-23

## 2018-03-23 RX ORDER — PROPOFOL 10 MG/ML
VIAL (ML) INTRAVENOUS CONTINUOUS PRN
Status: DISCONTINUED | OUTPATIENT
Start: 2018-03-23 | End: 2018-03-23

## 2018-03-23 RX ORDER — ONDANSETRON 2 MG/ML
4 INJECTION INTRAMUSCULAR; INTRAVENOUS ONCE AS NEEDED
Status: DISCONTINUED | OUTPATIENT
Start: 2018-03-23 | End: 2018-03-23 | Stop reason: HOSPADM

## 2018-03-23 RX ORDER — LIDOCAINE HCL/PF 100 MG/5ML
SYRINGE (ML) INTRAVENOUS
Status: DISCONTINUED | OUTPATIENT
Start: 2018-03-23 | End: 2018-03-23

## 2018-03-23 RX ORDER — SODIUM CHLORIDE 9 MG/ML
INJECTION, SOLUTION INTRAVENOUS CONTINUOUS
Status: DISCONTINUED | OUTPATIENT
Start: 2018-03-23 | End: 2018-03-23 | Stop reason: HOSPADM

## 2018-03-23 RX ORDER — SODIUM CHLORIDE 0.9 % (FLUSH) 0.9 %
3 SYRINGE (ML) INJECTION
Status: DISCONTINUED | OUTPATIENT
Start: 2018-03-23 | End: 2018-03-23 | Stop reason: HOSPADM

## 2018-03-23 RX ORDER — EPINEPHRINE 0.1 MG/ML
INJECTION INTRAVENOUS
Status: DISCONTINUED | OUTPATIENT
Start: 2018-03-23 | End: 2018-03-23

## 2018-03-23 RX ORDER — MIDAZOLAM HYDROCHLORIDE 1 MG/ML
INJECTION, SOLUTION INTRAMUSCULAR; INTRAVENOUS
Status: DISCONTINUED | OUTPATIENT
Start: 2018-03-23 | End: 2018-03-23

## 2018-03-23 RX ORDER — PHENYLEPHRINE HYDROCHLORIDE 10 MG/ML
INJECTION INTRAVENOUS
Status: DISCONTINUED | OUTPATIENT
Start: 2018-03-23 | End: 2018-03-23

## 2018-03-23 RX ORDER — CEPHALEXIN 500 MG/1
500 CAPSULE ORAL EVERY 8 HOURS
Qty: 10 CAPSULE | Refills: 0 | Status: SHIPPED | OUTPATIENT
Start: 2018-03-23 | End: 2018-03-26

## 2018-03-23 RX ORDER — SODIUM CHLORIDE 9 MG/ML
INJECTION, SOLUTION INTRAVENOUS CONTINUOUS PRN
Status: DISCONTINUED | OUTPATIENT
Start: 2018-03-23 | End: 2018-03-23

## 2018-03-23 RX ADMIN — ROCURONIUM BROMIDE 5 MG: 10 INJECTION, SOLUTION INTRAVENOUS at 10:03

## 2018-03-23 RX ADMIN — EPINEPHRINE 20 MG: 0.1 INJECTION, SOLUTION ENDOTRACHEAL; INTRACARDIAC; INTRAVENOUS at 10:03

## 2018-03-23 RX ADMIN — SUCCINYLCHOLINE CHLORIDE 120 MG: 20 INJECTION, SOLUTION INTRAMUSCULAR; INTRAVENOUS at 10:03

## 2018-03-23 RX ADMIN — LIDOCAINE HYDROCHLORIDE 80 MG: 20 INJECTION, SOLUTION INTRAVENOUS at 10:03

## 2018-03-23 RX ADMIN — PROPOFOL 20 MG: 10 INJECTION, EMULSION INTRAVENOUS at 11:03

## 2018-03-23 RX ADMIN — MIDAZOLAM 2 MG: 1 INJECTION INTRAMUSCULAR; INTRAVENOUS at 09:03

## 2018-03-23 RX ADMIN — PROPOFOL 120 MG: 10 INJECTION, EMULSION INTRAVENOUS at 10:03

## 2018-03-23 RX ADMIN — FENTANYL CITRATE 50 MCG: 50 INJECTION, SOLUTION INTRAMUSCULAR; INTRAVENOUS at 10:03

## 2018-03-23 RX ADMIN — EPINEPHRINE 10 MG: 0.1 INJECTION, SOLUTION ENDOTRACHEAL; INTRACARDIAC; INTRAVENOUS at 10:03

## 2018-03-23 RX ADMIN — PROPOFOL 100 MCG/KG/MIN: 10 INJECTION, EMULSION INTRAVENOUS at 09:03

## 2018-03-23 RX ADMIN — PHENYLEPHRINE HYDROCHLORIDE 200 MCG: 10 INJECTION INTRAVENOUS at 10:03

## 2018-03-23 RX ADMIN — SODIUM CHLORIDE: 0.9 INJECTION, SOLUTION INTRAVENOUS at 09:03

## 2018-03-23 RX ADMIN — CEFAZOLIN 2 G: 330 INJECTION, POWDER, FOR SOLUTION INTRAMUSCULAR; INTRAVENOUS at 09:03

## 2018-03-23 RX ADMIN — DEXMEDETOMIDINE HYDROCHLORIDE 0.7 MCG: 100 INJECTION, SOLUTION, CONCENTRATE INTRAVENOUS at 11:03

## 2018-03-23 RX ADMIN — ONDANSETRON 4 MG: 2 INJECTION, SOLUTION INTRAMUSCULAR; INTRAVENOUS at 09:03

## 2018-03-23 RX ADMIN — SODIUM CHLORIDE: 0.9 INJECTION, SOLUTION INTRAVENOUS at 01:03

## 2018-03-23 RX ADMIN — DEXMEDETOMIDINE HYDROCHLORIDE 0.8 MCG/KG/HR: 4 INJECTION, SOLUTION INTRAVENOUS at 01:03

## 2018-03-23 NOTE — H&P (VIEW-ONLY)
Thank you for referring your patient Cipriaon Thompson to the electrophysiology clinic for consultation. The patient is accompanied by his mother. Please review my findings below.    CHIEF COMPLAINT: Outpatient EP follow up    HISTORY OF PRESENT ILLNESS:   Cipriano is a 27 y.o. Male with Down's syndrome, TOF s/p complete repair as an infant with subsequent PVR in 1999 for right ventricular dilation and pacemaker placement in 2008 for Sinus node dysfunction. He presented with exercise intolerance and moderate PS/PI so he was referred for repeat PVR. Also with history of hypertension and ventricular ectopy with frequent PVC's. His home medication regimen includes Carvedilol 25 mg daily, Digoxin 250 mcg qd, Lisinopril 10 mg qd and Synthroid. He had a cardiac catheterization for possible Jeanna valve implantation on 4/4/17 and were unable to secondary to coronary artery anatomy. He underwent surgical pulmonary valve placement on 5/2/17.  He had frequent PVC's in the post op period.  He had his pacemaker interrogated post op but pacemaker was working appropriately although showed ~9months to [facility].    Cipriano underwent atrial flutter ablation in November 2017.  He returns today for follow up.  He has clinically been doing well.  He has no fatigue or activity intolerance.  He has no difficulty breathing.  He has no chest pain or palpitations.  He has no syncope.        REVIEW OF SYSTEMS:     GENERAL: No fever, chills, fatigability or weight loss.  SKIN: No rashes, itching or changes in color or texture of skin.  CHEST: Denies ECHEVERRIA, cyanosis, wheezing, cough and sputum production.  CARDIOVASCULAR: Denies chest pain or reduced exercise tolerance.  ABDOMEN: Appetite fine. No weight loss. Denies diarrhea, abdominal pain, or vomiting.  PERIPHERAL VASCULAR: No claudication or cyanosis.  MUSCULOSKELETAL: No joint stiffness or swelling.   NEUROLOGIC: No history of seizures,  alteration of gait or coordination.    PAST MEDICAL HISTORY:   Past  "Medical History:   Diagnosis Date    CHF (congestive heart failure)     Down syndrome     Encounter for blood transfusion     S/P surgery for complex congenital heart disease 1/30/2017    Tetralogy of Fallot 05/1990           FAMILY HISTORY:   Family History   Problem Relation Age of Onset    No Known Problems Mother     No Known Problems Sister          SOCIAL HISTORY:   Social History     Social History    Marital status: Single     Spouse name: N/A    Number of children: N/A    Years of education: N/A     Occupational History    Not on file.     Social History Main Topics    Smoking status: Never Smoker    Smokeless tobacco: Never Used    Alcohol use No    Drug use: Unknown    Sexual activity: Not Currently     Other Topics Concern    Not on file     Social History Narrative    Lives with mother, 1 dog (inside)       ALLERGIES:  Review of patient's allergies indicates:   Allergen Reactions    Latex, natural rubber     Sulfa (sulfonamide antibiotics)        MEDICATIONS:    Current Outpatient Prescriptions:     carvedilol (COREG) 25 MG tablet, Take 1 tablet (25 mg total) by mouth 2 (two) times daily with meals., Disp: 60 tablet, Rfl: 11    furosemide (LASIX) 40 MG tablet, Take 0.5 tablets (20 mg total) by mouth once daily., Disp: , Rfl:     levothyroxine (SYNTHROID) 88 MCG tablet, Take 88 mcg by mouth before breakfast. , Disp: , Rfl:     warfarin (COUMADIN) 5 MG tablet, Take 1 tablet (5 mg total) by mouth once daily. Or as instructed by coumadin clinic, Disp: 45 tablet, Rfl: 3      PHYSICAL EXAM:   Vitals:    02/22/18 1406 02/22/18 1409   BP: (!) 119/59 112/63   BP Location: Right arm Left arm   Patient Position: Sitting Sitting   BP Method: Large (Automatic) Large (Automatic)   Pulse: 65 65   SpO2: 96%    Weight: 107 kg (235 lb 14.3 oz)    Height: 5' 7" (1.702 m)          GENERAL: Awake, well-developed well-nourished, no apparent distress. +Down's facies.  HEENT: mucous membranes moist and " pink, normocephalic atraumatic, no cranial or carotid bruits, sclera anicteric  NECK: no jugular venous distention, no lymphadenopathy  CHEST: Good air movement, clear to auscultation bilaterally  CARDIOVASCULAR: Quiet precordium, regular rate and rhythm, S1S2, no rubs or gallops. 1/6 MARIELA at LSB.  ABDOMEN: Soft, nontender nondistended, no hepatomegaly, no aortic bruits  EXTREMITIES: Warm well perfused, 2+ radial/pedal pulses, capillary refill 2 seconds, no clubbing, cyanosis, or edema  NEURO: Alert and oriented, cooperative with exam, face symmetric, moves all extremities well    STUDIES:  ECG: ventricular pacing with 100% capture at rate of 65 bpm.    Pacemaker:  MDT PPM. RINA reached 1/23/2018. In backup mode VVI 65bpm. R-wave 5.6-8.0%, Imp 510 ohms, threshold 0.75V @ 0.ms.  5.1%.    ASSESSMENT:  Encounter Diagnoses   Name Primary?    Pacemaker      26 y/o male with TOF s/p repair and s/p recent pulmonary valve replacement.  He has pacemaker for sinus node dysfunction.  He is s/p flutter ablation without signs of recurrent flutter.  His pacemaker is at [facility].    PLAN:   1. Schedule pacemaker generator change.  2. Place Holter today  3. Stop coumadin  4. Continue carvedilol.  5. Keep follow up with Dr. Dean as planned.  6. Call for syncope, palpitations, chest pain, difficulty breathing, fatigue, or any other questions or concerns in the interim.      Time Spent: 40 (min) with over 50% in direct patient and family consultation regarding the management of pacemaker and history of atrial flutter s/p ablation.      The patient's doctor will be notified via EPIC/FAX    I hope this brings you up-to-date on Cipriano Fobbs  Please contact me with any questions or concerns.    Jose Ortiz MD  Pediatric and Adult Congenital Electrophysiologist  Pediatric Cardiologist

## 2018-03-23 NOTE — TRANSFER OF CARE
"Anesthesia Transfer of Care Note    Patient: Cipriano Thompson    Procedure(s) Performed: Procedure(s) (LRB):  REPLACEMENT-GENERATOR-PACEMAKER (N/A)    Patient location: PACU    Anesthesia Type: general    Transport from OR: Transported from OR on 6-10 L/min O2 by face mask with adequate spontaneous ventilation. Upon arrival to PACU/ICU, patient attached to 100% O2 by T-piece with adequate spontaneous ventilation. Continuous SpO2 monitoring in transport. Continuous ECG monitoring in transport    Post pain: adequate analgesia    Post assessment: no apparent anesthetic complications    Post vital signs: stable    Level of consciousness: sedated    Nausea/Vomiting: no nausea/vomiting    Complications: none    Transfer of care protocol was followedComments: Nasal and oral airway used, precedex infusion continuted pt transferred with monitor to PACU and report given.        Last vitals:   Visit Vitals  BP (!) 145/73 (BP Location: Right arm, Patient Position: Lying)   Pulse 65   Temp 36.6 °C (97.9 °F) (Oral)   Resp 18   Ht 5' 5" (1.651 m)   Wt 98.9 kg (218 lb)   SpO2 95%   BMI 36.28 kg/m²     "

## 2018-03-23 NOTE — NURSING TRANSFER
Nursing Transfer Note      3/23/2018     Transfer To: [facility]    Transfer via stretcher    Transfer with cardiac monitoring    Transported by pct    Medicines sent: no    Chart send with patient: Yes    Notified: mother

## 2018-03-23 NOTE — INTERVAL H&P NOTE
The patient has been examined and the H&P has been reviewed:    I concur with the findings and no changes have occurred since H&P was written.    Anesthesia/Surgery risks, benefits and alternative options discussed and understood by patient/family.          Active Hospital Problems    Diagnosis  POA    Pacemaker at end of battery life [Z45.010]  Not Applicable      Resolved Hospital Problems    Diagnosis Date Resolved POA   No resolved problems to display.

## 2018-03-23 NOTE — ANESTHESIA RELEASE NOTE
"Anesthesia Release from PACU Note    Patient: Cipriano Thompson    Procedure(s) Performed: Procedure(s) (LRB):  REPLACEMENT-GENERATOR-PACEMAKER (N/A)    Anesthesia type: general    Post pain: Adequate analgesia    Post assessment: no apparent anesthetic complications, tolerated procedure well and no evidence of recall    Last Vitals:   Visit Vitals  BP (!) 105/59 (BP Location: Left arm, Patient Position: Lying)   Pulse 75   Temp 36.4 °C (97.5 °F) (Temporal)   Resp 13   Ht 5' 5" (1.651 m)   Wt 98.9 kg (218 lb)   SpO2 95%   BMI 36.28 kg/m²       Post vital signs: stable    Level of consciousness: awake and alert     Nausea/Vomiting: no nausea/no vomiting    Complications: none    Airway Patency: patent    Respiratory: unassisted    Cardiovascular: stable and blood pressure at baseline    Hydration: euvolemic  "

## 2018-03-23 NOTE — PLAN OF CARE
Received report from MILTON Suárez. Patient s/p gen change, AAOx3. VSS, no c/o pain or discomfort at this time, resp even and unlabored. Covederm dressing to chest wall is CDI. No active bleeding. No hematoma noted. Post procedure protocol reviewed with patient and patient's family. Understanding verbalized. Family members at bedside. Nurse call bell within reach. Will continue to monitor per post procedure protocol.

## 2018-03-23 NOTE — ANESTHESIA POSTPROCEDURE EVALUATION
"Anesthesia Post Evaluation    Patient: Cipriano Thompson    Procedure(s) Performed: Procedure(s) (LRB):  REPLACEMENT-GENERATOR-PACEMAKER (N/A)    Final Anesthesia Type: general  Patient location during evaluation: PACU  Patient participation: Yes- Able to Participate  Level of consciousness: awake and alert  Post-procedure vital signs: reviewed and stable  Pain management: adequate  Airway patency: patent  PONV status at discharge: No PONV  Anesthetic complications: no      Cardiovascular status: hemodynamically stable  Respiratory status: unassisted, spontaneous ventilation and room air  Hydration status: euvolemic  Follow-up not needed.        Visit Vitals  BP (!) 102/58 (BP Location: Left arm, Patient Position: Lying)   Pulse 75   Temp 36.4 °C (97.5 °F) (Temporal)   Resp 17   Ht 5' 5" (1.651 m)   Wt 98.9 kg (218 lb)   SpO2 95%   BMI 36.28 kg/m²       Pain/Justo Score: Pain Assessment Performed: Yes (3/23/2018  4:00 PM)  Presence of Pain: non-verbal indicators absent (3/23/2018  4:00 PM)  Pain Rating Prior to Med Admin: 0 (3/23/2018  4:00 PM)  Pain Rating Post Med Admin: 0 (3/23/2018  4:00 PM)  Justo Score: 8 (3/23/2018  4:00 PM)      "

## 2018-03-27 ENCOUNTER — OFFICE VISIT (OUTPATIENT)
Dept: CARDIOLOGY | Facility: CLINIC | Age: 28
End: 2018-03-27
Payer: MEDICAID

## 2018-03-27 VITALS
HEIGHT: 65 IN | BODY MASS INDEX: 39.56 KG/M2 | WEIGHT: 237.44 LBS | HEART RATE: 82 BPM | OXYGEN SATURATION: 94 % | SYSTOLIC BLOOD PRESSURE: 141 MMHG | DIASTOLIC BLOOD PRESSURE: 65 MMHG

## 2018-03-27 DIAGNOSIS — I48.3 TYPICAL ATRIAL FLUTTER: Primary | ICD-10-CM

## 2018-03-27 DIAGNOSIS — Z95.3 HISTORY OF PULMONARY VALVE REPLACEMENT WITH BIOPROSTHETIC VALVE: ICD-10-CM

## 2018-03-27 DIAGNOSIS — Z95.0 CARDIAC PACEMAKER IN SITU: ICD-10-CM

## 2018-03-27 DIAGNOSIS — Q21.3 TOF (TETRALOGY OF FALLOT): Primary | ICD-10-CM

## 2018-03-27 PROCEDURE — 99024 POSTOP FOLLOW-UP VISIT: CPT | Mod: S$GLB,,, | Performed by: PEDIATRICS

## 2018-03-27 PROCEDURE — 93000 ELECTROCARDIOGRAM COMPLETE: CPT | Mod: S$GLB,,, | Performed by: PEDIATRICS

## 2018-03-27 NOTE — PROGRESS NOTES
"    HISTORY OF PRESENT ILLNESS:  Cipriano is a 28-year-old -American male with Down syndrome.  He was originally diagnosed to have Tetralogy of Fallot.  He underwent complete repair using a transannular patch.  He then required placement of a 27 mm Freestyle Medtronic Bioprosthetic pulmonary valve in 1999.  He subsequently developed sinus node dysfunction and had placement of a dual chamber endocardial pacemaker in 2008. 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  Dr. Godfrey placed a 27 mm St. Quinn Epic Bioprosthetic valve in the pulmonary position.  This was conducted in conjunction with placement of a 30 mm Dacron conduit. The operation was in May 2017.   Cipriano has done well.  He comes to Cardiology Clinic today, accompanied by his mother, for follow-up.  More recently, Cipriano presented to our clinic in atrial flutter with 2:1 block. He was admitted to the [facility] and underwent electrical cardioversion to sinus rhythm. Then, on Nov 29, 2017, he underwent: 1) Successful atrial scar flutter ablation by Dr Milligan and 2) Device reprogramming. When last checked, the pacemaker battery was appraching "end of life".  He recently (4 days ago) had replacement of his pacemaker generator.  Here is here today to check the surgical site and follow-up.     Mom reports Cipriano is currently doing well.  She denies he is having any problems with SOB, chest pain, palpitations, rapid or slow heart rates, or inability to conduct his usual but limited activities. Cipriano complains of being tired all the time.  He currently is on Coreg 25 mg q12 hrs, Synthroid 88 mcg qday for hypothyroidism, and Warfarin 5 mg qd. There is a history of atrial flutter and sick sinus syndrome.        REVIEW OF SYSTEMS:  Cipriano has no problems with vision or hearing deficits. No lightheadedness or seizures. No breathing disorder. History of snoring. No " coughing up blood.  No swallowing difficulty. No abdominal pain.  Bowel movements are normal. No bloody stools. No pelvic pain.  Urination is normal. No  bleeding disorder.  No known arterial disease. No HBP. No DM. No lipid disorder.  No known intrinsic problem with the lungs, liver or kidneys.     PHYSICAL EXAMINATION:  GENERAL:  An alert,  healthy-looking 28 -year-old with the stigmata of Down syndrome. Weight is 107.7 kg or 237.5 lbs and height is 1.651 meters or 5 ' 6 ''. Heart rate is 82 bpm and regular.   Saturation in room air is 94 %.  Blood pressure in the right arm is 141/65 mmHg.  Color and perfusion are good.  HEENT:  Normal eye movements. PERR to light. No bruits over the head. No upper airway obstruction.  No jugular venous distention.  The mucous membranes are moist and pink. NECK:  Supple and the thyroid is not enlarged.  Pacemaker is palpated in the L-subclavicular region. The incision is clean and not reddened. Non-infected.  CHEST:  Well-healed midline scar.  Normal chest movements with breathing.  Good air entry bilaterally.  Breath sounds are clear.  No wheezes, rhonchi or rales.  CARDIOVASCULAR:  Normal precordial activity. Heart rate ~ 82 bpm. S1 is normal and S2 is prominent.  There is a grade I/VI systolic ejection murmur heard up the left sternal border and base.  Diastole is clear.  No gallop, rub or click.  ABDOMEN:  Exogenous obesity.  Liver is percussed down about 3-4 fingerbreadths at the right costal margin. It is non-tender and non-pulsatile.  Bowel sounds are normal.  No masses appreciated.  EXTREMITIES:  Full ROM. Pulses are 2+ throughout.  Capillary refill is good.  There is mild peripheral edema.  No cyanosis or clubbing. No rashes or bruising. There is a boot on the R-foot. The foot is mildly swollen and painful on the plantar sole of the foot.            .........................................................................................................        An ECG was  obtained.  It showed atrial paced rhythm.  RBBB pattern ( ms). Normal T waves.      .......................................................................................................                ASSESSMENT:  In summary, we have a 28-year-old patient with Down syndrome who recently underwent heart surgery for progressive obstruction and insufficiency of a previously placed bioprosthetic pulmonary valve.  The current surgery was performed by Dr. Godfrey in May 2017.  The surgery entailed placement of a 27 mm St. Quinn Epic Bioprosthetic valve in the pulmonary position.  Cipriano  did well postoperatively. Also, he recently underwent successful catheter ablation for atrial flutter.  Four days ago, he underwent replacement of the pacemaker generator.  The site is clean, non-infected and he is doing well.          PLAN:  Given our findings, our suggestions are as follow    1. We need to check TFTs, now on Synthroid 88 mcg qd.  2). Continue on Coreg 25 mg q12 hrs (? Seems like a high dose) and Warfarin 5 mg qd.  3.)  ? May not need the Warfarin. Would obtain a Holter monitor and if no atrial flutter change to 81 mg ASA qd.  4)   Antibiotic prophylaxis is in order for any invasive procedures.  5). RTC in 4 days to remove the sutures at the pacemaker site. Also, get vascular U/S of the R leg/foot. 6) Would restart Lasix 20 mg qd for now.     Augustine Dean PhD, MD.

## 2018-03-27 NOTE — ANESTHESIA PREPROCEDURE EVALUATION
03/27/2018  Cipriano Thompson is a 27 y.o., male.    Anesthesia Evaluation    I have reviewed the Patient Summary Reports.    I have reviewed the Nursing Notes.   I have reviewed the Medications.     Review of Systems  Anesthesia Hx:  No problems with previous Anesthesia  History of prior surgery of interest to airway management or planning: heart surgery. Denies Family Hx of Anesthesia complications.   Denies Personal Hx of Anesthesia complications.   Hematology/Oncology:        Hematology Comments: Anticoagulation     Cardiovascular:   Pacemaker Dysrhythmias (a flutter) atrial fibrillation CHF ECG has been reviewed. S/p TET repair   Pulmonary:  Pulmonary Normal    Renal/:  Renal/ Normal     Hepatic/GI:  Hepatic/GI Normal    Musculoskeletal:  Musculoskeletal Normal    Neurological:  Neurology Normal  Denies CVA. Denies Seizures. Down's syndrome   Endocrine:   Hypothyroidism        Physical Exam  General:  Well nourished, Obesity    Airway/Jaw/Neck:  Airway Findings: Mouth Opening: Normal Tongue: Large  General Airway Assessment: Possible difficult intubation  Jaw/Neck Findings:  Micrognathia: Negative Neck ROM: Normal ROM      Dental:  Dental Findings: In tact   Chest/Lungs:  Chest/Lungs Findings: Clear to auscultation, Normal Respiratory Rate     Heart/Vascular:  Heart Findings: Rate: Normal  Rhythm: Regular Rhythm  Sounds: Normal  Heart murmur: negative    Abdomen:  Abdomen Findings:  Normal, Nontender, Soft       Mental Status:  Mental Status Findings:  Cooperative, Alert and Oriented         Anesthesia Plan  Type of Anesthesia, risks & benefits discussed:  Anesthesia Type:  general, MAC  Patient's Preference:   Intra-op Monitoring Plan:   Intra-op Monitoring Plan Comments:   Post Op Pain Control Plan:   Post Op Pain Control Plan Comments:   Induction:   IV  Beta Blocker:  Patient is on a Beta-Blocker and  has received one dose within the past 24 hours (No further documentation required).       Informed Consent: Patient understands risks and agrees with Anesthesia plan.  Questions answered. Anesthesia consent signed with patient.  ASA Score: 3     Day of Surgery Review of History & Physical:    H&P update referred to the surgeon.         Ready For Surgery From Anesthesia Perspective.

## 2018-03-31 NOTE — TELEPHONE ENCOUNTER
Spoke with mom. Procedures rescheduled to 3/23. New instructions verbalized as well as mailed.   Statement Selected

## 2018-04-02 ENCOUNTER — TELEPHONE (OUTPATIENT)
Dept: PEDIATRIC CARDIOLOGY | Facility: CLINIC | Age: 28
End: 2018-04-02

## 2018-04-02 NOTE — TELEPHONE ENCOUNTER
Lef message for Mom that we need to get Cipriano in on Thursday evening for wound check and suture removal from Pacemaker changeout.

## 2018-04-02 NOTE — DISCHARGE SUMMARY
[facility]  Discharge Note  Short Stay    Admit Date: 3/23/2018    Discharge Date and Time: 3/23/2018  6:24 PM     Attending Physician: No att. providers found     Discharge Provider: Jennifer Huerta    Diagnoses:  Active Hospital Problems    Diagnosis  POA    *Pacemaker at end of battery life [Z45.010]  Not Applicable      Resolved Hospital Problems    Diagnosis Date Resolved POA   No resolved problems to display.       Discharged Condition: good    Hospital Course: Patient was admitted for an outpatient procedure and tolerated the procedure well with no complications.    Final Diagnoses: Same as principal problem.    Disposition: Home or Self Care    Follow up/Patient Instructions:    Medications:  Reconciled Home Medications:      Medication List      START taking these medications    hydrocodone-acetaminophen 5-325mg 5-325 mg per tablet  Commonly known as:  NORCO  Take 1 tablet by mouth every 6 (six) hours as needed for Pain.        CONTINUE taking these medications    carvedilol 25 MG tablet  Commonly known as:  COREG  Take 1 tablet (25 mg total) by mouth 2 (two) times daily with meals.     furosemide 40 MG tablet  Commonly known as:  LASIX  Take 0.5 tablets (20 mg total) by mouth once daily.     levothyroxine 88 MCG tablet  Commonly known as:  SYNTHROID     warfarin 5 MG tablet  Commonly known as:  COUMADIN  Take 1 tablet (5 mg total) by mouth once daily. Or as instructed by coumadin clinic        ASK your doctor about these medications    cephALEXin 500 MG capsule  Commonly known as:  KEFLEX  Take 1 capsule (500 mg total) by mouth every 8 (eight) hours.  Ask about: Should I take this medication?           Where to Get Your Medications      These medications were sent to [facility] - [place], [place] - [address], [place] [address]    Phone:  [phone]   · cephALEXin 500 MG capsule  · hydrocodone-acetaminophen 5-325mg 5-325  mg per tablet       No discharge procedures on file.  Follow-up Information     Augustine Dean MD In 1 week.    Specialty:  Pediatric Cardiology  Why:  For wound re-check  Contact information:  [address] [address] [phone]                   Discharge Procedure Orders (must include Diet, Follow-up, Activity):  No discharge procedures on file.

## 2018-04-05 ENCOUNTER — OFFICE VISIT (OUTPATIENT)
Dept: CARDIOLOGY | Facility: CLINIC | Age: 28
End: 2018-04-05
Payer: MEDICAID

## 2018-04-05 VITALS
BODY MASS INDEX: 39.08 KG/M2 | HEART RATE: 75 BPM | DIASTOLIC BLOOD PRESSURE: 57 MMHG | WEIGHT: 234.56 LBS | HEIGHT: 65 IN | SYSTOLIC BLOOD PRESSURE: 109 MMHG | OXYGEN SATURATION: 94 %

## 2018-04-05 DIAGNOSIS — Z87.74 S/P SURGERY FOR COMPLEX CONGENITAL HEART DISEASE: ICD-10-CM

## 2018-04-05 DIAGNOSIS — Q90.9 DOWN SYNDROME: Primary | ICD-10-CM

## 2018-04-05 DIAGNOSIS — Z95.0 PACEMAKER: ICD-10-CM

## 2018-04-05 DIAGNOSIS — Q24.9 ADULT CONGENITAL HEART DISEASE: ICD-10-CM

## 2018-04-05 DIAGNOSIS — Q21.3 TOF (TETRALOGY OF FALLOT): ICD-10-CM

## 2018-04-05 DIAGNOSIS — I48.4 ATYPICAL ATRIAL FLUTTER: ICD-10-CM

## 2018-04-05 DIAGNOSIS — Q21.3 TETRALOGY OF FALLOT: ICD-10-CM

## 2018-04-05 PROCEDURE — 99999 PR PBB SHADOW E&M-EST. PATIENT-LVL III: CPT | Mod: PBBFAC,,, | Performed by: PEDIATRICS

## 2018-04-05 PROCEDURE — 99213 OFFICE O/P EST LOW 20 MIN: CPT | Mod: 25,S$PBB,, | Performed by: PEDIATRICS

## 2018-04-05 PROCEDURE — 99213 OFFICE O/P EST LOW 20 MIN: CPT | Mod: PBBFAC | Performed by: PEDIATRICS

## 2018-04-06 ENCOUNTER — CONFERENCE (OUTPATIENT)
Dept: PEDIATRIC CARDIOLOGY | Facility: CLINIC | Age: 28
End: 2018-04-06

## 2018-06-25 ENCOUNTER — CLINICAL SUPPORT (OUTPATIENT)
Dept: PEDIATRIC CARDIOLOGY | Facility: CLINIC | Age: 28
End: 2018-06-25
Attending: PEDIATRICS
Payer: MEDICAID

## 2018-06-25 DIAGNOSIS — Z95.0 PACEMAKER: ICD-10-CM

## 2018-06-25 PROCEDURE — 93296 REM INTERROG EVL PM/IDS: CPT | Mod: PBBFAC,PO | Performed by: PEDIATRICS

## 2018-06-25 PROCEDURE — 93294 REM INTERROG EVL PM/LDLS PM: CPT | Mod: ,,, | Performed by: PEDIATRICS

## 2018-08-23 ENCOUNTER — OFFICE VISIT (OUTPATIENT)
Dept: CARDIOLOGY | Facility: CLINIC | Age: 28
End: 2018-08-23
Payer: MEDICAID

## 2018-08-23 ENCOUNTER — CLINICAL SUPPORT (OUTPATIENT)
Dept: CARDIOLOGY | Facility: CLINIC | Age: 28
End: 2018-08-23
Payer: MEDICAID

## 2018-08-23 VITALS
HEIGHT: 65 IN | HEART RATE: 75 BPM | OXYGEN SATURATION: 95 % | SYSTOLIC BLOOD PRESSURE: 132 MMHG | BODY MASS INDEX: 39.58 KG/M2 | DIASTOLIC BLOOD PRESSURE: 86 MMHG | WEIGHT: 237.56 LBS

## 2018-08-23 DIAGNOSIS — I48.4 ATYPICAL ATRIAL FLUTTER: ICD-10-CM

## 2018-08-23 DIAGNOSIS — Z95.3 HISTORY OF PULMONARY VALVE REPLACEMENT WITH BIOPROSTHETIC VALVE: Primary | ICD-10-CM

## 2018-08-23 DIAGNOSIS — Z95.0 CARDIAC PACEMAKER IN SITU: ICD-10-CM

## 2018-08-23 DIAGNOSIS — Z95.3 HISTORY OF PULMONARY VALVE REPLACEMENT WITH BIOPROSTHETIC VALVE: ICD-10-CM

## 2018-08-23 DIAGNOSIS — Q90.9 DOWN SYNDROME: ICD-10-CM

## 2018-08-23 PROCEDURE — 93000 ELECTROCARDIOGRAM COMPLETE: CPT | Mod: 59,S$GLB,, | Performed by: PEDIATRICS

## 2018-08-23 PROCEDURE — 93325 DOPPLER ECHO COLOR FLOW MAPG: CPT | Mod: S$GLB,,, | Performed by: PEDIATRICS

## 2018-08-23 PROCEDURE — 93320 DOPPLER ECHO COMPLETE: CPT | Mod: S$GLB,,, | Performed by: PEDIATRICS

## 2018-08-23 PROCEDURE — 99214 OFFICE O/P EST MOD 30 MIN: CPT | Mod: S$GLB,,, | Performed by: PEDIATRICS

## 2018-08-23 PROCEDURE — 93303 ECHO TRANSTHORACIC: CPT | Mod: S$GLB,,, | Performed by: PEDIATRICS

## 2018-08-23 NOTE — PROGRESS NOTES
"      HOLD FOR NEXT VISIT.    HISTORY OF PRESENT ILLNESS:  Cipriano is a 28-year-old -American male with Down syndrome.  He was originally diagnosed to have Tetralogy of Fallot.  He underwent complete repair using a transannular patch.  He then required placement of a 27 mm Freestyle Medtronic Bioprosthetic pulmonary valve in 1999. Both surgeries were performed at [facility].  He subsequently developed sinus node dysfunction and had placement of a dual chamber endocardial pacemaker in 2008, at [facility] in [facility].  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  Dr. Godfrey placed a 27 mm [employer] Quinn Epic Bioprosthetic valve in the pulmonary position, at [facility].   This was conducted in conjunction with placement of a 30 mm Dacron conduit. The operation was in May 2017.   Cipriano has done well.  He comes to Cardiology Clinic today, accompanied by his mother, for follow-up.  More recently, Cipriano presented to our clinic in atrial flutter with 2:1 block. He was admitted to the [facility] and underwent electrical cardioversion to sinus rhythm. Then, on Nov 29, 2017, he underwent: 1) Successful atrial scar flutter ablation by Dr Milligan and 2) Device reprogramming. When last checked, the pacemaker battery was appraching "end of life".  Thus, he had replacement of his pacemaker generator.      Mom reports Cipriano is currently doing well.  She denies he is having any problems with SOB, chest pain, palpitations, abnormally rapid or slow heart rates, or inability to conduct his usual but limited activities. Cipriano complains of being tired.  He currently is on Coreg 25 mg q12 hrs, Synthroid 88 mcg qday for hypothyroidism, and Warfarin 5 mg qd.      REVIEW OF SYSTEMS:  Cipriano has no problems with vision or hearing. No lightheadedness or seizures. No breathing disorder. History of snoring. No coughing up " blood.  No swallowing difficulty. No abdominal pain.  Bowel movements are normal. No bloody stools. No pelvic pain.  Urination is normal. No  bleeding disorder.  No known arterial disease. No HBP. No DM. No lipid disorder.  No known intrinsic problem with the lungs, liver or kidneys.     PHYSICAL EXAMINATION:  GENERAL:  An alert,  healthy-looking 28 -year-old with the stigmata of Down syndrome. Weight is 107.7 kg or 237.5 lbs and height is 1.651 meters or 5 ' 6 ''. Heart rate is 82 bpm and regular.   Saturation in room air is 94 %.  Blood pressure in the right arm is 141/65 mmHg.  Color and perfusion are good.  HEENT:  Normal eye movements. PERR to light. No bruits over the head. No upper airway obstruction.  No jugular venous distention.  The mucous membranes are moist and pink. NECK:  Supple and the thyroid is not enlarged.  Pacemaker is palpated in the L-subclavicular region. The incision is clean and not reddened. Non-infected.  CHEST:  Well-healed midline scar.  Normal chest movements with breathing.  Good air entry bilaterally.  Breath sounds are clear.  No wheezes, rhonchi or rales.  CARDIOVASCULAR:  Normal precordial activity. Heart rate ~ 75 bpm. S1 is normal and S2 is prominent.  There is a grade I/VI systolic ejection murmur heard up the left sternal border and base.  Diastole is clear.  No gallop, rub or click.  ABDOMEN:  Exogenous obesity.  Liver is percussed down about 3-4 fingerbreadths at the right costal margin. It is non-tender and non-pulsatile.  Bowel sounds are normal.  No masses appreciated.  EXTREMITIES:  Full ROM. Pulses are 2+ throughout.  Capillary refill is good. No edema.  No cyanosis or clubbing. No rashes or bruising. There is a boot on the L-foot.             .........................................................................................................        ECG:  It showed atrial-paced rhythm at 75 bpm.  RBBB pattern ( ms). Normal T waves (Neg T waves in V2 and  V3).        ECHO: No pericardial effusion. No clots or vegetations.  No residual ASD or VSD. Pacing leads are in the RA/RV.  Bioprosthetic pulmonary valve is in good position, annulus ~ 25 mm. No RVOT or LVOT obstruction. No arch obstruction. M-MODE: LV  4.4 cm.  RV 3.0 cm. Ao 3.1 cm. LA 3.6 cm.  SEP 1.0 cm.  PW 1.1 cm. EF 60%.   The RA measures 15 cm2.  DOPPLER: Moderate TR with a jet of 40 mmHg, indicating mildly elevated RV peak pressure.  Trivial PI.  Peak pressure drop across the bioprosthetic pulmonary valve is 28 mmHg.  No MR. Trace AI. Peak pressure drop across the aortic valve is 7 mmHg.      .......................................................................................................                ASSESSMENT:  In summary, we have a 28-year-old patient with Down syndrome who recently underwent heart surgery for progressive obstruction and insufficiency of a previously placed bioprosthetic pulmonary valve.  The current surgery was performed by Dr. Godfrey in May 2017.  The surgery entailed placement of a 27 mm St. Quinn Epic Bioprosthetic valve in the pulmonary position.  Cipriano  did well postoperatively. Also, he recently underwent successful catheter ablation for atrial flutter.  He underwent replacement of the pacemaker generator.           PLAN:  Given our findings, our suggestions are as follow    1. We need to check TFTs, now on Synthroid 88 mcg qd.  2). Decrease Coreg 12.5 mg q12 hrs. He does not tolerate the Coreg well.  3) Would DC Warfarin and start 1 baby ASA qd.  No flutter at this time and the RA is not dilated.  Occasional PAC. 4)   Antibiotic prophylaxis is in order for any invasive procedures.  5) Would hold on Lasix 20 mg qd for now.     Augustine Dean PhD, MD.          HISTORY OF PRESENT ILLNESS:  Cipriano is a 28-year-old -American male with Down syndrome.  He was originally diagnosed to have Tetralogy of Fallot.  He underwent complete repair using a transannular patch.  He  "then required placement of a 27 mm Freestyle Medtronic Bioprosthetic pulmonary valve in 1999.  He subsequently developed sinus node dysfunction and had placement of a dual chamber endocardial pacemaker in 2008. 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  Dr. Godfrey placed a 27 mm St. Quinn Epic Bioprosthetic valve in the pulmonary position.  This was conducted in conjunction with placement of a 30 mm Dacron conduit. The operation was in May 2017.   Cipriano has done well.  He comes to [facility]iology Clinic today, accompanied by his mother, for follow-up.  More recently, Cipriano presented to our clinic in atrial flutter with 2:1 block. He was admitted to the [facility] and underwent electrical cardioversion to sinus rhythm. Then, on Nov 29, 2017, he underwent: 1) Successful atrial scar flutter ablation by Dr Milligan and 2) Device reprogramming. When last checked, the pacemaker battery was appraching "end of life".  Thus, he had replacement of his pacemaker generator.      Mom reports Cipriano is currently doing well.  She denies he is having any problems with SOB, chest pain, palpitations, abnormally rapid or slow heart rates, or inability to conduct his usual but limited activities. Cipriano complains of being tired.  He currently is on Coreg 25 mg q12 hrs, Synthroid 88 mcg qday for hypothyroidism, and Warfarin 5 mg qd. There is a history of atrial flutter and sick sinus syndrome.        REVIEW OF SYSTEMS:  Cipriano has no problems with vision or hearing. No lightheadedness or seizures. No breathing disorder. History of snoring. No coughing up blood.  No swallowing difficulty. No abdominal pain.  Bowel movements are normal. No bloody stools. No pelvic pain.  Urination is normal. No  bleeding disorder.  No known arterial disease. No HBP. No DM. No lipid disorder.  No known intrinsic problem with the lungs, liver or kidneys.     PHYSICAL " EXAMINATION:  GENERAL:  An alert,  healthy-looking 28 -year-old with the stigmata of Down syndrome. Weight is 107.7 kg or 237.5 lbs and height is 1.651 meters or 5 ' 6 ''. Heart rate is 82 bpm and regular.   Saturation in room air is 94 %.  Blood pressure in the right arm is 141/65 mmHg.  Color and perfusion are good.  HEENT:  Normal eye movements. PERR to light. No bruits over the head. No upper airway obstruction.  No jugular venous distention.  The mucous membranes are moist and pink. NECK:  Supple and the thyroid is not enlarged.  Pacemaker is palpated in the L-subclavicular region. The incision is clean and not reddened. Non-infected.  CHEST:  Well-healed midline scar.  Normal chest movements with breathing.  Good air entry bilaterally.  Breath sounds are clear.  No wheezes, rhonchi or rales.  CARDIOVASCULAR:  Normal precordial activity. Heart rate ~ 75 bpm. S1 is normal and S2 is prominent.  There is a grade I/VI systolic ejection murmur heard up the left sternal border and base.  Diastole is clear.  No gallop, rub or click.  ABDOMEN:  Exogenous obesity.  Liver is percussed down about 3-4 fingerbreadths at the right costal margin. It is non-tender and non-pulsatile.  Bowel sounds are normal.  No masses appreciated.  EXTREMITIES:  Full ROM. Pulses are 2+ throughout.  Capillary refill is good. No edema.  No cyanosis or clubbing. No rashes or bruising. There is a boot on the L-foot.             .........................................................................................................        ECG:  It showed atrial-paced rhythm at 75 bpm.  RBBB pattern ( ms). Normal T waves (Neg T waves in V2 and V3).      ECHO: No pericardial effusion. No clots or vegetations.  No residual ASD or VSD. Pacing leads are in the RA/RV.  Bioprosthetic pulmonary valve is in good position, annulus ~ 25 mm. No RVOT or LVOT obstruction. No arch obstruction. M-MODE: LV  4.4 cm.  RV 3.0 cm. Ao 3.1 cm. LA 3.6 cm.  SEP  1.0 cm.  PW 1.1 cm. EF 60%.   The RA measures 15 cm2.  DOPPLER: Moderate TR with a jet of 40 mmHg, indicating mildly elevated RV peak pressure.  Trivial PI.  Peak pressure drop across the bioprosthetic pulmonary valve is 28 mmHg.  No MR. Trace AI. Peak pressure drop across the aortic valve is 7 mmHg.     .......................................................................................................                ASSESSMENT:  In summary, we have a 28-year-old patient with Down syndrome who recently underwent heart surgery for progressive obstruction and insufficiency of a previously placed bioprosthetic pulmonary valve.  The current surgery was performed by Dr. Godfrey in May 2017.  The surgery entailed placement of a 27 mm St. Quinn Epic Bioprosthetic valve in the pulmonary position.  Cipriano  did well postoperatively. Also, he recently underwent successful catheter ablation for atrial flutter.  He underwent replacement of the pacemaker generator.           PLAN:  Given our findings, our suggestions are as follow    1. We need to check TFTs, now on Synthroid 88 mcg qd.  2). Decrease Coreg 12.5 mg q12 hrs. He does not tolerate the Goreg well.  3) Would DC Warfarin and start 1 baby ASA qd.  No flutter at this time and the RA is not dilated.  Occasional PAC. 4)   Antibiotic prophylaxis is in order for any invasive procedures.  5) Would hold on Lasix 20 mg qd for now.     Augustine Dean PhD, MD.

## 2018-10-01 ENCOUNTER — CLINICAL SUPPORT (OUTPATIENT)
Dept: PEDIATRIC CARDIOLOGY | Facility: CLINIC | Age: 28
End: 2018-10-01
Payer: MEDICAID

## 2018-10-01 DIAGNOSIS — Q21.3 TOF (TETRALOGY OF FALLOT): ICD-10-CM

## 2018-10-01 PROCEDURE — 99211 OFF/OP EST MAY X REQ PHY/QHP: CPT | Mod: PBBFAC,PO,25

## 2018-10-01 PROCEDURE — 93296 REM INTERROG EVL PM/IDS: CPT | Mod: PBBFAC,PO | Performed by: PEDIATRICS

## 2018-10-01 PROCEDURE — 99999 PR PBB SHADOW E&M-EST. PATIENT-LVL I: CPT | Mod: PBBFAC,,,

## 2018-10-01 PROCEDURE — 93294 REM INTERROG EVL PM/LDLS PM: CPT | Mod: ,,, | Performed by: PEDIATRICS

## 2019-01-14 ENCOUNTER — CLINICAL SUPPORT (OUTPATIENT)
Dept: PEDIATRIC CARDIOLOGY | Facility: CLINIC | Age: 29
End: 2019-01-14
Attending: PEDIATRICS
Payer: MEDICAID

## 2019-01-14 DIAGNOSIS — Q21.3 TOF (TETRALOGY OF FALLOT): ICD-10-CM

## 2019-01-14 DIAGNOSIS — I48.91 ATRIAL FIBRILLATION, UNSPECIFIED TYPE: ICD-10-CM

## 2019-01-14 DIAGNOSIS — Q21.3 TETRALOGY OF FALLOT: ICD-10-CM

## 2019-01-14 DIAGNOSIS — Z95.0 PACEMAKER: ICD-10-CM

## 2019-01-14 DIAGNOSIS — Q21.3 TETRALOGY OF FALLOT: Primary | ICD-10-CM

## 2019-01-14 PROCEDURE — 93294 REM INTERROG EVL PM/LDLS PM: CPT | Mod: ,,, | Performed by: PEDIATRICS

## 2019-01-14 PROCEDURE — 93296 REM INTERROG EVL PM/IDS: CPT | Mod: PBBFAC,PO | Performed by: PEDIATRICS

## 2019-01-14 PROCEDURE — 99999 PR PBB SHADOW E&M-EST. PATIENT-LVL I: CPT | Mod: PBBFAC,,,

## 2019-01-14 PROCEDURE — 99999 PR PBB SHADOW E&M-EST. PATIENT-LVL I: ICD-10-PCS | Mod: PBBFAC,,,

## 2019-01-14 PROCEDURE — 93294 CV PACEMAKER REMOTE PEDIATRICS (CUPID ONLY): ICD-10-PCS | Mod: ,,, | Performed by: PEDIATRICS

## 2019-01-14 PROCEDURE — 99211 OFF/OP EST MAY X REQ PHY/QHP: CPT | Mod: PBBFAC,PO,25

## 2019-01-21 NOTE — PROGRESS NOTES
"      HISTORY OF PRESENT ILLNESS:  Cipriano is a 28-year-old -American male with Down syndrome.  He was originally diagnosed to have Tetralogy of Fallot.  He underwent complete repair using a transannular patch.  He then required placement of a 27 mm Freestyle Medtronic Bioprosthetic pulmonary valve in 1999. Both surgeries were performed at [facility].  He subsequently developed sinus node dysfunction and had placement of a dual chamber endocardial pacemaker in 2008, at [facility] in [facility].  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  Dr. Godfrey placed a 27 mm [employer] Quinn Epic Bioprosthetic valve in the pulmonary position, at [facility].   This was conducted in conjunction with placement of a 30 mm Dacron conduit. The operation was in May 2017.   Cipriano has done well.  He comes to Cardiology Clinic today, accompanied by his mother, for follow-up.  More recently, Cipriano presented to our clinic in atrial flutter with 2:1 block. He was admitted to the [facility] and underwent electrical cardioversion to sinus rhythm. Then, on Nov 29, 2017, he underwent: 1) Successful atrial scar flutter-ablation by Dr Milligan and 2) Device reprogramming. When last checked, the pacemaker battery was approaching "end of life".  Thus, he had replacement of his pacemaker generator.      Mom reports Cipriano is currently doing well.  She denies he is having any problems with SOB, chest pain, palpitations, abnormally rapid or slow heart rates, or inability to conduct his usual but limited activities. Cipriano complains of being tired.  He currently is on Coreg 12.5 mg q12 hrs and Synthroid 88 mcg qday for hypothyroidism.     REVIEW OF SYSTEMS:  Cipriano has no problems with vision or hearing. No lightheadedness or seizures. No swallowing disorder. No SOL. No breathing disorder. History of snoring. No coughing up blood.  No " abdominal pain.  Bowel movements are normal. No bloody stools. No pelvic pain.  Urination is normal. No intrinsic bleeding disorder.  No known arterial disease. No HBP. No DM. No lipid disorder.  No known intrinsic problem with the lungs, liver or kidneys.     PHYSICAL EXAMINATION:  GENERAL:  An alert,  healthy-looking 28 -year-old with the stigmata of Down syndrome. Weight is 111.6 kg or 246. lbs and height is 1.651 meters or 5 ' 6 ''. Heart rate is 73 bpm and regular.   Saturation in room air is 94 %.  Blood pressure in the right arm is 134/77 mmHg.  Color and perfusion are good.  HEENT:  Normal eye movements. PERR to light. No bruits over the head. No upper airway obstruction.  No jugular venous distention.  The mucous membranes are moist and pink. NECK:  Supple and the thyroid is not enlarged.  Pacemaker is palpated in the L-subclavicular region. The incision is clean and not reddened. Non-infected.  CHEST:  Well-healed midline scar.  Normal chest movements with breathing.  Good air entry bilaterally.  Breath sounds are clear.  No wheezes, rhonchi or rales.  CARDIOVASCULAR:  Normal precordial activity. Heart rate ~ 75 bpm. S1 is normal and S2 is prominent.  There is a grade I/VI systolic ejection murmur heard up the left sternal border and base.  Diastole is clear.  No gallop, rub or click.  ABDOMEN:  Exogenous obesity.  Liver is percussed down about 3-4 fingerbreadths at the right costal margin. It is non-tender and non-pulsatile.  Bowel sounds are normal.  No masses appreciated.  EXTREMITIES:  Full ROM. Pulses are 2+ throughout. Capillary refill is good. No edema.  No cyanosis or clubbing. No rashes or bruising. There is a boot on the L-foot.             .........................................................................................................        ECG:  It showed atrial-paced rhythm at 80 bpm.  RBBB pattern ( ms). Normal T waves (Neg T waves in V2 and V3).  QTc 475 ms.        ECHO:    PVCs seen.  No pericardial effusion. No clots or vegetations.  No residual ASD or VSD. Pacing leads are in the RA/RV.  Bioprosthetic pulmonary valve is in good position; it is quite echogenic.  Annulus ~ 2.3 cm. No RVOT or LVOT obstruction. No arch obstruction. M-MODE: LV  4.4 cm.  RV 3.0 cm. Ao 3.1 cm. LA 3.6 cm.  SEP 1.0 cm.  PW 1.1 cm. EF 60%.   The RA measures 15 cm2.  DOPPLER: Moderate TR with a jet of 70 mmHg, indicating elevated RV peak pressure.  Trivial PI.  Peak pressure drop across the bioprosthetic pulmonary valve is 37 mmHg.  Trace MR. Trace AI. Peak pressure drop across the aortic valve is 8 mmHg.      .......................................................................................................                ASSESSMENT:  In summary, we have a 28-year-old patient with Down syndrome who underwent heart surgery for progressive obstruction and insufficiency of a previously placed bioprosthetic pulmonary valve.  The current surgery was performed by Dr. Godfrey in May 2017.  The surgery entailed placement of a 2.7 cm St. Quinn Epic Bioprosthetic valve in the pulmonary position.  Cipriano  did well postoperatively. Also, he recently underwent successful catheter ablation for atrial flutter.  He also underwent replacement of the pacemaker generator.           PLAN:  Given our findings, our suggestions are as follow    1. We need to check TFTs, now on Synthroid 88 mcg qd.  2). On Coreg 12.5 mg q12 hrs. 3) We DCed Warfarin and started 1 baby ASA qd.  No flutter at this time and the RA is not dilated.  Occasional PVCs are seen. Note, an increase in the TR jet to ~ 70 mmHg.  4)   Antibiotic prophylaxis is in order for any invasive procedures.  5) Would hold on Lasix 20 mg qd for now  6) Followup in EP clinic in March.     Augustine Dean PhD, MD.

## 2019-01-22 ENCOUNTER — OFFICE VISIT (OUTPATIENT)
Dept: CARDIOLOGY | Facility: CLINIC | Age: 29
End: 2019-01-22
Payer: MEDICAID

## 2019-01-22 ENCOUNTER — CLINICAL SUPPORT (OUTPATIENT)
Dept: CARDIOLOGY | Facility: CLINIC | Age: 29
End: 2019-01-22
Payer: MEDICAID

## 2019-01-22 VITALS
OXYGEN SATURATION: 97 % | HEART RATE: 73 BPM | SYSTOLIC BLOOD PRESSURE: 134 MMHG | DIASTOLIC BLOOD PRESSURE: 77 MMHG | HEIGHT: 65 IN | WEIGHT: 246.06 LBS | BODY MASS INDEX: 41 KG/M2

## 2019-01-22 DIAGNOSIS — Z95.0 CARDIAC PACEMAKER IN SITU: ICD-10-CM

## 2019-01-22 DIAGNOSIS — I07.1 TRICUSPID VALVE INSUFFICIENCY: ICD-10-CM

## 2019-01-22 DIAGNOSIS — I49.3 PVC'S (PREMATURE VENTRICULAR CONTRACTIONS): Primary | ICD-10-CM

## 2019-01-22 DIAGNOSIS — Z95.3 HISTORY OF PULMONARY VALVE REPLACEMENT WITH BIOPROSTHETIC VALVE: ICD-10-CM

## 2019-01-22 DIAGNOSIS — Z98.890 HISTORY OF OPEN HEART SURGERY: ICD-10-CM

## 2019-01-22 DIAGNOSIS — I07.1 TRICUSPID VALVE INSUFFICIENCY, UNSPECIFIED ETIOLOGY: ICD-10-CM

## 2019-01-22 PROCEDURE — 93303 ECHO TRANSTHORACIC: CPT | Mod: TC,S$GLB,, | Performed by: PEDIATRICS

## 2019-01-22 PROCEDURE — 93000 PR ELECTROCARDIOGRAM, COMPLETE: ICD-10-PCS | Mod: S$GLB,,, | Performed by: PEDIATRICS

## 2019-01-22 PROCEDURE — 93303 PR ECHO XTHORACIC,CONG A2M,COMPLETE: ICD-10-PCS | Mod: TC,S$GLB,, | Performed by: PEDIATRICS

## 2019-01-22 PROCEDURE — 93000 ELECTROCARDIOGRAM COMPLETE: CPT | Mod: S$GLB,,, | Performed by: PEDIATRICS

## 2019-01-22 PROCEDURE — 93320 DOPPLER ECHO COMPLETE: CPT | Mod: TC,S$GLB,, | Performed by: PEDIATRICS

## 2019-01-22 PROCEDURE — 99214 OFFICE O/P EST MOD 30 MIN: CPT | Mod: 25,S$GLB,, | Performed by: PEDIATRICS

## 2019-01-22 PROCEDURE — 93325 PR DOPPLER COLOR FLOW VELOCITY MAP: ICD-10-PCS | Mod: TC,S$GLB,, | Performed by: PEDIATRICS

## 2019-01-22 PROCEDURE — 99214 PR OFFICE/OUTPT VISIT, EST, LEVL IV, 30-39 MIN: ICD-10-PCS | Mod: 25,S$GLB,, | Performed by: PEDIATRICS

## 2019-01-22 PROCEDURE — 93320 PR DOPPLER ECHO HEART,COMPLETE: ICD-10-PCS | Mod: TC,S$GLB,, | Performed by: PEDIATRICS

## 2019-01-22 PROCEDURE — 93325 DOPPLER ECHO COLOR FLOW MAPG: CPT | Mod: TC,S$GLB,, | Performed by: PEDIATRICS

## 2019-03-13 NOTE — PROGRESS NOTES
"        HISTORY OF PRESENT ILLNESS (3/14/19):  Cipriano is a 28-year-old -American male with Down syndrome.  He was originally diagnosed to have Tetralogy of Fallot.  He underwent complete repair using a transannular patch.  He then required placement of a 27 mm Freestyle Medtronic Bioprosthetic pulmonary valve in 1999. Both surgeries were performed at [facility].  He subsequently developed sinus node dysfunction and had placement of a dual chamber endocardial pacemaker in 2008, at [facility] in [facility].  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  Dr. Godfrey placed a 27 mm [employer] Quinn Epic Bioprosthetic valve in the pulmonary position, at [facility].   This was conducted in conjunction with placement of a 30 mm Dacron conduit. The operation was in May 2017.   Cipriano has done well.  He comes to [facility]iology Clinic today, accompanied by his mother, for follow-up.  More recently, Cipriano presented to our clinic in atrial flutter with 2:1 block. He was admitted to the [facility] and underwent electrical cardioversion to sinus rhythm. Then, on Nov 29, 2017, he underwent: 1) Successful atrial scar flutter-ablation by Dr Milligan and 2) Device reprogramming. When last checked, the pacemaker battery was approaching "end of life".  Thus, he had replacement of his pacemaker generator.   3) Recent history of Gout.  He was seen today by EP and download showed ventricular ectopy.  Mom reports Cipriano is currently doing reasonably well.  She denies he is having any problems with SOB, chest pain, palpitations, abnormally rapid or slow heart rates, or inability to conduct his usual but limited activities. Cipriano complains of being tired.  He currently is on Coreg 12.5 mg q12 hrs and Synthroid 88 mcg qday for hypothyroidism.     REVIEW OF SYSTEMS:  Cipriano has no problems with vision or hearing. No lightheadedness " or seizures. No swallowing disorder. No SOL. No breathing disorder. History of snoring. No coughing up blood.  No abdominal pain.  Bowel movements are normal. No bloody stools. No pelvic pain.  Urination is normal. No intrinsic bleeding disorder.  No known arterial disease. No HBP. No DM. No lipid disorder.  No known intrinsic problem with the lungs, liver or kidneys.     PHYSICAL EXAMINATION:  GENERAL:  An alert,  healthy-looking 28 -year-old with the stigmata of Down syndrome. Weight is 108.8 kg or 240. lbs and height is 1.651 meters or 5 ' 6 ''. Heart rate is 75 bpm and regular.   Saturation in room air is 94 %.  Blood pressure in the right arm is 121/72 mmHg.  Color and perfusion are good.  HEENT:  Normal eye movements. PERR to light. No bruits over the head. No upper airway obstruction.  No jugular venous distention.  The mucous membranes are moist and pink. NECK:  Supple and the thyroid is not enlarged.  Pacemaker is palpated in the L-subclavicular region. The incision is clean and not reddened. Non-infected.  CHEST:  Well-healed midline scar.  Normal chest movements with breathing.  Good air entry bilaterally.  Breath sounds are clear.  No wheezes, rhonchi or rales.  CARDIOVASCULAR:  Normal precordial activity. Heart rate ~ 75 bpm. S1 is normal and S2 is prominent.  There is a grade I/VI systolic ejection murmur heard up the left sternal border and base.  Diastole is clear.  No gallop, rub or click.  ABDOMEN:  Exogenous obesity.  Liver is percussed down about 3-4 fingerbreadths at the right costal margin. It is non-tender and non-pulsatile.  Bowel sounds are normal.  No masses appreciated.  EXTREMITIES:  Full ROM. Pulses are 2+ throughout. Capillary refill is good. No edema.  No cyanosis or clubbing. No rashes or bruising. There is a boot on the L-foot.             .........................................................................................................        ECG:  It showed  atrial-paced rhythm at 76 bpm.  RBBB pattern ( ms). Normal T waves (Neg T waves in V2 and V3).  QTc 475 ms.   PVCs.        ECHO (Previous visit):   PVCs seen.  No pericardial effusion. No clots or vegetations.  No residual ASD or VSD. Pacing leads are in the RA/RV.  Bioprosthetic pulmonary valve is in good position; it is quite echogenic.  Annulus ~ 2.3 cm. No RVOT or LVOT obstruction. No arch obstruction. M-MODE: LV  4.4 cm.  RV 3.0 cm. Ao 3.1 cm. LA 3.6 cm.  SEP 1.0 cm.  PW 1.1 cm. EF 60%.   The RA measures 15 cm2.  DOPPLER: Moderate TR with a jet of 70 mmHg, indicating elevated RV peak pressure.  Trivial PI.  Peak pressure drop across the bioprosthetic pulmonary valve is 37 mmHg.  Trace MR. Trace AI. Peak pressure drop across the aortic valve is 8 mmHg.      .......................................................................................................                ASSESSMENT:  In summary, we have a 28-year-old patient with Down syndrome who underwent heart surgery for progressive obstruction and insufficiency of a previously placed bioprosthetic pulmonary valve.  The current surgery was performed by Dr. Godfrey in May 2017.  The surgery entailed placement of a 2.7 cm St. Quinn Epic Bioprosthetic valve in the pulmonary position.  Cipriano  did well postoperatively. Also, he recently underwent successful catheter ablation for atrial flutter.  He also underwent replacement of the pacemaker generator.           PLAN:  Given our findings, our suggestions are as follow    1. We need to check TFTs, now on Synthroid 88 mcg qd.  2). On Coreg 12.5 mg q12 hrs. 3) We DCed Warfarin and started 1 baby ASA qd.  No flutter at this time and the RA is not dilated.  Occasional PVCs are seen........... Note, an increase in the TR jet to ~ 70 mmHg..................... 4)   Antibiotic prophylaxis is in order for any invasive procedures.  5) Would hold on Lasix 20 mg qd for now    6)  Seen by EP and when downloaded found  to have ventricular ectopy.  NOTE high TR jet at previous ECHO.  For now will start Metoprolol ER 25 mg qd.      Augustine Dean PhD, MD.

## 2019-03-14 ENCOUNTER — OFFICE VISIT (OUTPATIENT)
Dept: PEDIATRIC CARDIOLOGY | Facility: CLINIC | Age: 29
End: 2019-03-14
Attending: PEDIATRICS
Payer: MEDICAID

## 2019-03-14 ENCOUNTER — OFFICE VISIT (OUTPATIENT)
Dept: CARDIOLOGY | Facility: CLINIC | Age: 29
End: 2019-03-14
Payer: MEDICAID

## 2019-03-14 VITALS
WEIGHT: 240 LBS | BODY MASS INDEX: 39.99 KG/M2 | SYSTOLIC BLOOD PRESSURE: 121 MMHG | WEIGHT: 240 LBS | HEIGHT: 65 IN | HEART RATE: 75 BPM | DIASTOLIC BLOOD PRESSURE: 72 MMHG | OXYGEN SATURATION: 94 % | HEIGHT: 65 IN | BODY MASS INDEX: 39.99 KG/M2 | OXYGEN SATURATION: 94 % | DIASTOLIC BLOOD PRESSURE: 72 MMHG | SYSTOLIC BLOOD PRESSURE: 121 MMHG | HEART RATE: 75 BPM

## 2019-03-14 DIAGNOSIS — Z87.74 S/P SURGERY FOR COMPLEX CONGENITAL HEART DISEASE: ICD-10-CM

## 2019-03-14 DIAGNOSIS — I49.3 VENTRICULAR ECTOPY: Primary | ICD-10-CM

## 2019-03-14 DIAGNOSIS — Q90.9 DOWN SYNDROME: ICD-10-CM

## 2019-03-14 DIAGNOSIS — I37.9 PULMONARY VALVE DISORDER: Primary | ICD-10-CM

## 2019-03-14 DIAGNOSIS — Q24.9 ADULT CONGENITAL HEART DISEASE: ICD-10-CM

## 2019-03-14 DIAGNOSIS — Z95.0 CARDIAC PACEMAKER IN SITU: ICD-10-CM

## 2019-03-14 DIAGNOSIS — I47.20 VT (VENTRICULAR TACHYCARDIA): ICD-10-CM

## 2019-03-14 DIAGNOSIS — Q21.3 TETRALOGY OF FALLOT: ICD-10-CM

## 2019-03-14 DIAGNOSIS — Z95.3 HISTORY OF PULMONARY VALVE REPLACEMENT WITH BIOPROSTHETIC VALVE: ICD-10-CM

## 2019-03-14 DIAGNOSIS — Z95.0 PACEMAKER: ICD-10-CM

## 2019-03-14 DIAGNOSIS — I48.91 ATRIAL FIBRILLATION, UNSPECIFIED TYPE: ICD-10-CM

## 2019-03-14 PROCEDURE — 93000 ELECTROCARDIOGRAM COMPLETE: CPT | Mod: S$GLB,,, | Performed by: PEDIATRICS

## 2019-03-14 PROCEDURE — 99214 OFFICE O/P EST MOD 30 MIN: CPT | Mod: 25,S$GLB,, | Performed by: PEDIATRICS

## 2019-03-14 PROCEDURE — 99214 PR OFFICE/OUTPT VISIT, EST, LEVL IV, 30-39 MIN: ICD-10-PCS | Mod: 25,S$GLB,, | Performed by: PEDIATRICS

## 2019-03-14 PROCEDURE — 99213 PR OFFICE/OUTPT VISIT, EST, LEVL III, 20-29 MIN: ICD-10-PCS | Mod: S$GLB,,, | Performed by: PEDIATRICS

## 2019-03-14 PROCEDURE — 99213 OFFICE O/P EST LOW 20 MIN: CPT | Mod: S$GLB,,, | Performed by: PEDIATRICS

## 2019-03-14 PROCEDURE — 93000 PR ELECTROCARDIOGRAM, COMPLETE: ICD-10-PCS | Mod: S$GLB,,, | Performed by: PEDIATRICS

## 2019-03-14 PROCEDURE — 93283 CARDIAC DEVICE CHECK - IN CLINIC & HOSPITAL: ICD-10-PCS | Mod: 26,,, | Performed by: PEDIATRICS

## 2019-03-14 PROCEDURE — 93283 PRGRMG EVAL IMPLANTABLE DFB: CPT | Mod: 26,,, | Performed by: PEDIATRICS

## 2019-03-14 RX ORDER — ALLOPURINOL 100 MG/1
100 TABLET ORAL 2 TIMES DAILY
COMMUNITY
End: 2021-03-08

## 2019-03-14 NOTE — PROGRESS NOTES
Thank you for referring your patient Cipriano Thompson to the electrophysiology clinic for consultation. The patient is accompanied by his mother. Please review my findings below.    CHIEF COMPLAINT: Outpatient EP follow up after PM generator change.    HISTORY OF PRESENT ILLNESS:   Cipriano is a 28 y.o. Male with Down's syndrome, TOF s/p complete repair as an infant with subsequent PVR in 1999 for right ventricular dilation and pacemaker placement in 2008 for Sinus node dysfunction. He presented with exercise intolerance and moderate PS/PI so he was referred for repeat PVR. Also with history of hypertension and ventricular ectopy with frequent PVC's. His home medication regimen includes Carvedilol 25 mg daily, Digoxin 250 mcg qd, Lisinopril 10 mg qd and Synthroid. He had a cardiac catheterization for possible Jeanna valve implantation on 4/4/17 and were unable to secondary to coronary artery anatomy. He underwent surgical pulmonary valve placement on 5/2/17.  He had frequent PVC's in the post op period.  He had his pacemaker interrogated post op but pacemaker was working appropriately although showed ~9months to RINA.  Cipriano underwent atrial flutter ablation in November 2017.  Cipriano underwent pacemaker generator change on 3/23/18.      Cipriano returns today for scheduled follow up.  He reports overall clinically doing well.  He has no difficulty breathing.  He has no chest pain or palpitations.  He has no syncope or near syncope.  He has no fatigue or activity intolerance.  He has no complaints from pacemaker.      REVIEW OF SYSTEMS:     GENERAL: No fever, chills, fatigability or weight loss.  SKIN: No rashes, itching or changes in color or texture of skin.  CHEST: Denies ECHEVERRIA, cyanosis, wheezing, cough and sputum production.  CARDIOVASCULAR: Denies chest pain or reduced exercise tolerance.  ABDOMEN: Appetite fine. No weight loss. Denies diarrhea, abdominal pain, or vomiting.  PERIPHERAL VASCULAR: No claudication or  cyanosis.  MUSCULOSKELETAL: No joint stiffness or swelling.   NEUROLOGIC: No history of seizures,  alteration of gait or coordination.    PAST MEDICAL HISTORY:   Past Medical History:   Diagnosis Date    CHF (congestive heart failure)     Down syndrome     Encounter for blood transfusion     S/P surgery for complex congenital heart disease 1/30/2017    Tetralogy of Fallot 05/1990         FAMILY HISTORY:   Family History   Problem Relation Age of Onset    No Known Problems Mother     No Known Problems Sister          SOCIAL HISTORY:   Social History     Socioeconomic History    Marital status: Single     Spouse name: Not on file    Number of children: Not on file    Years of education: Not on file    Highest education level: Not on file   Social Needs    Financial resource strain: Not on file    Food insecurity - worry: Not on file    Food insecurity - inability: Not on file    Transportation needs - medical: Not on file    Transportation needs - non-medical: Not on file   Occupational History    Not on file   Tobacco Use    Smoking status: Never Smoker    Smokeless tobacco: Never Used   Substance and Sexual Activity    Alcohol use: No    Drug use: No    Sexual activity: Not Currently   Other Topics Concern    Not on file   Social History Narrative    Lives with mother, 1 dog (inside)       ALLERGIES:  Review of patient's allergies indicates:   Allergen Reactions    Latex, natural rubber     Sulfa (sulfonamide antibiotics)        MEDICATIONS:    Current Outpatient Medications:     allopurinol (ZYLOPRIM) 100 MG tablet, Take 100 mg by mouth 2 (two) times daily., Disp: , Rfl:     carvedilol (COREG) 25 MG tablet, Take 1 tablet (25 mg total) by mouth 2 (two) times daily with meals., Disp: 60 tablet, Rfl: 11    levothyroxine (SYNTHROID) 88 MCG tablet, Take 88 mcg by mouth before breakfast. , Disp: , Rfl:       PHYSICAL EXAM:   Vitals:    03/14/19 1022   BP: 121/72   Pulse: 75   SpO2: (!) 94%  "  Weight: 108.8 kg (239 lb 15.5 oz)   Height: 5' 5" (1.651 m)         GENERAL: Awake, well-developed well-nourished, no apparent distress. +Down's facies.  HEENT: mucous membranes moist and pink, normocephalic atraumatic, no cranial or carotid bruits, sclera anicteric  NECK: no jugular venous distention, no lymphadenopathy  CHEST: Good air movement, clear to auscultation bilaterally, pacemaker incision healing well.  CARDIOVASCULAR: Quiet precordium, regular rate and rhythm, S1S2, no rubs or gallops. 1/6 MARIELA at LSB.  ABDOMEN: Soft, nontender nondistended, no hepatomegaly, no aortic bruits  EXTREMITIES: Warm well perfused, 2+ radial/pedal pulses, capillary refill 2 seconds, no clubbing, cyanosis, or edema  NEURO: Alert and oriented, cooperative with exam, face symmetric, moves all extremities well    STUDIES:  ECG: Atrial pacing with intact AV conduction and PVC noted.  Pacemaker: MDT YOHANNES AQUINO DR pacemaker. Battery longevity 12.3 years, nonsustained VT noted as long as 7 seconds.    ASSESSMENT:    27 y/o male with TOF s/p repair and s/p recent pulmonary valve replacement.  He has pacemaker for sinus node dysfunction.  He is s/p flutter ablation without signs of recurrent flutter.  He is s/p pacemaker generator change.  He had nonsustained VT on pacemaker check which is new for Cipriano.  Discussed evaluation/management options at length.  Recommended EP study for VT induction evaluation.    PLAN:   1. Continue coumadin and carvedilol  2. Start Metoprolol 25mg XL daily  3. Schedule NIEPS and left subclavian venogram  4. F/u to be determined at time of NIEPS.  5. F/u in EP clinic in 6 months with ECG, pacemaker check, and Holter.  6. Call for syncope, palpitations, chest pain, difficulty breathing, fatigue, or any other questions or concerns in the interim.      Time Spent: 20 (min) with over 50% in direct patient and family consultation regarding the evaluation and management of nonsustained ventricular tachycardia on " pacemaker check in face of adult congenital heart disease.      The patient's doctor will be notified via EPIC/FAX    I hope this brings you up-to-date on Cipriano Fobbs  Please contact me with any questions or concerns.    Jose Ortiz MD  Pediatric and Adult Congenital Electrophysiologist  Pediatric Cardiologist

## 2019-03-17 NOTE — PROGRESS NOTES
"    HISTORY OF PRESENT ILLNESS (10/29/19):  Cipriano is a 29-year-old -American male with Down syndrome.  He was originally diagnosed to have Tetralogy of Fallot.  He underwent complete repair using a transannular patch.  He then required placement of a 27 mm Freestyle Medtronic Bioprosthetic pulmonary valve in 1999. Both surgeries were performed at [facility].  He subsequently developed sinus node dysfunction and had placement of a dual chamber endocardial pacemaker in 2008, at [facility] in [facility].  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  Dr. Godfrey placed a 27 mm [employer] Quinn Epic Bioprosthetic valve in the pulmonary position, at [facility].   This was conducted in conjunction with placement of a 30 mm Dacron conduit. The operation was in May 2017.      Cipriano comes to Cardiology Clinic today, accompanied by his mother, for follow-up. More recently, Cipriano presented to our clinic in atrial flutter with 2:1 block. He was admitted to the [facility] and underwent electrical cardioversion to sinus rhythm. Then, on Nov 29, 2017, he underwent: 1) Successful atrial scar flutter-ablation by Dr Milligan and 2) Pacemaker device reprogramming. When last checked, the pacemaker battery was approaching "end of life".  Thus, he had replacement of his pacemaker generator. He has a recent history of Gout.  He was seen on 3/14 by EP and download of the pacemaker showed significant ventricular ectopy.  He is here today to reassess his R-sided pressures through the TR jet by ECHO.  A study a while back suggested it was elevated, which could explain the ventricular ectopy.     Currently, Mom reports Cipriano is doing reasonably well.  She denies he is having problems with HBP, SOB, chest pain, palpitations, abnormally rapid or slow heart rates, or inability to conduct his usual but limited activities.  The " TFTs were found to be normal.  He currently is on Coreg 12.5 mg q12 hrs and Synthroid 88 mcg qday for hypothyroidism.  We added Metoprolol 25 mg qd because of the ventricular ectopy.     REVIEW OF SYSTEMS:  Cipriano has no problems with vision or hearing. No lightheadedness or seizures. No swallowing disorder and no vomiting. No SOL. No breathing disorder. No asthma. History of snoring. No coughing up blood.  No abdominal pain. Bowel movements are normal. No bloody stools. No pelvic pain.  Urination is normal. No intrinsic bleeding disorder.  No known arterial disease. No DM. No lipid disorder.  No known intrinsic problem with the lungs, liver or kidneys. He does have gout.     PHYSICAL EXAMINATION:  GENERAL:  An alert,  healthy-looking 28 -year-old with the stigmata of Down syndrome. Weight is 109.4 kg or 241. lbs and height is 1.651 meters or 5 ' 6 ''. Heart rate is 73 bpm and regular.   Saturation in room air is 95 %.  Blood pressure in the right arm is 113/60 mmHg.  Color and perfusion are good.  HEENT:  Normal eye movements. PERR to light. No bruits over the head. No upper airway obstruction.  No jugular venous distention.  The mucous membranes are moist and pink. NECK:  Supple and the thyroid is not enlarged.  Pacemaker is palpated in the L-subclavicular region. The incision is clean and not reddened. Non-infected.  CHEST:  Well-healed midline scar.  Normal chest movements with breathing.  Good air entry bilaterally.  Breath sounds are clear.  No wheezes, rhonchi or rales.  CARDIOVASCULAR:  Normal precordial activity. Heart rate ~ 75 bpm. S1 is normal and S2 is prominent.  There is a grade I/VI systolic ejection murmur heard up the left sternal border and base.  Diastole is clear.  No gallop, rub or click.  ABDOMEN:  Exogenous obesity.  Liver is percussed down about 3-4 fingerbreadths at the right costal margin. It is non-tender and non-pulsatile.  Bowel sounds are normal.  No masses appreciated.   EXTREMITIES:  Full ROM. Pulses are 2+ throughout. Capillary refill is good. No edema.  No cyanosis or clubbing. No rashes or bruising. There is a boot on the L-foot.             .........................................................................................................        ECG:  It showed atrial-paced rhythm at 75 bpm.  RBBB pattern ( ms). Normal T waves (Neg T wave depression in V2 and V3).  QTc 439 ms.   No PVCs seen .        ECHO (TODAY):   PVCs seen.  No pericardial effusion. No clots or vegetations.  No residual ASD or VSD. Pacing leads are in the RA/RV.  Bioprosthetic pulmonary valve is in good position; it is quite echogenic.  Annulus ~ 2.3 cm. No RVOT or LVOT obstruction. No arch obstruction. M-MODE: LV  4.4 cm.  RV 3.6 cm (upper limits). Ao 2.8 cm. LA 3.0 cm.  SEP 1.2 cm.  PW 1.1 cm. EF 64%.   The RA measures 15 cm2.  DOPPLER: Mild (+) TR with a jet of 60 mmHg, indicating elevated RV peak pressure.  Trivial PI.  Peak pressure drop across the bioprosthetic pulmonary valve is 39 mmHg.  Trace MR. Trace AI. Peak pressure drop across the aortic valve is 6 mmHg.      .......................................................................................................                ASSESSMENT:  In summary, we have a 28-year-old patient with Down syndrome who underwent heart surgery for progressive obstruction and insufficiency of a previously placed bioprosthetic pulmonary valve.  The current surgery was performed by Dr. Godfrey in May 2017.  The surgery entailed placement of a 2.7 cm St. Quinn Epic Bioprosthetic valve in the pulmonary position.  Cipriano  did well postoperatively. Also, he recently underwent successful catheter ablation for atrial flutter.  He also underwent replacement of the pacemaker generator.  There appears to be new onset ventricular ectopy.  The TR jet is elevated.         PLAN:  Given our findings, our suggestions are as follow    1. Maintain on Synthroid 88 mcg  "qd. TFTs were done today.  2). Continue with Coreg 12.5 mg q12 hrs. We added Metoprolol 25 mg qd. 3) We DCed Warfarin and started 1 baby ASA qd.   3)  "The TR jet is 60-68 mmHg". 4)   Antibiotic prophylaxis is in order for any invasive procedures.  5. Date for EPS set at March 27th.        Augustine Dean PhD, MD.                                     HISTORY OF PRESENT ILLNESS (3/19/19):  Cipirano is a 28-year-old -American male with Down syndrome.  He was originally diagnosed to have Tetralogy of Fallot.  He underwent complete repair using a transannular patch.  He then required placement of a 27 mm Freestyle Medtronic Bioprosthetic pulmonary valve in 1999. [facility].  He subsequently developed sinus node dysfunction and had placement of a dual chamber endocardial pacemaker in 2008, at [facility] in [facility].  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  Dr. Godfrey placed a 27 mm St. Quinn Epic Bioprosthetic valve in the pulmonary position, at [facility].   This was conducted in conjunction with placement of a 30 mm Dacron conduit. The operation was in May 2017.   Cipriano has done well.  He comes to Cardiology Clinic today, accompanied by his mother, for follow-up.  More recently, Cipriano presented to our clinic in atrial flutter with 2:1 block. He was admitted to the [facility] and underwent electrical cardioversion to sinus rhythm. Then, on Nov 29, 2017, he underwent: 1) Successful atrial scar flutter-ablation by Dr Milligan and 2) Pacemaker device reprogramming. When last checked, the pacemaker battery was approaching "end of life".  Thus, he had replacement of his pacemaker generator. He has a recent history of Gout.  He was seen on 3/14 by EP and download of the pacemaker showed significant ventricular ectopy.  He is here today to reassess his R-sided " pressures through the TR jet by ECHO.  A study a wile back suggested it was elevated, which could explain the ventricular ectopy.   Currently, Mom reports Cipriano is doing reasonably well.  She denies he is having problems with SOB, chest pain, palpitations, abnormally rapid or slow heart rates, or inability to conduct his usual but limited activities.  The TFTs were found to be normal.  He currently is on Coreg 12.5 mg q12 hrs and Synthroid 88 mcg qday for hypothyroidism.  WE added Metoprolol 25 mg qd because of the ventricular ectopy.     REVIEW OF SYSTEMS:  Cipriano has no problems with vision or hearing. No lightheadedness or seizures. No swallowing disorder and no vomiting. No SOL. No breathing disorder. History of snoring. No coughing up blood.  No abdominal pain. Bowel movements are normal. No bloody stools. No pelvic pain.  Urination is normal. No intrinsic bleeding disorder.  No known arterial disease. No HBP. No DM. No lipid disorder.  No known intrinsic problem with the lungs, liver or kidneys.     PHYSICAL EXAMINATION:  GENERAL:  An alert,  healthy-looking 28 -year-old with the stigmata of Down syndrome. Weight is 109.4 kg or 241. lbs and height is 1.651 meters or 5 ' 6 ''. Heart rate is 73 bpm and regular.   Saturation in room air is 95 %.  Blood pressure in the right arm is 113/60 mmHg.  Color and perfusion are good.  HEENT:  Normal eye movements. PERR to light. No bruits over the head. No upper airway obstruction.  No jugular venous distention.  The mucous membranes are moist and pink. NECK:  Supple and the thyroid is not enlarged.  Pacemaker is palpated in the L-subclavicular region. The incision is clean and not reddened. Non-infected.  CHEST:  Well-healed midline scar.  Normal chest movements with breathing.  Good air entry bilaterally.  Breath sounds are clear.  No wheezes, rhonchi or rales.  CARDIOVASCULAR:  Normal precordial activity. Heart rate ~ 75 bpm. S1 is normal and S2 is prominent.  There is a  grade I/VI systolic ejection murmur heard up the left sternal border and base.  Diastole is clear.  No gallop, rub or click.  ABDOMEN:  Exogenous obesity.  Liver is percussed down about 3-4 fingerbreadths at the right costal margin. It is non-tender and non-pulsatile.  Bowel sounds are normal.  No masses appreciated.  EXTREMITIES:  Full ROM. Pulses are 2+ throughout. Capillary refill is good. No edema.  No cyanosis or clubbing. No rashes or bruising. There is a boot on the L-foot.             .........................................................................................................        ECG:  It showed atrial-paced rhythm at 75 bpm.  RBBB pattern ( ms). Normal T waves (Neg T wave depression in V2 and V3).  QTc 439 ms.   No PVCs seen .        ECHO (TODAY):   PVCs seen.  No pericardial effusion. No clots or vegetations.  No residual ASD or VSD. Pacing leads are in the RA/RV.  Bioprosthetic pulmonary valve is in good position; it is quite echogenic.  Annulus ~ 2.3 cm. No RVOT or LVOT obstruction. No arch obstruction. M-MODE: LV  4.4 cm.  RV 3.6 cm (upper limits). Ao 2.8 cm. LA 3.0 cm.  SEP 1.2 cm.  PW 1.1 cm. EF 64%.   The RA measures 15 cm2.  DOPPLER: Mild (+) TR with a jet of 60 mmHg, indicating elevated RV peak pressure.  Trivial PI.  Peak pressure drop across the bioprosthetic pulmonary valve is 39 mmHg.  Trace MR. Trace AI. Peak pressure drop across the aortic valve is 6 mmHg.      .......................................................................................................                ASSESSMENT:  In summary, we have a 28-year-old patient with Down syndrome who underwent heart surgery for progressive obstruction and insufficiency of a previously placed bioprosthetic pulmonary valve.  The current surgery was performed by Dr. Godfrey in May 2017.  The surgery entailed placement of a 2.7 cm St. Quinn Epic Bioprosthetic valve in the pulmonary position.  Cipriano  did well  "postoperatively. Also, he recently underwent successful catheter ablation for atrial flutter.  He also underwent replacement of the pacemaker generator.  There appears to be new onset ventricular ectopy.  The TR jet is elevated.         PLAN:  Given our findings, our suggestions are as follow    1. Maintain on Synthroid 88 mcg qd. TFTs were done today.  2). Continue with Coreg 12.5 mg q12 hrs. We added Metoprolol 25 mg qd. 3) We DCed Warfarin and started 1 baby ASA qd.   3)  "The TR jet is 60-68 mmHg". 4)   Antibiotic prophylaxis is in order for any invasive procedures.  5. Date for EPS set at March 27th.        Augustine Dean PhD, MD.     "

## 2019-03-19 ENCOUNTER — OFFICE VISIT (OUTPATIENT)
Dept: CARDIOLOGY | Facility: CLINIC | Age: 29
End: 2019-03-19
Payer: MEDICAID

## 2019-03-19 ENCOUNTER — CLINICAL SUPPORT (OUTPATIENT)
Dept: CARDIOLOGY | Facility: CLINIC | Age: 29
End: 2019-03-19
Payer: MEDICAID

## 2019-03-19 ENCOUNTER — LAB VISIT (OUTPATIENT)
Dept: LAB | Facility: OTHER | Age: 29
End: 2019-03-19
Payer: MEDICAID

## 2019-03-19 VITALS
DIASTOLIC BLOOD PRESSURE: 60 MMHG | BODY MASS INDEX: 40.16 KG/M2 | OXYGEN SATURATION: 95 % | WEIGHT: 241.06 LBS | HEART RATE: 73 BPM | HEIGHT: 65 IN | SYSTOLIC BLOOD PRESSURE: 113 MMHG

## 2019-03-19 DIAGNOSIS — Q90.9 DOWN SYNDROME: ICD-10-CM

## 2019-03-19 DIAGNOSIS — Z95.3 HISTORY OF PULMONARY VALVE REPLACEMENT WITH BIOPROSTHETIC VALVE: ICD-10-CM

## 2019-03-19 DIAGNOSIS — Z95.0 CARDIAC PACEMAKER IN SITU: ICD-10-CM

## 2019-03-19 DIAGNOSIS — I49.3 VENTRICULAR ECTOPY: ICD-10-CM

## 2019-03-19 DIAGNOSIS — I49.3 VENTRICULAR ECTOPY: Primary | ICD-10-CM

## 2019-03-19 DIAGNOSIS — Z95.0 PACEMAKER: ICD-10-CM

## 2019-03-19 DIAGNOSIS — Q24.9 ADULT CONGENITAL HEART DISEASE: ICD-10-CM

## 2019-03-19 LAB
T4 SERPL-MCNC: 6 UG/DL
TSH SERPL DL<=0.005 MIU/L-ACNC: 2.67 UIU/ML

## 2019-03-19 PROCEDURE — 93303 PR ECHO XTHORACIC,CONG A2M,COMPLETE: ICD-10-PCS | Mod: S$GLB,,, | Performed by: PEDIATRICS

## 2019-03-19 PROCEDURE — 84443 ASSAY THYROID STIM HORMONE: CPT

## 2019-03-19 PROCEDURE — 93325 DOPPLER ECHO COLOR FLOW MAPG: CPT | Mod: S$GLB,,, | Performed by: PEDIATRICS

## 2019-03-19 PROCEDURE — 93000 PR ELECTROCARDIOGRAM, COMPLETE: ICD-10-PCS | Mod: S$GLB,,, | Performed by: PEDIATRICS

## 2019-03-19 PROCEDURE — 36415 COLL VENOUS BLD VENIPUNCTURE: CPT

## 2019-03-19 PROCEDURE — 93325 PR DOPPLER COLOR FLOW VELOCITY MAP: ICD-10-PCS | Mod: S$GLB,,, | Performed by: PEDIATRICS

## 2019-03-19 PROCEDURE — 93000 ELECTROCARDIOGRAM COMPLETE: CPT | Mod: S$GLB,,, | Performed by: PEDIATRICS

## 2019-03-19 PROCEDURE — 93320 DOPPLER ECHO COMPLETE: CPT | Mod: S$GLB,,, | Performed by: PEDIATRICS

## 2019-03-19 PROCEDURE — 93320 PR DOPPLER ECHO HEART,COMPLETE: ICD-10-PCS | Mod: S$GLB,,, | Performed by: PEDIATRICS

## 2019-03-19 PROCEDURE — 99213 PR OFFICE/OUTPT VISIT, EST, LEVL III, 20-29 MIN: ICD-10-PCS | Mod: 25,S$GLB,, | Performed by: PEDIATRICS

## 2019-03-19 PROCEDURE — 84436 ASSAY OF TOTAL THYROXINE: CPT

## 2019-03-19 PROCEDURE — 99213 OFFICE O/P EST LOW 20 MIN: CPT | Mod: 25,S$GLB,, | Performed by: PEDIATRICS

## 2019-03-19 PROCEDURE — 93303 ECHO TRANSTHORACIC: CPT | Mod: S$GLB,,, | Performed by: PEDIATRICS

## 2019-03-26 ENCOUNTER — ANESTHESIA EVENT (OUTPATIENT)
Dept: MEDSURG UNIT | Facility: HOSPITAL | Age: 29
End: 2019-03-26
Payer: MEDICAID

## 2019-03-27 ENCOUNTER — ANESTHESIA (OUTPATIENT)
Dept: MEDSURG UNIT | Facility: HOSPITAL | Age: 29
End: 2019-03-27
Payer: MEDICAID

## 2019-03-27 ENCOUNTER — HOSPITAL ENCOUNTER (OUTPATIENT)
Facility: HOSPITAL | Age: 29
Discharge: HOME | End: 2019-03-27
Attending: PEDIATRICS | Admitting: PEDIATRICS
Payer: MEDICAID

## 2019-03-27 VITALS
TEMPERATURE: 97 F | HEART RATE: 75 BPM | DIASTOLIC BLOOD PRESSURE: 65 MMHG | OXYGEN SATURATION: 93 % | RESPIRATION RATE: 20 BRPM | WEIGHT: 241 LBS | SYSTOLIC BLOOD PRESSURE: 128 MMHG | BODY MASS INDEX: 40.15 KG/M2 | HEIGHT: 65 IN

## 2019-03-27 DIAGNOSIS — Q90.9 DOWN SYNDROME: ICD-10-CM

## 2019-03-27 DIAGNOSIS — I37.9 PULMONARY VALVE DISORDER: Primary | ICD-10-CM

## 2019-03-27 DIAGNOSIS — I48.4 ATYPICAL ATRIAL FLUTTER: ICD-10-CM

## 2019-03-27 DIAGNOSIS — Q21.3 TETRALOGY OF FALLOT: ICD-10-CM

## 2019-03-27 DIAGNOSIS — I47.20 VT (VENTRICULAR TACHYCARDIA): ICD-10-CM

## 2019-03-27 DIAGNOSIS — Q24.9 ADULT CONGENITAL HEART DISEASE: ICD-10-CM

## 2019-03-27 DIAGNOSIS — Z95.0 PACEMAKER: ICD-10-CM

## 2019-03-27 DIAGNOSIS — Z87.74 S/P SURGERY FOR COMPLEX CONGENITAL HEART DISEASE: ICD-10-CM

## 2019-03-27 PROCEDURE — 93612 INTRAVENTRICULAR PACING: CPT | Performed by: PEDIATRICS

## 2019-03-27 PROCEDURE — D9220A PRA ANESTHESIA: ICD-10-PCS | Mod: ANES,,, | Performed by: ANESTHESIOLOGY

## 2019-03-27 PROCEDURE — 93603 RIGHT VENTRICULAR RECORDING: CPT | Performed by: PEDIATRICS

## 2019-03-27 PROCEDURE — D9220A PRA ANESTHESIA: ICD-10-PCS | Mod: CRNA,,, | Performed by: NURSE ANESTHETIST, CERTIFIED REGISTERED

## 2019-03-27 PROCEDURE — 93603 PR RIGHT VENTRICULAR RECORDING: ICD-10-PCS | Mod: 26,,, | Performed by: PEDIATRICS

## 2019-03-27 PROCEDURE — 93010 EKG 12-LEAD: ICD-10-PCS | Mod: 59,,, | Performed by: INTERNAL MEDICINE

## 2019-03-27 PROCEDURE — 93010 ELECTROCARDIOGRAM REPORT: CPT | Mod: 59,,, | Performed by: INTERNAL MEDICINE

## 2019-03-27 PROCEDURE — 01920 ANES CARDIAC CATHETERIZATION: CPT | Performed by: PEDIATRICS

## 2019-03-27 PROCEDURE — 93612 PR INTRAVENTRICULAR PACING: ICD-10-PCS | Mod: 26,,, | Performed by: PEDIATRICS

## 2019-03-27 PROCEDURE — D9220A PRA ANESTHESIA: Mod: ANES,,, | Performed by: ANESTHESIOLOGY

## 2019-03-27 PROCEDURE — 37000008 HC ANESTHESIA 1ST 15 MINUTES: Performed by: PEDIATRICS

## 2019-03-27 PROCEDURE — D9220A PRA ANESTHESIA: Mod: CRNA,,, | Performed by: NURSE ANESTHETIST, CERTIFIED REGISTERED

## 2019-03-27 PROCEDURE — 37000009 HC ANESTHESIA EA ADD 15 MINS: Performed by: PEDIATRICS

## 2019-03-27 PROCEDURE — 25000003 PHARM REV CODE 250: Performed by: NURSE ANESTHETIST, CERTIFIED REGISTERED

## 2019-03-27 PROCEDURE — 63600175 PHARM REV CODE 636 W HCPCS: Performed by: NURSE ANESTHETIST, CERTIFIED REGISTERED

## 2019-03-27 PROCEDURE — 93623 PRGRMD STIMJ&PACG IV RX NFS: CPT | Mod: 26,,, | Performed by: PEDIATRICS

## 2019-03-27 PROCEDURE — 93612 INTRAVENTRICULAR PACING: CPT | Mod: 26,,, | Performed by: PEDIATRICS

## 2019-03-27 PROCEDURE — 94761 N-INVAS EAR/PLS OXIMETRY MLT: CPT

## 2019-03-27 PROCEDURE — 93603 RIGHT VENTRICULAR RECORDING: CPT | Mod: 26,,, | Performed by: PEDIATRICS

## 2019-03-27 PROCEDURE — 93623 PR STIM/PACING HEART POST IV DRUG INFU: ICD-10-PCS | Mod: 26,,, | Performed by: PEDIATRICS

## 2019-03-27 PROCEDURE — 93623 PRGRMD STIMJ&PACG IV RX NFS: CPT | Performed by: PEDIATRICS

## 2019-03-27 PROCEDURE — 93005 ELECTROCARDIOGRAM TRACING: CPT | Mod: 59

## 2019-03-27 PROCEDURE — 27000221 HC OXYGEN, UP TO 24 HOURS

## 2019-03-27 RX ORDER — FENTANYL CITRATE 50 UG/ML
25 INJECTION, SOLUTION INTRAMUSCULAR; INTRAVENOUS EVERY 5 MIN PRN
Status: DISCONTINUED | OUTPATIENT
Start: 2019-03-27 | End: 2019-03-27 | Stop reason: HOSPADM

## 2019-03-27 RX ORDER — SODIUM CHLORIDE 0.9 % (FLUSH) 0.9 %
3 SYRINGE (ML) INJECTION
Status: DISCONTINUED | OUTPATIENT
Start: 2019-03-27 | End: 2019-03-27 | Stop reason: HOSPADM

## 2019-03-27 RX ORDER — PROPOFOL 10 MG/ML
VIAL (ML) INTRAVENOUS CONTINUOUS PRN
Status: DISCONTINUED | OUTPATIENT
Start: 2019-03-27 | End: 2019-03-27

## 2019-03-27 RX ORDER — MIDAZOLAM HYDROCHLORIDE 1 MG/ML
INJECTION, SOLUTION INTRAMUSCULAR; INTRAVENOUS
Status: DISCONTINUED | OUTPATIENT
Start: 2019-03-27 | End: 2019-03-27

## 2019-03-27 RX ADMIN — ISOPROTERENOL HYDROCHLORIDE 2 MCG/MIN: 0.2 INJECTION, SOLUTION INTRAMUSCULAR; INTRAVENOUS at 12:03

## 2019-03-27 RX ADMIN — PROPOFOL 100 MCG/KG/MIN: 10 INJECTION, EMULSION INTRAVENOUS at 11:03

## 2019-03-27 RX ADMIN — MIDAZOLAM 3 MG: 1 INJECTION INTRAMUSCULAR; INTRAVENOUS at 11:03

## 2019-03-27 NOTE — INTERVAL H&P NOTE
The patient has been examined and the H&P has been reviewed:    I concur with the findings and no changes have occurred since H&P was written.    Anesthesia/procedureal risks, benefits and alternative options discussed and understood by patient/family.  Informed consent was obtained and signed by his mother (legal guardian).      Assessment    Active Hospital Problems    Diagnosis  POA    VT (ventricular tachycardia) [I47.2]  Yes      Resolved Hospital Problems   No resolved problems to display.     Plan  Noninvasive EP study through pacemaker for VT induction  Possible venogram      Jose Ortiz MD  Pediatric and Adult Congenital Electrophysiologist  Pediatric Cardiologist

## 2019-03-27 NOTE — NURSING
Discharge instructions and medlist given.  Patient and mother verbalized understanding.  Off unit via wheelchair with escort and family.

## 2019-03-27 NOTE — ANESTHESIA PREPROCEDURE EVALUATION
03/26/2019  Cipriano Thompson is a 28 y.o., male.    Anesthesia Evaluation    I have reviewed the Patient Summary Reports.     I have reviewed the Medications.     Review of Systems  Anesthesia Hx:  Hx of Anesthetic complications Combative when waking from anesthesia in the past.  History of prior surgery of interest to airway management or planning: Denies Family Hx of Anesthesia complications.   Denies Personal Hx of Anesthesia complications.   Social:  Non-Smoker, No Alcohol Use    Hematology/Oncology:  Hematology Normal   Oncology Normal     EENT/Dental:EENT/Dental Normal   Cardiovascular:   Dysrhythmias CHF Interpretation Summary  Tetralogy of Fallot s/p prior pulmonary valve replacement and s/p  pacemaker  - s/p pulmonary valve replacement with 27mm porcine valve in 30 mm  conduit (5/2/17)  Limited JAKE to evaluate left atrial appendage prior to cardioversion.  1. No evidence of intracardiac thrombus or sludging as contraindication for  cardioversion.  2. Intact atrial septum. Mild right atrial enlargement.  3. Trivial mitral valve insufficiency. Mild tricuspid valve insufficiency.  4. The bioprosthetic pulmonary valve leaflets are thin and mobile. There is  laminar flow through the pulmonary valve with a peak velocity of 2.1 m/sec  and no significant regurgitation.  5.Qualitatively the left ventricle appears mildly hypertrophied with normal  systolic function.  Page 1 of 3  11/29/2017  6. Qualitatively the right ventricle is moderately dilated and mildly  hypertrophied with mildly diminished systolic function.  7. The tricuspid regurgitant jet peak velocity is 2.2 m/sec, estimating a right  ventricular pressure of 19 mmHg above the right atrial pressure.   Pulmonary:   Sleep Apnea    Renal/:  Renal/ Normal     Hepatic/GI:  Hepatic/GI Normal    Musculoskeletal:  Musculoskeletal Normal     OB/GYN/PEDS:  Developmental Delay (Down Syndrome)   Neurological:  Neurology Normal    Endocrine:   Hypothyroidism    Dermatological:  Skin Normal    Psych:  Psychiatric Normal           Physical Exam  General:  Well nourished    Airway/Jaw/Neck:  Airway Findings: Mouth Opening: Normal Tongue: Normal  General Airway Assessment: Adult  Mallampati: III  Improves to II with phonation.  TM Distance: Normal, at least 6 cm      Dental:  Dental Findings: In tact   Chest/Lungs:  Chest/Lungs Findings: Clear to auscultation     Heart/Vascular:  Heart Findings: Rate: Normal  Rhythm: Regular Rhythm     Abdomen:  Abdomen Findings:  Normal, Nontender, Soft       Mental Status:  Mental Status Findings:  Cooperative         Anesthesia Plan  Type of Anesthesia, risks & benefits discussed:  Anesthesia Type:  general  Patient's Preference:   Intra-op Monitoring Plan: standard ASA monitors  Intra-op Monitoring Plan Comments:   Post Op Pain Control Plan: multimodal analgesia  Post Op Pain Control Plan Comments:   Induction:   IV  Beta Blocker:  Patient is not currently on a Beta-Blocker (No further documentation required).       Informed Consent: Patient representative understands risks and agrees with Anesthesia plan.  Questions answered. Anesthesia consent signed with patient representative.  ASA Score: 3     Day of Surgery Review of History & Physical:    H&P update referred to the surgeon.         Ready For Surgery From Anesthesia Perspective.

## 2019-03-27 NOTE — ANESTHESIA POSTPROCEDURE EVALUATION
Anesthesia Post Evaluation    Patient: Cipriano Thompson    Procedure(s) Performed: Procedure(s) (LRB):  CARDIAC ELECTROPHYSIOLOGY STUDY, NONINVASIVE (N/A)    Final Anesthesia Type: general  Patient location during evaluation: PACU  Patient participation: Yes- Able to Participate  Level of consciousness: awake and alert and oriented  Post-procedure vital signs: reviewed and stable  Pain management: adequate  Airway patency: patent  PONV status at discharge: No PONV  Anesthetic complications: no      Cardiovascular status: blood pressure returned to baseline and hemodynamically stable  Respiratory status: unassisted  Hydration status: euvolemic  Follow-up not needed.          Vitals Value Taken Time   /65 3/27/2019  1:30 PM   Temp 36.2 °C (97.2 °F) 3/27/2019  1:30 PM   Pulse 75 3/27/2019  1:30 PM   Resp 20 3/27/2019  1:30 PM   SpO2 93 % 3/27/2019  1:30 PM         Event Time     Out of Recovery 13:22:46          Pain/Justo Score: Justo Score: 10 (3/27/2019  1:13 PM)

## 2019-03-27 NOTE — NURSING TRANSFER
Nursing Transfer Note      3/27/2019     Transfer To: 3prep    Transfer via stretcher    Transfer with cardiac monitoring    Transported by RN    Medicines sent: none    Chart send with patient: Yes    Notified: mother at bedside    Patient reassessed at: to be done by receiving RN (date, time)

## 2019-03-27 NOTE — TRANSFER OF CARE
"Anesthesia Transfer of Care Note    Patient: Cipriano Thompson    Procedure(s) Performed: Procedure(s) (LRB):  CARDIAC ELECTROPHYSIOLOGY STUDY, NONINVASIVE (N/A)  VENOGRAM, EP LAB (N/A)    Patient location: PACU    Anesthesia Type: general    Transport from OR: Transported from OR on 6-10 L/min O2 by face mask with adequate spontaneous ventilation    Post pain: adequate analgesia    Post assessment: no apparent anesthetic complications and tolerated procedure well    Post vital signs: stable    Level of consciousness: awake and alert    Nausea/Vomiting: no nausea/vomiting    Complications: none    Transfer of care protocol was followed      Last vitals:   Visit Vitals  BP (!) 119/56 (BP Location: Right arm, Patient Position: Lying)   Pulse 76   Temp 36.3 °C (97.4 °F) (Axillary)   Resp 14   Ht 5' 5" (1.651 m)   Wt 109.3 kg (241 lb)   SpO2 98%   BMI 40.10 kg/m²     "

## 2019-03-31 LAB
AV DELAY - LONGEST: 240 MSEC
AV DELAY - LONGEST: 240 MSEC
IMPEDANCE RA LEAD (NATIVE): 456 OHMS
IMPEDANCE RA LEAD (NATIVE): 475 OHMS
IMPEDANCE RA LEAD: 361 OHMS
IMPEDANCE RA LEAD: 380 OHMS
OHS CV DC PP MS1: 0.4 MS
OHS CV DC PP MS1: 0.4 MS
OHS CV DC PP MS2: 0.4 MS
OHS CV DC PP MS2: 0.4 MS
OHS CV DC PP V1: 1.5 V
OHS CV DC PP V1: 1.5 V
OHS CV DC PP V2: 2 V
OHS CV DC PP V2: 2.25 V
P/R-WAVE RA LEAD (NATIVE): 2.8 MV
P/R-WAVE RA LEAD (NATIVE): NORMAL MV
P/R-WAVE RA LEAD: 2.8 MV
P/R-WAVE RA LEAD: NORMAL MV
PV DELAY - LONGEST: 220 MSEC
PV DELAY - LONGEST: 220 MSEC
THRESHOLD MS RA LEAD (NATIVE): 0.4 MS
THRESHOLD MS RA LEAD (NATIVE): 0.4 MS
THRESHOLD MS RA LEAD: 0.4 MS
THRESHOLD MS RA LEAD: 0.4 MS
THRESHOLD V RA LEAD (NATIVE): 0.88 V
THRESHOLD V RA LEAD (NATIVE): 1.25 V
THRESHOLD V RA LEAD: 0.38 V
THRESHOLD V RA LEAD: 0.5 V

## 2019-04-07 NOTE — DISCHARGE SUMMARY
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       Discharged Condition: good    Hospital Course: Patient was admitted for an outpatient procedure and tolerated the procedure well with no complications.    Final Diagnoses: Same as principal problem.    Disposition: Home or Self Care    Follow up/Patient Instructions:    Medications:  Reconciled Home Medications:      Medication List      CONTINUE taking these medications    allopurinol 100 MG tablet  Commonly known as:  ZYLOPRIM  Take 100 mg by mouth 2 (two) times daily.     carvedilol 25 MG tablet  Commonly known as:  COREG  Take 1 tablet (25 mg total) by mouth 2 (two) times daily with meals.     levothyroxine 88 MCG tablet  Commonly known as:  SYNTHROID  Take 88 mcg by mouth before breakfast.          Discharge Procedure Orders   Diet general     Call MD for:  temperature >100.4     Call MD for:  persistent nausea and vomiting     Call MD for:  severe uncontrolled pain     Call MD for:  difficulty breathing, headache or visual disturbances     Call MD for:  redness, tenderness, or signs of infection (pain, swelling, redness, odor or green/yellow discharge around incision site)     Call MD for:  hives     Call MD for:  persistent dizziness or light-headedness     Call MD for:  extreme fatigue     Call MD for:   Order Comments: Syncope, near syncope, palpitations, or any other questions or concerns.     No dressing needed     Activity as tolerated   Order Comments: No heavy lifting or strenuous activity x 24 hours.     Follow-up Information     Augustine Dean MD In 6 weeks.    Specialty:  Pediatric  Cardiology  Contact information:  [address] [facility]  [address]  [place] [address]  [phone]                 I have reviewed the above discharge instructions and plan at length with patient and family.  Their questions were answered and they expressed understanding.    Jose Ortiz MD  Pediatric and Adult Congenital Electrophysiologist  Pediatric Cardiologist

## 2019-04-12 ENCOUNTER — TELEPHONE (OUTPATIENT)
Dept: PEDIATRIC CARDIOLOGY | Facility: CLINIC | Age: 29
End: 2019-04-12

## 2019-04-12 NOTE — TELEPHONE ENCOUNTER
Spoke to patients guardian, informed patient is scheduled on Monday to do a remote transmission on device. Informed transmission can be sent anytime before Monday. Verbalized understanding.

## 2019-04-15 ENCOUNTER — CLINICAL SUPPORT (OUTPATIENT)
Dept: PEDIATRIC CARDIOLOGY | Facility: CLINIC | Age: 29
End: 2019-04-15
Attending: PEDIATRICS
Payer: MEDICAID

## 2019-04-15 ENCOUNTER — CONFERENCE (OUTPATIENT)
Dept: PEDIATRIC CARDIOLOGY | Facility: CLINIC | Age: 29
End: 2019-04-15

## 2019-04-15 DIAGNOSIS — Q21.3 TETRALOGY OF FALLOT: ICD-10-CM

## 2019-04-15 DIAGNOSIS — I48.91 ATRIAL FIBRILLATION, UNSPECIFIED TYPE: ICD-10-CM

## 2019-04-15 DIAGNOSIS — Z95.0 PACEMAKER: ICD-10-CM

## 2019-04-15 PROCEDURE — 99999 PR PBB SHADOW E&M-EST. PATIENT-LVL I: ICD-10-PCS | Mod: PBBFAC,,,

## 2019-04-15 PROCEDURE — 93296 REM INTERROG EVL PM/IDS: CPT | Mod: PBBFAC,PO | Performed by: PEDIATRICS

## 2019-04-15 PROCEDURE — 93294 REM INTERROG EVL PM/LDLS PM: CPT | Mod: ,,, | Performed by: PEDIATRICS

## 2019-04-15 PROCEDURE — 99211 OFF/OP EST MAY X REQ PHY/QHP: CPT | Mod: PBBFAC,PO

## 2019-04-15 PROCEDURE — 99999 PR PBB SHADOW E&M-EST. PATIENT-LVL I: CPT | Mod: PBBFAC,,,

## 2019-04-15 PROCEDURE — 93294 CV PACEMAKER REMOTE PEDIATRICS (CUPID ONLY): ICD-10-PCS | Mod: ,,, | Performed by: PEDIATRICS

## 2019-04-15 NOTE — PROGRESS NOTES
Cipriano Thompson underwent Non Invasive  EP study on 3/27/2019. .His case was reviewed in our [facility] Conference on 3/29/2019. He should follow up with Dr. Dean in 6 weeks

## 2019-05-31 LAB
AV DELAY - LONGEST: 240 MSEC
BATTERY VOLTAGE (V): 3.04 V
ERI (V): 2.63 V
IMPEDANCE RA LEAD (NATIVE): 456 OHMS
IMPEDANCE RA LEAD: 380 OHMS
OHS CV DC PP MS1: 0.4 MS
OHS CV DC PP MS2: 0.4 MS
OHS CV DC PP V1: 1.5 V
OHS CV DC PP V2: 2.25 V
P/R-WAVE RA LEAD (NATIVE): 1.9 MV
P/R-WAVE RA LEAD: 2 MV
PV DELAY - LONGEST: 220 MSEC
THRESHOLD MS RA LEAD (NATIVE): 0.4 MS
THRESHOLD MS RA LEAD: 0.4 MS
THRESHOLD V RA LEAD (NATIVE): 1.12 V
THRESHOLD V RA LEAD: 0.5 V

## 2019-07-19 ENCOUNTER — TELEPHONE (OUTPATIENT)
Dept: PEDIATRIC CARDIOLOGY | Facility: CLINIC | Age: 29
End: 2019-07-19

## 2019-07-22 ENCOUNTER — CLINICAL SUPPORT (OUTPATIENT)
Dept: PEDIATRIC CARDIOLOGY | Facility: CLINIC | Age: 29
End: 2019-07-22
Attending: PEDIATRICS
Payer: MEDICAID

## 2019-07-22 DIAGNOSIS — Q21.3 TETRALOGY OF FALLOT: ICD-10-CM

## 2019-07-22 DIAGNOSIS — I48.91 ATRIAL FIBRILLATION, UNSPECIFIED TYPE: ICD-10-CM

## 2019-07-22 DIAGNOSIS — Z95.0 PACEMAKER: ICD-10-CM

## 2019-07-22 PROCEDURE — 93294 CV PACEMAKER REMOTE PEDIATRICS (CUPID ONLY): ICD-10-PCS | Mod: ,,, | Performed by: PEDIATRICS

## 2019-07-22 PROCEDURE — 93296 REM INTERROG EVL PM/IDS: CPT | Mod: PBBFAC | Performed by: PEDIATRICS

## 2019-07-22 PROCEDURE — 99999 PR PBB SHADOW E&M-EST. PATIENT-LVL I: CPT | Mod: PBBFAC,,,

## 2019-07-22 PROCEDURE — 99211 OFF/OP EST MAY X REQ PHY/QHP: CPT | Mod: PBBFAC,25

## 2019-07-22 PROCEDURE — 99999 PR PBB SHADOW E&M-EST. PATIENT-LVL I: ICD-10-PCS | Mod: PBBFAC,,,

## 2019-07-22 PROCEDURE — 93294 REM INTERROG EVL PM/LDLS PM: CPT | Mod: ,,, | Performed by: PEDIATRICS

## 2019-09-09 LAB
AV DELAY - LONGEST: 240 MSEC
BATTERY VOLTAGE (V): 3.03 V
ERI (V): 2.63 V
IMPEDANCE RA LEAD (NATIVE): 513 OHMS
IMPEDANCE RA LEAD: 399 OHMS
OHS CV DC PP MS1: 0.4 MS
OHS CV DC PP MS2: 0.4 MS
OHS CV DC PP V1: 1.5 V
OHS CV DC PP V2: 2.25 V
P/R-WAVE RA LEAD (NATIVE): 2.9 MV
P/R-WAVE RA LEAD: 2.4 MV
PV DELAY - LONGEST: 220 MSEC
THRESHOLD MS RA LEAD (NATIVE): 0.4 MS
THRESHOLD MS RA LEAD: 0.4 MS
THRESHOLD V RA LEAD (NATIVE): 1.12 V
THRESHOLD V RA LEAD: 0.5 V

## 2019-10-11 DIAGNOSIS — I49.3 PVC'S (PREMATURE VENTRICULAR CONTRACTIONS): ICD-10-CM

## 2019-10-11 DIAGNOSIS — Q21.3 TETRALOGY OF FALLOT: ICD-10-CM

## 2019-10-11 DIAGNOSIS — Z95.0 PACEMAKER: ICD-10-CM

## 2019-10-11 DIAGNOSIS — I48.4 ATYPICAL ATRIAL FLUTTER: ICD-10-CM

## 2019-10-11 DIAGNOSIS — I11.9 RIGHT VENTRICULAR HYPERTENSION: Primary | ICD-10-CM

## 2019-10-11 DIAGNOSIS — I48.91 ATRIAL FIBRILLATION, UNSPECIFIED TYPE: ICD-10-CM

## 2019-10-28 ENCOUNTER — CLINICAL SUPPORT (OUTPATIENT)
Dept: PEDIATRIC CARDIOLOGY | Facility: CLINIC | Age: 29
End: 2019-10-28
Attending: PEDIATRICS
Payer: MEDICAID

## 2019-10-28 DIAGNOSIS — I49.3 PVC'S (PREMATURE VENTRICULAR CONTRACTIONS): ICD-10-CM

## 2019-10-28 DIAGNOSIS — I11.9 RIGHT VENTRICULAR HYPERTENSION: ICD-10-CM

## 2019-10-28 DIAGNOSIS — I48.4 ATYPICAL ATRIAL FLUTTER: ICD-10-CM

## 2019-10-28 DIAGNOSIS — I48.91 ATRIAL FIBRILLATION, UNSPECIFIED TYPE: ICD-10-CM

## 2019-10-28 DIAGNOSIS — Q21.3 TETRALOGY OF FALLOT: ICD-10-CM

## 2019-10-28 DIAGNOSIS — Z95.0 PACEMAKER: ICD-10-CM

## 2019-10-28 PROCEDURE — 93294 CV PACEMAKER REMOTE PEDIATRICS (CUPID ONLY): ICD-10-PCS | Mod: ,,, | Performed by: PEDIATRICS

## 2019-10-28 PROCEDURE — 93294 REM INTERROG EVL PM/LDLS PM: CPT | Mod: ,,, | Performed by: PEDIATRICS

## 2019-10-28 PROCEDURE — 99999 PR PBB SHADOW E&M-EST. PATIENT-LVL I: ICD-10-PCS | Mod: PBBFAC,,,

## 2019-10-28 PROCEDURE — 93296 REM INTERROG EVL PM/IDS: CPT | Mod: PBBFAC | Performed by: PEDIATRICS

## 2019-10-28 PROCEDURE — 99211 OFF/OP EST MAY X REQ PHY/QHP: CPT | Mod: PBBFAC

## 2019-10-28 PROCEDURE — 99999 PR PBB SHADOW E&M-EST. PATIENT-LVL I: CPT | Mod: PBBFAC,,,

## 2019-10-30 LAB
AV DELAY - LONGEST: 240 MSEC
BATTERY VOLTAGE (V): 3.02 V
ERI (V): 2.63 V
IMPEDANCE RA LEAD (NATIVE): 532 OHMS
IMPEDANCE RA LEAD: 399 OHMS
OHS CV DC PP MS1: 0.4 MS
OHS CV DC PP MS2: 0.4 MS
OHS CV DC PP V1: 1.5 V
OHS CV DC PP V2: 2.25 V
P/R-WAVE RA LEAD (NATIVE): 2.4 MV
P/R-WAVE RA LEAD: 2.4 MV
PV DELAY - LONGEST: 220 MSEC
THRESHOLD MS RA LEAD (NATIVE): 0.4 MS
THRESHOLD MS RA LEAD: 0.4 MS
THRESHOLD V RA LEAD (NATIVE): 1.12 V
THRESHOLD V RA LEAD: 0.5 V

## 2019-10-30 NOTE — PROGRESS NOTES
"      HISTORY OF PRESENT ILLNESS (10/31/19):  Cipriano is a 29-year-old -American male with Down syndrome.  He was originally diagnosed to have Tetralogy of Fallot.  He underwent complete repair using a transannular patch.  He then required placement of a 27 mm Freestyle Medtronic Bioprosthetic pulmonary valve in 1999. Both surgeries were performed at [facility].  He subsequently developed sinus node dysfunction and had placement of a dual chamber endocardial pacemaker in 2008, also at [facility] in [facility].  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  Dr. Godfrey placed a 27 mm St. Quinn Epic Bioprosthetic valve in the pulmonary position, at [facility].   This was conducted in conjunction with placement of a 30 mm Dacron conduit. The operation was in May 2017.       Cipriano comes to Cardiology Clinic today, accompanied by his mother, for follow-up. More recently, Cipriano presented to our clinic in atrial flutter with 2:1 block. He was admitted to the [facility] and underwent electrical cardioversion to sinus rhythm. Then, on Nov 29, 2017, he underwent: 1) Successful atrial scar flutter-ablation by Dr Milligan and 2) Pacemaker device reprogramming. When last checked, the pacemaker battery was approaching "end of life".  Thus, he subsequently had replacement of his pacemaker generator.  He was seen on 3/14 by EP and download of the pacemaker showed significant ventricular ectopy.  He is here today to reassess his R-sided pressures through the TR jet by ECHO.  A study a while back suggested it was elevated, which could explain the new ventricular ectopy.      Currently, Mom reports Cipriano is doing reasonably well.  She denies he is having problems with HBP, difficulty breathing, SOB, chest pain, palpitations, abnormally rapid or slow heart rates, or inability to conduct his usual but limited " activities.  The TFTs were found to be normal.  He currently is on Coreg 12.5 mg q12 hrs and Synthroid 88 mcg qday for hypothyroidism.  We added Metoprolol 25 mg qd because of the ventricular ectopy.     REVIEW OF SYSTEMS:  Cipriano has no known problems with vision or hearing. No lightheadedness or seizures. No swallowing disorder and no vomiting. No SOL. No breathing disorder. No asthma. History of snoring. No coughing up blood.  No abdominal pain. Bowel movements are normal. No bloody stools. No pelvic pain.  Urination is normal. No intrinsic bleeding disorder.  No known arterial disease. No DM. No lipid disorder.  He does have gout. No known intrinsic problem with the lungs, liver or kidneys.     PHYSICAL EXAMINATION:  GENERAL:  An alert,  healthy-looking 28 -year-old with the stigmata of Down syndrome. Weight is 114.4 kg or 252. lbs and height is 1.651 meters or 5 ' 6 ''. Heart rate is 75 bpm and regular.   Saturation in room air is 99 %.  Blood pressure in the right arm is 115/66 mmHg.  Color and perfusion are good. HEENT:  Normal eye movements. PERR to light. ? OM. The TM is reddened. No bruits over the head. No upper airway obstruction.  No jugular venous distention.  The mucous membranes are moist and pink. NECK:  Supple and the thyroid is not enlarged. Pacemaker is palpated in the L-subclavicular region. The incision is clean and not reddened. Non-infected.  CHEST:  Well-healed midline scar.  Normal chest movements with breathing.  Good air entry bilaterally.  Breath sounds are clear.  No wheezes, rhonchi or rales.  CARDIOVASCULAR:  Normal precordial activity. Heart rate ~ 75 bpm. S1 is normal and S2 is prominent.  There is a grade I/VI systolic ejection murmur heard up the left sternal border and base.  Diastole is clear.  No gallop, rub or click.  ABDOMEN:  Exogenous obesity.  Liver is percussed down about 3-4 fingerbreadths at the right costal margin. It is non-tender and non-pulsatile.  Bowel sounds are  normal.  No masses appreciated.  EXTREMITIES:  Full ROM. Pulses are 2+ throughout. Capillary refill is good. No edema.  No cyanosis or clubbing. No rashes or bruising. There is a boot on the L-foot.             .........................................................................................................        ECG:  It showed atrial-paced rhythm at 76 bpm, with prolonged AV conduction.  PAC with aberrant conduction vs PVC. RBBB pattern ( ms). Normal T waves (Neg T wave depression in V2 and V3).  QTc 450 ms.          ECHO (TODAY):   PVCs seen.  No pericardial effusion. No clots or vegetations.  No residual ASD or VSD. Pacing leads are in the RA/RV.  Bioprosthetic pulmonary valve is in good position; it is quite echogenic.  Annulus ~ 2.3 cm. No LVOT obstruction.  Appears to have some muscle hypertrophy in the RVOT. No arch obstruction. M-MODE: LV  4.5 cm.  RV 3.3 cm (upper limits). Ao 2.6 cm. LA 3.9 cm.  SEP 1.1 cm.  PW 1.1 cm. EF 55%.   The RA measures 15 cm2. DOPPLER: Mild TR with a jet of 45-50 mmHg, indicating slightly elevated RV peak pressure.  Trivial PI.  Peak pressure drop across the bioprosthetic pulmonary valve is 27 mmHg.  Trace MR. Trace AI. Peak pressure drop across the aortic valve is 5 mmHg.        PACEMAKER DOWNLOAD:   Atrial pacing and ventricular pacing.  No atrial flutter seen.   No sustained ventricular ectopy.  Atrial pacing 72%. Estimated longevity 11.8 mos. Sensing: Atrial 2.6 mV and RV 2.0 mV. Impedence: Atrial 399 Ohms and  Ohms.    .......................................................................................................                 ASSESSMENT:  In summary, we have a 29-year-old patient with Down syndrome, born with TOF, who underwent heart surgery for progressive obstruction and insufficiency of a previously placed bioprosthetic pulmonary valve.  The most recent surgery was performed by Dr. Godfrey in May 2017.  The surgery entailed placement of a  "2.7 cm St. Quinn Epic Bioprosthetic valve in the pulmonary position.  Cipriano  did well postoperatively. Also, he recently underwent successful catheter ablation for atrial flutter.  He also had replacement of the pacemaker generator.  There appears to be new onset ventricular ectopy.  The TR jet is elevated.  No obstruction across the bioprosthetic pulmonary valve.  ? Related to upper airway obstruction.        PLAN:  Given our findings, our suggestions are as follow    1. Maintain on Synthroid 88 mcg qd.  2). Continue with Coreg 12.5 mg q12 hrs. We added Metoprolol 25 mg qd. 3) We DCed Warfarin and started 1 baby ASA qd.   4)  "The TR jet today is 45-50 mmHg". 4)   Antibiotic prophylaxis is in order for any invasive procedures.  5) Consider ENT evaluation for upper airway obstruction.     Augustine Dean PhD, MD.                       "

## 2019-10-31 ENCOUNTER — CLINICAL SUPPORT (OUTPATIENT)
Dept: CARDIOLOGY | Facility: CLINIC | Age: 29
End: 2019-10-31
Payer: MEDICAID

## 2019-10-31 ENCOUNTER — OFFICE VISIT (OUTPATIENT)
Dept: CARDIOLOGY | Facility: CLINIC | Age: 29
End: 2019-10-31
Payer: MEDICAID

## 2019-10-31 VITALS
HEIGHT: 65 IN | DIASTOLIC BLOOD PRESSURE: 66 MMHG | HEART RATE: 75 BPM | WEIGHT: 252.19 LBS | SYSTOLIC BLOOD PRESSURE: 115 MMHG | OXYGEN SATURATION: 96 % | BODY MASS INDEX: 42.02 KG/M2

## 2019-10-31 DIAGNOSIS — Z98.890 HISTORY OF OPEN HEART SURGERY: ICD-10-CM

## 2019-10-31 DIAGNOSIS — I49.3 PVC (PREMATURE VENTRICULAR CONTRACTION): Primary | ICD-10-CM

## 2019-10-31 DIAGNOSIS — Q90.9 DOWN SYNDROME: ICD-10-CM

## 2019-10-31 DIAGNOSIS — Z95.3 HISTORY OF PULMONARY VALVE REPLACEMENT WITH BIOPROSTHETIC VALVE: ICD-10-CM

## 2019-10-31 DIAGNOSIS — I48.92 ATRIAL FLUTTER, UNSPECIFIED TYPE: ICD-10-CM

## 2019-10-31 PROCEDURE — 93288 PR INTERROG EVAL, IN PERSON,PACEMAKER: ICD-10-PCS | Mod: 51,S$GLB,, | Performed by: PEDIATRICS

## 2019-10-31 PROCEDURE — 93000 ELECTROCARDIOGRAM COMPLETE: CPT | Mod: 59,51,S$GLB, | Performed by: PEDIATRICS

## 2019-10-31 PROCEDURE — 93325 DOPPLER ECHO COLOR FLOW MAPG: CPT | Mod: S$GLB,,, | Performed by: PEDIATRICS

## 2019-10-31 PROCEDURE — 93000 PR ELECTROCARDIOGRAM, COMPLETE: ICD-10-PCS | Mod: 59,51,S$GLB, | Performed by: PEDIATRICS

## 2019-10-31 PROCEDURE — 99214 OFFICE O/P EST MOD 30 MIN: CPT | Mod: 25,S$GLB,, | Performed by: PEDIATRICS

## 2019-10-31 PROCEDURE — 93325 PR DOPPLER COLOR FLOW VELOCITY MAP: ICD-10-PCS | Mod: S$GLB,,, | Performed by: PEDIATRICS

## 2019-10-31 PROCEDURE — 93320 PR DOPPLER ECHO HEART,COMPLETE: ICD-10-PCS | Mod: S$GLB,,, | Performed by: PEDIATRICS

## 2019-10-31 PROCEDURE — 93303 ECHO TRANSTHORACIC: CPT | Mod: 51,S$GLB,, | Performed by: PEDIATRICS

## 2019-10-31 PROCEDURE — 99214 PR OFFICE/OUTPT VISIT, EST, LEVL IV, 30-39 MIN: ICD-10-PCS | Mod: 25,S$GLB,, | Performed by: PEDIATRICS

## 2019-10-31 PROCEDURE — 93320 DOPPLER ECHO COMPLETE: CPT | Mod: S$GLB,,, | Performed by: PEDIATRICS

## 2019-10-31 PROCEDURE — 93303 PR ECHO XTHORACIC,CONG A2M,COMPLETE: ICD-10-PCS | Mod: 51,S$GLB,, | Performed by: PEDIATRICS

## 2019-10-31 PROCEDURE — 93288 INTERROG EVL PM/LDLS PM IP: CPT | Mod: 51,S$GLB,, | Performed by: PEDIATRICS

## 2019-10-31 RX ORDER — METOPROLOL TARTRATE 25 MG/1
25 TABLET, FILM COATED ORAL 2 TIMES DAILY
COMMUNITY
End: 2019-10-31

## 2019-11-20 NOTE — PROGRESS NOTES
Cipriano Donna underwent  Pacemaker generator change out on 03/23/2018. .His case was reviewed in our [facility] on 04/06/2018. He  Followed  up with Dr Ortiz on 4/05/2018 for wound check.  
no

## 2020-01-08 ENCOUNTER — TELEPHONE (OUTPATIENT)
Dept: PEDIATRIC CARDIOLOGY | Facility: CLINIC | Age: 30
End: 2020-01-08

## 2020-01-08 DIAGNOSIS — I48.91 ATRIAL FIBRILLATION, UNSPECIFIED TYPE: ICD-10-CM

## 2020-01-08 DIAGNOSIS — Z95.0 PACEMAKER: ICD-10-CM

## 2020-01-08 DIAGNOSIS — Q21.3 TETRALOGY OF FALLOT: Primary | ICD-10-CM

## 2020-01-08 DIAGNOSIS — I49.3 PVC'S (PREMATURE VENTRICULAR CONTRACTIONS): ICD-10-CM

## 2020-01-08 DIAGNOSIS — I48.92 ATRIAL FLUTTER, UNSPECIFIED TYPE: ICD-10-CM

## 2020-01-08 NOTE — TELEPHONE ENCOUNTER
Spoke with Cipriano Suadmarcelino mother regarding device followup. [facility] doing a clinic at Dr Dean's office. We would love to take care of you and service your device, but we will need to set you up an appointment in [facility] clinic with Dr Huerta to continue REMOTE monitoring. Please let us know if you would like to schedule an appointment or speak with Dr Dean to see who they would like you follow up with. Mom requested to make appointment with DR Huerta to establish care for REMOTE device monitoring. Appointments scheduled 2/13/2020.

## 2020-02-10 ENCOUNTER — CLINICAL SUPPORT (OUTPATIENT)
Dept: PEDIATRIC CARDIOLOGY | Facility: CLINIC | Age: 30
End: 2020-02-10
Attending: PEDIATRICS
Payer: MEDICAID

## 2020-02-10 DIAGNOSIS — Q21.3 TETRALOGY OF FALLOT: ICD-10-CM

## 2020-02-10 DIAGNOSIS — I11.9 RIGHT VENTRICULAR HYPERTENSION: ICD-10-CM

## 2020-02-10 DIAGNOSIS — I48.4 ATYPICAL ATRIAL FLUTTER: ICD-10-CM

## 2020-02-10 DIAGNOSIS — I48.91 ATRIAL FIBRILLATION, UNSPECIFIED TYPE: ICD-10-CM

## 2020-02-10 DIAGNOSIS — I49.3 PVC'S (PREMATURE VENTRICULAR CONTRACTIONS): ICD-10-CM

## 2020-02-10 DIAGNOSIS — Z95.0 PACEMAKER: ICD-10-CM

## 2020-02-10 PROCEDURE — 93294 REM INTERROG EVL PM/LDLS PM: CPT | Mod: ,,, | Performed by: PEDIATRICS

## 2020-02-10 PROCEDURE — 93294 CV PACEMAKER REMOTE PEDIATRICS (CUPID ONLY): ICD-10-PCS | Mod: ,,, | Performed by: PEDIATRICS

## 2020-02-10 PROCEDURE — 99999 PR PBB SHADOW E&M-EST. PATIENT-LVL I: CPT | Mod: PBBFAC,,,

## 2020-02-10 PROCEDURE — 99999 PR PBB SHADOW E&M-EST. PATIENT-LVL I: ICD-10-PCS | Mod: PBBFAC,,,

## 2020-02-10 PROCEDURE — 99211 OFF/OP EST MAY X REQ PHY/QHP: CPT | Mod: PBBFAC

## 2020-02-10 PROCEDURE — 93296 REM INTERROG EVL PM/IDS: CPT | Mod: PBBFAC | Performed by: PEDIATRICS

## 2020-02-13 ENCOUNTER — TELEPHONE (OUTPATIENT)
Dept: PEDIATRIC CARDIOLOGY | Facility: CLINIC | Age: 30
End: 2020-02-13

## 2020-02-13 NOTE — TELEPHONE ENCOUNTER
Returned mothers call. She wishes to reschedule today's appointments. Rescheduled to March 5th. Verified address and will mail out appointment slip.

## 2020-02-16 LAB
AV DELAY - LONGEST: 240 MSEC
BATTERY VOLTAGE (V): 3.02 V
ERI (V): 2.63 V
IMPEDANCE RA LEAD (NATIVE): 494 OHMS
IMPEDANCE RA LEAD: 380 OHMS
OHS CV DC PP MS1: 0.4 MS
OHS CV DC PP MS2: 0.4 MS
OHS CV DC PP V1: 1.5 V
OHS CV DC PP V2: 2 V
P/R-WAVE RA LEAD (NATIVE): 1.9 MV
P/R-WAVE RA LEAD: 2 MV
PV DELAY - LONGEST: 220 MSEC
THRESHOLD MS RA LEAD (NATIVE): 0.4 MS
THRESHOLD MS RA LEAD: 0.4 MS
THRESHOLD V RA LEAD (NATIVE): 1 V
THRESHOLD V RA LEAD: 0.38 V

## 2020-03-05 ENCOUNTER — OFFICE VISIT (OUTPATIENT)
Dept: CARDIOLOGY | Facility: CLINIC | Age: 30
End: 2020-03-05
Attending: PEDIATRICS
Payer: MEDICAID

## 2020-03-05 ENCOUNTER — HOSPITAL ENCOUNTER (OUTPATIENT)
Dept: CARDIOLOGY | Facility: CLINIC | Age: 30
Discharge: HOME | End: 2020-03-05
Attending: PEDIATRICS
Payer: MEDICAID

## 2020-03-05 ENCOUNTER — CLINICAL SUPPORT (OUTPATIENT)
Dept: PEDIATRIC CARDIOLOGY | Facility: CLINIC | Age: 30
End: 2020-03-05
Attending: PEDIATRICS
Payer: MEDICAID

## 2020-03-05 VITALS
DIASTOLIC BLOOD PRESSURE: 71 MMHG | WEIGHT: 269.38 LBS | SYSTOLIC BLOOD PRESSURE: 129 MMHG | OXYGEN SATURATION: 97 % | BODY MASS INDEX: 44.88 KG/M2 | HEART RATE: 74 BPM | HEIGHT: 65 IN

## 2020-03-05 DIAGNOSIS — Q21.3 TETRALOGY OF FALLOT: ICD-10-CM

## 2020-03-05 DIAGNOSIS — I49.3 PVC'S (PREMATURE VENTRICULAR CONTRACTIONS): ICD-10-CM

## 2020-03-05 DIAGNOSIS — Z95.0 PACEMAKER: ICD-10-CM

## 2020-03-05 DIAGNOSIS — I48.92 ATRIAL FLUTTER, UNSPECIFIED TYPE: ICD-10-CM

## 2020-03-05 DIAGNOSIS — I48.91 ATRIAL FIBRILLATION, UNSPECIFIED TYPE: ICD-10-CM

## 2020-03-05 DIAGNOSIS — I11.9 RIGHT VENTRICULAR HYPERTENSION: ICD-10-CM

## 2020-03-05 DIAGNOSIS — Q24.9 ADULT CONGENITAL HEART DISEASE: ICD-10-CM

## 2020-03-05 DIAGNOSIS — I48.92 ATRIAL FLUTTER, UNSPECIFIED TYPE: Primary | ICD-10-CM

## 2020-03-05 DIAGNOSIS — I47.20 VT (VENTRICULAR TACHYCARDIA): ICD-10-CM

## 2020-03-05 DIAGNOSIS — Z87.74 S/P SURGERY FOR COMPLEX CONGENITAL HEART DISEASE: ICD-10-CM

## 2020-03-05 DIAGNOSIS — I48.4 ATYPICAL ATRIAL FLUTTER: ICD-10-CM

## 2020-03-05 PROCEDURE — 99999 PR PBB SHADOW E&M-EST. PATIENT-LVL I: ICD-10-PCS | Mod: PBBFAC,,,

## 2020-03-05 PROCEDURE — 99999 PR PBB SHADOW E&M-EST. PATIENT-LVL III: CPT | Mod: PBBFAC,,, | Performed by: PEDIATRICS

## 2020-03-05 PROCEDURE — 99999 PR PBB SHADOW E&M-EST. PATIENT-LVL I: CPT | Mod: PBBFAC,,,

## 2020-03-05 PROCEDURE — 93005 ELECTROCARDIOGRAM TRACING: CPT | Mod: PBBFAC,59 | Performed by: INTERNAL MEDICINE

## 2020-03-05 PROCEDURE — 93280 CV PACEMAKER PROGRAMMING PEDIATRICS (CUPID ONLY): ICD-10-PCS | Mod: 26,S$PBB,, | Performed by: PEDIATRICS

## 2020-03-05 PROCEDURE — 99211 OFF/OP EST MAY X REQ PHY/QHP: CPT | Mod: PBBFAC,25,27

## 2020-03-05 PROCEDURE — 99213 OFFICE O/P EST LOW 20 MIN: CPT | Mod: PBBFAC,25,27 | Performed by: PEDIATRICS

## 2020-03-05 PROCEDURE — 93227: ICD-10-PCS | Mod: ,,, | Performed by: PEDIATRICS

## 2020-03-05 PROCEDURE — 93280 PM DEVICE PROGR EVAL DUAL: CPT | Mod: PBBFAC | Performed by: PEDIATRICS

## 2020-03-05 PROCEDURE — 99215 OFFICE O/P EST HI 40 MIN: CPT | Mod: 25,S$PBB,, | Performed by: PEDIATRICS

## 2020-03-05 PROCEDURE — 93010 EKG 12-LEAD: ICD-10-PCS | Mod: S$PBB,,, | Performed by: INTERNAL MEDICINE

## 2020-03-05 PROCEDURE — 99999 PR PBB SHADOW E&M-EST. PATIENT-LVL III: ICD-10-PCS | Mod: PBBFAC,,, | Performed by: PEDIATRICS

## 2020-03-05 PROCEDURE — 93010 ELECTROCARDIOGRAM REPORT: CPT | Mod: S$PBB,,, | Performed by: INTERNAL MEDICINE

## 2020-03-05 PROCEDURE — 93227 XTRNL ECG REC<48 HR R&I: CPT | Mod: ,,, | Performed by: PEDIATRICS

## 2020-03-05 PROCEDURE — 99215 PR OFFICE/OUTPT VISIT, EST, LEVL V, 40-54 MIN: ICD-10-PCS | Mod: 25,S$PBB,, | Performed by: PEDIATRICS

## 2020-03-05 PROCEDURE — 99211 OFF/OP EST MAY X REQ PHY/QHP: CPT | Mod: PBBFAC,25

## 2020-03-05 RX ORDER — METOPROLOL SUCCINATE 25 MG/1
25 TABLET, EXTENDED RELEASE ORAL DAILY
COMMUNITY
End: 2020-12-11

## 2020-03-05 NOTE — PROGRESS NOTES
Thank you for referring your patient Cipriano Thompson to the electrophysiology clinic for consultation. The patient is accompanied by his mother. Please review my findings below.    CHIEF COMPLAINT: Outpatient EP follow up after PM generator change.    HISTORY OF PRESENT ILLNESS:   Cipriano is a 29 y.o. y.o. Male with Down's syndrome, TOF s/p complete repair at age ~2 years (shunt as a baby via thoracotomy) with subsequent PVR in 1999 for right ventricular dilation and pacemaker placement in 2008 for Sinus node dysfunction. He presented with exercise intolerance and moderate PS/PI so he was referred for repeat PVR. Also with history of hypertension and ventricular ectopy with frequent PVC's.  He had a cardiac catheterization for possible Jeanna valve implantation on 4/4/17 and were unable to secondary to coronary artery anatomy. He underwent surgical pulmonary valve placement on 5/2/17.  He had frequent PVC's in the post op period.  Cipriano underwent atrial flutter ablation in November 2017.  Cipriano underwent pacemaker generator change on 3/23/18.  Due to nonsustained VT on pacemaker check, he underwent NIEPS on 3/27/19 that was negative for inducible VT.      Interval Hx:  Mom says that ever since his pacemaker generator was replaced he has been different.  He does not sleep at night but sleeps all day however this is not new.  Mom says he still does not have any energy to do anything.  He falls asleep easily.  He does have sleep apnea and has a CPAP machine but does not wear it.  He gets easily winded.  Dr. Yaniv Dean is his primary cardiologist and there has not been any hemodynamic concern but he is due for follow up.  He has no syncope or near syncope.  He has no chest pain or palpitations.  He has no fatigue or change in activity tolerance.    REVIEW OF SYSTEMS:     GENERAL: No fever, chills, fatigability or weight loss.  SKIN: No rashes, itching or changes in color or texture of skin.  CHEST: Denies ECHEVERRIA, cyanosis,  wheezing, cough and sputum production.  CARDIOVASCULAR: Denies chest pain or reduced exercise tolerance.  ABDOMEN: Appetite fine. No weight loss. Denies diarrhea, abdominal pain, or vomiting.  PERIPHERAL VASCULAR: No claudication or cyanosis.  MUSCULOSKELETAL: No joint stiffness or swelling.   NEUROLOGIC: No history of seizures,  alteration of gait or coordination.    PAST MEDICAL HISTORY:   Past Medical History:   Diagnosis Date    CHF (congestive heart failure)     Down syndrome     Encounter for blood transfusion     S/P surgery for complex congenital heart disease 1/30/2017    Tetralogy of Fallot 05/1990           FAMILY HISTORY:   Family History   Problem Relation Age of Onset    No Known Problems Mother     No Known Problems Sister          SOCIAL HISTORY:   Social History     Socioeconomic History    Marital status: Single     Spouse name: Not on file    Number of children: Not on file    Years of education: Not on file    Highest education level: Not on file   Occupational History    Not on file   Social Needs    Financial resource strain: Not on file    Food insecurity:     Worry: Not on file     Inability: Not on file    Transportation needs:     Medical: Not on file     Non-medical: Not on file   Tobacco Use    Smoking status: Never Smoker    Smokeless tobacco: Never Used   Substance and Sexual Activity    Alcohol use: No    Drug use: No    Sexual activity: Not Currently   Lifestyle    Physical activity:     Days per week: Not on file     Minutes per session: Not on file    Stress: Not on file   Relationships    Social connections:     Talks on phone: Not on file     Gets together: Not on file     Attends Buddhism service: Not on file     Active member of club or organization: Not on file     Attends meetings of clubs or organizations: Not on file     Relationship status: Not on file   Other Topics Concern    Not on file   Social History Narrative    Lives with mother, 1 dog  "(inside)       ALLERGIES:  Review of patient's allergies indicates:   Allergen Reactions    Latex, natural rubber     Sulfa (sulfonamide antibiotics)        MEDICATIONS:    Current Outpatient Medications:     allopurinol (ZYLOPRIM) 100 MG tablet, Take 100 mg by mouth 2 (two) times daily. , Disp: , Rfl:     carvedilol (COREG) 25 MG tablet, Take 1 tablet (25 mg total) by mouth 2 (two) times daily with meals. (Patient taking differently: Take 12.5 mg by mouth 2 (two) times daily. ), Disp: 60 tablet, Rfl: 11    levothyroxine (SYNTHROID) 88 MCG tablet, Take 88 mcg by mouth before breakfast. , Disp: , Rfl:     metoprolol succinate (TOPROL-XL) 25 MG 24 hr tablet, Take 12.5 mg by mouth 2 (two) times daily., Disp: , Rfl:       PHYSICAL EXAM:   Vitals:    03/05/20 1316   BP: 129/71   BP Location: Left arm   Patient Position: Sitting   BP Method: Large (Automatic)   Pulse: 74   SpO2: 97%   Weight: 122.2 kg (269 lb 6.4 oz)   Height: 5' 5" (1.651 m)         GENERAL: Awake, well-developed well-nourished, no apparent distress. +Down's facies.  HEENT: mucous membranes moist and pink, normocephalic atraumatic, no cranial or carotid bruits, sclera anicteric  NECK: no jugular venous distention, no lymphadenopathy  CHEST: Good air movement, clear to auscultation bilaterally, pacemaker incision healing well.  CARDIOVASCULAR: Quiet precordium, regular rate and rhythm, S1S2, no rubs or gallops. 1/6 MARIELA at LSB.  ABDOMEN: Soft, nontender nondistended, no hepatomegaly, no aortic bruits  EXTREMITIES: Warm well perfused, 2+ radial/pedal pulses, capillary refill 2 seconds, no clubbing, cyanosis, or edema  NEURO: Alert and oriented, cooperative with exam, face symmetric, moves all extremities well    STUDIES:  ECG: Atrial pacing with intact AV conduction and RBBB ( msec), one PVC    Pacemaker programming:  Following physician: Bradley    Permanent Programming   RA Lead: 1.5 Adaptive V @ 0.4 ms. Sensitivity: 0.6 mV.   RV Lead: 2.0 V @ " 0.4 ms. Sensitivity: 0.9 mV.     Pacemaker Generator and Leads meet standard of FDA approval.     Chamber type: dual.   Mode: AAI <=> DDD   Lower limit rate: 75 bpm     Tachy Zone   AT1 Rate: > 171 bpm   Therapies: monitor    VT1 Rate: > 150 bpm   Therapies: monitor   Device Analysis 2     Estimated longevity: 11.4 yrs.       Mode Switch: No  Nonsustained VT: No    Other: No new episodes since last [facility] transmission.    Leads  RA Lead:   P/R-wave: 2.0 mV  Lead Impedance: 361 Ohms  Paced: 2%   RV Lead:   P/R-wave: 2.8 mV  Impedance: 456 Ohms  Paced: 69%       Thresholds  RA Lead: 0.75 V @ 0.4 ms. Configuration: bipolar.   RV Lead: 1.0 V @ 0.4 ms. Configuration: bipolar.   Device Comments     Wound check comments:   Chronic left upper chest device site. Skin intact.    General comments:   Pacemaker interrogation and lead testing performed. All data reviewed.   No new episodes since last Carelink transmission.     Next REMOTE check scheduled for Monday, June 8, 2020.    Implants:  Device MedQueue-it Analilia MARTÍNEZ [id number] Implanted: 23-Mar-2018  Atrial Medtronic 4076 CapSureFix® Novus [id number] Implanted: 09-Oct-2008  RV Medtronic 4076 CapSureFix® Novus [id number] Implanted: 09-Oct-2008         ASSESSMENT:  Encounter Diagnoses   Name Primary?    Atrial flutter, unspecified type Yes    S/P surgery for complex congenital heart disease     Tetralogy of Fallot     PVC's (premature ventricular contractions)     VT (ventricular tachycardia)     Adult congenital heart disease     Pacemaker      29 y.o. male with TOF s/p repair and s/p pulmonary valve replacement.  He has pacemaker for sinus node dysfunction.  He is s/p flutter ablation without signs of recurrent flutter.  He has nonsustained VT on remote device checks s/p negative NIEPS in March 2019.      PLAN:   1.   2. Keep f/u with Dr. Dean as planned.  3. F/u in EP clinic in 6 months with ECG, pacemaker check, and Holter.  4. Call for syncope,  palpitations, chest pain, difficulty breathing, fatigue, or any other questions or concerns in the interim.        The patient's doctor will be notified via EPIC/FAX    I hope this brings you up-to-date on Ciprianoparris Brandons  Please contact me with any questions or concerns.    Jennifer Huerta MD  Pediatric and Adult Congenital Electrophysiologist  Pediatric Cardiologist

## 2020-03-08 LAB
IMPEDANCE RA LEAD (NATIVE): 456 OHMS
IMPEDANCE RA LEAD: 361 OHMS
OHS CV DC PP MS1: 0.4 MS
OHS CV DC PP MS2: 0.4 MS
OHS CV DC PP V1: NORMAL V
OHS CV DC PP V2: 2 V
P/R-WAVE RA LEAD (NATIVE): 2.8 MV
P/R-WAVE RA LEAD: 2 MV
THRESHOLD MS RA LEAD (NATIVE): 0.4 MS
THRESHOLD MS RA LEAD: 0.4 MS
THRESHOLD V RA LEAD (NATIVE): 1 V
THRESHOLD V RA LEAD: 0.75 V

## 2020-03-13 DIAGNOSIS — I48.92 ATRIAL FLUTTER, UNSPECIFIED TYPE: ICD-10-CM

## 2020-03-13 DIAGNOSIS — Q21.3 TETRALOGY OF FALLOT: Primary | ICD-10-CM

## 2020-03-13 LAB
OHS CV EVENT MONITOR DAY: 0
OHS CV HOLTER LENGTH DECIMAL HOURS: 17
OHS CV HOLTER LENGTH HOURS: 17
OHS CV HOLTER LENGTH MINUTES: 0

## 2020-03-18 ENCOUNTER — TELEPHONE (OUTPATIENT)
Dept: PEDIATRIC CARDIOLOGY | Facility: CLINIC | Age: 30
End: 2020-03-18

## 2020-06-05 ENCOUNTER — TELEPHONE (OUTPATIENT)
Dept: PEDIATRIC CARDIOLOGY | Facility: CLINIC | Age: 30
End: 2020-06-05

## 2020-06-05 NOTE — TELEPHONE ENCOUNTER
Spoke to patients mom, informed Cipriano is scheduled on Monday to do a remote transmission on device. Informed transmission can be sent anytime before Monday. Verbalized understanding.

## 2020-06-08 ENCOUNTER — CLINICAL SUPPORT (OUTPATIENT)
Dept: PEDIATRIC CARDIOLOGY | Facility: CLINIC | Age: 30
End: 2020-06-08
Attending: PEDIATRICS
Payer: MEDICAID

## 2020-06-08 DIAGNOSIS — Z95.0 PACEMAKER: ICD-10-CM

## 2020-06-08 DIAGNOSIS — I48.4 ATYPICAL ATRIAL FLUTTER: ICD-10-CM

## 2020-06-08 DIAGNOSIS — I11.9 RIGHT VENTRICULAR HYPERTENSION: ICD-10-CM

## 2020-06-08 DIAGNOSIS — I49.3 PVC'S (PREMATURE VENTRICULAR CONTRACTIONS): ICD-10-CM

## 2020-06-08 DIAGNOSIS — I48.91 ATRIAL FIBRILLATION, UNSPECIFIED TYPE: ICD-10-CM

## 2020-06-08 DIAGNOSIS — Q21.3 TETRALOGY OF FALLOT: ICD-10-CM

## 2020-06-08 PROCEDURE — 93296 REM INTERROG EVL PM/IDS: CPT | Mod: PBBFAC | Performed by: PEDIATRICS

## 2020-06-08 PROCEDURE — 99211 OFF/OP EST MAY X REQ PHY/QHP: CPT | Mod: PBBFAC

## 2020-06-08 PROCEDURE — 93294 CV PACEMAKER REMOTE PEDIATRICS (CUPID ONLY): ICD-10-PCS | Mod: ,,, | Performed by: PEDIATRICS

## 2020-06-08 PROCEDURE — 99999 PR PBB SHADOW E&M-EST. PATIENT-LVL I: ICD-10-PCS | Mod: PBBFAC,,,

## 2020-06-08 PROCEDURE — 93294 REM INTERROG EVL PM/LDLS PM: CPT | Mod: ,,, | Performed by: PEDIATRICS

## 2020-06-08 PROCEDURE — 99999 PR PBB SHADOW E&M-EST. PATIENT-LVL I: CPT | Mod: PBBFAC,,,

## 2020-06-15 LAB
BATTERY VOLTAGE (V): 3.02 V
ERI (V): 2.63 V
IMPEDANCE RA LEAD (NATIVE): 456 OHMS
IMPEDANCE RA LEAD: 380 OHMS
OHS CV DC PP MS1: 0.4 MS
OHS CV DC PP MS2: 0.9 MS
OHS CV DC PP V1: NORMAL V
OHS CV DC PP V2: NORMAL V
P/R-WAVE RA LEAD (NATIVE): 2.8 MV
P/R-WAVE RA LEAD: 2.1 MV
THRESHOLD MS RA LEAD (NATIVE): 0.4 MS
THRESHOLD MS RA LEAD: 0.4 MS
THRESHOLD V RA LEAD (NATIVE): 1 V
THRESHOLD V RA LEAD: 0.5 V

## 2020-09-11 ENCOUNTER — TELEPHONE (OUTPATIENT)
Dept: PEDIATRIC CARDIOLOGY | Facility: CLINIC | Age: 30
End: 2020-09-11

## 2020-09-11 NOTE — TELEPHONE ENCOUNTER
Spoke to patients guardian, informed Cipriano is scheduled on Monday to do a remote transmission on device. Informed transmission can be sent anytime before Monday. Verbalized understanding.

## 2020-09-14 ENCOUNTER — CLINICAL SUPPORT (OUTPATIENT)
Dept: PEDIATRIC CARDIOLOGY | Facility: CLINIC | Age: 30
End: 2020-09-14
Attending: PEDIATRICS
Payer: MEDICAID

## 2020-09-14 DIAGNOSIS — I48.4 ATYPICAL ATRIAL FLUTTER: ICD-10-CM

## 2020-09-14 DIAGNOSIS — I11.9 RIGHT VENTRICULAR HYPERTENSION: ICD-10-CM

## 2020-09-14 DIAGNOSIS — Q24.9 ADULT CONGENITAL HEART DISEASE: Primary | ICD-10-CM

## 2020-09-14 DIAGNOSIS — Z95.0 PACEMAKER: ICD-10-CM

## 2020-09-14 DIAGNOSIS — I49.3 PVC'S (PREMATURE VENTRICULAR CONTRACTIONS): ICD-10-CM

## 2020-09-14 DIAGNOSIS — Q21.3 TETRALOGY OF FALLOT: ICD-10-CM

## 2020-09-14 DIAGNOSIS — Z45.010 PACEMAKER AT END OF BATTERY LIFE: ICD-10-CM

## 2020-09-14 DIAGNOSIS — I48.91 ATRIAL FIBRILLATION, UNSPECIFIED TYPE: ICD-10-CM

## 2020-09-14 PROCEDURE — 99211 OFF/OP EST MAY X REQ PHY/QHP: CPT | Mod: PBBFAC

## 2020-09-14 PROCEDURE — 93296 REM INTERROG EVL PM/IDS: CPT | Mod: PBBFAC | Performed by: PEDIATRICS

## 2020-09-14 PROCEDURE — 93294 REM INTERROG EVL PM/LDLS PM: CPT | Mod: ,,, | Performed by: PEDIATRICS

## 2020-09-14 PROCEDURE — 99999 PR PBB SHADOW E&M-EST. PATIENT-LVL I: ICD-10-PCS | Mod: PBBFAC,,,

## 2020-09-14 PROCEDURE — 99999 PR PBB SHADOW E&M-EST. PATIENT-LVL I: CPT | Mod: PBBFAC,,,

## 2020-09-14 PROCEDURE — 93294 CV PACEMAKER REMOTE PEDIATRICS (CUPID ONLY): ICD-10-PCS | Mod: ,,, | Performed by: PEDIATRICS

## 2020-09-16 LAB
AV DELAY - LONGEST: 240 MSEC
BATTERY VOLTAGE (V): 3.02 V
ERI (V): 2.63 V
IMPEDANCE RA LEAD (NATIVE): 458 OHMS
IMPEDANCE RA LEAD: 381 OHMS
OHS CV DC PP MS1: 0.4 MS
OHS CV DC PP MS2: 0.4 MS
OHS CV DC PP V1: NORMAL V
OHS CV DC PP V2: NORMAL V
P/R-WAVE RA LEAD (NATIVE): 2.5 MV
P/R-WAVE RA LEAD: 2.5 MV
PV DELAY - LONGEST: 220 MSEC
THRESHOLD MS RA LEAD (NATIVE): 0.4 MS
THRESHOLD MS RA LEAD: 0.4 MS
THRESHOLD V RA LEAD (NATIVE): 1 V
THRESHOLD V RA LEAD: 0.38 V

## 2020-10-26 ENCOUNTER — TELEPHONE (OUTPATIENT)
Dept: PEDIATRIC CARDIOLOGY | Facility: CLINIC | Age: 30
End: 2020-10-26

## 2020-10-26 DIAGNOSIS — Q21.3 TETRALOGY OF FALLOT: ICD-10-CM

## 2020-10-26 DIAGNOSIS — Z95.0 PACEMAKER: ICD-10-CM

## 2020-10-26 DIAGNOSIS — I37.9 PULMONARY VALVE DISORDER: Primary | ICD-10-CM

## 2020-10-26 NOTE — TELEPHONE ENCOUNTER
Received message that mother would like to speak with staff. Returned call and mother states that Cipriano has gained a significant amount of weight, falls asleep easily and has been recently wetting the bed. Relayed that these issues need to be discussed with patient's PCP and advised mother to have patient seen as soon as possible.   Mother also states that Cipriano was a patient of Dr. LISE Dean, who recently retired. Schedule clinic follow up with EKG, Echo, device check and visits with Dr. Huerta and Dr. Valle on 12/10, which is the next available with all required testing. Mother voiced understanding.

## 2020-12-10 ENCOUNTER — OFFICE VISIT (OUTPATIENT)
Dept: CARDIOLOGY | Facility: CLINIC | Age: 30
End: 2020-12-10
Payer: MEDICAID

## 2020-12-10 ENCOUNTER — HOSPITAL ENCOUNTER (OUTPATIENT)
Dept: CARDIOLOGY | Facility: HOSPITAL | Age: 30
Discharge: HOME | End: 2020-12-10
Attending: PEDIATRICS
Payer: MEDICAID

## 2020-12-10 ENCOUNTER — HOSPITAL ENCOUNTER (OUTPATIENT)
Dept: CARDIOLOGY | Facility: CLINIC | Age: 30
Discharge: HOME | End: 2020-12-10
Payer: MEDICAID

## 2020-12-10 ENCOUNTER — CLINICAL SUPPORT (OUTPATIENT)
Dept: CARDIOLOGY | Facility: CLINIC | Age: 30
End: 2020-12-10
Attending: PEDIATRICS
Payer: MEDICAID

## 2020-12-10 VITALS
OXYGEN SATURATION: 92 % | SYSTOLIC BLOOD PRESSURE: 129 MMHG | WEIGHT: 293.44 LBS | HEIGHT: 65 IN | DIASTOLIC BLOOD PRESSURE: 65 MMHG | BODY MASS INDEX: 48.89 KG/M2 | HEART RATE: 75 BPM

## 2020-12-10 VITALS
OXYGEN SATURATION: 92 % | DIASTOLIC BLOOD PRESSURE: 65 MMHG | HEART RATE: 90 BPM | WEIGHT: 269 LBS | SYSTOLIC BLOOD PRESSURE: 129 MMHG | HEART RATE: 75 BPM | WEIGHT: 293.44 LBS | BODY MASS INDEX: 44.82 KG/M2 | HEIGHT: 65 IN | BODY MASS INDEX: 48.89 KG/M2 | HEIGHT: 65 IN

## 2020-12-10 DIAGNOSIS — I48.92 ATRIAL FLUTTER, UNSPECIFIED TYPE: ICD-10-CM

## 2020-12-10 DIAGNOSIS — E03.9 HYPOTHYROIDISM, UNSPECIFIED TYPE: Primary | ICD-10-CM

## 2020-12-10 DIAGNOSIS — I37.9 PULMONARY VALVE DISORDER: ICD-10-CM

## 2020-12-10 DIAGNOSIS — Z95.2 PULMONARY VALVE REPLACED: ICD-10-CM

## 2020-12-10 DIAGNOSIS — Q21.3 TETRALOGY OF FALLOT: ICD-10-CM

## 2020-12-10 DIAGNOSIS — Z95.0 PACEMAKER: ICD-10-CM

## 2020-12-10 DIAGNOSIS — Z45.010 PACEMAKER AT END OF BATTERY LIFE: ICD-10-CM

## 2020-12-10 DIAGNOSIS — I49.3 PVC'S (PREMATURE VENTRICULAR CONTRACTIONS): ICD-10-CM

## 2020-12-10 DIAGNOSIS — Z87.74 S/P SURGERY FOR COMPLEX CONGENITAL HEART DISEASE: ICD-10-CM

## 2020-12-10 DIAGNOSIS — I47.20 VT (VENTRICULAR TACHYCARDIA): ICD-10-CM

## 2020-12-10 DIAGNOSIS — I37.2 NONRHEUMATIC PULMONARY VALVE STENOSIS WITH INSUFFICIENCY: ICD-10-CM

## 2020-12-10 DIAGNOSIS — Q24.9 ADULT CONGENITAL HEART DISEASE: ICD-10-CM

## 2020-12-10 DIAGNOSIS — Q90.9 DOWN SYNDROME: ICD-10-CM

## 2020-12-10 DIAGNOSIS — Z95.0 PACEMAKER: Primary | ICD-10-CM

## 2020-12-10 DIAGNOSIS — I11.9 RIGHT VENTRICULAR HYPERTENSION: ICD-10-CM

## 2020-12-10 PROBLEM — Z79.01 ANTICOAGULATION GOAL OF INR 2 TO 3: Status: RESOLVED | Noted: 2017-09-13 | Resolved: 2020-12-10

## 2020-12-10 PROBLEM — I37.1 PULMONARY VALVE INSUFFICIENCY: Status: RESOLVED | Noted: 2017-05-02 | Resolved: 2020-12-10

## 2020-12-10 PROBLEM — Z79.01 LONG TERM (CURRENT) USE OF ANTICOAGULANTS: Status: RESOLVED | Noted: 2017-09-14 | Resolved: 2020-12-10

## 2020-12-10 PROBLEM — Z51.81 ANTICOAGULATION GOAL OF INR 2 TO 3: Status: RESOLVED | Noted: 2017-09-13 | Resolved: 2020-12-10

## 2020-12-10 LAB
ASCENDING AORTA: 2.85 CM
AV INDEX (PROSTH): 0.8
AV MEAN GRADIENT: 4 MMHG
AV PEAK GRADIENT: 5 MMHG
AV VALVE AREA: 3.83 CM2
AV VELOCITY RATIO: 0.7
BSA FOR ECHO PROCEDURE: 2.37 M2
CV ECHO LV RWT: 0.39 CM
DOP CALC AO PEAK VEL: 1.13 M/S
DOP CALC AO VTI: 19.92 CM
DOP CALC LVOT AREA: 4.8 CM2
DOP CALC LVOT DIAMETER: 2.47 CM
DOP CALC LVOT PEAK VEL: 0.79 M/S
DOP CALC LVOT STROKE VOLUME: 76.34 CM3
DOP CALC RVOT PEAK VEL: 1.45 M/S
DOP CALC RVOT VTI: 32.86 CM
DOP CALCLVOT PEAK VEL VTI: 15.94 CM
E WAVE DECELERATION TIME: 172.53 MSEC
E/A RATIO: 1.81
E/E' RATIO: 10.4 M/S
ECHO LV POSTERIOR WALL: 0.93 CM (ref 0.6–1.1)
FRACTIONAL SHORTENING: 35 % (ref 28–44)
INTERVENTRICULAR SEPTUM: 0.93 CM (ref 0.6–1.1)
LA MAJOR: 5.55 CM
LA MINOR: 5.54 CM
LA WIDTH: 4.26 CM
LEFT ATRIUM SIZE: 3.11 CM
LEFT ATRIUM VOLUME INDEX MOD: 28.7 ML/M2
LEFT ATRIUM VOLUME INDEX: 26.8 ML/M2
LEFT ATRIUM VOLUME MOD: 66.8 CM3
LEFT ATRIUM VOLUME: 62.44 CM3
LEFT INTERNAL DIMENSION IN SYSTOLE: 3.15 CM (ref 2.1–4)
LEFT VENTRICLE DIASTOLIC VOLUME INDEX: 46.54 ML/M2
LEFT VENTRICLE DIASTOLIC VOLUME: 108.36 ML
LEFT VENTRICLE MASS INDEX: 67 G/M2
LEFT VENTRICLE SYSTOLIC VOLUME INDEX: 16.9 ML/M2
LEFT VENTRICLE SYSTOLIC VOLUME: 39.45 ML
LEFT VENTRICULAR INTERNAL DIMENSION IN DIASTOLE: 4.82 CM (ref 3.5–6)
LEFT VENTRICULAR MASS: 155.43 G
LV LATERAL E/E' RATIO: 9.75 M/S
LV SEPTAL E/E' RATIO: 11.14 M/S
MV PEAK A VEL: 0.43 M/S
MV PEAK E VEL: 0.78 M/S
PISA TR MAX VEL: 3.74 M/S
PV MEAN GRADIENT: 5.42 MMHG
PV PEAK VELOCITY: 2.63 CM/S
RA MAJOR: 5.29 CM
RA WIDTH: 4.13 CM
RIGHT VENTRICULAR END-DIASTOLIC DIMENSION: 4.76 CM
RV TISSUE DOPPLER FREE WALL SYSTOLIC VELOCITY 1 (APICAL 4 CHAMBER VIEW): 8.29 CM/S
SINUS: 3.07 CM
STJ: 2.94 CM
TDI LATERAL: 0.08 M/S
TDI SEPTAL: 0.07 M/S
TDI: 0.08 M/S
TR MAX PG: 56 MMHG
TRICUSPID ANNULAR PLANE SYSTOLIC EXCURSION: 1.44 CM

## 2020-12-10 PROCEDURE — 99214 OFFICE O/P EST MOD 30 MIN: CPT | Mod: 25,S$PBB,, | Performed by: PEDIATRICS

## 2020-12-10 PROCEDURE — 93005 ELECTROCARDIOGRAM TRACING: CPT | Mod: PBBFAC | Performed by: INTERNAL MEDICINE

## 2020-12-10 PROCEDURE — 93010 EKG 12-LEAD: ICD-10-PCS | Mod: S$PBB,,, | Performed by: INTERNAL MEDICINE

## 2020-12-10 PROCEDURE — 99999 PR PBB SHADOW E&M-EST. PATIENT-LVL III: CPT | Mod: PBBFAC,,, | Performed by: PEDIATRICS

## 2020-12-10 PROCEDURE — 99999 PR PBB SHADOW E&M-EST. PATIENT-LVL III: ICD-10-PCS | Mod: PBBFAC,,, | Performed by: PEDIATRICS

## 2020-12-10 PROCEDURE — 99213 OFFICE O/P EST LOW 20 MIN: CPT | Mod: PBBFAC,25 | Performed by: PEDIATRICS

## 2020-12-10 PROCEDURE — 93320 DOPPLER ECHO COMPLETE: CPT | Mod: 26,,, | Performed by: PEDIATRICS

## 2020-12-10 PROCEDURE — 93303 ECHO TRANSTHORACIC: CPT | Mod: 26,,, | Performed by: PEDIATRICS

## 2020-12-10 PROCEDURE — 93280 PM DEVICE PROGR EVAL DUAL: CPT | Mod: PBBFAC | Performed by: PEDIATRICS

## 2020-12-10 PROCEDURE — 93325 DOPPLER ECHO COLOR FLOW MAPG: CPT | Mod: 26,,, | Performed by: PEDIATRICS

## 2020-12-10 PROCEDURE — 93010 ELECTROCARDIOGRAM REPORT: CPT | Mod: S$PBB,,, | Performed by: INTERNAL MEDICINE

## 2020-12-10 PROCEDURE — 93325 ECHO (CUPID ONLY): ICD-10-PCS | Mod: 26,,, | Performed by: PEDIATRICS

## 2020-12-10 PROCEDURE — 93303 ECHO TRANSTHORACIC: CPT

## 2020-12-10 PROCEDURE — 93303 ECHO (CUPID ONLY): ICD-10-PCS | Mod: 26,,, | Performed by: PEDIATRICS

## 2020-12-10 PROCEDURE — 99215 OFFICE O/P EST HI 40 MIN: CPT | Mod: 25,S$PBB,, | Performed by: PEDIATRICS

## 2020-12-10 PROCEDURE — 93280 CARDIAC DEVICE CHECK - IN CLINIC & HOSPITAL: ICD-10-PCS | Mod: 26,S$PBB,, | Performed by: PEDIATRICS

## 2020-12-10 PROCEDURE — 93320 ECHO (CUPID ONLY): ICD-10-PCS | Mod: 26,,, | Performed by: PEDIATRICS

## 2020-12-10 PROCEDURE — 99214 PR OFFICE/OUTPT VISIT, EST, LEVL IV, 30-39 MIN: ICD-10-PCS | Mod: 25,S$PBB,, | Performed by: PEDIATRICS

## 2020-12-10 PROCEDURE — 99215 PR OFFICE/OUTPT VISIT, EST, LEVL V, 40-54 MIN: ICD-10-PCS | Mod: 25,S$PBB,, | Performed by: PEDIATRICS

## 2020-12-10 PROCEDURE — 99213 OFFICE O/P EST LOW 20 MIN: CPT | Mod: PBBFAC,25,27 | Performed by: PEDIATRICS

## 2020-12-10 RX ORDER — HYDROCHLOROTHIAZIDE 25 MG/1
25 TABLET ORAL DAILY
COMMUNITY
Start: 2020-09-18 | End: 2021-06-23 | Stop reason: SDUPTHER

## 2020-12-10 NOTE — PROGRESS NOTES
Thank you for referring your patient Cipriano Thompson to the electrophysiology clinic for consultation. The patient is accompanied by his mother. Please review my findings below.    CHIEF COMPLAINT: Outpatient EP follow up after PM generator change.    HISTORY OF PRESENT ILLNESS:   Cipriano is a 30 y.o. y.o. Male with Down's syndrome, TOF s/p complete repair at age ~2 years (shunt as a baby via thoracotomy) with subsequent PVR in 1999 for right ventricular dilation and pacemaker placement in 2008 for Sinus node dysfunction. He presented with exercise intolerance and moderate PS/PI so he was referred for repeat PVR. Also with history of hypertension and ventricular ectopy with frequent PVC's.  He had a cardiac catheterization for possible Jeanna valve implantation on 4/4/17 and were unable to secondary to coronary artery anatomy. He underwent surgical pulmonary valve placement on 5/2/17.  He had frequent PVC's in the post op period.  Cipriano underwent atrial flutter ablation in November 2017.  Cipriano underwent pacemaker generator change on 3/23/18.  Due to nonsustained VT on pacemaker check, he underwent NIEPS on 3/27/19 that was negative for inducible VT.      Interval Hx:  Cipriano has gained a lot of weight and is swollen.  Mom says he sleeps all day and stays up all night playing video games.  He is saying his foot hurts and he has history of gout.  He falls asleep easily and has history of sleep apnea.  He had CPAP before Shiela but does not have it anymore.  Mom isn't home at night and is not sure if he always takes his medicines.  She is only refilling his medicine box every 3 weeks instead of every week.  He has no syncope or near syncope.  He has no chest pain or palpitations.      REVIEW OF SYSTEMS:     GENERAL: No fever, chills, fatigability or weight loss.  SKIN: No rashes, itching or changes in color or texture of skin.  CHEST: Denies ECHEVERRIA, cyanosis, wheezing, cough and sputum production.  CARDIOVASCULAR: Denies chest  pain or reduced exercise tolerance.  ABDOMEN: Appetite fine. No weight loss. Denies diarrhea, abdominal pain, or vomiting.  PERIPHERAL VASCULAR: No claudication or cyanosis.  MUSCULOSKELETAL: No joint stiffness or swelling.   NEUROLOGIC: No history of seizures,  alteration of gait or coordination.    PAST MEDICAL HISTORY:   Past Medical History:   Diagnosis Date    CHF (congestive heart failure)     Down syndrome     Encounter for blood transfusion     S/P surgery for complex congenital heart disease 1/30/2017    Tetralogy of Fallot 05/1990           FAMILY HISTORY:   Family History   Problem Relation Age of Onset    No Known Problems Mother     No Known Problems Sister          SOCIAL HISTORY:   Social History     Socioeconomic History    Marital status: Single     Spouse name: Not on file    Number of children: Not on file    Years of education: Not on file    Highest education level: Not on file   Occupational History    Not on file   Social Needs    Financial resource strain: Not on file    Food insecurity     Worry: Not on file     Inability: Not on file    Transportation needs     Medical: Not on file     Non-medical: Not on file   Tobacco Use    Smoking status: Never Smoker    Smokeless tobacco: Never Used   Substance and Sexual Activity    Alcohol use: No    Drug use: No    Sexual activity: Not Currently   Lifestyle    Physical activity     Days per week: Not on file     Minutes per session: Not on file    Stress: Not on file   Relationships    Social connections     Talks on phone: Not on file     Gets together: Not on file     Attends Sikhism service: Not on file     Active member of club or organization: Not on file     Attends meetings of clubs or organizations: Not on file     Relationship status: Not on file   Other Topics Concern    Not on file   Social History Narrative    Lives with mother, 1 dog (inside)       ALLERGIES:  Review of patient's allergies indicates:   Allergen  "Reactions    Latex, natural rubber     Sulfa (sulfonamide antibiotics)        MEDICATIONS:    Current Outpatient Medications:     allopurinol (ZYLOPRIM) 100 MG tablet, Take 100 mg by mouth 2 (two) times daily. , Disp: , Rfl:     carvedilol (COREG) 25 MG tablet, Take 1 tablet (25 mg total) by mouth 2 (two) times daily with meals. (Patient taking differently: Take 12.5 mg by mouth 2 (two) times daily. ), Disp: 60 tablet, Rfl: 11    hydroCHLOROthiazide (HYDRODIURIL) 25 MG tablet, Take 25 mg by mouth once daily., Disp: , Rfl:     levothyroxine (SYNTHROID) 88 MCG tablet, Take 88 mcg by mouth before breakfast. , Disp: , Rfl:     metoprolol succinate (TOPROL-XL) 25 MG 24 hr tablet, Take 12.5 mg by mouth 2 (two) times daily., Disp: , Rfl:       PHYSICAL EXAM:   Vitals:    12/10/20 1254 12/10/20 1258   BP: 133/64 129/65   BP Location: Right arm Left arm   Patient Position: Sitting Sitting   BP Method: Large (Automatic) Large (Automatic)   Pulse: 75 75   SpO2: (!) 92%    Weight: 133.1 kg (293 lb 6.9 oz)    Height: 5' 5" (1.651 m)          GENERAL: Awake, well-developed well-nourished, no apparent distress. +Down's facies.  HEENT: mucous membranes moist and pink, normocephalic atraumatic, no cranial or carotid bruits, sclera anicteric  NECK: no jugular venous distention, no lymphadenopathy  CHEST: Good air movement, clear to auscultation bilaterally, pacemaker incision healing well.  CARDIOVASCULAR: Quiet precordium, regular rate and rhythm, S1S2, no rubs or gallops. 1/6 MARIELA at LSB.  ABDOMEN: Soft, nontender nondistended, no hepatomegaly, no aortic bruits  EXTREMITIES: Warm well perfused, 2+ radial/pedal pulses, capillary refill 2 seconds, no clubbing, cyanosis, or edema  NEURO: Alert and oriented, cooperative with exam, face symmetric, moves all extremities well    STUDIES:  ECG: Atrial pacing with intact AV conduction and RBBB ( msec)    Pacemaker programming:  Following physician: Bradley Martinez" Programming   RA Lead: 1.5 Adaptive V @ 0.4 ms. Sensitivity: 0.6 mV.   RV Lead: 2.0 Adaptive V @ 0.4 ms. Sensitivity: 0.9 mV.     Pacemaker Generator and Leads meet standard of FDA approval.     Chamber type: dual.   Mode: AAI <=> DDD   Lower limit rate: 75 bpm   Upper tracking rate: 130 bpm   Max sensor rate: 130 bpm     AV Delay  Longest: 240 msec       PV Delay  Longest: 220 msec       Tachy Zone   AT1      Rate: >  171 bpm               Therapies: monitor    VT1      Rate: >  150 bpm               Therapies: monitor   Device Analysis 2    Battery voltage: 3.01 V  BV @ RINA: 2.63 V  Estimated longevity: 10.8 yrs(10.3-``.2 yrs).       Mode Switch: No  Nonsustained VT: No    Other: 16 Short V-V intervals    Leads  RA Lead:        P/R-wave: 2.0-3.0  mV       Lead Impedance: 361 Ohms       Paced: 51%   RV Lead:        P/R-wave: 2.0-3.1  mV       Impedance: 437 Ohms       Paced: 1%       Thresholds  RA Lead: 0.5 V @ 0.4 ms. Configuration: auto threshold, bipolar.   RV Lead: 1.0 V @ 0.4 ms. Configuration: auto threshold, bipolar.   Device Comments    Wound check comments:   Remote Device Check      Reprogramming comments:   No changes    General comments:   REMOTE transmission received and data reviewed.    Device and leads WNL.  Remaining longevity 10.8 yrs.     NO Arrhythmias noted.   16 Short V-V intervals    Next transmission scheduled for Monday, March 15, 2021      Implants:  Device Medtronic Analilia MARTÍNEZ [id number] Implanted: 23-Mar-2018  Atrial Medtronic 4076 CapSureFix® Novus [id number] Implanted: 09-Oct-2008  RV Medtronic 4076 CapSureFix® Novus [id number] Implanted: 09-Oct-2008     Echo:  Uncooperative with many views-  Qualitative impression of normal right atrial size.  There is a pacing wire crossing the tricuspid valve to the right ventricle with mild-to-moderate associated insufficiency.  Qualitative impression of mild right ventricular dilation hypertrophy with low normal systolic  function.  Right ventricular pressure estimated 56 mmHg above right atrial pressure from reasonably well defined TR doppler profile.  Echodensity consistent with patch repair of ventricular septal defect and malalignment of the ventricular septum with no residual shunt demonstrated.    Bioprosthetic pulmonary valve in good position with peak velocity < 2.7 m/sec, mean gradient <19 mm Hg and no significant insufficiency.  Pulmonary branches were not well demonstrated.  Qualitatively normal left ventricular size and structure.  Abnormal septal motion with good movement of the LV free wall, SF = 35% and EF estimated 55-60% from apical views.    Indeterminate indices of left ventricular diastolic function.   Aortic dimensions:  Sinuses of Valsalva = 31 mm.  ST junction             = 29 mm.  Ascending aorta     = 31mm.  No suprasternal imaging was obtained.        ASSESSMENT:  Encounter Diagnoses   Name Primary?    Pacemaker Yes    S/P surgery for complex congenital heart disease     Tetralogy of Fallot     PVC's (premature ventricular contractions)     Atrial flutter, unspecified type     Pacemaker at end of battery life     VT (ventricular tachycardia)      30 y.o. male with TOF s/p repair and s/p pulmonary valve replacement.  He has pacemaker for sinus node dysfunction.  He is s/p flutter ablation without signs of recurrent flutter.  He has nonsustained VT on remote device checks s/p negative NIEPS in March 2019.  He does not have any current arrhythmias.    PLAN:   1. Continue current medication  2. Remote checks every 3 months  3. F/u in EP clinic in 6 months with ECG, pacemaker check, and Holter.  4. Call for syncope, palpitations, chest pain, difficulty breathing, fatigue, or any other questions or concerns in the interim.        The patient's doctor will be notified via EPIC/FAX    I hope this brings you up-to-date on Cipriano Fobbs  Please contact me with any questions or concerns.    Jennifer Huerta,  MD  Pediatric and Adult Congenital Electrophysiologist  Pediatric Cardiologist

## 2020-12-10 NOTE — PROGRESS NOTES
12/10/2020    re:Cipriano Thompson  :1990    [facility]     Dear Dr. Rodgers:    Cipriano Thompson is a 30 y.o. male seen in my [facility] clinic today for evaluation of congenital heart disease.  To summarize his diagnoses are as follow:  1.  Tetralogy of Fallot status post complete repair with a trans annular patch followed by pulmonary valve replacement with a 27 mm Freestyle Medtronic bioprosthetic valve in .  Both surgeries at [facility].   - now status post placement of a 27 mm [place] bioprosthetic valve in a 30 mm Dacron conduit in the pulmonary position by Dr. Godfrey May 2017.   - unchanged mild pulmonary valve stenosis and no significant pulmonary insufficiency   - echocardiogram suggests a right ventricular systolic pressure around 60 mm of mercury, a little bit higher than on his last echocardiogram.  2.  Sinus node dysfunction status post dual chamber pacemaker  at [facility]  3.  History of atrial flutter status post flutter ablation by Dr. Milligan in   4.  Down syndrome  5.  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  6.  Significant weight gain, new pitting edema in the legs, worsening daytime somnolence and fatigue.  Definite medical noncompliance.  Strong suspicion for undertreated hypothyroidism.  Definite obstructive sleep apnea.  7.  Medical noncompliance  8.  Obesity    To summarize, my recommendations are as follows:  1.  SBE prophylaxis is indicated before procedures.  2.  Discontinue his carvedilol.  He is typically non compliant with the nighttime dose anyway, and we need to try to simplify his medical regimen.  I told mom to give all of his medicines in the morning.  3.  Referral to Sleep Medicine.  I strongly suspect severe obstructive sleep apnea.  I am, however, concerned that he will not tolerate CPAP at home.  4.  Baseline blood work today.  We will check a CMP, CBC, BNP, magnesium, and  thyroid function studies.  5.  Patient needs primary care.  I have reached out to our primary care team here to see if they can work him in.  6.  Mom needs to be more strict about his diet.  She needs to get sweets out of the house.  She needs to make sure he takes his medications.  7.  At a minimum, I need to see him again in 1 year with an echocardiogram and EKG.  He will see electrophysiology as well.    Discussion:  From a cardiac standpoint, he actually looks great.  He has good biventricular function.  His mild pulmonary stenosis is unchanged, and there is no significant insufficiency of his pulmonary valve.  His tricuspid insufficiency jet is a little bit higher today, suggesting elevated right ventricular pressure.  I wonder if he has some distal pulmonary hypertension.  He was snoring throughout the echocardiogram, and I think his obstructive sleep apnea could definitely predispose him to some pulmonary hypertension.  His inferior vena cava was small and collapsed quite a bit on echo, I suspect he was snoring during that time.  I do not see evidence of elevated right atrial pressures and his right atrium is not enlarged.  I do not believe that his edema is cardiac in origin.  Instead, I have a strong suspicion that his hypothyroidism is poorly treated.  My plan is as noted above.    It is unreasonable to expect a Down syndrome patient to be in charge of his own medicines.  Likewise, if there are sweets in the house, he is going to eat them.  She needs to get these foods out of the house.  I tried to simplify his medical regimen today as much as possible so he only needs once a day medications.  That way, mom can make sure he takes these medicines in the morning before she leaves for work.      History of present illness:  He was last seen by Dr. Augustine Dean a year ago.  At that time, an EKG revealed a right bundle branch block with a QRS duration 162 milliseconds.  An echocardiogram revealed frequent  premature ventricular contractions.  Mild tricuspid insufficiency estimated a right ventricular pressure about 45-50 mm of mercury above the right atrial pressure.  Peak pressure gradient across the pulmonary valve 27 mm of mercury.  Trivial pulmonary insufficiency.  Trace aortic insufficiency.  No mention of ventricular function.  In August 2018, he saw Dr. Dean.  His mother was complaining that he was fatigued.  The carvedilol dose was dropped from 25 mg twice a day to 12.5 mg twice a day.  In March 2019, he was noted to have nonsustained ventricular tachycardia on a pacemaker check.  25 mg of metoprolol daily was added, and his carvedilol was continued.  A noninvasive EP study was performed by Dr. Ortiz in March 2019.  This included Isuprel infusion.  There was no inducible ventricular tachycardia.    He has gained almost 30 lb since he was last seen in this clinic by Dr. Huerta.  Over the past few months, he has had progressive pitting edema in his legs up to the knee.  A few months ago, she talk to Dr. Dean about this edema.  He prescribed hydrochlorothiazide.  He is also much more somnolent.  He falls asleep extremely easily, including falling asleep in the bathtub.  He falls asleep within a few seconds of getting in the car.  It sounds like he was diagnosed with obstructive sleep apnea many years ago, but he did not tolerate CPAP.  After Shiela, there was repeat testing where he was borderline for sleep apnea.  He has not been treated.  There is significant medical noncompliance.  A lot of his medications are taken at night.  Mom works until midnight, and she relies on him to take his own medications which she puts in a pillbox.  She states that she has to fill the pillbox once every 3 weeks when she should have to fill at weekly.  He also eats a lot.  He is left to his own for dinner, and he will often eat any sweets that her in the house.  Mom typically has ice cream in the home.    They deny  primary care.  In the past, they were followed by Dr. Rodgers, but they have not seen her in many years.  They are looking for a new primary care doctor.  He has not had blood drawn in too long to remember.    The review of systems is as noted above. It is otherwise negative for other symptoms related to the general, neurological, psychiatric, endocrine, gastrointestinal, genitourinary, respiratory, dermatologic, musculoskeletal, hematologic, and immunologic systems.    Past Medical History:   Diagnosis Date    CHF (congestive heart failure)     Down syndrome     Encounter for blood transfusion     S/P surgery for complex congenital heart disease 1/30/2017    Tetralogy of Fallot 05/1990     Past Surgical History:   Procedure Laterality Date    BUNIONECTOMY      CARDIAC SURGERY      open heart, ppm     INSERT / REPLACE / REMOVE PACEMAKER Left 10/2008    NONINVASIVE CARDIAC ELECTROPHYSIOLOGY STUDY N/A 3/27/2019    Procedure: CARDIAC ELECTROPHYSIOLOGY STUDY, NONINVASIVE;  Surgeon: Jose Ortiz MD;  Location: [facility] EP LAB;  Service: Cardiology;  Laterality: N/A;  VT, NIEPS, Venogram, MDT, MAC, 3 prep, [place]k    TONSILLECTOMY       Family History   Problem Relation Age of Onset    No Known Problems Mother     No Known Problems Sister      Social History     Socioeconomic History    Marital status: Single     Spouse name: Not on file    Number of children: Not on file    Years of education: Not on file    Highest education level: Not on file   Occupational History    Not on file   Social Needs    Financial resource strain: Not on file    Food insecurity     Worry: Not on file     Inability: Not on file    Transportation needs     Medical: Not on file     Non-medical: Not on file   Tobacco Use    Smoking status: Never Smoker    Smokeless tobacco: Never Used   Substance and Sexual Activity    Alcohol use: No    Drug use: No    Sexual activity: Not Currently   Lifestyle    Physical activity     " Days per week: Not on file     Minutes per session: Not on file    Stress: Not on file   Relationships    Social connections     Talks on phone: Not on file     Gets together: Not on file     Attends Islam service: Not on file     Active member of club or organization: Not on file     Attends meetings of clubs or organizations: Not on file     Relationship status: Not on file   Other Topics Concern    Not on file   Social History Narrative    Lives with mother, 1 dog (inside)     Current Outpatient Medications on File Prior to Visit   Medication Sig Dispense Refill    allopurinol (ZYLOPRIM) 100 MG tablet Take 100 mg by mouth 2 (two) times daily.       carvedilol (COREG) 25 MG tablet Take 1 tablet (25 mg total) by mouth 2 (two) times daily with meals. (Patient taking differently: Take 12.5 mg by mouth 2 (two) times daily. ) 60 tablet 11    hydroCHLOROthiazide (HYDRODIURIL) 25 MG tablet Take 25 mg by mouth once daily.      levothyroxine (SYNTHROID) 88 MCG tablet Take 88 mcg by mouth before breakfast.       metoprolol succinate (TOPROL-XL) 25 MG 24 hr tablet Take 12.5 mg by mouth 2 (two) times daily.       No current facility-administered medications on file prior to visit.      Review of patient's allergies indicates:   Allergen Reactions    Adhesive     Latex, natural rubber     Sulfa (sulfonamide antibiotics)         Vitals:    12/10/20 1259 12/10/20 1300   BP: 133/64 129/65   BP Location: Right arm Left arm   Patient Position: Sitting Sitting   BP Method: Large (Automatic) Large (Automatic)   Pulse: 75 75   SpO2: (!) 92%    Weight: 133.1 kg (293 lb 6.9 oz)    Height: 5' 5" (1.651 m)      Of note, he weighed 122 kg March 2020.  He has gained 24 lb since that time.  In general, he is an obese, very somnolent man.  He snored throughout much of the clinic visit.  He was difficult to arouse, and he would fall back asleep very quickly.  He does have Down syndrome.  The eyes, nares, and oropharynx are " clear.  Eyelids and conjunctiva are normal without drainage or erythema.  Pupils equal and round bilaterally.  The head is normocephalic and atraumatic.  The neck is supple without jugular venous distention or thyroid enlargement.  The lungs are clear to auscultation bilaterally.  He has a well-healed median sternotomy incision.  His pacemaker is in the left upper chest.  Heart sounds are somewhat distant.  Normal 1st heart sound, likely split 2nd heart sound.  1/6 systolic ejection murmur with no diastolic murmur.  The abdominal exam is benign without hepatosplenomegaly, tenderness, or distention.  Pulses are normal in all 4 extremities with brisk capillary refill and no clubbing, cyanosis.  However, he has 2+ pitting edema up to about his knee bilaterally.  He has some erythematous, dry skin on both extensor surfaces of his arms.    I personally reviewed the following tests performed today and my interpretation follows:  His EKG reveals sinus rhythm with a right bundle branch block.    Pacemaker was interrogated by the electrophysiology team.  It is working well.    The official echo report is pending.  However, I reviewed all the images of that study.  To summarize my findings:  1.  Mild to moderate tricuspid insufficiency estimates a right ventricular systolic pressure up to 56 mm of mercury greater than the right atrial pressure.  Small inferior vena cava with significant collapse during inspiration suggests low right atrial pressures.  2.  No significant pulmonary insufficiency.  Peak and mean gradient across the bioprosthetic pulmonary valve 30 and 19 mm of mercury, respectively.  3.  No obvious arrhythmia  4.  Dyskinetic interventricular septum, but overall good biventricular function  5.  No pericardial effusion    He had a cardiac catheterization April 4, 2017:  IMPRESSION:   1.  Down syndrome.  Repaired tetralogy of Fallot with subsequent 27 mm FreeStyle bioprosthetic pulmonary valve implantation.   2.   Moderate pulmonary valve stenosis (peak gradient 24 mmHg).  Moderate pulmonary insufficiency.   3.  Normal cardiac output and vascular resistance calculations.   4.  Pacemaker for sinus node dysfunction.   5.  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  Normal origin of left coronary.   6.  Left aortic arch.  Normal head and neck branching.   At that catheterization, his wedge pressure was 13.  Left ventricular end-diastolic pressure 10.  Pulmonary artery pressure mean 22-24.    Thank you for referring this patient to our clinic.  Please call with any questions.    I spent 65 total minutes on this clinic visit.  I reviewed his old medical records.  I reviewed his previous echocardiogram as well as the 1 performed today.  Patient was discussed at length with Dr. Huerta in electrophysiology.  I have also reached out to Dr. Oneal in primary Care to work on getting this patient a good primary care physician.    Sincerely,        Bradley Valle MD  Pediatric Cardiology  Adult Congenital Heart Disease  Pediatric Heart Failure and Transplantation  [facility] [address] [place]  [address]  [phone]

## 2020-12-11 ENCOUNTER — TELEPHONE (OUTPATIENT)
Dept: PEDIATRIC CARDIOLOGY | Facility: CLINIC | Age: 30
End: 2020-12-11

## 2020-12-11 LAB
AV DELAY - LONGEST: 240 MSEC
BATTERY VOLTAGE (V): 3.01 V
ERI (V): 2.63 V
IMPEDANCE RA LEAD (NATIVE): 437 OHMS
IMPEDANCE RA LEAD: 361 OHMS
OHS CV DC PP MS1: 0.4 MS
OHS CV DC PP MS2: 0.4 MS
OHS CV DC PP V1: NORMAL V
OHS CV DC PP V2: NORMAL V
P/R-WAVE RA LEAD (NATIVE): NORMAL MV
P/R-WAVE RA LEAD: NORMAL MV
PV DELAY - LONGEST: 220 MSEC
THRESHOLD MS RA LEAD (NATIVE): 0.4 MS
THRESHOLD MS RA LEAD: 0.4 MS
THRESHOLD V RA LEAD (NATIVE): 1 V
THRESHOLD V RA LEAD: 0.5 V

## 2020-12-11 RX ORDER — METOPROLOL SUCCINATE 25 MG/1
25 TABLET, EXTENDED RELEASE ORAL DAILY
Qty: 30 TABLET | Refills: 11 | Status: SHIPPED | OUTPATIENT
Start: 2020-12-11 | End: 2021-06-23 | Stop reason: SDUPTHER

## 2020-12-13 ENCOUNTER — TELEPHONE (OUTPATIENT)
Dept: INTERNAL MEDICINE | Facility: CLINIC | Age: 30
End: 2020-12-13

## 2020-12-14 NOTE — TELEPHONE ENCOUNTER
----- Message from Bradley Valle MD sent at 12/10/2020  1:49 PM CST -----  Dr. Oneal,    This 30-year-old with Down syndrome needs a good primary care doctor.  Would you be willing to bring him in to your practice?  If you are full, is there someone else at [facility] that has experience with Adult Down's?  I saw him for the 1st time today in my cardiology clinic.  He clearly has obstructive sleep apnea, and I am referring him to sleep medicine.  I also think there is a very good chance that his hypothyroidism is under treated.  I am sending blood work on him today as well.    Thanks,  Kiel

## 2020-12-14 NOTE — TELEPHONE ENCOUNTER
Please contact family to book him an apt to see me next year - you can override him in in February - likely no apt time is going to pop up for him until late march or April due to Medicaid.

## 2021-01-08 ENCOUNTER — TELEPHONE (OUTPATIENT)
Dept: SLEEP MEDICINE | Facility: CLINIC | Age: 31
End: 2021-01-08

## 2021-01-29 ENCOUNTER — OFFICE VISIT (OUTPATIENT)
Dept: SLEEP MEDICINE | Facility: CLINIC | Age: 31
End: 2021-01-29
Payer: MEDICAID

## 2021-01-29 VITALS
SYSTOLIC BLOOD PRESSURE: 132 MMHG | HEIGHT: 65 IN | DIASTOLIC BLOOD PRESSURE: 77 MMHG | BODY MASS INDEX: 47.18 KG/M2 | HEART RATE: 87 BPM | WEIGHT: 283.19 LBS

## 2021-01-29 DIAGNOSIS — G47.10 HYPERSOMNOLENCE: ICD-10-CM

## 2021-01-29 DIAGNOSIS — G47.33 OSA (OBSTRUCTIVE SLEEP APNEA): Primary | ICD-10-CM

## 2021-01-29 PROCEDURE — 99204 PR OFFICE/OUTPT VISIT, NEW, LEVL IV, 45-59 MIN: ICD-10-PCS | Mod: S$PBB,,, | Performed by: INTERNAL MEDICINE

## 2021-01-29 PROCEDURE — 99999 PR PBB SHADOW E&M-EST. PATIENT-LVL III: CPT | Mod: PBBFAC,,, | Performed by: INTERNAL MEDICINE

## 2021-01-29 PROCEDURE — 99204 OFFICE O/P NEW MOD 45 MIN: CPT | Mod: S$PBB,,, | Performed by: INTERNAL MEDICINE

## 2021-01-29 PROCEDURE — 99213 OFFICE O/P EST LOW 20 MIN: CPT | Mod: PBBFAC | Performed by: INTERNAL MEDICINE

## 2021-01-29 PROCEDURE — 99999 PR PBB SHADOW E&M-EST. PATIENT-LVL III: ICD-10-PCS | Mod: PBBFAC,,, | Performed by: INTERNAL MEDICINE

## 2021-02-03 ENCOUNTER — TELEPHONE (OUTPATIENT)
Dept: INTERNAL MEDICINE | Facility: CLINIC | Age: 31
End: 2021-02-03

## 2021-02-04 NOTE — TELEPHONE ENCOUNTER
Discussed blood work with the mother.  CMP, CBC, and BNP looked great.  His TSH is mildly.  However, I suspect that if he took his Synthroid daily instead of missing doses frequently, the current dose of Synthroid would likely be adequate.  We once again discussed trying to get the majority of his medications in in the morning when mom is able to make sure he takes them.  My plan is as follows:  1.  Once again, we discontinued his twice a day carvedilol  2.  Change metoprolol from 12.5 mg twice a day to 25 mg daily  3.  He can take his Synthroid and hydrochlorothiazide in the morning  4.  He is currently on twice a day allopurinol.  We will continue that for now, but it is possible his primary care doctor can change this in the future.    To be clear, I stressed that it is incredibly important that he takes his Synthroid every day.  Mom needs to give this medication in the morning.     We are working on getting him set up for a sleep medicine consult as I suspect his sleep apnea contribute significantly to his symptoms.  I have also requested a primary care doctor at [facility].   constipation/ABDOMINAL PAIN

## 2021-02-22 ENCOUNTER — TELEPHONE (OUTPATIENT)
Dept: SLEEP MEDICINE | Facility: CLINIC | Age: 31
End: 2021-02-22

## 2021-02-22 ENCOUNTER — PATIENT OUTREACH (OUTPATIENT)
Dept: ADMINISTRATIVE | Facility: HOSPITAL | Age: 31
End: 2021-02-22

## 2021-02-22 DIAGNOSIS — E78.5 HYPERLIPIDEMIA, UNSPECIFIED HYPERLIPIDEMIA TYPE: Primary | ICD-10-CM

## 2021-02-23 DIAGNOSIS — G47.30 SLEEP APNEA, UNSPECIFIED TYPE: Primary | ICD-10-CM

## 2021-03-08 ENCOUNTER — LAB VISIT (OUTPATIENT)
Dept: LAB | Facility: HOSPITAL | Age: 31
End: 2021-03-08
Attending: INTERNAL MEDICINE
Payer: MEDICAID

## 2021-03-08 ENCOUNTER — OFFICE VISIT (OUTPATIENT)
Dept: INTERNAL MEDICINE | Facility: CLINIC | Age: 31
End: 2021-03-08
Payer: MEDICAID

## 2021-03-08 VITALS
SYSTOLIC BLOOD PRESSURE: 122 MMHG | BODY MASS INDEX: 48.46 KG/M2 | HEART RATE: 84 BPM | OXYGEN SATURATION: 95 % | HEIGHT: 65 IN | WEIGHT: 290.88 LBS | TEMPERATURE: 99 F | DIASTOLIC BLOOD PRESSURE: 68 MMHG

## 2021-03-08 DIAGNOSIS — I37.2 NONRHEUMATIC PULMONARY VALVE STENOSIS WITH INSUFFICIENCY: ICD-10-CM

## 2021-03-08 DIAGNOSIS — R79.9 ABNORMAL BLOOD CHEMISTRY: ICD-10-CM

## 2021-03-08 DIAGNOSIS — G47.33 OSA (OBSTRUCTIVE SLEEP APNEA): Primary | ICD-10-CM

## 2021-03-08 DIAGNOSIS — Q21.3 TETRALOGY OF FALLOT: ICD-10-CM

## 2021-03-08 DIAGNOSIS — E53.8 VITAMIN B12 DEFICIENCY: ICD-10-CM

## 2021-03-08 DIAGNOSIS — G47.10 HYPERSOMNOLENCE: ICD-10-CM

## 2021-03-08 DIAGNOSIS — E03.9 HYPOTHYROIDISM, UNSPECIFIED TYPE: ICD-10-CM

## 2021-03-08 DIAGNOSIS — Q24.9 ADULT CONGENITAL HEART DISEASE: ICD-10-CM

## 2021-03-08 DIAGNOSIS — E55.9 VITAMIN D DEFICIENCY DISEASE: ICD-10-CM

## 2021-03-08 DIAGNOSIS — Z00.00 ROUTINE GENERAL MEDICAL EXAMINATION AT A HEALTH CARE FACILITY: Primary | ICD-10-CM

## 2021-03-08 DIAGNOSIS — Q90.9 DOWN SYNDROME: ICD-10-CM

## 2021-03-08 DIAGNOSIS — I11.9 RIGHT VENTRICULAR HYPERTENSION: ICD-10-CM

## 2021-03-08 LAB
ALBUMIN SERPL BCP-MCNC: 3.3 G/DL (ref 3.5–5.2)
ALP SERPL-CCNC: 124 U/L (ref 55–135)
ALT SERPL W/O P-5'-P-CCNC: 25 U/L (ref 10–44)
ANION GAP SERPL CALC-SCNC: 8 MMOL/L (ref 8–16)
AST SERPL-CCNC: 19 U/L (ref 10–40)
BASOPHILS # BLD AUTO: 0.07 K/UL (ref 0–0.2)
BASOPHILS NFR BLD: 1 % (ref 0–1.9)
BILIRUB SERPL-MCNC: 0.5 MG/DL (ref 0.1–1)
BUN SERPL-MCNC: 20 MG/DL (ref 6–20)
CALCIUM SERPL-MCNC: 9.4 MG/DL (ref 8.7–10.5)
CHLORIDE SERPL-SCNC: 103 MMOL/L (ref 95–110)
CHOLEST SERPL-MCNC: 178 MG/DL (ref 120–199)
CHOLEST/HDLC SERPL: 6.1 {RATIO} (ref 2–5)
CO2 SERPL-SCNC: 30 MMOL/L (ref 23–29)
CREAT SERPL-MCNC: 1.2 MG/DL (ref 0.5–1.4)
DIFFERENTIAL METHOD: ABNORMAL
EOSINOPHIL # BLD AUTO: 0.1 K/UL (ref 0–0.5)
EOSINOPHIL NFR BLD: 2 % (ref 0–8)
ERYTHROCYTE [DISTWIDTH] IN BLOOD BY AUTOMATED COUNT: 15.8 % (ref 11.5–14.5)
EST. GFR  (AFRICAN AMERICAN): >60 ML/MIN/1.73 M^2
EST. GFR  (NON AFRICAN AMERICAN): >60 ML/MIN/1.73 M^2
ESTIMATED AVG GLUCOSE: 131 MG/DL (ref 68–131)
GIANT PLATELETS BLD QL SMEAR: PRESENT
GLUCOSE SERPL-MCNC: 132 MG/DL (ref 70–110)
HBA1C MFR BLD: 6.2 % (ref 4–5.6)
HCT VFR BLD AUTO: 46.9 % (ref 40–54)
HDLC SERPL-MCNC: 29 MG/DL (ref 40–75)
HDLC SERPL: 16.3 % (ref 20–50)
HGB BLD-MCNC: 14.8 G/DL (ref 14–18)
IMM GRANULOCYTES # BLD AUTO: 0.02 K/UL (ref 0–0.04)
IMM GRANULOCYTES NFR BLD AUTO: 0.3 % (ref 0–0.5)
LDLC SERPL CALC-MCNC: 117.4 MG/DL (ref 63–159)
LYMPHOCYTES # BLD AUTO: 1.3 K/UL (ref 1–4.8)
LYMPHOCYTES NFR BLD: 18.5 % (ref 18–48)
MCH RBC QN AUTO: 30.5 PG (ref 27–31)
MCHC RBC AUTO-ENTMCNC: 31.6 G/DL (ref 32–36)
MCV RBC AUTO: 97 FL (ref 82–98)
MONOCYTES # BLD AUTO: 0.5 K/UL (ref 0.3–1)
MONOCYTES NFR BLD: 7.1 % (ref 4–15)
NEUTROPHILS # BLD AUTO: 4.9 K/UL (ref 1.8–7.7)
NEUTROPHILS NFR BLD: 71.1 % (ref 38–73)
NONHDLC SERPL-MCNC: 149 MG/DL
NRBC BLD-RTO: 0 /100 WBC
PLATELET # BLD AUTO: 275 K/UL (ref 150–350)
PLATELET BLD QL SMEAR: ABNORMAL
PMV BLD AUTO: 9.5 FL (ref 9.2–12.9)
POTASSIUM SERPL-SCNC: 4.4 MMOL/L (ref 3.5–5.1)
PROT SERPL-MCNC: 7.6 G/DL (ref 6–8.4)
RBC # BLD AUTO: 4.86 M/UL (ref 4.6–6.2)
SODIUM SERPL-SCNC: 141 MMOL/L (ref 136–145)
T4 FREE SERPL-MCNC: 0.82 NG/DL (ref 0.71–1.51)
TRIGL SERPL-MCNC: 158 MG/DL (ref 30–150)
TSH SERPL DL<=0.005 MIU/L-ACNC: 4.46 UIU/ML (ref 0.4–4)
URATE SERPL-MCNC: 8.3 MG/DL (ref 3.4–7)
WBC # BLD AUTO: 6.91 K/UL (ref 3.9–12.7)

## 2021-03-08 PROCEDURE — 84443 ASSAY THYROID STIM HORMONE: CPT | Performed by: INTERNAL MEDICINE

## 2021-03-08 PROCEDURE — 80053 COMPREHEN METABOLIC PANEL: CPT | Performed by: INTERNAL MEDICINE

## 2021-03-08 PROCEDURE — 82306 VITAMIN D 25 HYDROXY: CPT | Performed by: INTERNAL MEDICINE

## 2021-03-08 PROCEDURE — 80061 LIPID PANEL: CPT | Performed by: INTERNAL MEDICINE

## 2021-03-08 PROCEDURE — 99214 OFFICE O/P EST MOD 30 MIN: CPT | Mod: PBBFAC | Performed by: INTERNAL MEDICINE

## 2021-03-08 PROCEDURE — 85025 COMPLETE CBC W/AUTO DIFF WBC: CPT | Performed by: INTERNAL MEDICINE

## 2021-03-08 PROCEDURE — 99385 PR PREVENTIVE VISIT,NEW,18-39: ICD-10-PCS | Mod: S$PBB,,, | Performed by: INTERNAL MEDICINE

## 2021-03-08 PROCEDURE — 99385 PREV VISIT NEW AGE 18-39: CPT | Mod: S$PBB,,, | Performed by: INTERNAL MEDICINE

## 2021-03-08 PROCEDURE — 99999 PR PBB SHADOW E&M-EST. PATIENT-LVL IV: CPT | Mod: PBBFAC,,, | Performed by: INTERNAL MEDICINE

## 2021-03-08 PROCEDURE — 83036 HEMOGLOBIN GLYCOSYLATED A1C: CPT | Performed by: INTERNAL MEDICINE

## 2021-03-08 PROCEDURE — 99999 PR PBB SHADOW E&M-EST. PATIENT-LVL IV: ICD-10-PCS | Mod: PBBFAC,,, | Performed by: INTERNAL MEDICINE

## 2021-03-08 PROCEDURE — 36415 COLL VENOUS BLD VENIPUNCTURE: CPT | Performed by: INTERNAL MEDICINE

## 2021-03-08 PROCEDURE — 84439 ASSAY OF FREE THYROXINE: CPT | Performed by: INTERNAL MEDICINE

## 2021-03-08 PROCEDURE — 82607 VITAMIN B-12: CPT | Performed by: INTERNAL MEDICINE

## 2021-03-08 PROCEDURE — 84550 ASSAY OF BLOOD/URIC ACID: CPT | Performed by: INTERNAL MEDICINE

## 2021-03-08 RX ORDER — CARVEDILOL 25 MG/1
25 TABLET ORAL 2 TIMES DAILY
COMMUNITY
End: 2021-06-23 | Stop reason: SDUPTHER

## 2021-03-08 RX ORDER — ALLOPURINOL 300 MG/1
300 TABLET ORAL DAILY
Qty: 30 TABLET | Refills: 6 | Status: SHIPPED | OUTPATIENT
Start: 2021-03-08 | End: 2021-06-23 | Stop reason: SDUPTHER

## 2021-03-09 LAB — VIT B12 SERPL-MCNC: 634 PG/ML (ref 210–950)

## 2021-03-10 ENCOUNTER — IMMUNIZATION (OUTPATIENT)
Dept: INTERNAL MEDICINE | Facility: CLINIC | Age: 31
End: 2021-03-10
Payer: MEDICAID

## 2021-03-10 DIAGNOSIS — Z23 NEED FOR VACCINATION: Primary | ICD-10-CM

## 2021-03-10 LAB — 25(OH)D3+25(OH)D2 SERPL-MCNC: 24 NG/ML (ref 30–96)

## 2021-03-10 PROCEDURE — 0001A COVID-19, MRNA, LNP-S, PF, 30 MCG/0.3 ML DOSE VACCINE: CPT | Mod: CV19,S$GLB,, | Performed by: INTERNAL MEDICINE

## 2021-03-10 PROCEDURE — 0001A COVID-19, MRNA, LNP-S, PF, 30 MCG/0.3 ML DOSE VACCINE: ICD-10-PCS | Mod: CV19,S$GLB,, | Performed by: INTERNAL MEDICINE

## 2021-03-10 PROCEDURE — 91300 COVID-19, MRNA, LNP-S, PF, 30 MCG/0.3 ML DOSE VACCINE: ICD-10-PCS | Mod: S$GLB,,, | Performed by: INTERNAL MEDICINE

## 2021-03-10 PROCEDURE — 91300 COVID-19, MRNA, LNP-S, PF, 30 MCG/0.3 ML DOSE VACCINE: CPT | Mod: S$GLB,,, | Performed by: INTERNAL MEDICINE

## 2021-03-12 ENCOUNTER — TELEPHONE (OUTPATIENT)
Dept: SLEEP MEDICINE | Facility: OTHER | Age: 31
End: 2021-03-12

## 2021-03-15 ENCOUNTER — HOSPITAL ENCOUNTER (OUTPATIENT)
Dept: PEDIATRIC CARDIOLOGY | Facility: HOSPITAL | Age: 31
Discharge: HOME | End: 2021-03-15
Attending: PEDIATRICS
Payer: MEDICAID

## 2021-03-15 DIAGNOSIS — Q24.9 ADULT CONGENITAL HEART DISEASE: ICD-10-CM

## 2021-03-15 DIAGNOSIS — Z95.0 PACEMAKER: ICD-10-CM

## 2021-03-15 LAB
AV DELAY - LONGEST: 240 MSEC
BATTERY VOLTAGE (V): 3.01 V
ERI (V): 2.63 V
IMPEDANCE RA LEAD (NATIVE): 475 OHMS
IMPEDANCE RA LEAD: 380 OHMS
OHS CV DC PP MS1: 0.4 MS
OHS CV DC PP MS2: 0.4 MS
OHS CV DC PP V1: NORMAL V
OHS CV DC PP V2: NORMAL V
P/R-WAVE RA LEAD (NATIVE): 2.8 MV
P/R-WAVE RA LEAD: 2.6 MV
PV DELAY - LONGEST: 220 MSEC
THRESHOLD MS RA LEAD (NATIVE): 0.4 MS
THRESHOLD MS RA LEAD: 0.4 MS
THRESHOLD V RA LEAD (NATIVE): 1 V
THRESHOLD V RA LEAD: 0.38 V

## 2021-03-15 PROCEDURE — 93294 REM INTERROG EVL PM/LDLS PM: CPT | Mod: ,,, | Performed by: PEDIATRICS

## 2021-03-15 PROCEDURE — 93294 CV PACEMAKER REMOTE PEDIATRICS (CUPID ONLY): ICD-10-PCS | Mod: ,,, | Performed by: PEDIATRICS

## 2021-03-15 PROCEDURE — 93296 REM INTERROG EVL PM/IDS: CPT

## 2021-03-21 ENCOUNTER — TELEPHONE (OUTPATIENT)
Dept: INTERNAL MEDICINE | Facility: CLINIC | Age: 31
End: 2021-03-21

## 2021-03-21 DIAGNOSIS — E13.9 DIABETES MELLITUS DUE TO ABNORMAL INSULIN: Primary | ICD-10-CM

## 2021-03-31 ENCOUNTER — IMMUNIZATION (OUTPATIENT)
Dept: INTERNAL MEDICINE | Facility: CLINIC | Age: 31
End: 2021-03-31
Payer: MEDICAID

## 2021-03-31 DIAGNOSIS — Z23 NEED FOR VACCINATION: Primary | ICD-10-CM

## 2021-03-31 PROCEDURE — 91300 COVID-19, MRNA, LNP-S, PF, 30 MCG/0.3 ML DOSE VACCINE: ICD-10-PCS | Mod: S$GLB,,, | Performed by: INTERNAL MEDICINE

## 2021-03-31 PROCEDURE — 91300 COVID-19, MRNA, LNP-S, PF, 30 MCG/0.3 ML DOSE VACCINE: CPT | Mod: S$GLB,,, | Performed by: INTERNAL MEDICINE

## 2021-03-31 PROCEDURE — 0002A COVID-19, MRNA, LNP-S, PF, 30 MCG/0.3 ML DOSE VACCINE: CPT | Mod: CV19,S$GLB,, | Performed by: INTERNAL MEDICINE

## 2021-03-31 PROCEDURE — 0002A COVID-19, MRNA, LNP-S, PF, 30 MCG/0.3 ML DOSE VACCINE: ICD-10-PCS | Mod: CV19,S$GLB,, | Performed by: INTERNAL MEDICINE

## 2021-04-06 ENCOUNTER — HOSPITAL ENCOUNTER (OUTPATIENT)
Dept: PREADMISSION TESTING | Facility: OTHER | Age: 31
Discharge: HOME | End: 2021-04-06
Attending: INTERNAL MEDICINE
Payer: MEDICAID

## 2021-04-06 DIAGNOSIS — R05.9 COUGH: ICD-10-CM

## 2021-04-06 PROCEDURE — U0005 INFEC AGEN DETEC AMPLI PROBE: HCPCS | Performed by: INTERNAL MEDICINE

## 2021-04-06 PROCEDURE — U0003 INFECTIOUS AGENT DETECTION BY NUCLEIC ACID (DNA OR RNA); SEVERE ACUTE RESPIRATORY SYNDROME CORONAVIRUS 2 (SARS-COV-2) (CORONAVIRUS DISEASE [COVID-19]), AMPLIFIED PROBE TECHNIQUE, MAKING USE OF HIGH THROUGHPUT TECHNOLOGIES AS DESCRIBED BY CMS-2020-01-R: HCPCS | Performed by: INTERNAL MEDICINE

## 2021-04-07 LAB — SARS-COV-2 RNA RESP QL NAA+PROBE: NOT DETECTED

## 2021-04-09 ENCOUNTER — HOSPITAL ENCOUNTER (OUTPATIENT)
Dept: SLEEP MEDICINE | Facility: OTHER | Age: 31
Discharge: HOME | End: 2021-04-09
Attending: INTERNAL MEDICINE
Payer: MEDICAID

## 2021-04-09 DIAGNOSIS — I37.2 NONRHEUMATIC PULMONARY VALVE STENOSIS WITH INSUFFICIENCY: ICD-10-CM

## 2021-04-09 DIAGNOSIS — I11.9 RIGHT VENTRICULAR HYPERTENSION: ICD-10-CM

## 2021-04-09 DIAGNOSIS — G47.33 OSA (OBSTRUCTIVE SLEEP APNEA): ICD-10-CM

## 2021-04-09 DIAGNOSIS — Q90.9 DOWN SYNDROME: ICD-10-CM

## 2021-04-09 DIAGNOSIS — G47.10 HYPERSOMNOLENCE: ICD-10-CM

## 2021-04-09 DIAGNOSIS — Q21.3 TETRALOGY OF FALLOT: ICD-10-CM

## 2021-04-09 PROCEDURE — 95811 POLYSOM 6/>YRS CPAP 4/> PARM: CPT

## 2021-04-09 PROCEDURE — 95811 POLYSOM 6/>YRS CPAP 4/> PARM: CPT | Mod: 26,,, | Performed by: INTERNAL MEDICINE

## 2021-04-09 PROCEDURE — 95811 PR POLYSOMNOGRAPHY W/CPAP: ICD-10-PCS | Mod: 26,,, | Performed by: INTERNAL MEDICINE

## 2021-04-14 ENCOUNTER — PATIENT MESSAGE (OUTPATIENT)
Dept: SLEEP MEDICINE | Facility: CLINIC | Age: 31
End: 2021-04-14

## 2021-04-14 DIAGNOSIS — G47.33 OSA (OBSTRUCTIVE SLEEP APNEA): Primary | ICD-10-CM

## 2021-06-21 ENCOUNTER — HOSPITAL ENCOUNTER (OUTPATIENT)
Dept: PEDIATRIC CARDIOLOGY | Facility: HOSPITAL | Age: 31
Discharge: HOME | End: 2021-06-21
Attending: PEDIATRICS
Payer: MEDICAID

## 2021-06-21 DIAGNOSIS — Q24.9 ADULT CONGENITAL HEART DISEASE: ICD-10-CM

## 2021-06-21 DIAGNOSIS — Z95.0 PACEMAKER: ICD-10-CM

## 2021-06-21 DIAGNOSIS — Q21.3 TETRALOGY OF FALLOT: Primary | ICD-10-CM

## 2021-06-21 DIAGNOSIS — I48.91 ATRIAL FIBRILLATION, UNSPECIFIED TYPE: ICD-10-CM

## 2021-06-21 PROCEDURE — 93294 REM INTERROG EVL PM/LDLS PM: CPT | Mod: ,,, | Performed by: PEDIATRICS

## 2021-06-21 PROCEDURE — 93296 REM INTERROG EVL PM/IDS: CPT

## 2021-06-21 PROCEDURE — 93294 CV PACEMAKER REMOTE PEDIATRICS (CUPID ONLY): ICD-10-PCS | Mod: ,,, | Performed by: PEDIATRICS

## 2021-06-23 LAB
AV DELAY - LONGEST: 240 MSEC
BATTERY VOLTAGE (V): 3.01 V
ERI (V): 2.63 V
IMPEDANCE RA LEAD (NATIVE): 418 OHMS
IMPEDANCE RA LEAD: 361 OHMS
OHS CV DC PP MS1: 0.4 MS
OHS CV DC PP MS2: 0.4 MS
OHS CV DC PP V1: NORMAL V
OHS CV DC PP V2: NORMAL V
P/R-WAVE RA LEAD (NATIVE): 3.6 MV
P/R-WAVE RA LEAD: 2.6 MV
PV DELAY - LONGEST: 220 MSEC
THRESHOLD MS RA LEAD (NATIVE): 0.4 MS
THRESHOLD MS RA LEAD: 0.4 MS
THRESHOLD V RA LEAD (NATIVE): 1.12 V
THRESHOLD V RA LEAD: 0.38 V

## 2021-06-24 RX ORDER — METOPROLOL SUCCINATE 25 MG/1
25 TABLET, EXTENDED RELEASE ORAL DAILY
Qty: 30 TABLET | Refills: 3 | Status: SHIPPED | OUTPATIENT
Start: 2021-06-24 | End: 2021-10-19 | Stop reason: SDUPTHER

## 2021-06-24 RX ORDER — HYDROCHLOROTHIAZIDE 25 MG/1
25 TABLET ORAL DAILY
Qty: 30 TABLET | Refills: 3 | Status: SHIPPED | OUTPATIENT
Start: 2021-06-24 | End: 2021-10-19 | Stop reason: SDUPTHER

## 2021-06-24 RX ORDER — LEVOTHYROXINE SODIUM 88 UG/1
88 TABLET ORAL
Qty: 30 TABLET | Refills: 3 | Status: SHIPPED | OUTPATIENT
Start: 2021-06-24 | End: 2021-10-19 | Stop reason: SDUPTHER

## 2021-06-24 RX ORDER — CARVEDILOL 25 MG/1
25 TABLET ORAL 2 TIMES DAILY
Qty: 60 TABLET | Refills: 3 | Status: SHIPPED | OUTPATIENT
Start: 2021-06-24 | End: 2021-10-19 | Stop reason: SDUPTHER

## 2021-06-24 RX ORDER — ALLOPURINOL 300 MG/1
300 TABLET ORAL DAILY
Qty: 30 TABLET | Refills: 3 | Status: SHIPPED | OUTPATIENT
Start: 2021-06-24 | End: 2021-10-19 | Stop reason: SDUPTHER

## 2021-06-30 ENCOUNTER — LAB VISIT (OUTPATIENT)
Dept: LAB | Facility: HOSPITAL | Age: 31
End: 2021-06-30
Attending: INTERNAL MEDICINE
Payer: MEDICAID

## 2021-06-30 ENCOUNTER — OFFICE VISIT (OUTPATIENT)
Dept: INTERNAL MEDICINE | Facility: CLINIC | Age: 31
End: 2021-06-30
Payer: MEDICAID

## 2021-06-30 VITALS
BODY MASS INDEX: 48.63 KG/M2 | SYSTOLIC BLOOD PRESSURE: 126 MMHG | HEIGHT: 65 IN | WEIGHT: 291.88 LBS | DIASTOLIC BLOOD PRESSURE: 66 MMHG | OXYGEN SATURATION: 100 % | HEART RATE: 89 BPM

## 2021-06-30 DIAGNOSIS — Q90.9 DOWN SYNDROME: ICD-10-CM

## 2021-06-30 DIAGNOSIS — E13.9 DIABETES MELLITUS DUE TO ABNORMAL INSULIN: ICD-10-CM

## 2021-06-30 DIAGNOSIS — M10.9 GOUT, ARTHRITIS: ICD-10-CM

## 2021-06-30 DIAGNOSIS — I48.91 ATRIAL FIBRILLATION, UNSPECIFIED TYPE: ICD-10-CM

## 2021-06-30 DIAGNOSIS — G47.33 OSA (OBSTRUCTIVE SLEEP APNEA): ICD-10-CM

## 2021-06-30 DIAGNOSIS — Q24.9 ADULT CONGENITAL HEART DISEASE: ICD-10-CM

## 2021-06-30 DIAGNOSIS — E66.01 MORBID OBESITY: ICD-10-CM

## 2021-06-30 DIAGNOSIS — E13.9 DIABETES MELLITUS DUE TO ABNORMAL INSULIN: Primary | ICD-10-CM

## 2021-06-30 DIAGNOSIS — E03.9 HYPOTHYROIDISM, UNSPECIFIED TYPE: ICD-10-CM

## 2021-06-30 LAB
BASOPHILS # BLD AUTO: 0.05 K/UL (ref 0–0.2)
BASOPHILS NFR BLD: 0.8 % (ref 0–1.9)
DIFFERENTIAL METHOD: ABNORMAL
EOSINOPHIL # BLD AUTO: 0.2 K/UL (ref 0–0.5)
EOSINOPHIL NFR BLD: 2.8 % (ref 0–8)
ERYTHROCYTE [DISTWIDTH] IN BLOOD BY AUTOMATED COUNT: 15.8 % (ref 11.5–14.5)
ESTIMATED AVG GLUCOSE: 143 MG/DL (ref 68–131)
HBA1C MFR BLD: 6.6 % (ref 4–5.6)
HCT VFR BLD AUTO: 43.7 % (ref 40–54)
HGB BLD-MCNC: 13.6 G/DL (ref 14–18)
IMM GRANULOCYTES # BLD AUTO: 0.03 K/UL (ref 0–0.04)
IMM GRANULOCYTES NFR BLD AUTO: 0.5 % (ref 0–0.5)
LYMPHOCYTES # BLD AUTO: 1 K/UL (ref 1–4.8)
LYMPHOCYTES NFR BLD: 16.4 % (ref 18–48)
MCH RBC QN AUTO: 29.6 PG (ref 27–31)
MCHC RBC AUTO-ENTMCNC: 31.1 G/DL (ref 32–36)
MCV RBC AUTO: 95 FL (ref 82–98)
MONOCYTES # BLD AUTO: 0.3 K/UL (ref 0.3–1)
MONOCYTES NFR BLD: 5.7 % (ref 4–15)
NEUTROPHILS # BLD AUTO: 4.4 K/UL (ref 1.8–7.7)
NEUTROPHILS NFR BLD: 73.8 % (ref 38–73)
NRBC BLD-RTO: 0 /100 WBC
PLATELET # BLD AUTO: 281 K/UL (ref 150–450)
PMV BLD AUTO: 9.4 FL (ref 9.2–12.9)
RBC # BLD AUTO: 4.59 M/UL (ref 4.6–6.2)
WBC # BLD AUTO: 5.98 K/UL (ref 3.9–12.7)

## 2021-06-30 PROCEDURE — 99214 OFFICE O/P EST MOD 30 MIN: CPT | Mod: S$PBB,,, | Performed by: INTERNAL MEDICINE

## 2021-06-30 PROCEDURE — 85025 COMPLETE CBC W/AUTO DIFF WBC: CPT | Performed by: INTERNAL MEDICINE

## 2021-06-30 PROCEDURE — 99214 OFFICE O/P EST MOD 30 MIN: CPT | Mod: PBBFAC | Performed by: INTERNAL MEDICINE

## 2021-06-30 PROCEDURE — 83036 HEMOGLOBIN GLYCOSYLATED A1C: CPT | Performed by: INTERNAL MEDICINE

## 2021-06-30 PROCEDURE — 36415 COLL VENOUS BLD VENIPUNCTURE: CPT | Performed by: INTERNAL MEDICINE

## 2021-06-30 PROCEDURE — 99214 PR OFFICE/OUTPT VISIT, EST, LEVL IV, 30-39 MIN: ICD-10-PCS | Mod: S$PBB,,, | Performed by: INTERNAL MEDICINE

## 2021-06-30 PROCEDURE — 84550 ASSAY OF BLOOD/URIC ACID: CPT | Performed by: INTERNAL MEDICINE

## 2021-06-30 PROCEDURE — 99999 PR PBB SHADOW E&M-EST. PATIENT-LVL IV: CPT | Mod: PBBFAC,,, | Performed by: INTERNAL MEDICINE

## 2021-06-30 PROCEDURE — 99999 PR PBB SHADOW E&M-EST. PATIENT-LVL IV: ICD-10-PCS | Mod: PBBFAC,,, | Performed by: INTERNAL MEDICINE

## 2021-06-30 PROCEDURE — 80053 COMPREHEN METABOLIC PANEL: CPT | Performed by: INTERNAL MEDICINE

## 2021-06-30 PROCEDURE — 84443 ASSAY THYROID STIM HORMONE: CPT | Performed by: INTERNAL MEDICINE

## 2021-07-01 LAB
ALBUMIN SERPL BCP-MCNC: 3.4 G/DL (ref 3.5–5.2)
ALP SERPL-CCNC: 121 U/L (ref 55–135)
ALT SERPL W/O P-5'-P-CCNC: 23 U/L (ref 10–44)
ANION GAP SERPL CALC-SCNC: 9 MMOL/L (ref 8–16)
AST SERPL-CCNC: 19 U/L (ref 10–40)
BILIRUB SERPL-MCNC: 0.3 MG/DL (ref 0.1–1)
BUN SERPL-MCNC: 15 MG/DL (ref 6–20)
CALCIUM SERPL-MCNC: 9.5 MG/DL (ref 8.7–10.5)
CHLORIDE SERPL-SCNC: 101 MMOL/L (ref 95–110)
CO2 SERPL-SCNC: 28 MMOL/L (ref 23–29)
CREAT SERPL-MCNC: 1.3 MG/DL (ref 0.5–1.4)
EST. GFR  (AFRICAN AMERICAN): >60 ML/MIN/1.73 M^2
EST. GFR  (NON AFRICAN AMERICAN): >60 ML/MIN/1.73 M^2
GLUCOSE SERPL-MCNC: 134 MG/DL (ref 70–110)
POTASSIUM SERPL-SCNC: 4 MMOL/L (ref 3.5–5.1)
PROT SERPL-MCNC: 7.7 G/DL (ref 6–8.4)
SODIUM SERPL-SCNC: 138 MMOL/L (ref 136–145)
TSH SERPL DL<=0.005 MIU/L-ACNC: 2.78 UIU/ML (ref 0.4–4)
URATE SERPL-MCNC: 9.1 MG/DL (ref 3.4–7)

## 2021-07-07 ENCOUNTER — TELEPHONE (OUTPATIENT)
Dept: ADMINISTRATIVE | Facility: HOSPITAL | Age: 31
End: 2021-07-07

## 2021-07-12 ENCOUNTER — TELEPHONE (OUTPATIENT)
Dept: INTERNAL MEDICINE | Facility: CLINIC | Age: 31
End: 2021-07-12

## 2021-10-04 ENCOUNTER — HOSPITAL ENCOUNTER (OUTPATIENT)
Dept: PEDIATRIC CARDIOLOGY | Facility: HOSPITAL | Age: 31
Discharge: HOME | End: 2021-10-04
Attending: PEDIATRICS
Payer: MEDICAID

## 2021-10-04 DIAGNOSIS — Z95.0 PACEMAKER: ICD-10-CM

## 2021-10-04 DIAGNOSIS — Q21.3 TETRALOGY OF FALLOT: ICD-10-CM

## 2021-10-04 DIAGNOSIS — I48.91 ATRIAL FIBRILLATION, UNSPECIFIED TYPE: ICD-10-CM

## 2021-10-04 DIAGNOSIS — Q24.9 ADULT CONGENITAL HEART DISEASE: ICD-10-CM

## 2021-10-04 PROCEDURE — 93294 REM INTERROG EVL PM/LDLS PM: CPT | Mod: ,,, | Performed by: PEDIATRICS

## 2021-10-04 PROCEDURE — 93294 CV PACEMAKER REMOTE PEDIATRICS (CUPID ONLY): ICD-10-PCS | Mod: ,,, | Performed by: PEDIATRICS

## 2021-10-04 PROCEDURE — 93296 REM INTERROG EVL PM/IDS: CPT

## 2021-10-08 LAB
AV DELAY - LONGEST: 240 MSEC
BATTERY VOLTAGE (V): 3.01 V
ERI (V): 2.63 V
IMPEDANCE RA LEAD (NATIVE): 437 OHMS
IMPEDANCE RA LEAD: 399 OHMS
OHS CV DC PP MS1: 0.4 MS
OHS CV DC PP MS2: 0.4 MS
OHS CV DC PP V1: NORMAL V
OHS CV DC PP V2: NORMAL V
P/R-WAVE RA LEAD (NATIVE): 1.6 MV
P/R-WAVE RA LEAD: 2.3 MV
PV DELAY - LONGEST: 220 MSEC
THRESHOLD MS RA LEAD (NATIVE): 0.4 MS
THRESHOLD MS RA LEAD: 0.4 MS
THRESHOLD V RA LEAD (NATIVE): 1 V
THRESHOLD V RA LEAD: 0.38 V

## 2021-10-12 ENCOUNTER — LAB VISIT (OUTPATIENT)
Dept: LAB | Facility: HOSPITAL | Age: 31
End: 2021-10-12
Attending: INTERNAL MEDICINE
Payer: MEDICAID

## 2021-10-12 DIAGNOSIS — E13.9 DIABETES MELLITUS DUE TO ABNORMAL INSULIN: ICD-10-CM

## 2021-10-12 DIAGNOSIS — M10.9 GOUT, ARTHRITIS: ICD-10-CM

## 2021-10-12 LAB
25(OH)D3+25(OH)D2 SERPL-MCNC: 16 NG/ML (ref 30–96)
ALBUMIN SERPL BCP-MCNC: 3.4 G/DL (ref 3.5–5.2)
ALP SERPL-CCNC: 125 U/L (ref 55–135)
ALT SERPL W/O P-5'-P-CCNC: 21 U/L (ref 10–44)
ANION GAP SERPL CALC-SCNC: 11 MMOL/L (ref 8–16)
AST SERPL-CCNC: 24 U/L (ref 10–40)
BASOPHILS # BLD AUTO: 0.06 K/UL (ref 0–0.2)
BASOPHILS NFR BLD: 0.8 % (ref 0–1.9)
BILIRUB SERPL-MCNC: 0.4 MG/DL (ref 0.1–1)
BUN SERPL-MCNC: 17 MG/DL (ref 6–20)
CALCIUM SERPL-MCNC: 9.7 MG/DL (ref 8.7–10.5)
CHLORIDE SERPL-SCNC: 102 MMOL/L (ref 95–110)
CHOLEST SERPL-MCNC: 175 MG/DL (ref 120–199)
CHOLEST/HDLC SERPL: 6 {RATIO} (ref 2–5)
CO2 SERPL-SCNC: 27 MMOL/L (ref 23–29)
CREAT SERPL-MCNC: 1.2 MG/DL (ref 0.5–1.4)
DIFFERENTIAL METHOD: ABNORMAL
EOSINOPHIL # BLD AUTO: 0.1 K/UL (ref 0–0.5)
EOSINOPHIL NFR BLD: 1.8 % (ref 0–8)
ERYTHROCYTE [DISTWIDTH] IN BLOOD BY AUTOMATED COUNT: 15.7 % (ref 11.5–14.5)
EST. GFR  (AFRICAN AMERICAN): >60 ML/MIN/1.73 M^2
EST. GFR  (NON AFRICAN AMERICAN): >60 ML/MIN/1.73 M^2
ESTIMATED AVG GLUCOSE: 137 MG/DL (ref 68–131)
GLUCOSE SERPL-MCNC: 105 MG/DL (ref 70–110)
HBA1C MFR BLD: 6.4 % (ref 4–5.6)
HCT VFR BLD AUTO: 43.8 % (ref 40–54)
HDLC SERPL-MCNC: 29 MG/DL (ref 40–75)
HDLC SERPL: 16.6 % (ref 20–50)
HGB BLD-MCNC: 14.2 G/DL (ref 14–18)
IMM GRANULOCYTES # BLD AUTO: 0.03 K/UL (ref 0–0.04)
IMM GRANULOCYTES NFR BLD AUTO: 0.4 % (ref 0–0.5)
LDLC SERPL CALC-MCNC: 106 MG/DL (ref 63–159)
LYMPHOCYTES # BLD AUTO: 1.1 K/UL (ref 1–4.8)
LYMPHOCYTES NFR BLD: 14.6 % (ref 18–48)
MCH RBC QN AUTO: 30.5 PG (ref 27–31)
MCHC RBC AUTO-ENTMCNC: 32.4 G/DL (ref 32–36)
MCV RBC AUTO: 94 FL (ref 82–98)
MONOCYTES # BLD AUTO: 0.4 K/UL (ref 0.3–1)
MONOCYTES NFR BLD: 5.9 % (ref 4–15)
NEUTROPHILS # BLD AUTO: 5.6 K/UL (ref 1.8–7.7)
NEUTROPHILS NFR BLD: 76.5 % (ref 38–73)
NONHDLC SERPL-MCNC: 146 MG/DL
NRBC BLD-RTO: 0 /100 WBC
PLATELET # BLD AUTO: 282 K/UL (ref 150–450)
PMV BLD AUTO: 9.9 FL (ref 9.2–12.9)
POTASSIUM SERPL-SCNC: 4 MMOL/L (ref 3.5–5.1)
PROT SERPL-MCNC: 7.5 G/DL (ref 6–8.4)
RBC # BLD AUTO: 4.66 M/UL (ref 4.6–6.2)
SODIUM SERPL-SCNC: 140 MMOL/L (ref 136–145)
TRIGL SERPL-MCNC: 200 MG/DL (ref 30–150)
URATE SERPL-MCNC: 10 MG/DL (ref 3.4–7)
WBC # BLD AUTO: 7.34 K/UL (ref 3.9–12.7)

## 2021-10-12 PROCEDURE — 85025 COMPLETE CBC W/AUTO DIFF WBC: CPT | Performed by: INTERNAL MEDICINE

## 2021-10-12 PROCEDURE — 84550 ASSAY OF BLOOD/URIC ACID: CPT | Performed by: INTERNAL MEDICINE

## 2021-10-12 PROCEDURE — 80061 LIPID PANEL: CPT | Performed by: INTERNAL MEDICINE

## 2021-10-12 PROCEDURE — 82306 VITAMIN D 25 HYDROXY: CPT | Performed by: INTERNAL MEDICINE

## 2021-10-12 PROCEDURE — 36415 COLL VENOUS BLD VENIPUNCTURE: CPT | Performed by: INTERNAL MEDICINE

## 2021-10-12 PROCEDURE — 83036 HEMOGLOBIN GLYCOSYLATED A1C: CPT | Performed by: INTERNAL MEDICINE

## 2021-10-12 PROCEDURE — 80053 COMPREHEN METABOLIC PANEL: CPT | Performed by: INTERNAL MEDICINE

## 2021-10-19 ENCOUNTER — OFFICE VISIT (OUTPATIENT)
Dept: INTERNAL MEDICINE | Facility: CLINIC | Age: 31
End: 2021-10-19
Payer: MEDICAID

## 2021-10-19 ENCOUNTER — IMMUNIZATION (OUTPATIENT)
Dept: INTERNAL MEDICINE | Facility: CLINIC | Age: 31
End: 2021-10-19
Payer: MEDICAID

## 2021-10-19 VITALS
OXYGEN SATURATION: 97 % | HEART RATE: 74 BPM | BODY MASS INDEX: 47.71 KG/M2 | WEIGHT: 286.38 LBS | HEIGHT: 65 IN | DIASTOLIC BLOOD PRESSURE: 80 MMHG | SYSTOLIC BLOOD PRESSURE: 124 MMHG

## 2021-10-19 DIAGNOSIS — E03.1 CONGENITAL HYPOTHYROIDISM WITHOUT GOITER: Primary | ICD-10-CM

## 2021-10-19 DIAGNOSIS — G47.33 OSA (OBSTRUCTIVE SLEEP APNEA): ICD-10-CM

## 2021-10-19 DIAGNOSIS — E55.9 VITAMIN D DEFICIENCY DISEASE: ICD-10-CM

## 2021-10-19 DIAGNOSIS — Q24.9 ADULT CONGENITAL HEART DISEASE: ICD-10-CM

## 2021-10-19 DIAGNOSIS — M10.9 GOUT, ARTHRITIS: ICD-10-CM

## 2021-10-19 DIAGNOSIS — E13.9 DIABETES MELLITUS DUE TO ABNORMAL INSULIN: ICD-10-CM

## 2021-10-19 PROCEDURE — 99999 PR PBB SHADOW E&M-EST. PATIENT-LVL III: ICD-10-PCS | Mod: PBBFAC,,, | Performed by: INTERNAL MEDICINE

## 2021-10-19 PROCEDURE — 99214 PR OFFICE/OUTPT VISIT, EST, LEVL IV, 30-39 MIN: ICD-10-PCS | Mod: S$PBB,,, | Performed by: INTERNAL MEDICINE

## 2021-10-19 PROCEDURE — 99213 OFFICE O/P EST LOW 20 MIN: CPT | Mod: PBBFAC | Performed by: INTERNAL MEDICINE

## 2021-10-19 PROCEDURE — 99999 PR PBB SHADOW E&M-EST. PATIENT-LVL III: CPT | Mod: PBBFAC,,, | Performed by: INTERNAL MEDICINE

## 2021-10-19 PROCEDURE — 90471 IMMUNIZATION ADMIN: CPT | Mod: PBBFAC

## 2021-10-19 PROCEDURE — 99214 OFFICE O/P EST MOD 30 MIN: CPT | Mod: S$PBB,,, | Performed by: INTERNAL MEDICINE

## 2021-10-19 RX ORDER — LEVOTHYROXINE SODIUM 88 UG/1
88 TABLET ORAL
Qty: 30 TABLET | Refills: 3 | Status: SHIPPED | OUTPATIENT
Start: 2021-10-19 | End: 2022-11-16 | Stop reason: SDUPTHER

## 2021-10-19 RX ORDER — CARVEDILOL 25 MG/1
25 TABLET ORAL 2 TIMES DAILY
Qty: 60 TABLET | Refills: 3 | Status: SHIPPED | OUTPATIENT
Start: 2021-10-19 | End: 2022-11-16 | Stop reason: SDUPTHER

## 2021-10-19 RX ORDER — METOPROLOL SUCCINATE 25 MG/1
25 TABLET, EXTENDED RELEASE ORAL DAILY
Qty: 30 TABLET | Refills: 3 | Status: SHIPPED | OUTPATIENT
Start: 2021-10-19 | End: 2022-11-16 | Stop reason: SDUPTHER

## 2021-10-19 RX ORDER — HYDROCHLOROTHIAZIDE 25 MG/1
25 TABLET ORAL DAILY
Qty: 30 TABLET | Refills: 3 | Status: SHIPPED | OUTPATIENT
Start: 2021-10-19 | End: 2022-11-16 | Stop reason: SDUPTHER

## 2021-10-19 RX ORDER — ALLOPURINOL 300 MG/1
300 TABLET ORAL DAILY
Qty: 30 TABLET | Refills: 3 | Status: SHIPPED | OUTPATIENT
Start: 2021-10-19 | End: 2022-11-16 | Stop reason: SDUPTHER

## 2021-10-19 RX ORDER — ERGOCALCIFEROL 1.25 MG/1
50000 CAPSULE ORAL
Qty: 24 CAPSULE | Refills: 4 | Status: SHIPPED | OUTPATIENT
Start: 2021-10-21 | End: 2022-11-16 | Stop reason: SDUPTHER

## 2021-10-20 DIAGNOSIS — E11.9 TYPE 2 DIABETES MELLITUS WITHOUT COMPLICATION: ICD-10-CM

## 2021-10-25 ENCOUNTER — TELEPHONE (OUTPATIENT)
Dept: OPTOMETRY | Facility: CLINIC | Age: 31
End: 2021-10-25
Payer: MEDICAID

## 2021-10-25 ENCOUNTER — PATIENT OUTREACH (OUTPATIENT)
Dept: ADMINISTRATIVE | Facility: OTHER | Age: 31
End: 2021-10-25
Payer: MEDICAID

## 2021-10-26 ENCOUNTER — OFFICE VISIT (OUTPATIENT)
Dept: OPTOMETRY | Facility: CLINIC | Age: 31
End: 2021-10-26
Payer: MEDICAID

## 2021-10-26 DIAGNOSIS — H26.9 CATARACT OF RIGHT EYE, UNSPECIFIED CATARACT TYPE: ICD-10-CM

## 2021-10-26 DIAGNOSIS — H52.7 REFRACTIVE ERROR: ICD-10-CM

## 2021-10-26 DIAGNOSIS — E11.9 TYPE 2 DIABETES MELLITUS WITHOUT RETINOPATHY: Primary | ICD-10-CM

## 2021-10-26 DIAGNOSIS — E13.9 DIABETES MELLITUS DUE TO ABNORMAL INSULIN: ICD-10-CM

## 2021-10-26 PROCEDURE — 92004 PR EYE EXAM, NEW PATIENT,COMPREHESV: ICD-10-PCS | Mod: S$PBB,,, | Performed by: OPTOMETRIST

## 2021-10-26 PROCEDURE — 92004 COMPRE OPH EXAM NEW PT 1/>: CPT | Mod: S$PBB,,, | Performed by: OPTOMETRIST

## 2021-10-26 PROCEDURE — 92015 PR REFRACTION: ICD-10-PCS | Mod: ,,, | Performed by: OPTOMETRIST

## 2021-10-26 PROCEDURE — 99999 PR PBB SHADOW E&M-EST. PATIENT-LVL III: CPT | Mod: PBBFAC,,, | Performed by: OPTOMETRIST

## 2021-10-26 PROCEDURE — 92015 DETERMINE REFRACTIVE STATE: CPT | Mod: ,,, | Performed by: OPTOMETRIST

## 2021-10-26 PROCEDURE — 99213 OFFICE O/P EST LOW 20 MIN: CPT | Mod: PBBFAC | Performed by: OPTOMETRIST

## 2021-10-26 PROCEDURE — 99999 PR PBB SHADOW E&M-EST. PATIENT-LVL III: ICD-10-PCS | Mod: PBBFAC,,, | Performed by: OPTOMETRIST

## 2022-01-10 ENCOUNTER — HOSPITAL ENCOUNTER (OUTPATIENT)
Dept: PEDIATRIC CARDIOLOGY | Facility: HOSPITAL | Age: 32
Discharge: HOME | End: 2022-01-10
Attending: PEDIATRICS
Payer: MEDICAID

## 2022-01-10 DIAGNOSIS — Q24.9 ADULT CONGENITAL HEART DISEASE: ICD-10-CM

## 2022-01-10 DIAGNOSIS — Q21.3 TETRALOGY OF FALLOT: ICD-10-CM

## 2022-01-10 DIAGNOSIS — I48.91 ATRIAL FIBRILLATION, UNSPECIFIED TYPE: ICD-10-CM

## 2022-01-10 DIAGNOSIS — Z95.0 PACEMAKER: ICD-10-CM

## 2022-01-10 PROCEDURE — 93296 REM INTERROG EVL PM/IDS: CPT

## 2022-01-10 PROCEDURE — 93294 CV PACEMAKER REMOTE PEDIATRICS (CUPID ONLY): ICD-10-PCS | Mod: ,,, | Performed by: PEDIATRICS

## 2022-01-10 PROCEDURE — 93294 REM INTERROG EVL PM/LDLS PM: CPT | Mod: ,,, | Performed by: PEDIATRICS

## 2022-01-11 LAB
AV DELAY - LONGEST: 240 MSEC
BATTERY VOLTAGE (V): 3 V
ERI (V): 2.63 V
IMPEDANCE RA LEAD (NATIVE): 475 OHMS
IMPEDANCE RA LEAD: 399 OHMS
OHS CV DC PP MS1: 0.4 MS
OHS CV DC PP MS2: 0.4 MS
OHS CV DC PP V1: NORMAL V
OHS CV DC PP V2: NORMAL V
P/R-WAVE RA LEAD (NATIVE): 2.1 MV
P/R-WAVE RA LEAD: 2 MV
PV DELAY - LONGEST: 220 MSEC
THRESHOLD MS RA LEAD (NATIVE): 0.4 MS
THRESHOLD MS RA LEAD: 0.4 MS
THRESHOLD V RA LEAD (NATIVE): 1 V
THRESHOLD V RA LEAD: 0.38 V

## 2022-01-20 ENCOUNTER — LAB VISIT (OUTPATIENT)
Dept: LAB | Facility: HOSPITAL | Age: 32
End: 2022-01-20
Attending: INTERNAL MEDICINE
Payer: MEDICAID

## 2022-01-20 DIAGNOSIS — E13.9 DIABETES MELLITUS DUE TO ABNORMAL INSULIN: ICD-10-CM

## 2022-01-20 DIAGNOSIS — M10.9 GOUT, ARTHRITIS: ICD-10-CM

## 2022-01-20 DIAGNOSIS — E55.9 VITAMIN D DEFICIENCY DISEASE: ICD-10-CM

## 2022-01-20 LAB
25(OH)D3+25(OH)D2 SERPL-MCNC: 19 NG/ML (ref 30–96)
ALBUMIN SERPL BCP-MCNC: 3.2 G/DL (ref 3.5–5.2)
ALP SERPL-CCNC: 129 U/L (ref 55–135)
ALT SERPL W/O P-5'-P-CCNC: 17 U/L (ref 10–44)
ANION GAP SERPL CALC-SCNC: 9 MMOL/L (ref 8–16)
AST SERPL-CCNC: 15 U/L (ref 10–40)
BASOPHILS # BLD AUTO: 0.07 K/UL (ref 0–0.2)
BASOPHILS NFR BLD: 1.1 % (ref 0–1.9)
BILIRUB SERPL-MCNC: 0.3 MG/DL (ref 0.1–1)
BUN SERPL-MCNC: 12 MG/DL (ref 6–20)
CALCIUM SERPL-MCNC: 9.4 MG/DL (ref 8.7–10.5)
CHLORIDE SERPL-SCNC: 102 MMOL/L (ref 95–110)
CO2 SERPL-SCNC: 26 MMOL/L (ref 23–29)
CREAT SERPL-MCNC: 1.1 MG/DL (ref 0.5–1.4)
DIFFERENTIAL METHOD: ABNORMAL
EOSINOPHIL # BLD AUTO: 0.2 K/UL (ref 0–0.5)
EOSINOPHIL NFR BLD: 3.4 % (ref 0–8)
ERYTHROCYTE [DISTWIDTH] IN BLOOD BY AUTOMATED COUNT: 16.2 % (ref 11.5–14.5)
EST. GFR  (AFRICAN AMERICAN): >60 ML/MIN/1.73 M^2
EST. GFR  (NON AFRICAN AMERICAN): >60 ML/MIN/1.73 M^2
ESTIMATED AVG GLUCOSE: 131 MG/DL (ref 68–131)
GLUCOSE SERPL-MCNC: 127 MG/DL (ref 70–110)
HBA1C MFR BLD: 6.2 % (ref 4–5.6)
HCT VFR BLD AUTO: 44.4 % (ref 40–54)
HGB BLD-MCNC: 13.7 G/DL (ref 14–18)
IMM GRANULOCYTES # BLD AUTO: 0.04 K/UL (ref 0–0.04)
IMM GRANULOCYTES NFR BLD AUTO: 0.6 % (ref 0–0.5)
LYMPHOCYTES # BLD AUTO: 1.1 K/UL (ref 1–4.8)
LYMPHOCYTES NFR BLD: 17.4 % (ref 18–48)
MCH RBC QN AUTO: 30.6 PG (ref 27–31)
MCHC RBC AUTO-ENTMCNC: 30.9 G/DL (ref 32–36)
MCV RBC AUTO: 99 FL (ref 82–98)
MONOCYTES # BLD AUTO: 0.4 K/UL (ref 0.3–1)
MONOCYTES NFR BLD: 5.4 % (ref 4–15)
NEUTROPHILS # BLD AUTO: 4.6 K/UL (ref 1.8–7.7)
NEUTROPHILS NFR BLD: 72.1 % (ref 38–73)
NRBC BLD-RTO: 0 /100 WBC
PLATELET # BLD AUTO: 256 K/UL (ref 150–450)
PMV BLD AUTO: 9.6 FL (ref 9.2–12.9)
POTASSIUM SERPL-SCNC: 4.2 MMOL/L (ref 3.5–5.1)
PROT SERPL-MCNC: 7.3 G/DL (ref 6–8.4)
RBC # BLD AUTO: 4.48 M/UL (ref 4.6–6.2)
SODIUM SERPL-SCNC: 137 MMOL/L (ref 136–145)
TSH SERPL DL<=0.005 MIU/L-ACNC: 3.74 UIU/ML (ref 0.4–4)
URATE SERPL-MCNC: 6.7 MG/DL (ref 3.4–7)
WBC # BLD AUTO: 6.43 K/UL (ref 3.9–12.7)

## 2022-01-20 PROCEDURE — 84550 ASSAY OF BLOOD/URIC ACID: CPT | Performed by: INTERNAL MEDICINE

## 2022-01-20 PROCEDURE — 80053 COMPREHEN METABOLIC PANEL: CPT | Performed by: INTERNAL MEDICINE

## 2022-01-20 PROCEDURE — 84443 ASSAY THYROID STIM HORMONE: CPT | Performed by: INTERNAL MEDICINE

## 2022-01-20 PROCEDURE — 83036 HEMOGLOBIN GLYCOSYLATED A1C: CPT | Performed by: INTERNAL MEDICINE

## 2022-01-20 PROCEDURE — 85025 COMPLETE CBC W/AUTO DIFF WBC: CPT | Performed by: INTERNAL MEDICINE

## 2022-01-20 PROCEDURE — 82306 VITAMIN D 25 HYDROXY: CPT | Performed by: INTERNAL MEDICINE

## 2022-01-20 PROCEDURE — 36415 COLL VENOUS BLD VENIPUNCTURE: CPT | Performed by: INTERNAL MEDICINE

## 2022-01-24 ENCOUNTER — TELEPHONE (OUTPATIENT)
Dept: INTERNAL MEDICINE | Facility: CLINIC | Age: 32
End: 2022-01-24
Payer: MEDICAID

## 2022-01-24 NOTE — TELEPHONE ENCOUNTER
----- Message from Moraima Dacosta sent at 1/24/2022  1:35 PM CST -----  Contact: Pt  Type:  Sooner Appoointment Request    Caller is requesting a sooner appointment.  Caller declined first available appointment listed below.  Caller will not accept being placed on the waitlist and is requesting a message be sent to doctor.  Name of Caller:Pt mother  When is the first available appointment?01/26/22  Symptoms:F/U  Would the patient rather a call back or a response via [facility]? Call back  Best Call Back Number:[phone]  Additional Information: Pt mother asked for a call back with a new appt date and time.

## 2022-01-24 NOTE — TELEPHONE ENCOUNTER
If mother is calling to reschedule the apt, Will have to figure out when can work him in -  It  May be 1-2 months. She should try to make the apt if possibe

## 2022-01-25 NOTE — TELEPHONE ENCOUNTER
He has an apt with me this Wednesday - do they need to be seen sooner than Wednesday? If so, should go to urgent care.   Find out what is going on.  They should really try to make the apt.

## 2022-01-25 NOTE — TELEPHONE ENCOUNTER
I tried to reschedule to appointment but I am unable to, please contact staff that can override to help get sooner appt thank you

## 2022-01-26 ENCOUNTER — OFFICE VISIT (OUTPATIENT)
Dept: INTERNAL MEDICINE | Facility: CLINIC | Age: 32
End: 2022-01-26
Payer: MEDICAID

## 2022-01-26 VITALS
HEIGHT: 65 IN | DIASTOLIC BLOOD PRESSURE: 70 MMHG | SYSTOLIC BLOOD PRESSURE: 110 MMHG | WEIGHT: 289.13 LBS | HEART RATE: 92 BPM | BODY MASS INDEX: 48.17 KG/M2 | OXYGEN SATURATION: 98 %

## 2022-01-26 DIAGNOSIS — E03.9 HYPOTHYROIDISM, UNSPECIFIED TYPE: Primary | ICD-10-CM

## 2022-01-26 DIAGNOSIS — G47.33 OSA (OBSTRUCTIVE SLEEP APNEA): ICD-10-CM

## 2022-01-26 DIAGNOSIS — Q90.9 DOWN SYNDROME: ICD-10-CM

## 2022-01-26 DIAGNOSIS — M10.9 GOUT, ARTHRITIS: ICD-10-CM

## 2022-01-26 DIAGNOSIS — Q24.9 ADULT CONGENITAL HEART DISEASE: ICD-10-CM

## 2022-01-26 PROCEDURE — 3078F PR MOST RECENT DIASTOLIC BLOOD PRESSURE < 80 MM HG: ICD-10-PCS | Mod: CPTII,,, | Performed by: INTERNAL MEDICINE

## 2022-01-26 PROCEDURE — 99214 OFFICE O/P EST MOD 30 MIN: CPT | Mod: S$PBB,,, | Performed by: INTERNAL MEDICINE

## 2022-01-26 PROCEDURE — 3074F PR MOST RECENT SYSTOLIC BLOOD PRESSURE < 130 MM HG: ICD-10-PCS | Mod: CPTII,,, | Performed by: INTERNAL MEDICINE

## 2022-01-26 PROCEDURE — 3008F PR BODY MASS INDEX (BMI) DOCUMENTED: ICD-10-PCS | Mod: CPTII,,, | Performed by: INTERNAL MEDICINE

## 2022-01-26 PROCEDURE — 99214 PR OFFICE/OUTPT VISIT, EST, LEVL IV, 30-39 MIN: ICD-10-PCS | Mod: S$PBB,,, | Performed by: INTERNAL MEDICINE

## 2022-01-26 PROCEDURE — 99999 PR PBB SHADOW E&M-EST. PATIENT-LVL II: CPT | Mod: PBBFAC,,, | Performed by: INTERNAL MEDICINE

## 2022-01-26 PROCEDURE — 99999 PR PBB SHADOW E&M-EST. PATIENT-LVL II: ICD-10-PCS | Mod: PBBFAC,,, | Performed by: INTERNAL MEDICINE

## 2022-01-26 PROCEDURE — 3044F HG A1C LEVEL LT 7.0%: CPT | Mod: CPTII,,, | Performed by: INTERNAL MEDICINE

## 2022-01-26 PROCEDURE — 3078F DIAST BP <80 MM HG: CPT | Mod: CPTII,,, | Performed by: INTERNAL MEDICINE

## 2022-01-26 PROCEDURE — 99212 OFFICE O/P EST SF 10 MIN: CPT | Mod: PBBFAC | Performed by: INTERNAL MEDICINE

## 2022-01-26 PROCEDURE — 3008F BODY MASS INDEX DOCD: CPT | Mod: CPTII,,, | Performed by: INTERNAL MEDICINE

## 2022-01-26 PROCEDURE — 3074F SYST BP LT 130 MM HG: CPT | Mod: CPTII,,, | Performed by: INTERNAL MEDICINE

## 2022-01-26 PROCEDURE — 3044F PR MOST RECENT HEMOGLOBIN A1C LEVEL <7.0%: ICD-10-PCS | Mod: CPTII,,, | Performed by: INTERNAL MEDICINE

## 2022-01-26 NOTE — PROGRESS NOTES
Chief Complaint: Follow up of  Medical problems     HPI:This is a 31 year old man who presents with his mother for follow up of his medical problems.    All history is obtained from his mother    For 3 weeks, he had excessive sleeping. He slept through the movie, Spiderman.   He snored during the movie. Mother kept waking him up during the moving.    He did not use the CPAP machine when he was in [place] for Christmas.     He started back using the CPAP machine when he returned from [place] on 12/30/21.  Once he puts the CPAP machine on, he will not take it off.  Mother says that he does not have air leaks.     Excessive sleepiness has improved the last 10 days.  He will does nap a lot during the day.     Mother gets off work at midnight. He us usally awake when she gets home.  Mother goes to bed at 3 am and Cipriano is still awake.  Mother wakes Cipriano up at 7:30-8 am to bring the dogs out.  He goes back to sleep around 9 am (CPAP machine is on).  He will sleep until the evening if his mother lets him sleep. Mother wakes Cipriano up at 3:15 pm.     He gets 20 hours a month from services from the [facility]. His aunt does his services by taking him to different activities.   She just started him in the community due to COVID pandemic        He has gained weight during the COVID pandemic.   He was 269 pounds on March 5, 2020.   Weight 12/10/2020 was 293 (gain of 24 pounds).  Weight got down to 283 on 1/29/2021 (lost fluid).  Weight is currently 286 (lost 5 pounds since our last visit).       He has gout. He is taking allopurinol 300 mg daily.       He has lost 5 pounds since our last visit.      His mother notes that Cipriano has not been taking his medication regularly - maybe 3 times a week. When he was in [place], he took his medication regularly.   He should take coreg 25 mg daily, metoprolol succinate 25 mg 1 tablet daily, hctz 25 mg once daily and levothyroxine 88 mcg daily.      HE is not taking vitamin D supplement. He  should take vitamin D 50,000 units twice weekly.              Past Medical History:   Diagnosis Date    Atrial fibrillation      CHF (congestive heart failure)      Down syndrome      Encounter for blood transfusion      Gout      Hypothyroidism      S/P surgery for complex congenital heart disease 1/30/2017    Sleep apnea      Tetralogy of Fallot 05/1990    VT (ventricular tachycardia) 3/27/2019                      Past Surgical History:   Procedure Laterality Date    BUNIONECTOMY        CARDIAC SURGERY         open heart, ppm     INSERT / REPLACE / REMOVE PACEMAKER Left 10/2008    NONINVASIVE CARDIAC ELECTROPHYSIOLOGY STUDY N/A 3/27/2019     Procedure: CARDIAC ELECTROPHYSIOLOGY STUDY, NONINVASIVE;  Surgeon: Jose Ortiz MD;  Location: [facility] EP LAB;  Service: Cardiology;  Laterality: N/A;  VT, NIEPS, Venogram, MDT, MAC, 3 prep, Macicek    TONSILLECTOMY          Social History                   Socioeconomic History    Marital status: Single       Spouse name: Not on file    Number of children: Not on file    Years of education: Not on file    Highest education level: Not on file   Occupational History    Not on file   Tobacco Use    Smoking status: Never Smoker    Smokeless tobacco: Never Used   Substance and Sexual Activity    Alcohol use: No    Drug use: No    Sexual activity: Not Currently   Other Topics Concern    Not on file   Social History Narrative     Lives with mother, 1 dog (inside)      Social Determinants of Health            Financial Resource Strain:     Difficulty of Paying Living Expenses:    Food Insecurity:     Worried About Running Out of Food in the Last Year:     Ran Out of Food in the Last Year:    Transportation Needs:     Lack of Transportation (Medical):     Lack of Transportation (Non-Medical):    Physical Activity:     Days of Exercise per Week:     Minutes of Exercise per Session:    Stress:     Feeling of Stress :    Social Connections:      "Frequency of Communication with Friends and Family:     Frequency of Social Gatherings with Friends and Family:     Attends Christian Services:     Active Member of Clubs or Organizations:     Attends Club or Organization Meetings:     Marital Status:                    Family Status   Relation Name Status    Mother Luann Alive    Sister Barbara Alive    MGM       MGF       PGM       PGF               Meds and allergies: updated on Epic     REVIEW OF SYSTEMS: No fevers, chills, night sweats, fatigue, visual change, hearing loss, sinus congestion, sore throat, chest pain, nausea, vomiting, constipation, diarrhea, dysuria, hematuria, polydipsia, polyuria, headaches, anxiety, depression     Physical exam:    /70   Pulse 92   Ht 5' 5" (1.651 m)   Wt 131.1 kg (289 lb 2.1 oz)   SpO2 98%   BMI 48.11 kg/m²       General: alert, oriented x 3, no apparent distress.  Affect normal  HEENT: Conjunctivae: anicteric, PERRL, EOMI, TM clear, Oralpharynx clear  Neck: supple, no thyroid enlargement, no cervical lymphadenopathy  Resp: effort normal, lungs clear bilaterally  CV: Regular rate and rhythm without murmurs, gallops or rubs, no lower extremity edema,               Labs 22 reviewed        Assessment/Plan:        Congenital heart disease with CHF, a fib and pacemaker - follow up with cardiology. Doing better with medications and diet.      Sleep apnea - discussed compliance with CPAP machine.     Hypothyroidism - TSH     GOut on allopurinol to 300 mg once daily     Morbid Obesity - work on diet and exercise     Down's Syndrome with mental handicapped    Encouraged more family support.      Vitamin D deficiency - get on prescription vitamin D 50,000 units twice a week      Diabetes - sugars are doing better.            I will see him back in 4 months, sooner if problems arise.            "

## 2022-03-28 ENCOUNTER — PATIENT OUTREACH (OUTPATIENT)
Dept: ADMINISTRATIVE | Facility: OTHER | Age: 32
End: 2022-03-28
Payer: MEDICAID

## 2022-03-29 ENCOUNTER — OFFICE VISIT (OUTPATIENT)
Dept: SLEEP MEDICINE | Facility: CLINIC | Age: 32
End: 2022-03-29
Payer: MEDICAID

## 2022-03-29 VITALS
WEIGHT: 289 LBS | SYSTOLIC BLOOD PRESSURE: 112 MMHG | HEART RATE: 71 BPM | DIASTOLIC BLOOD PRESSURE: 77 MMHG | BODY MASS INDEX: 48.09 KG/M2

## 2022-03-29 DIAGNOSIS — Q90.9 DOWN SYNDROME: ICD-10-CM

## 2022-03-29 DIAGNOSIS — I48.91 ATRIAL FIBRILLATION, UNSPECIFIED TYPE: ICD-10-CM

## 2022-03-29 DIAGNOSIS — I11.9 RIGHT VENTRICULAR HYPERTENSION: ICD-10-CM

## 2022-03-29 DIAGNOSIS — G47.33 OSA (OBSTRUCTIVE SLEEP APNEA): Primary | ICD-10-CM

## 2022-03-29 PROCEDURE — 3078F DIAST BP <80 MM HG: CPT | Mod: CPTII,,, | Performed by: NURSE PRACTITIONER

## 2022-03-29 PROCEDURE — 3008F BODY MASS INDEX DOCD: CPT | Mod: CPTII,,, | Performed by: NURSE PRACTITIONER

## 2022-03-29 PROCEDURE — 1159F PR MEDICATION LIST DOCUMENTED IN MEDICAL RECORD: ICD-10-PCS | Mod: CPTII,,, | Performed by: NURSE PRACTITIONER

## 2022-03-29 PROCEDURE — 99213 OFFICE O/P EST LOW 20 MIN: CPT | Mod: PBBFAC | Performed by: NURSE PRACTITIONER

## 2022-03-29 PROCEDURE — 3074F SYST BP LT 130 MM HG: CPT | Mod: CPTII,,, | Performed by: NURSE PRACTITIONER

## 2022-03-29 PROCEDURE — 1159F MED LIST DOCD IN RCRD: CPT | Mod: CPTII,,, | Performed by: NURSE PRACTITIONER

## 2022-03-29 PROCEDURE — 3074F PR MOST RECENT SYSTOLIC BLOOD PRESSURE < 130 MM HG: ICD-10-PCS | Mod: CPTII,,, | Performed by: NURSE PRACTITIONER

## 2022-03-29 PROCEDURE — 99214 PR OFFICE/OUTPT VISIT, EST, LEVL IV, 30-39 MIN: ICD-10-PCS | Mod: S$PBB,,, | Performed by: NURSE PRACTITIONER

## 2022-03-29 PROCEDURE — 3044F HG A1C LEVEL LT 7.0%: CPT | Mod: CPTII,,, | Performed by: NURSE PRACTITIONER

## 2022-03-29 PROCEDURE — 99214 OFFICE O/P EST MOD 30 MIN: CPT | Mod: S$PBB,,, | Performed by: NURSE PRACTITIONER

## 2022-03-29 PROCEDURE — 3044F PR MOST RECENT HEMOGLOBIN A1C LEVEL <7.0%: ICD-10-PCS | Mod: CPTII,,, | Performed by: NURSE PRACTITIONER

## 2022-03-29 PROCEDURE — 99999 PR PBB SHADOW E&M-EST. PATIENT-LVL III: ICD-10-PCS | Mod: PBBFAC,,, | Performed by: NURSE PRACTITIONER

## 2022-03-29 PROCEDURE — 99999 PR PBB SHADOW E&M-EST. PATIENT-LVL III: CPT | Mod: PBBFAC,,, | Performed by: NURSE PRACTITIONER

## 2022-03-29 PROCEDURE — 3078F PR MOST RECENT DIASTOLIC BLOOD PRESSURE < 80 MM HG: ICD-10-PCS | Mod: CPTII,,, | Performed by: NURSE PRACTITIONER

## 2022-03-29 PROCEDURE — 3008F PR BODY MASS INDEX (BMI) DOCUMENTED: ICD-10-PCS | Mod: CPTII,,, | Performed by: NURSE PRACTITIONER

## 2022-03-29 NOTE — PROGRESS NOTES
Cc: BIJAN mgt    Mom present providing all of the history. Had PSG 2021 revealing severe BIJAN. Setup with apap 5-12cm 6/1/21 but limited use due to mask interface and irregular sleep pattern Sleeps a lot during daytime b/c up at night playing games. Mom told DME he wouldn't be able to keep his mask on prior to getting machine. Has bill now of $1000 due for machine. BP stable  Remote: 93/297days>4h. avg 3.5h/ nAHI 2.8, 90% tile 11cm    /77 (BP Location: Left arm, Patient Position: Sitting, BP Method: Large (Automatic))   Pulse 71   Wt 131.1 kg (289 lb)   BMI 48.09 kg/m²     Assessment- BIJAN severe  AFib, VT has PPM, Down's syndrome, RV HTN    Plan:  Return machine and obtain requal PSG and plan setup again with apap WITH adherence monitoring visit 4.5-5 wks after setup  See pcp re: RV HTN mgt/continue meds

## 2022-03-30 ENCOUNTER — TELEPHONE (OUTPATIENT)
Dept: SLEEP MEDICINE | Facility: CLINIC | Age: 32
End: 2022-03-30
Payer: MEDICAID

## 2022-03-30 NOTE — TELEPHONE ENCOUNTER
----- Message from Ernestine Calhoun sent at 3/30/2022 10:45 AM CDT -----  Regarding: D/C Pap  Np. Jb,    I spoke with the patients mother about the pap unit. She said that the pt is going to do a re-trial. If it is okay with you I would prefer that he keep the unit he has since there is a shortage. Please advise.       Thank you,    Ernestine

## 2022-04-11 ENCOUNTER — HOSPITAL ENCOUNTER (OUTPATIENT)
Dept: PEDIATRIC CARDIOLOGY | Facility: HOSPITAL | Age: 32
Discharge: HOME | End: 2022-04-11
Attending: PEDIATRICS
Payer: MEDICAID

## 2022-04-11 ENCOUNTER — TELEPHONE (OUTPATIENT)
Dept: SLEEP MEDICINE | Facility: OTHER | Age: 32
End: 2022-04-11
Payer: MEDICAID

## 2022-04-11 DIAGNOSIS — Q21.3 TETRALOGY OF FALLOT: ICD-10-CM

## 2022-04-11 DIAGNOSIS — Q24.9 ADULT CONGENITAL HEART DISEASE: ICD-10-CM

## 2022-04-11 DIAGNOSIS — I48.91 ATRIAL FIBRILLATION, UNSPECIFIED TYPE: ICD-10-CM

## 2022-04-11 DIAGNOSIS — Z95.0 PACEMAKER: ICD-10-CM

## 2022-04-11 PROCEDURE — 93294 CV PACEMAKER REMOTE PEDIATRICS (CUPID ONLY): ICD-10-PCS | Mod: ,,, | Performed by: PEDIATRICS

## 2022-04-11 PROCEDURE — 93296 REM INTERROG EVL PM/IDS: CPT

## 2022-04-11 PROCEDURE — 93294 REM INTERROG EVL PM/LDLS PM: CPT | Mod: ,,, | Performed by: PEDIATRICS

## 2022-04-19 ENCOUNTER — TELEPHONE (OUTPATIENT)
Dept: SLEEP MEDICINE | Facility: OTHER | Age: 32
End: 2022-04-19
Payer: MEDICAID

## 2022-04-21 LAB
AV DELAY - LONGEST: 240 MSEC
BATTERY VOLTAGE (V): 3 V
ERI (V): 2.63 V
IMPEDANCE RA LEAD (NATIVE): 437 OHMS
IMPEDANCE RA LEAD: 380 OHMS
OHS CV DC PP MS1: 0.4 MS
OHS CV DC PP MS2: 0.4 MS
OHS CV DC PP V1: NORMAL V
OHS CV DC PP V2: NORMAL V
P/R-WAVE RA LEAD (NATIVE): 3.4 MV
P/R-WAVE RA LEAD: 2.3 MV
PV DELAY - LONGEST: 220 MSEC
THRESHOLD MS RA LEAD (NATIVE): 0.4 MS
THRESHOLD MS RA LEAD: 0.4 MS
THRESHOLD V RA LEAD (NATIVE): 1 V
THRESHOLD V RA LEAD: 0.5 V

## 2022-04-22 ENCOUNTER — TELEPHONE (OUTPATIENT)
Dept: SLEEP MEDICINE | Facility: OTHER | Age: 32
End: 2022-04-22
Payer: MEDICAID

## 2022-04-29 ENCOUNTER — TELEPHONE (OUTPATIENT)
Dept: SLEEP MEDICINE | Facility: OTHER | Age: 32
End: 2022-04-29
Payer: MEDICAID

## 2022-05-04 ENCOUNTER — TELEPHONE (OUTPATIENT)
Dept: SLEEP MEDICINE | Facility: OTHER | Age: 32
End: 2022-05-04
Payer: MEDICAID

## 2022-05-05 ENCOUNTER — TELEPHONE (OUTPATIENT)
Dept: SLEEP MEDICINE | Facility: CLINIC | Age: 32
End: 2022-05-05
Payer: MEDICAID

## 2022-05-05 DIAGNOSIS — G47.33 OSA (OBSTRUCTIVE SLEEP APNEA): Primary | ICD-10-CM

## 2022-05-05 NOTE — TELEPHONE ENCOUNTER
Spoke with insurance staff and opened new case for psg denial/provided more clinical information including CHF class 3, down's syndrome/cogntive impairment to do a home study and hx VT with PPM and current AFib and still not approved so then did P2P with ins doc and was told he DOES NOT require a requal study if he failed compliance last year they can use his former study as long as within 10 yrs. Told for out auth dept to just look at their guidelines and follow them. So I will order new supplies. I will place RX.

## 2022-05-17 DIAGNOSIS — I49.3 PVC'S (PREMATURE VENTRICULAR CONTRACTIONS): ICD-10-CM

## 2022-05-17 DIAGNOSIS — Q24.9 ADULT CONGENITAL HEART DISEASE: ICD-10-CM

## 2022-05-17 DIAGNOSIS — I37.2 NONRHEUMATIC PULMONARY VALVE STENOSIS WITH INSUFFICIENCY: ICD-10-CM

## 2022-05-17 DIAGNOSIS — I48.91 ATRIAL FIBRILLATION, UNSPECIFIED TYPE: ICD-10-CM

## 2022-05-17 DIAGNOSIS — Q21.3 TETRALOGY OF FALLOT: Primary | ICD-10-CM

## 2022-05-17 DIAGNOSIS — I11.9 RIGHT VENTRICULAR HYPERTENSION: ICD-10-CM

## 2022-05-17 DIAGNOSIS — I48.3 TYPICAL ATRIAL FLUTTER: ICD-10-CM

## 2022-05-17 DIAGNOSIS — Z95.0 PACEMAKER: ICD-10-CM

## 2022-05-17 DIAGNOSIS — I47.20 VT (VENTRICULAR TACHYCARDIA): ICD-10-CM

## 2022-05-26 ENCOUNTER — OFFICE VISIT (OUTPATIENT)
Dept: INTERNAL MEDICINE | Facility: CLINIC | Age: 32
End: 2022-05-26
Payer: MEDICAID

## 2022-05-26 ENCOUNTER — LAB VISIT (OUTPATIENT)
Dept: LAB | Facility: HOSPITAL | Age: 32
End: 2022-05-26
Attending: INTERNAL MEDICINE
Payer: MEDICAID

## 2022-05-26 ENCOUNTER — TELEPHONE (OUTPATIENT)
Dept: INTERNAL MEDICINE | Facility: CLINIC | Age: 32
End: 2022-05-26
Payer: MEDICAID

## 2022-05-26 VITALS
HEIGHT: 65 IN | OXYGEN SATURATION: 97 % | HEART RATE: 61 BPM | WEIGHT: 297.38 LBS | SYSTOLIC BLOOD PRESSURE: 122 MMHG | BODY MASS INDEX: 49.55 KG/M2 | DIASTOLIC BLOOD PRESSURE: 78 MMHG

## 2022-05-26 DIAGNOSIS — E13.9 DIABETES MELLITUS DUE TO ABNORMAL INSULIN: ICD-10-CM

## 2022-05-26 DIAGNOSIS — Q21.3 TETRALOGY OF FALLOT: ICD-10-CM

## 2022-05-26 DIAGNOSIS — Q90.9 DOWN SYNDROME: ICD-10-CM

## 2022-05-26 DIAGNOSIS — R79.9 ABNORMAL BLOOD CHEMISTRY: Primary | ICD-10-CM

## 2022-05-26 DIAGNOSIS — Q24.9 ADULT CONGENITAL HEART DISEASE: ICD-10-CM

## 2022-05-26 DIAGNOSIS — G47.33 OSA (OBSTRUCTIVE SLEEP APNEA): ICD-10-CM

## 2022-05-26 DIAGNOSIS — M10.9 GOUT, ARTHRITIS: ICD-10-CM

## 2022-05-26 DIAGNOSIS — R79.9 ABNORMAL BLOOD CHEMISTRY: ICD-10-CM

## 2022-05-26 LAB
ALBUMIN SERPL BCP-MCNC: 3.6 G/DL (ref 3.5–5.2)
ALP SERPL-CCNC: 119 U/L (ref 55–135)
ALT SERPL W/O P-5'-P-CCNC: 26 U/L (ref 10–44)
ANION GAP SERPL CALC-SCNC: 9 MMOL/L (ref 8–16)
AST SERPL-CCNC: 23 U/L (ref 10–40)
BASOPHILS # BLD AUTO: 0.07 K/UL (ref 0–0.2)
BASOPHILS NFR BLD: 1 % (ref 0–1.9)
BILIRUB SERPL-MCNC: 0.6 MG/DL (ref 0.1–1)
BUN SERPL-MCNC: 15 MG/DL (ref 6–20)
CALCIUM SERPL-MCNC: 9.7 MG/DL (ref 8.7–10.5)
CHLORIDE SERPL-SCNC: 101 MMOL/L (ref 95–110)
CO2 SERPL-SCNC: 27 MMOL/L (ref 23–29)
CREAT SERPL-MCNC: 1.2 MG/DL (ref 0.5–1.4)
DIFFERENTIAL METHOD: ABNORMAL
EOSINOPHIL # BLD AUTO: 0.1 K/UL (ref 0–0.5)
EOSINOPHIL NFR BLD: 1.9 % (ref 0–8)
ERYTHROCYTE [DISTWIDTH] IN BLOOD BY AUTOMATED COUNT: 16 % (ref 11.5–14.5)
EST. GFR  (AFRICAN AMERICAN): >60 ML/MIN/1.73 M^2
EST. GFR  (NON AFRICAN AMERICAN): >60 ML/MIN/1.73 M^2
ESTIMATED AVG GLUCOSE: 134 MG/DL (ref 68–131)
GLUCOSE SERPL-MCNC: 123 MG/DL (ref 70–110)
HBA1C MFR BLD: 6.3 % (ref 4–5.6)
HCT VFR BLD AUTO: 44.8 % (ref 40–54)
HGB BLD-MCNC: 14.4 G/DL (ref 14–18)
IMM GRANULOCYTES # BLD AUTO: 0.03 K/UL (ref 0–0.04)
IMM GRANULOCYTES NFR BLD AUTO: 0.4 % (ref 0–0.5)
LYMPHOCYTES # BLD AUTO: 1.2 K/UL (ref 1–4.8)
LYMPHOCYTES NFR BLD: 17 % (ref 18–48)
MCH RBC QN AUTO: 30 PG (ref 27–31)
MCHC RBC AUTO-ENTMCNC: 32.1 G/DL (ref 32–36)
MCV RBC AUTO: 93 FL (ref 82–98)
MONOCYTES # BLD AUTO: 0.5 K/UL (ref 0.3–1)
MONOCYTES NFR BLD: 6.2 % (ref 4–15)
NEUTROPHILS # BLD AUTO: 5.4 K/UL (ref 1.8–7.7)
NEUTROPHILS NFR BLD: 73.5 % (ref 38–73)
NRBC BLD-RTO: 0 /100 WBC
PLATELET # BLD AUTO: 270 K/UL (ref 150–450)
PMV BLD AUTO: 9.3 FL (ref 9.2–12.9)
POTASSIUM SERPL-SCNC: 3.7 MMOL/L (ref 3.5–5.1)
PROT SERPL-MCNC: 7.8 G/DL (ref 6–8.4)
RBC # BLD AUTO: 4.8 M/UL (ref 4.6–6.2)
SODIUM SERPL-SCNC: 137 MMOL/L (ref 136–145)
WBC # BLD AUTO: 7.28 K/UL (ref 3.9–12.7)

## 2022-05-26 PROCEDURE — 3078F DIAST BP <80 MM HG: CPT | Mod: CPTII,,, | Performed by: INTERNAL MEDICINE

## 2022-05-26 PROCEDURE — 99999 PR PBB SHADOW E&M-EST. PATIENT-LVL III: ICD-10-PCS | Mod: PBBFAC,,, | Performed by: INTERNAL MEDICINE

## 2022-05-26 PROCEDURE — 99213 OFFICE O/P EST LOW 20 MIN: CPT | Mod: PBBFAC | Performed by: INTERNAL MEDICINE

## 2022-05-26 PROCEDURE — 3044F HG A1C LEVEL LT 7.0%: CPT | Mod: CPTII,,, | Performed by: INTERNAL MEDICINE

## 2022-05-26 PROCEDURE — 3078F PR MOST RECENT DIASTOLIC BLOOD PRESSURE < 80 MM HG: ICD-10-PCS | Mod: CPTII,,, | Performed by: INTERNAL MEDICINE

## 2022-05-26 PROCEDURE — 3008F BODY MASS INDEX DOCD: CPT | Mod: CPTII,,, | Performed by: INTERNAL MEDICINE

## 2022-05-26 PROCEDURE — 99214 PR OFFICE/OUTPT VISIT, EST, LEVL IV, 30-39 MIN: ICD-10-PCS | Mod: S$PBB,,, | Performed by: INTERNAL MEDICINE

## 2022-05-26 PROCEDURE — 1159F PR MEDICATION LIST DOCUMENTED IN MEDICAL RECORD: ICD-10-PCS | Mod: CPTII,,, | Performed by: INTERNAL MEDICINE

## 2022-05-26 PROCEDURE — 3044F PR MOST RECENT HEMOGLOBIN A1C LEVEL <7.0%: ICD-10-PCS | Mod: CPTII,,, | Performed by: INTERNAL MEDICINE

## 2022-05-26 PROCEDURE — 85025 COMPLETE CBC W/AUTO DIFF WBC: CPT | Performed by: INTERNAL MEDICINE

## 2022-05-26 PROCEDURE — 99999 PR PBB SHADOW E&M-EST. PATIENT-LVL III: CPT | Mod: PBBFAC,,, | Performed by: INTERNAL MEDICINE

## 2022-05-26 PROCEDURE — 83036 HEMOGLOBIN GLYCOSYLATED A1C: CPT | Performed by: INTERNAL MEDICINE

## 2022-05-26 PROCEDURE — 99214 OFFICE O/P EST MOD 30 MIN: CPT | Mod: S$PBB,,, | Performed by: INTERNAL MEDICINE

## 2022-05-26 PROCEDURE — 80053 COMPREHEN METABOLIC PANEL: CPT | Performed by: INTERNAL MEDICINE

## 2022-05-26 PROCEDURE — 3074F PR MOST RECENT SYSTOLIC BLOOD PRESSURE < 130 MM HG: ICD-10-PCS | Mod: CPTII,,, | Performed by: INTERNAL MEDICINE

## 2022-05-26 PROCEDURE — 36415 COLL VENOUS BLD VENIPUNCTURE: CPT | Performed by: INTERNAL MEDICINE

## 2022-05-26 PROCEDURE — 3008F PR BODY MASS INDEX (BMI) DOCUMENTED: ICD-10-PCS | Mod: CPTII,,, | Performed by: INTERNAL MEDICINE

## 2022-05-26 PROCEDURE — 1159F MED LIST DOCD IN RCRD: CPT | Mod: CPTII,,, | Performed by: INTERNAL MEDICINE

## 2022-05-26 PROCEDURE — 3074F SYST BP LT 130 MM HG: CPT | Mod: CPTII,,, | Performed by: INTERNAL MEDICINE

## 2022-05-26 RX ORDER — DULAGLUTIDE 0.75 MG/.5ML
0.75 INJECTION, SOLUTION SUBCUTANEOUS
Qty: 4 PEN | Refills: 4 | Status: SHIPPED | OUTPATIENT
Start: 2022-05-26 | End: 2022-05-26 | Stop reason: SDUPTHER

## 2022-05-26 RX ORDER — DULAGLUTIDE 0.75 MG/.5ML
0.75 INJECTION, SOLUTION SUBCUTANEOUS
Qty: 4 PEN | Refills: 4 | Status: SHIPPED | OUTPATIENT
Start: 2022-05-26 | End: 2022-09-21

## 2022-05-26 NOTE — TELEPHONE ENCOUNTER
----- Message from Ginna Krys sent at 5/26/2022  9:53 AM CDT -----  Contact: pt mother, [phone]  Patient's mother states she is on her way pt 10 am appointment today and  will be in within 15 minutes. Please advise.

## 2022-05-26 NOTE — PROGRESS NOTES
"Chief Complaint: Follow up of  Medical problems     HPI:This is a 32 year old man who presents with his mother for follow up of his medical problems.     All history is obtained from his mother    He just had a birthday.  He went to a [place] to eat.  He had barbecue ribs, baked potato with cheese, merchant and butter.     He is bored at home.  He has been doing " same old, same old".   He is not exercising.  His niece, age 21,  is staying with them.  Niece will start bringing him to the park to walk.     He is wearing his CPAP machine when he is sleeping.  He saw sleep medicine.  He is not wearing his CPAP machine enough to justify the insurance to pay for it. He needs a new sleep study at home. Mother would prefer an in lab sleep study. Medicaid will not pay for an in lab study.       When he wears the CPAP machine, he is not sleepy while he is awake.      Mother gets off work at midnight. He us usally awake when she gets home.  Mother goes to bed at 3 am and Cipriano is still awake.  Mother wakes Cipriano up at 7:30-8 am to bring the dogs out.  He goes back to sleep around 10 am (CPAP machine is on).  He will sleep until the evening if his mother lets him sleep. Mother wakes Cipriano up at 3 pm. .     He gets 20 hours a month from services from the [facility]. His aunt does his services by taking him to different activities.   She just started him in the community due to COVID pandemic        He has gained weight during the COVID pandemic.   He was 269 pounds on March 5, 2020.    Weight is currently 297 - gained 8 pounds since our last visit       He has gout. He is taking allopurinol 300 mg daily.       His mother notes that Cipriano has not been taking his medication regularly - maybe 3 times a week.   He should take coreg 25 mg daily, metoprolol succinate 25 mg 1 tablet daily, hctz 25 mg once daily and levothyroxine 88 mcg daily.      HE is not taking vitamin D supplement. He should take vitamin D 50,000 units twice weekly. "              Past Medical History:   Diagnosis Date    Atrial fibrillation      CHF (congestive heart failure)      Down syndrome      Encounter for blood transfusion      Gout      Hypothyroidism      S/P surgery for complex congenital heart disease 1/30/2017    Sleep apnea      Tetralogy of Fallot 05/1990    VT (ventricular tachycardia) 3/27/2019                      Past Surgical History:   Procedure Laterality Date    BUNIONECTOMY        CARDIAC SURGERY         open heart, ppm     INSERT / REPLACE / REMOVE PACEMAKER Left 10/2008    NONINVASIVE CARDIAC ELECTROPHYSIOLOGY STUDY N/A 3/27/2019     Procedure: CARDIAC ELECTROPHYSIOLOGY STUDY, NONINVASIVE;  Surgeon: Jose Ortiz MD;  Location: [facility] EP [facility];  Service: Cardiology;  Laterality: N/A;  VT, NIEPS, Venogram, MDT, MAC, 3 prep, Macicek    TONSILLECTOMY          Social History                   Socioeconomic History    Marital status: Single       Spouse name: Not on file    Number of children: Not on file    Years of education: Not on file    Highest education level: Not on file   Occupational History    Not on file   Tobacco Use    Smoking status: Never Smoker    Smokeless tobacco: Never Used   Substance and Sexual Activity    Alcohol use: No    Drug use: No    Sexual activity: Not Currently   Other Topics Concern    Not on file   Social History Narrative     Lives with mother, 1 dog (inside)      Social Determinants of Health            Financial Resource Strain:     Difficulty of Paying Living Expenses:    Food Insecurity:     Worried About Running Out of Food in the Last Year:     Ran Out of Food in the Last Year:    Transportation Needs:     Lack of Transportation (Medical):     Lack of Transportation (Non-Medical):    Physical Activity:     Days of Exercise per Week:     Minutes of Exercise per Session:    Stress:     Feeling of Stress :    Social Connections:     Frequency of Communication with Friends and Family:   "   Frequency of Social Gatherings with Friends and Family:     Attends Worship Services:     Active Member of Clubs or Organizations:     Attends Club or Organization Meetings:     Marital Status:                    Family Status   Relation Name Status    Mother Luann Alive    Sister Barbara Alive    MGM       MGF       PGM       PGF               Meds and allergies: updated on Epic     REVIEW OF SYSTEMS: No fevers, chills,  hearing loss, sinus congestion, sore throat, chest pain, nausea, vomiting, constipation, diarrhea, dysuria, hematuria, polydipsia, polyuria, headaches, anxiety, depression    He is not wearing his eye glasses.      Physical exam:    /78 (BP Location: Right arm, Patient Position: Sitting)   Pulse 61   Ht 5' 5" (1.651 m)   Wt 134.9 kg (297 lb 6.4 oz)   SpO2 97%   BMI 49.49 kg/m²     General: alert, oriented x 3, no apparent distress.  Affect normal  HEENT: Conjunctivae: anicteric, PERRL, EOMI, TM clear, Oralpharynx clear  Neck: supple, no thyroid enlargement, no cervical lymphadenopathy  Resp: effort normal, lungs clear bilaterally  CV: Regular rate and rhythm without murmurs, gallops or rubs, no lower extremity edema,     Protective Sensation (w/ 10 gram monofilament):  Right: Intact  Left: Intact    Visual Inspection:  Dry Skin -  Bilateral    Pedal Pulses:   Right: Present  Left: Present    Posterior tibialis:   Right:Present  Left: Present                    Labs 22 reviewed        Assessment/Plan:        Congenital heart disease with CHF, a fib and pacemaker - follow up with cardiology. Doing better with medications and diet.      Sleep apnea - discussed compliance with CPAP machine.     Hypothyroidism - TSH     GOut on allopurinol to 300 mg once daily     Morbid Obesity - work on diet and exercise     Down's Syndrome with mental handicapped    Encouraged more family support.      Vitamin D deficiency - get on prescription vitamin " D 50,000 units twice a week      Diabetes - Trulicity 0.75 mg sq once a week.  Labs today.            I will see him back in 4 months, sooner if problems arise.

## 2022-07-11 ENCOUNTER — HOSPITAL ENCOUNTER (OUTPATIENT)
Dept: PEDIATRIC CARDIOLOGY | Facility: HOSPITAL | Age: 32
Discharge: HOME | End: 2022-07-11
Attending: PEDIATRICS
Payer: MEDICAID

## 2022-07-11 DIAGNOSIS — I48.91 ATRIAL FIBRILLATION, UNSPECIFIED TYPE: ICD-10-CM

## 2022-07-11 DIAGNOSIS — I47.20 VT (VENTRICULAR TACHYCARDIA): ICD-10-CM

## 2022-07-11 DIAGNOSIS — Z95.0 PACEMAKER: ICD-10-CM

## 2022-07-11 DIAGNOSIS — I48.3 TYPICAL ATRIAL FLUTTER: ICD-10-CM

## 2022-07-11 DIAGNOSIS — I49.3 PVC'S (PREMATURE VENTRICULAR CONTRACTIONS): ICD-10-CM

## 2022-07-11 DIAGNOSIS — Q24.9 ADULT CONGENITAL HEART DISEASE: ICD-10-CM

## 2022-07-11 DIAGNOSIS — Q21.3 TETRALOGY OF FALLOT: ICD-10-CM

## 2022-07-11 LAB
AV DELAY - LONGEST: 240 MSEC
BATTERY VOLTAGE (V): 3 V
ERI (V): 2.63 V
IMPEDANCE RA LEAD (NATIVE): 456 OHMS
IMPEDANCE RA LEAD: 380 OHMS
OHS CV DC PP MS1: 0.4 MS
OHS CV DC PP MS2: 0.4 MS
OHS CV DC PP V1: NORMAL V
OHS CV DC PP V2: NORMAL V
P/R-WAVE RA LEAD (NATIVE): 1.6 MV
P/R-WAVE RA LEAD: 2.1 MV
PV DELAY - LONGEST: 220 MSEC
THRESHOLD MS RA LEAD (NATIVE): 0.4 MS
THRESHOLD MS RA LEAD: 0.4 MS
THRESHOLD V RA LEAD (NATIVE): 1.12 V
THRESHOLD V RA LEAD: 0.5 V

## 2022-07-11 PROCEDURE — 93294 CV PACEMAKER REMOTE PEDIATRICS (CUPID ONLY): ICD-10-PCS | Mod: ,,, | Performed by: PEDIATRICS

## 2022-07-11 PROCEDURE — 93294 REM INTERROG EVL PM/LDLS PM: CPT | Mod: ,,, | Performed by: PEDIATRICS

## 2022-07-11 PROCEDURE — 93296 REM INTERROG EVL PM/IDS: CPT

## 2022-08-29 ENCOUNTER — TELEPHONE (OUTPATIENT)
Dept: PEDIATRIC CARDIOLOGY | Facility: CLINIC | Age: 32
End: 2022-08-29
Payer: MEDICAID

## 2022-08-29 NOTE — TELEPHONE ENCOUNTER
Appt scheduled for Nov 10 start 10 AM, mom verbalized understanding all information provided     ----- Message -----  From: Dean Fong MA  Sent: 8/24/2022   1:48 PM CDT  To: Leonard JARRETT Staff    Mom called          In regards to speak with staff or provider with scheduling child for his annual follow up with Dr. Valle. Mom would like for staff to give a call back.        Call back  [phone]

## 2022-09-21 ENCOUNTER — IMMUNIZATION (OUTPATIENT)
Dept: INTERNAL MEDICINE | Facility: CLINIC | Age: 32
End: 2022-09-21
Payer: MEDICAID

## 2022-09-21 ENCOUNTER — OFFICE VISIT (OUTPATIENT)
Dept: INTERNAL MEDICINE | Facility: CLINIC | Age: 32
End: 2022-09-21
Payer: MEDICAID

## 2022-09-21 VITALS
SYSTOLIC BLOOD PRESSURE: 118 MMHG | HEART RATE: 79 BPM | OXYGEN SATURATION: 95 % | DIASTOLIC BLOOD PRESSURE: 70 MMHG | HEIGHT: 65 IN | WEIGHT: 300.69 LBS | BODY MASS INDEX: 50.1 KG/M2

## 2022-09-21 DIAGNOSIS — Z00.00 ROUTINE GENERAL MEDICAL EXAMINATION AT A HEALTH CARE FACILITY: Primary | ICD-10-CM

## 2022-09-21 DIAGNOSIS — E13.9 DIABETES MELLITUS DUE TO ABNORMAL INSULIN: ICD-10-CM

## 2022-09-21 DIAGNOSIS — E03.9 HYPOTHYROIDISM, UNSPECIFIED TYPE: ICD-10-CM

## 2022-09-21 PROCEDURE — 99213 OFFICE O/P EST LOW 20 MIN: CPT | Mod: PBBFAC | Performed by: INTERNAL MEDICINE

## 2022-09-21 PROCEDURE — 99395 PREV VISIT EST AGE 18-39: CPT | Mod: S$PBB,,, | Performed by: INTERNAL MEDICINE

## 2022-09-21 PROCEDURE — 3078F PR MOST RECENT DIASTOLIC BLOOD PRESSURE < 80 MM HG: ICD-10-PCS | Mod: CPTII,,, | Performed by: INTERNAL MEDICINE

## 2022-09-21 PROCEDURE — 1159F MED LIST DOCD IN RCRD: CPT | Mod: CPTII,,, | Performed by: INTERNAL MEDICINE

## 2022-09-21 PROCEDURE — 3008F PR BODY MASS INDEX (BMI) DOCUMENTED: ICD-10-PCS | Mod: CPTII,,, | Performed by: INTERNAL MEDICINE

## 2022-09-21 PROCEDURE — 90471 IMMUNIZATION ADMIN: CPT | Mod: PBBFAC

## 2022-09-21 PROCEDURE — 99999 PR PBB SHADOW E&M-EST. PATIENT-LVL III: ICD-10-PCS | Mod: PBBFAC,,, | Performed by: INTERNAL MEDICINE

## 2022-09-21 PROCEDURE — 1159F PR MEDICATION LIST DOCUMENTED IN MEDICAL RECORD: ICD-10-PCS | Mod: CPTII,,, | Performed by: INTERNAL MEDICINE

## 2022-09-21 PROCEDURE — 3074F PR MOST RECENT SYSTOLIC BLOOD PRESSURE < 130 MM HG: ICD-10-PCS | Mod: CPTII,,, | Performed by: INTERNAL MEDICINE

## 2022-09-21 PROCEDURE — 3044F PR MOST RECENT HEMOGLOBIN A1C LEVEL <7.0%: ICD-10-PCS | Mod: CPTII,,, | Performed by: INTERNAL MEDICINE

## 2022-09-21 PROCEDURE — 3078F DIAST BP <80 MM HG: CPT | Mod: CPTII,,, | Performed by: INTERNAL MEDICINE

## 2022-09-21 PROCEDURE — 3044F HG A1C LEVEL LT 7.0%: CPT | Mod: CPTII,,, | Performed by: INTERNAL MEDICINE

## 2022-09-21 PROCEDURE — 99999 PR PBB SHADOW E&M-EST. PATIENT-LVL III: CPT | Mod: PBBFAC,,, | Performed by: INTERNAL MEDICINE

## 2022-09-21 PROCEDURE — 99395 PR PREVENTIVE VISIT,EST,18-39: ICD-10-PCS | Mod: S$PBB,,, | Performed by: INTERNAL MEDICINE

## 2022-09-21 PROCEDURE — 3008F BODY MASS INDEX DOCD: CPT | Mod: CPTII,,, | Performed by: INTERNAL MEDICINE

## 2022-09-21 PROCEDURE — 3074F SYST BP LT 130 MM HG: CPT | Mod: CPTII,,, | Performed by: INTERNAL MEDICINE

## 2022-09-21 RX ORDER — DULAGLUTIDE 1.5 MG/.5ML
1.5 INJECTION, SOLUTION SUBCUTANEOUS
Qty: 4 PEN | Refills: 11 | Status: SHIPPED | OUTPATIENT
Start: 2022-09-21 | End: 2022-10-09 | Stop reason: SDUPTHER

## 2022-09-21 RX ORDER — DULAGLUTIDE 0.75 MG/.5ML
INJECTION, SOLUTION SUBCUTANEOUS
Qty: 12 PEN | Refills: 0 | Status: SHIPPED | OUTPATIENT
Start: 2022-09-21 | End: 2022-09-21

## 2022-09-21 NOTE — PROGRESS NOTES
"Chief Complaint: Annual exam and Follow up of  Medical problems     HPI:This is a 32 year old man who presents with his mother for follow up of his medical problems.     All history is obtained from his mother    He is getting this Trulicity 0.75 mg injection once a week for his blood sugars. He has gained 3 pounds since our last visit.    He has mid back pain because he lays in bed a lot. When he gets up, he is better.       He is bored at home.  He has been doing " same old, same old".   He is not exercising.  His niece, age 21,  is staying with them.  Niece will start bringing him to the park to walk.      He is wearing his CPAP machine when he is sleeping.  He saw sleep medicine.  He is not wearing his CPAP machine enough to justify the insurance to pay for it. He needs a new sleep study at home. Mother would prefer an in lab sleep study. Medicaid will not pay for an in lab study.       When he wears the CPAP machine, he is not sleepy while he is awake.      Mother gets off work at midnight. He us usally awake when she gets home.  Mother goes to bed at 3 am and Cipriano is still awake.  Mother wakes Cipriano up at 7:30-8 am to bring the dogs out.  He goes back to sleep around 10 am (CPAP machine is on).  He will sleep until the evening if his mother lets him sleep. Mother wakes Cipriano up at 3 pm. .     He gets 20 hours a month from services from the [facility]. His aunt does his services by taking him to different activities.   She just started him in the community due to COVID pandemic             He has gout. He should take allopurinol 300 mg daily.       His mother notes that Cipriano has not been taking his medication regularly - he takes his medications 50% of the time. His mother fills a 2 week tray of his medications and she winds up filling his medication tray once a month     He should take coreg 25 mg daily, metoprolol succinate 25 mg 1 tablet daily, hctz 25 mg once daily and levothyroxine 88 mcg daily.      HE is " not taking vitamin D supplement. He should take vitamin D 50,000 units twice weekly.              Past Medical History:   Diagnosis Date    Atrial fibrillation      CHF (congestive heart failure)      Down syndrome      Encounter for blood transfusion      Gout      Hypothyroidism      S/P surgery for complex congenital heart disease 1/30/2017    Sleep apnea      Tetralogy of Fallot 05/1990    VT (ventricular tachycardia) 3/27/2019                      Past Surgical History:   Procedure Laterality Date    BUNIONECTOMY        CARDIAC SURGERY         open heart, ppm     INSERT / REPLACE / REMOVE PACEMAKER Left 10/2008    NONINVASIVE CARDIAC ELECTROPHYSIOLOGY STUDY N/A 3/27/2019     Procedure: CARDIAC ELECTROPHYSIOLOGY STUDY, NONINVASIVE;  Surgeon: Jose Ortiz MD;  Location: [facility] EP [facility];  Service: Cardiology;  Laterality: N/A;  VT, NIEPS, Venogram, MDT, MAC, 3 prep, Macicek    TONSILLECTOMY          Social History                   Socioeconomic History    Marital status: Single       Spouse name: Not on file    Number of children: Not on file    Years of education: Not on file    Highest education level: Not on file   Occupational History    Not on file   Tobacco Use    Smoking status: Never Smoker    Smokeless tobacco: Never Used   Substance and Sexual Activity    Alcohol use: No    Drug use: No    Sexual activity: Not Currently   Other Topics Concern    Not on file   Social History Narrative     Lives with mother, 1 dog (inside)      Social Determinants of Health            Financial Resource Strain:     Difficulty of Paying Living Expenses:    Food Insecurity:     Worried About Running Out of Food in the Last Year:     Ran Out of Food in the Last Year:    Transportation Needs:     Lack of Transportation (Medical):     Lack of Transportation (Non-Medical):    Physical Activity:     Days of Exercise per Week:     Minutes of Exercise per Session:    Stress:     Feeling of Stress :    Social Connections:      Frequency of Communication with Friends and Family:     Frequency of Social Gatherings with Friends and Family:     Attends Restoration Services:     Active Member of Clubs or Organizations:     Attends Club or Organization Meetings:     Marital Status:                    Family Status   Relation Name Status    Mother Luann Alive    Sister Barbara Alive    MGM       MGF       PGM       PGF               Meds and allergies: updated on Epic     REVIEW OF SYSTEMS: No fevers, chills,  hearing loss, sinus congestion, sore throat, chest pain, nausea, vomiting, constipation, diarrhea, dysuria, hematuria, polydipsia, polyuria, headaches, anxiety, depression     He is not wearing his eye glasses.      Physical exam:       General: alert, oriented x 3, no apparent distress.  Affect normal  HEENT: Conjunctivae: anicteric, PERRL, EOMI, TM clear, Oralpharynx clear  Neck: supple, no thyroid enlargement, no cervical lymphadenopathy  Resp: effort normal, lungs clear bilaterally  CV: Regular rate and rhythm without murmurs, gallops or rubs, no lower extremity edema,                   Labs 22 reviewed        Assessment/Plan:    Annual exam - discussed diet, exercise and safety issues.          Congenital heart disease with CHF, a fib and pacemaker - follow up with cardiology. Stressed compliance  with medications and diet.      Sleep apnea - discussed compliance with CPAP machine.     Hypothyroidism - TSH     GOut on allopurinol to 300 mg once daily     Morbid Obesity - work on diet and exercise     Down's Syndrome with mental handicapped    Encouraged more family support.      Vitamin D deficiency - get on prescription vitamin D 50,000 units twice a week      Diabetes - increase Trulicity to 1.5 mg sq once a week.  Labs          I will see him back in 4 months, sooner if problems arise.

## 2022-09-21 NOTE — TELEPHONE ENCOUNTER
----- Message from Zahra Luther sent at 9/21/2022 11:36 AM CDT -----  Type:  Patient Returning Call    Who Called: pt  mom   Who Left Message for Patient: pt   Does the patient know what this is regarding?: pt is on his way to his appt mom had an appt across the street and they on there way to the appt  Would the patient rather a call back or a response via [facility]?  Call   Best Call Back Number Click to [phone]  Additional Information:  call

## 2022-09-21 NOTE — TELEPHONE ENCOUNTER
Care Due:                  Date            Visit Type   Department     Provider  --------------------------------------------------------------------------------                                EP -                              PRIMARY      [facility]  Last Visit: 05-      CARE ([facility])   MEDICINE       SOCORRO ROSAS  Next Visit: None Scheduled  None         None Found                                                            Last  Test          Frequency    Reason                     Performed    Due Date  --------------------------------------------------------------------------------    HBA1C.......  6 months...  dulaglutide..............  05- 11-    [facility] Embedded Care Gaps. Reference number: [id number]. 9/21/2022   8:04:37 AM CDT

## 2022-09-21 NOTE — TELEPHONE ENCOUNTER
Refill Decision Note   Cipriano Thompson  is requesting a refill authorization.  Brief Assessment and Rationale for Refill:  Approve    -Medication-Related Problems Identified: Requires labs  Medication Therapy Plan:       Medication Reconciliation Completed: No   Comments:     Provider Staff:     Action is required for this patient.   Please see care gap opportunities below in Care Due Message.     Thanks!  [facility]     Appointments      Date Provider   Last Visit   5/26/2022 Polly Oneal MD   Next Visit   9/21/2022 Polly Oneal MD     Note composed:11:17 AM 09/21/2022           Note composed:11:17 AM 09/21/2022

## 2022-09-21 NOTE — TELEPHONE ENCOUNTER
Ok....   Upon review of the In Basket request we were able to locate, review, and update the patient chart as requested for Mammogram     Any additional questions or concerns should be emailed to the Practice Liaisons via [email or web address] email, please do not reply via In Basket      Thank you  Kimani Patton MA

## 2022-09-24 ENCOUNTER — LAB VISIT (OUTPATIENT)
Dept: LAB | Facility: HOSPITAL | Age: 32
End: 2022-09-24
Attending: INTERNAL MEDICINE
Payer: MEDICAID

## 2022-09-24 DIAGNOSIS — E03.9 HYPOTHYROIDISM, UNSPECIFIED TYPE: ICD-10-CM

## 2022-09-24 DIAGNOSIS — E13.9 DIABETES MELLITUS DUE TO ABNORMAL INSULIN: ICD-10-CM

## 2022-09-24 LAB
ALBUMIN SERPL BCP-MCNC: 3.5 G/DL (ref 3.5–5.2)
ALP SERPL-CCNC: 129 U/L (ref 55–135)
ALT SERPL W/O P-5'-P-CCNC: 22 U/L (ref 10–44)
ANION GAP SERPL CALC-SCNC: 10 MMOL/L (ref 8–16)
AST SERPL-CCNC: 26 U/L (ref 10–40)
BASOPHILS # BLD AUTO: 0.08 K/UL (ref 0–0.2)
BASOPHILS NFR BLD: 1.2 % (ref 0–1.9)
BILIRUB SERPL-MCNC: 0.4 MG/DL (ref 0.1–1)
BUN SERPL-MCNC: 15 MG/DL (ref 6–20)
CALCIUM SERPL-MCNC: 9.6 MG/DL (ref 8.7–10.5)
CHLORIDE SERPL-SCNC: 102 MMOL/L (ref 95–110)
CO2 SERPL-SCNC: 27 MMOL/L (ref 23–29)
CREAT SERPL-MCNC: 1.3 MG/DL (ref 0.5–1.4)
DIFFERENTIAL METHOD: ABNORMAL
EOSINOPHIL # BLD AUTO: 0.1 K/UL (ref 0–0.5)
EOSINOPHIL NFR BLD: 1.6 % (ref 0–8)
ERYTHROCYTE [DISTWIDTH] IN BLOOD BY AUTOMATED COUNT: 16.2 % (ref 11.5–14.5)
EST. GFR  (NO RACE VARIABLE): >60 ML/MIN/1.73 M^2
ESTIMATED AVG GLUCOSE: 126 MG/DL (ref 68–131)
GLUCOSE SERPL-MCNC: 128 MG/DL (ref 70–110)
HBA1C MFR BLD: 6 % (ref 4–5.6)
HCT VFR BLD AUTO: 43.4 % (ref 40–54)
HGB BLD-MCNC: 13.7 G/DL (ref 14–18)
IMM GRANULOCYTES # BLD AUTO: 0.03 K/UL (ref 0–0.04)
IMM GRANULOCYTES NFR BLD AUTO: 0.4 % (ref 0–0.5)
LYMPHOCYTES # BLD AUTO: 1.1 K/UL (ref 1–4.8)
LYMPHOCYTES NFR BLD: 15.7 % (ref 18–48)
MCH RBC QN AUTO: 30.3 PG (ref 27–31)
MCHC RBC AUTO-ENTMCNC: 31.6 G/DL (ref 32–36)
MCV RBC AUTO: 96 FL (ref 82–98)
MONOCYTES # BLD AUTO: 0.5 K/UL (ref 0.3–1)
MONOCYTES NFR BLD: 7.6 % (ref 4–15)
NEUTROPHILS # BLD AUTO: 5 K/UL (ref 1.8–7.7)
NEUTROPHILS NFR BLD: 73.5 % (ref 38–73)
NRBC BLD-RTO: 0 /100 WBC
PLATELET # BLD AUTO: 278 K/UL (ref 150–450)
PMV BLD AUTO: 9.6 FL (ref 9.2–12.9)
POTASSIUM SERPL-SCNC: 3.9 MMOL/L (ref 3.5–5.1)
PROT SERPL-MCNC: 7.5 G/DL (ref 6–8.4)
RBC # BLD AUTO: 4.52 M/UL (ref 4.6–6.2)
SODIUM SERPL-SCNC: 139 MMOL/L (ref 136–145)
TSH SERPL DL<=0.005 MIU/L-ACNC: 2.94 UIU/ML (ref 0.4–4)
WBC # BLD AUTO: 6.8 K/UL (ref 3.9–12.7)

## 2022-09-24 PROCEDURE — 83036 HEMOGLOBIN GLYCOSYLATED A1C: CPT | Performed by: INTERNAL MEDICINE

## 2022-09-24 PROCEDURE — 80053 COMPREHEN METABOLIC PANEL: CPT | Performed by: INTERNAL MEDICINE

## 2022-09-24 PROCEDURE — 85025 COMPLETE CBC W/AUTO DIFF WBC: CPT | Performed by: INTERNAL MEDICINE

## 2022-09-24 PROCEDURE — 84443 ASSAY THYROID STIM HORMONE: CPT | Performed by: INTERNAL MEDICINE

## 2022-09-24 PROCEDURE — 36415 COLL VENOUS BLD VENIPUNCTURE: CPT | Performed by: INTERNAL MEDICINE

## 2022-09-25 ENCOUNTER — PATIENT MESSAGE (OUTPATIENT)
Dept: INTERNAL MEDICINE | Facility: CLINIC | Age: 32
End: 2022-09-25
Payer: MEDICAID

## 2022-09-26 NOTE — TELEPHONE ENCOUNTER
PLease notify pt's mother  - Luann Thompson ( you have a message on her as well)  Cipriano's blood work is fine - His blood count, blood sugar, kidney function, liver function, thyroid funciton are fine    Increase the trulicity as discussed in clinic.

## 2022-09-26 NOTE — TELEPHONE ENCOUNTER
Called and spoke to pt mother.Relayed message from PCP:  Cipriano's blood work is fine - His blood count, blood sugar, kidney function, liver function, thyroid funciton are fine  Increase the trulicity as discussed in clinic.    Pt mother says she spoke to pharmacy and they said that they will not refill the higher dose at this time because of insurance purposes. Pharmacy told her to take 2 injections this week and 2 injections this coming Friday. After that, they can refill at the higher dose.

## 2022-10-07 ENCOUNTER — TELEPHONE (OUTPATIENT)
Dept: INTERNAL MEDICINE | Facility: CLINIC | Age: 32
End: 2022-10-07
Payer: MEDICAID

## 2022-10-07 NOTE — TELEPHONE ENCOUNTER
Pt mother would like  the new rx of Trulicity called in. She states that the pt has been using the old medication for the new strength however he is out of the medication now. Please advise.

## 2022-10-09 RX ORDER — DULAGLUTIDE 1.5 MG/.5ML
1.5 INJECTION, SOLUTION SUBCUTANEOUS
Qty: 4 PEN | Refills: 11 | Status: SHIPPED | OUTPATIENT
Start: 2022-10-09 | End: 2023-10-28 | Stop reason: SDUPTHER

## 2022-10-10 ENCOUNTER — HOSPITAL ENCOUNTER (OUTPATIENT)
Dept: PEDIATRIC CARDIOLOGY | Facility: HOSPITAL | Age: 32
Discharge: HOME | End: 2022-10-10
Attending: PEDIATRICS
Payer: MEDICAID

## 2022-10-10 DIAGNOSIS — I48.91 ATRIAL FIBRILLATION, UNSPECIFIED TYPE: ICD-10-CM

## 2022-10-10 DIAGNOSIS — Q21.3 TETRALOGY OF FALLOT: ICD-10-CM

## 2022-10-10 DIAGNOSIS — I47.20 VT (VENTRICULAR TACHYCARDIA): ICD-10-CM

## 2022-10-10 DIAGNOSIS — Q24.9 ADULT CONGENITAL HEART DISEASE: ICD-10-CM

## 2022-10-10 DIAGNOSIS — I49.3 PVC'S (PREMATURE VENTRICULAR CONTRACTIONS): ICD-10-CM

## 2022-10-10 DIAGNOSIS — Z95.0 PACEMAKER: ICD-10-CM

## 2022-10-10 DIAGNOSIS — I48.3 TYPICAL ATRIAL FLUTTER: ICD-10-CM

## 2022-10-10 PROCEDURE — 93294 CV PACEMAKER REMOTE PEDIATRICS (CUPID ONLY): ICD-10-PCS | Mod: ,,, | Performed by: PEDIATRICS

## 2022-10-10 PROCEDURE — 93297 CV PACEMAKER REMOTE PEDIATRICS (CUPID ONLY): ICD-10-PCS | Mod: ,,, | Performed by: PEDIATRICS

## 2022-10-10 PROCEDURE — 93297 REM INTERROG DEV EVAL ICPMS: CPT | Mod: ,,, | Performed by: PEDIATRICS

## 2022-10-10 PROCEDURE — 93296 REM INTERROG EVL PM/IDS: CPT

## 2022-10-10 PROCEDURE — 93294 REM INTERROG EVL PM/LDLS PM: CPT | Mod: ,,, | Performed by: PEDIATRICS

## 2022-10-14 DIAGNOSIS — E11.9 TYPE 2 DIABETES MELLITUS WITHOUT COMPLICATION: ICD-10-CM

## 2022-10-18 ENCOUNTER — PATIENT MESSAGE (OUTPATIENT)
Dept: ADMINISTRATIVE | Facility: HOSPITAL | Age: 32
End: 2022-10-18
Payer: MEDICAID

## 2022-11-15 ENCOUNTER — TELEPHONE (OUTPATIENT)
Dept: INTERNAL MEDICINE | Facility: CLINIC | Age: 32
End: 2022-11-15
Payer: MEDICAID

## 2022-11-15 NOTE — TELEPHONE ENCOUNTER
Trulicity 1.5mg/0.5ml is not covered under Medicaid insurance.    Cipriano Thompson ([place]: [id number])    Your information has been sent to Jacob.

## 2022-11-16 RX ORDER — ALLOPURINOL 300 MG/1
300 TABLET ORAL DAILY
Qty: 30 TABLET | Refills: 3 | Status: SHIPPED | OUTPATIENT
Start: 2022-11-16 | End: 2023-03-01

## 2022-11-16 RX ORDER — ERGOCALCIFEROL 1.25 MG/1
50000 CAPSULE ORAL
Qty: 24 CAPSULE | Refills: 4 | Status: SHIPPED | OUTPATIENT
Start: 2022-11-17 | End: 2023-12-25

## 2022-11-16 RX ORDER — HYDROCHLOROTHIAZIDE 25 MG/1
25 TABLET ORAL DAILY
Qty: 30 TABLET | Refills: 3 | Status: SHIPPED | OUTPATIENT
Start: 2022-11-16 | End: 2023-03-03

## 2022-11-16 RX ORDER — METOPROLOL SUCCINATE 25 MG/1
25 TABLET, EXTENDED RELEASE ORAL DAILY
Qty: 30 TABLET | Refills: 3 | Status: SHIPPED | OUTPATIENT
Start: 2022-11-16 | End: 2023-05-06

## 2022-11-16 RX ORDER — CARVEDILOL 25 MG/1
25 TABLET ORAL DAILY
Qty: 30 TABLET | Refills: 3 | Status: SHIPPED | OUTPATIENT
Start: 2022-11-16 | End: 2023-03-01

## 2022-11-16 RX ORDER — LEVOTHYROXINE SODIUM 88 UG/1
88 TABLET ORAL
Qty: 30 TABLET | Refills: 3 | Status: SHIPPED | OUTPATIENT
Start: 2022-11-16 | End: 2023-02-27

## 2022-11-16 NOTE — TELEPHONE ENCOUNTER
Called and spoke to pt mom. Ststes pt is out of all his medication refills. Meds pended     LOV with Polly Oneal MD , 9/21/2022

## 2022-11-16 NOTE — TELEPHONE ENCOUNTER
Care Due:                  Date            Visit Type   Department     Provider  --------------------------------------------------------------------------------                                EP -                              PRIMARY      [facility] INTERNAL  Last Visit: 09-      CARE ([facility])   MEDICINE       SOCORRO ROSAS                              EP                               PRIMARY      [facility] INTERNAL  Next Visit: 01-      CARE ([facility])   MEDICINE       SOCORRO ROSAS                                                            Last  Test          Frequency    Reason                     Performed    Due Date  --------------------------------------------------------------------------------    Uric Acid...  12 months..  allopurinoL..............  01- 01-    [employer] Embedded Care Gaps. Reference number: [id number]. 11/16/2022   11:20:48 AM CST

## 2022-11-16 NOTE — TELEPHONE ENCOUNTER
----- Message from Velma Spann sent at 11/16/2022 10:55 AM CST -----  Contact: PT/ [phone]  Type:  Patient Call    Who Called: pt mother    Would the patient rather a call back or a response via [facility]? Call    Best Call Back Number:[phone]  Additional Information: Pt  mother calling  advising that her  son medications don't  have any refills please contact mother.

## 2022-11-19 LAB
AV DELAY - LONGEST: 240 MSEC
BATTERY VOLTAGE (V): 3 V
ERI (V): 2.63 V
IMPEDANCE RA LEAD (NATIVE): 456 OHMS
IMPEDANCE RA LEAD: 380 OHMS
OHS CV DC PP MS1: 0.4 MS
OHS CV DC PP MS2: 0.4 MS
OHS CV DC PP V1: NORMAL V
OHS CV DC PP V2: NORMAL V
P/R-WAVE RA LEAD (NATIVE): 2.5 MV
P/R-WAVE RA LEAD: 2.1 MV
PV DELAY - LONGEST: 220 MSEC
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
## 2022-12-21 DIAGNOSIS — E11.9 TYPE 2 DIABETES MELLITUS WITHOUT COMPLICATION: ICD-10-CM

## 2023-01-19 ENCOUNTER — OFFICE VISIT (OUTPATIENT)
Dept: CARDIOLOGY | Facility: CLINIC | Age: 33
End: 2023-01-19
Payer: MEDICAID

## 2023-01-19 ENCOUNTER — HOSPITAL ENCOUNTER (OUTPATIENT)
Dept: CARDIOLOGY | Facility: CLINIC | Age: 33
Discharge: HOME | End: 2023-01-19
Payer: MEDICAID

## 2023-01-19 ENCOUNTER — HOSPITAL ENCOUNTER (OUTPATIENT)
Dept: PEDIATRIC CARDIOLOGY | Facility: HOSPITAL | Age: 33
Discharge: HOME | End: 2023-01-19
Attending: PEDIATRICS
Payer: MEDICAID

## 2023-01-19 ENCOUNTER — HOSPITAL ENCOUNTER (OUTPATIENT)
Dept: CARDIOLOGY | Facility: HOSPITAL | Age: 33
Discharge: HOME | End: 2023-01-19
Attending: PEDIATRICS
Payer: MEDICAID

## 2023-01-19 ENCOUNTER — CLINICAL SUPPORT (OUTPATIENT)
Dept: CARDIOLOGY | Facility: CLINIC | Age: 33
End: 2023-01-19
Attending: PEDIATRICS
Payer: MEDICAID

## 2023-01-19 VITALS
HEART RATE: 75 BPM | OXYGEN SATURATION: 95 % | BODY MASS INDEX: 50.4 KG/M2 | HEIGHT: 65 IN | SYSTOLIC BLOOD PRESSURE: 128 MMHG | DIASTOLIC BLOOD PRESSURE: 61 MMHG | WEIGHT: 302.5 LBS

## 2023-01-19 VITALS
HEART RATE: 75 BPM | WEIGHT: 300.06 LBS | OXYGEN SATURATION: 95 % | SYSTOLIC BLOOD PRESSURE: 130 MMHG | HEIGHT: 65 IN | DIASTOLIC BLOOD PRESSURE: 66 MMHG | BODY MASS INDEX: 49.99 KG/M2

## 2023-01-19 VITALS — WEIGHT: 300 LBS | BODY MASS INDEX: 49.98 KG/M2 | HEIGHT: 65 IN | HEART RATE: 72 BPM

## 2023-01-19 DIAGNOSIS — Q21.3 TETRALOGY OF FALLOT: ICD-10-CM

## 2023-01-19 DIAGNOSIS — Q24.9 ADULT CONGENITAL HEART DISEASE: ICD-10-CM

## 2023-01-19 DIAGNOSIS — I48.91 ATRIAL FIBRILLATION, UNSPECIFIED TYPE: ICD-10-CM

## 2023-01-19 DIAGNOSIS — I48.3 TYPICAL ATRIAL FLUTTER: ICD-10-CM

## 2023-01-19 DIAGNOSIS — I11.9 RIGHT VENTRICULAR HYPERTENSION: ICD-10-CM

## 2023-01-19 DIAGNOSIS — Q90.9 DOWN SYNDROME: ICD-10-CM

## 2023-01-19 DIAGNOSIS — I49.3 PVC'S (PREMATURE VENTRICULAR CONTRACTIONS): ICD-10-CM

## 2023-01-19 DIAGNOSIS — I37.2 NONRHEUMATIC PULMONARY VALVE STENOSIS WITH INSUFFICIENCY: ICD-10-CM

## 2023-01-19 DIAGNOSIS — Z95.2 PULMONARY VALVE REPLACED: ICD-10-CM

## 2023-01-19 DIAGNOSIS — Z95.0 PACEMAKER: ICD-10-CM

## 2023-01-19 DIAGNOSIS — Q21.3 TETRALOGY OF FALLOT: Primary | ICD-10-CM

## 2023-01-19 DIAGNOSIS — E66.01 MORBID OBESITY: ICD-10-CM

## 2023-01-19 DIAGNOSIS — I47.20 VT (VENTRICULAR TACHYCARDIA): ICD-10-CM

## 2023-01-19 DIAGNOSIS — Q24.9 ADULT CONGENITAL HEART DISEASE: Primary | ICD-10-CM

## 2023-01-19 LAB
ASCENDING AORTA: 3.02 CM
AV INDEX (PROSTH): 0.91
AV MEAN GRADIENT: 4 MMHG
AV PEAK GRADIENT: 6 MMHG
AV VALVE AREA: 3.62 CM2
AV VELOCITY RATIO: 0.83
BSA FOR ECHO PROCEDURE: 2.5 M2
CV ECHO LV RWT: 0.46 CM
DOP CALC AO PEAK VEL: 1.27 M/S
DOP CALC AO VTI: 25.41 CM
DOP CALC LVOT AREA: 4 CM2
DOP CALC LVOT DIAMETER: 2.25 CM
DOP CALC LVOT PEAK VEL: 1.05 M/S
DOP CALC LVOT STROKE VOLUME: 92.04 CM3
DOP CALC RVOT PEAK VEL: 1.14 M/S
DOP CALC RVOT VTI: 29.56 CM
DOP CALCLVOT PEAK VEL VTI: 23.16 CM
E WAVE DECELERATION TIME: 164.97 MSEC
E/A RATIO: 1.66
E/E' RATIO: 13.5 M/S
ECHO LV POSTERIOR WALL: 1.05 CM (ref 0.6–1.1)
EJECTION FRACTION: 60 %
FRACTIONAL SHORTENING: 32 % (ref 28–44)
INTERVENTRICULAR SEPTUM: 1.08 CM (ref 0.6–1.1)
LA MAJOR: 5.51 CM
LA MINOR: 5.54 CM
LA WIDTH: 2.66 CM
LEFT ATRIUM SIZE: 3.79 CM
LEFT ATRIUM VOLUME INDEX MOD: 12.6 ML/M2
LEFT ATRIUM VOLUME INDEX: 20.1 ML/M2
LEFT ATRIUM VOLUME MOD: 29.5 CM3
LEFT ATRIUM VOLUME: 47.34 CM3
LEFT INTERNAL DIMENSION IN SYSTOLE: 3.13 CM (ref 2.1–4)
LEFT VENTRICLE DIASTOLIC VOLUME INDEX: 40.8 ML/M2
LEFT VENTRICLE DIASTOLIC VOLUME: 95.87 ML
LEFT VENTRICLE MASS INDEX: 73 G/M2
LEFT VENTRICLE SYSTOLIC VOLUME INDEX: 16.5 ML/M2
LEFT VENTRICLE SYSTOLIC VOLUME: 38.86 ML
LEFT VENTRICULAR INTERNAL DIMENSION IN DIASTOLE: 4.57 CM (ref 3.5–6)
LEFT VENTRICULAR MASS: 171.43 G
LV LATERAL E/E' RATIO: 12 M/S
LV SEPTAL E/E' RATIO: 15.43 M/S
MV PEAK A VEL: 0.65 M/S
MV PEAK E VEL: 1.08 M/S
MV STENOSIS PRESSURE HALF TIME: 47.84 MS
MV VALVE AREA P 1/2 METHOD: 4.6 CM2
PISA TR MAX VEL: 3.75 M/S
PV MEAN GRADIENT: 18.9 MMHG
PV PEAK VELOCITY: 2.68 CM/S
RA MAJOR: 5.95 CM
RA WIDTH: 4.53 CM
RIGHT VENTRICULAR END-DIASTOLIC DIMENSION: 4.68 CM
RV TISSUE DOPPLER FREE WALL SYSTOLIC VELOCITY 1 (APICAL 4 CHAMBER VIEW): 8.21 CM/S
SINUS: 3.16 CM
STJ: 2.89 CM
TDI LATERAL: 0.09 M/S
TDI SEPTAL: 0.07 M/S
TDI: 0.08 M/S
TR MAX PG: 56 MMHG
TRICUSPID ANNULAR PLANE SYSTOLIC EXCURSION: 2.16 CM

## 2023-01-19 PROCEDURE — 3078F PR MOST RECENT DIASTOLIC BLOOD PRESSURE < 80 MM HG: ICD-10-PCS | Mod: CPTII,,, | Performed by: PEDIATRICS

## 2023-01-19 PROCEDURE — 93005 ELECTROCARDIOGRAM TRACING: CPT | Mod: PBBFAC | Performed by: INTERNAL MEDICINE

## 2023-01-19 PROCEDURE — 3078F DIAST BP <80 MM HG: CPT | Mod: CPTII,,, | Performed by: PEDIATRICS

## 2023-01-19 PROCEDURE — 99999 PR PBB SHADOW E&M-EST. PATIENT-LVL III: CPT | Mod: PBBFAC,,, | Performed by: PEDIATRICS

## 2023-01-19 PROCEDURE — 3008F BODY MASS INDEX DOCD: CPT | Mod: CPTII,,, | Performed by: PEDIATRICS

## 2023-01-19 PROCEDURE — 3074F PR MOST RECENT SYSTOLIC BLOOD PRESSURE < 130 MM HG: ICD-10-PCS | Mod: CPTII,,, | Performed by: PEDIATRICS

## 2023-01-19 PROCEDURE — 93010 EKG 12-LEAD: ICD-10-PCS | Mod: S$PBB,,, | Performed by: INTERNAL MEDICINE

## 2023-01-19 PROCEDURE — 93303 ECHO (CUPID ONLY): ICD-10-PCS | Mod: 26,,, | Performed by: PEDIATRICS

## 2023-01-19 PROCEDURE — 93325 DOPPLER ECHO COLOR FLOW MAPG: CPT | Mod: 26,,, | Performed by: PEDIATRICS

## 2023-01-19 PROCEDURE — 93303 ECHO TRANSTHORACIC: CPT | Mod: 26,,, | Performed by: PEDIATRICS

## 2023-01-19 PROCEDURE — 99999 PR PBB SHADOW E&M-EST. PATIENT-LVL III: ICD-10-PCS | Mod: PBBFAC,,, | Performed by: PEDIATRICS

## 2023-01-19 PROCEDURE — 93320 DOPPLER ECHO COMPLETE: CPT | Mod: 26,,, | Performed by: PEDIATRICS

## 2023-01-19 PROCEDURE — 93320 ECHO (CUPID ONLY): ICD-10-PCS | Mod: 26,,, | Performed by: PEDIATRICS

## 2023-01-19 PROCEDURE — 93244 EXT ECG>48HR<7D REV&INTERPJ: CPT | Mod: ,,, | Performed by: PEDIATRICS

## 2023-01-19 PROCEDURE — 3075F SYST BP GE 130 - 139MM HG: CPT | Mod: CPTII,,, | Performed by: PEDIATRICS

## 2023-01-19 PROCEDURE — 99215 PR OFFICE/OUTPT VISIT, EST, LEVL V, 40-54 MIN: ICD-10-PCS | Mod: S$PBB,,, | Performed by: PEDIATRICS

## 2023-01-19 PROCEDURE — 99214 PR OFFICE/OUTPT VISIT, EST, LEVL IV, 30-39 MIN: ICD-10-PCS | Mod: 25,S$PBB,, | Performed by: PEDIATRICS

## 2023-01-19 PROCEDURE — 99213 OFFICE O/P EST LOW 20 MIN: CPT | Mod: PBBFAC,25 | Performed by: PEDIATRICS

## 2023-01-19 PROCEDURE — 93325 ECHO (CUPID ONLY): ICD-10-PCS | Mod: 26,,, | Performed by: PEDIATRICS

## 2023-01-19 PROCEDURE — 99214 OFFICE O/P EST MOD 30 MIN: CPT | Mod: 25,S$PBB,, | Performed by: PEDIATRICS

## 2023-01-19 PROCEDURE — 93280 CV PACEMAKER PROGRAMMING PEDIATRICS (CUPID ONLY): ICD-10-PCS | Mod: 26,S$PBB,, | Performed by: PEDIATRICS

## 2023-01-19 PROCEDURE — 93242 EXT ECG>48HR<7D RECORDING: CPT

## 2023-01-19 PROCEDURE — 99215 OFFICE O/P EST HI 40 MIN: CPT | Mod: S$PBB,,, | Performed by: PEDIATRICS

## 2023-01-19 PROCEDURE — 3008F PR BODY MASS INDEX (BMI) DOCUMENTED: ICD-10-PCS | Mod: CPTII,,, | Performed by: PEDIATRICS

## 2023-01-19 PROCEDURE — 93303 ECHO TRANSTHORACIC: CPT

## 2023-01-19 PROCEDURE — 3074F SYST BP LT 130 MM HG: CPT | Mod: CPTII,,, | Performed by: PEDIATRICS

## 2023-01-19 PROCEDURE — 1159F MED LIST DOCD IN RCRD: CPT | Mod: CPTII,,, | Performed by: PEDIATRICS

## 2023-01-19 PROCEDURE — 3075F PR MOST RECENT SYSTOLIC BLOOD PRESS GE 130-139MM HG: ICD-10-PCS | Mod: CPTII,,, | Performed by: PEDIATRICS

## 2023-01-19 PROCEDURE — 93010 ELECTROCARDIOGRAM REPORT: CPT | Mod: S$PBB,,, | Performed by: INTERNAL MEDICINE

## 2023-01-19 PROCEDURE — 99213 OFFICE O/P EST LOW 20 MIN: CPT | Mod: PBBFAC,25,27 | Performed by: PEDIATRICS

## 2023-01-19 PROCEDURE — 93280 PM DEVICE PROGR EVAL DUAL: CPT | Mod: PBBFAC | Performed by: PEDIATRICS

## 2023-01-19 PROCEDURE — 1159F PR MEDICATION LIST DOCUMENTED IN MEDICAL RECORD: ICD-10-PCS | Mod: CPTII,,, | Performed by: PEDIATRICS

## 2023-01-19 PROCEDURE — 93244 CV 3-14 DAY PEDIATRIC HOLTER MONITOR (CUPID ONLY): ICD-10-PCS | Mod: ,,, | Performed by: PEDIATRICS

## 2023-01-19 NOTE — PROGRESS NOTES
2023    re:Cipriano Thompson  :1990    [facility]     Dear Dr. Rodgers:    Cipriano Thompson is a 32 y.o. male seen in my ACHD clinic today for evaluation of congenital heart disease.  To summarize his diagnoses are as follow:  1.  Tetralogy of Fallot status post complete repair with a trans annular patch followed by pulmonary valve replacement with a 27 mm Freestyle [employer] bioprosthetic valve in .  Both surgeries at [facility].   - now status post placement of a 27 mm [employer] bioprosthetic valve in a 30 mm Dacron conduit in the pulmonary position by Dr. Godfrey May 2017.   - mild pulmonary valve stenosis and no significant pulmonary insufficiency   - echocardiogram suggests a right ventricular systolic pressure around 60 mm of mercury, unchanged.  2.  Sinus node dysfunction status post dual chamber pacemaker  at [facility]  3.  History of atrial flutter status post flutter ablation by Dr. Milligan in   4.  Down syndrome  5.  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  6.  Medical noncompliance, improved  7.  Obesity    To summarize, my recommendations are as follows:  1.  SBE prophylaxis is indicated before procedures.  2.  Continue current meds  3.  to see EP team today  4.  Continue CPAP for obstructive sleep apnea   5.  Close follow up with PCP  6.  Work on increasing activity level, better diet.  7.  At a minimum, I need to see him again in 1 year with an echocardiogram and EKG.  He will see electrophysiology as well.    Discussion:  From a cardiac standpoint, he actually looks great.  He has good biventricular function.  His mild pulmonary stenosis is unchanged, and there is no significant insufficiency of his pulmonary valve.      He looked much better in clinic today than he did a few years ago when I last saw him.  At that time, he was extremely somnolent and snoring throughout much of the clinic  visit.  Good follow-up with primary care has done wonders.  Treatment of his obstructive sleep apnea and is hypothyroidism are obviously very important.  He is quite obese, and his lifestyle is not conducive to weight gain.  His mother works until late at night and is not able to encourage a lot of activity, but this is the best they can do at present.    History of present illness:  I last saw him in clinic about 2 years ago.  Prior to that, he was followed by Dr. Dean.  When I saw him 2 years ago, he had had significant weight gain with new pitting edema and worsening daytime somnolence along with obvious medical noncompliance an under treatment of his hypothyroidism.  I referred him to sleep Medicine and got him set up with primary care.  To simplify his medical regimen, I stopped his twice a day carvedilol and switched him to once a day metoprolol.  I stressed the importance of compliance.  He started following with Dr. Oneal in primary care, and she has done a lot with him.  He is now wearing his CPAP at night.  He is on Trulicity.    From a cardiac standpoint, he has done well.  His complaint in clinic today is left-sided back pain.  He blames this on a fall a few months ago when he tripped.  His mother blames it on how he plays video games, laying on the bed but then turning to the right side.  No syncope.  No near-syncope.  No baseline shortness of breath.  Unchanged dyspnea on exertion.  No edema.      His mother continues to work from 5:00 p.m. until midnight.  The patient is left alone to feed himself from much of the day.  She admits that it is very hard to encourage him to exercise and very hard to encourage a healthy diet, but she is working on it.    The review of systems is as noted above. It is otherwise negative for other symptoms related to the general, neurological, psychiatric, endocrine, gastrointestinal, genitourinary, respiratory, dermatologic, musculoskeletal, hematologic, and  immunologic systems.    Past Medical History:   Diagnosis Date    Atrial fibrillation     CHF (congestive heart failure)     Down syndrome     Encounter for blood transfusion     Gout     Hypothyroidism     S/P surgery for complex congenital heart disease 1/30/2017    Sleep apnea     Tetralogy of Fallot 05/1990    VT (ventricular tachycardia) 3/27/2019     Past Surgical History:   Procedure Laterality Date    BUNIONECTOMY      CARDIAC SURGERY      open heart x 3, ppm     INSERT / REPLACE / REMOVE PACEMAKER Left 10/2008    NONINVASIVE CARDIAC ELECTROPHYSIOLOGY STUDY N/A 3/27/2019    Procedure: CARDIAC ELECTROPHYSIOLOGY STUDY, NONINVASIVE;  Surgeon: Jose Ortiz MD;  Location: [facility] EP LAB;  Service: Cardiology;  Laterality: N/A;  VT, NIEPS, Venogram, MDT, MAC, 3 prep, [place]    TONSILLECTOMY       Family History   Problem Relation Age of Onset    No Known Problems Mother     No Known Problems Sister      Social History     Socioeconomic History    Marital status: Single   Tobacco Use    Smoking status: Never    Smokeless tobacco: Never   Substance and Sexual Activity    Alcohol use: No    Drug use: No    Sexual activity: Not Currently   Social History Narrative    Lives with mother, 3 dog - Elfego, Rolando and .    He got a certificicate of completion of school in [email or web address].     He has never been in a day program.      Current Outpatient Medications on File Prior to Visit   Medication Sig Dispense Refill    allopurinoL (ZYLOPRIM) 300 MG tablet Take 1 tablet (300 mg total) by mouth once daily. 30 tablet 3    carvediloL (COREG) 25 MG tablet Take 1 tablet (25 mg total) by mouth once daily. 30 tablet 3    dulaglutide (TRULICITY) 1.5 mg/0.5 mL pen injector Inject 1.5 mg into the skin every 7 days. 4 pen 11    ergocalciferol (ERGOCALCIFEROL) 50,000 unit Cap Take 1 capsule (50,000 Units total) by mouth twice a week. 24 capsule 4    hydroCHLOROthiazide (HYDRODIURIL) 25 MG tablet Take 1 tablet (25 mg total) by mouth once  "daily. 30 tablet 3    levothyroxine (SYNTHROID) 88 MCG tablet Take 1 tablet (88 mcg total) by mouth before breakfast. 30 tablet 3    metoprolol succinate (TOPROL-XL) 25 MG 24 hr tablet Take 1 tablet (25 mg total) by mouth once daily. 30 tablet 3     No current facility-administered medications on file prior to visit.     Review of patient's allergies indicates:   Allergen Reactions    Adhesive     Latex, natural rubber     Sulfa (sulfonamide antibiotics)         Vitals:    01/19/23 1341   BP: 130/66   BP Location: Right arm   Pulse: 75   SpO2: 95%   Weight: (!) 136.1 kg (300 lb 0.7 oz)   Height: 5' 5" (1.651 m)     Of note, he weighed 122 kg March 2020.   In general, he is obese.  Much more alert than at his last visit.  He does have Down syndrome.  The eyes, nares, and oropharynx are clear.  Eyelids and conjunctiva are normal without drainage or erythema.  Pupils equal and round bilaterally.  The head is normocephalic and atraumatic.  The neck is supple without jugular venous distention or thyroid enlargement.  The lungs are clear to auscultation bilaterally.  He has a well-healed median sternotomy incision.  His pacemaker is in the left upper chest.  Heart sounds are somewhat distant.  Normal 1st heart sound, likely split 2nd heart sound.  2/6 systolic ejection murmur with no diastolic murmur.  The abdominal exam is benign without hepatosplenomegaly, tenderness, or distention.  Pulses are normal in all 4 extremities with brisk capillary refill and no clubbing, cyanosis.  However, he has 2+ pitting edema up to about his knee bilaterally.  He has some erythematous, dry skin on both extensor surfaces of his arms.    I personally reviewed the following tests performed today and my interpretation follows:  His EKG reveals sinus rhythm with a right bundle branch block.    Pacemaker was interrogated by the electrophysiology team.  It is working well.  Results for orders placed during the hospital encounter of " 01/19/23  Echo Saline Bubble? No    IMPRESSION:  Qualitative impression of at least mild right atrial enlargement.  There is a pacing wire crossing the tricuspid valve to the right ventricle with mild-to-moderate associated insufficiency.  Qualitative impression of mild to moderate right ventricular dilation and mild hypertrophy with low normal systolic function.  Right ventricular pressure estimated 56 mmHg above right atrial pressure from reasonably well defined TR doppler profile.  Echodensity consistent with patch repair of ventricular septal defect and malalignment of the ventricular septum with no residual shunt demonstrated.  Bioprosthetic pulmonary valve in good position with peak velocity < 2.9 m/sec, mean gradient <22 mm Hg and no significant insufficiency (minimally increased from previous study).  Pulmonary branches were not well demonstrated.  Qualitatively normal left ventricular size and structure.  Abnormal septal motion with good movement of the LV free wall, SF = 35% and EF estimated 60 -65% from apical views.  Normal velocity across aortic valve with trace aortic insufficiency.  Sinuses of Valsalva = 32 mm.    He had a cardiac catheterization April 4, 2017:  IMPRESSION:   1.  Down syndrome.  Repaired tetralogy of Fallot with subsequent 27 mm FreeStyle bioprosthetic pulmonary valve implantation.   2.  Moderate pulmonary valve stenosis (peak gradient 24 mmHg).  Moderate pulmonary insufficiency.   3.  Normal cardiac output and vascular resistance calculations.   4.  Pacemaker for sinus node dysfunction.   5.  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  Normal origin of left coronary.   6.  Left aortic arch.  Normal head and neck branching.   At that catheterization, his wedge pressure was 13.  Left ventricular end-diastolic pressure 10.  Pulmonary artery pressure mean 22-24.    Thank you for referring this patient to our clinic.  Please call with any questions.    Sincerely,        Bradley JARRETT  MD [facility]  Pediatric Cardiology  Adult Congenital Heart Disease  Pediatric Heart Failure and Transplantation  [facility]  [address] [place]  [address]  [phone]

## 2023-01-19 NOTE — PROGRESS NOTES
Name: Cipriano Thompson  MRN: [id number]  : 1990      Subjective:   CC: ToF, SND s/p pacemaker, NS-VT    HPI:    Cipriano Thompson is a 32 y.o. male with Down Syndrome, Tetralogy of Fallot, Obesity; hx of sinus node dysfunction s/p dual chamber pacemaker, NS-VT s/p negative NIEPS (2019); who presents to [facility] at [facility] for follow-up. Since he was last seen, his mother says he still sleeps all day and stays up all night playing video games. He falls asleep easily and has history of sleep apnea. He has no syncope or near syncope.  He has no chest pain or palpitations.    To review, his hx is notable for Down's syndrome, TOF s/p complete repair at age ~2 years (shunt as a baby via thoracotomy) with subsequent PVR in  for right ventricular dilation and pacemaker placement in  for Sinus node dysfunction. He presented with exercise intolerance and moderate PS/PI so he was referred for repeat PVR. Also with history of hypertension and ventricular ectopy with frequent PVC's.  He had a cardiac catheterization for possible Jeanna valve implantation on 17 and were unable to secondary to coronary artery anatomy. He underwent surgical pulmonary valve placement on 17.  He had frequent PVC's in the post op period.  Cipriano underwent atrial flutter ablation in 2017.  Cipriano underwent pacemaker generator change on 3/23/18.  Due to nonsustained VT on pacemaker check, he underwent NIEPS on 3/27/19 that was negative for inducible VT.       Past-Medical Hx/Problem List:  Tetralogy of Fallot   S/P complete repair with a trans annular patch (Clif)  S/P pulmonary valve replacement with a 27 mm Freestyle Medtronic bioprosthetic valve (, Clif).  S/P 27 mm Saint Quinn bioprosthetic valve in a 30 mm Dacron conduit in the pulmonary position by Dr. Godfrey May 2017.  Unchanged mild pulmonary valve stenosis and no significant pulmonary insufficiency  Echocardiogram suggests a  right ventricular systolic pressure around 60 mm of mercury, a little bit higher than on his last echocardiogram.  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  Sinus node dysfunction  S/P dual chamber pacemaker 2008 at [facility]  History of atrial flutter   S/P atrial flutter ablation by Dr. Milligan in 2017  Ventricular Tachycardia  Non-sustained, noted on device interrogations  S/P NIEPS on 3/27/19 that was negative for inducible VT.    Down Syndrome  Obesity  Significant weight gain, new pitting edema in the legs, worsening daytime somnolence and fatigue.    Definite medical noncompliance.    Hypothyroidism.    Obstructive sleep apnea.  CPAP  Medical noncompliance    Family Hx:  No known family history of congenital heart defects or cardiac surgeries in childhood.  No known family members with pacemakers or defibrillators.  No known inherited channelopathies or cardiomyopathies.  No known hx of sudden cardiac death or heart transplant.  No No known heart attack in someone less than 50yoa.    Social Hx:  Lives in [place], [place].    Review of Systems:  GEN:  No fevers, + fatigue, No weight-loss, No weight-gain  EYE:  No significant changes in vision, No eye redness, No lens dislocation  ENT: No cough, No congestion, No swelling, + snoring, No hearing loss,   RESP: No increased work of breathing, + dyspnea, No noisy breathing, No hx of pneumothorax  CV:  No chest pain, No palpitations, + tachycardia, No activity or exercise intolerance  GI:  No abdominal pain, No nausea, No vomiting, No diarrhea, No constipation  VIKAS: Normal UOP  MSK: No pain, No swelling, No joint dislocations, No scoliosis, No extremity swelling  HEME: No easy bruising or bleeding  NEUR: No history of seizures, No dizziness, No near-syncope, No syncope  DERM: No Rashes  PSY: No anxiety, No depression  ALL: See below.    Medications & Allergy:  Current Outpatient Medications on File  "Prior to Visit   Medication Sig Dispense Refill    allopurinoL (ZYLOPRIM) 300 MG tablet Take 1 tablet (300 mg total) by mouth once daily. 30 tablet 3    carvediloL (COREG) 25 MG tablet Take 1 tablet (25 mg total) by mouth once daily. 30 tablet 3    dulaglutide (TRULICITY) 1.5 mg/0.5 mL pen injector Inject 1.5 mg into the skin every 7 days. 4 pen 11    ergocalciferol (ERGOCALCIFEROL) 50,000 unit Cap Take 1 capsule (50,000 Units total) by mouth twice a week. 24 capsule 4    hydroCHLOROthiazide (HYDRODIURIL) 25 MG tablet Take 1 tablet (25 mg total) by mouth once daily. 30 tablet 3    levothyroxine (SYNTHROID) 88 MCG tablet Take 1 tablet (88 mcg total) by mouth before breakfast. 30 tablet 3    metoprolol succinate (TOPROL-XL) 25 MG 24 hr tablet Take 1 tablet (25 mg total) by mouth once daily. 30 tablet 3     No current facility-administered medications on file prior to visit.       Review of patient's allergies indicates:   Allergen Reactions    Adhesive     Latex, natural rubber     Sulfa (sulfonamide antibiotics)           Objective:   Vitals:  Vitals:    01/19/23 1000 01/19/23 1003   BP: 132/66 128/61   BP Location: Right arm Left arm   Patient Position: Sitting Sitting   BP Method: Large (Automatic) Large (Automatic)   Pulse: 75 75   SpO2: 95%    Weight: (!) 137.2 kg (302 lb 7.5 oz)    Height: 5' 5" (1.651 m)      Body mass index is 50.33 kg/m².  Body surface area is 2.51 meters squared.    Exam:  GEN: No acute distress, Downs Facies  EYE: Anicteric sclerae  ENT: No drainage, Moist mucous membranes  PULM: Normal work of breathing;  Clear to auscultation bilaterally, Good air movement throughout.  CV: No chest pain;   II/VI systolic murmur;   No rubs or gallops;  EXT: No cyanosis, 2+ edema   2+ radial and dorsalis pedis pulses bilaterally  ABD: Soft, obese, Non-tender,    No hepatomegaly;  Normal bowel sounds  NEUR: Normal gait, Grossly normal tone.  PSY: Fatigued, fell asleep several times in chair during visit (and " snored during this).      Results / Data:   ECG:   (01/19/2023) - Atrial paced rhythm, intact AV conduction, RBBB, QRSD 176ms  (12/10/2020) - Atrial paced rhythm, intact AV conduction, RBBB, QRSD 176ms    Holter/Zio:   (01/19/2023)  Pending, placed today.    Echocardiogram:   (01/19/2023)  CONGENITAL CARDIAC HISTORY:  Tetralogy of Fallot.  Down syndrome.  S/P Complete repair using a transannular patch - Ruthane.  S/P 27 mm Freestyle Medtronic Bioprosthetic pulmonary valve- Clif 1999.  S/P Dual chamber endocardial pacemaker (sinus node dysfunction) - GEGE in 2008.  S/P 27 mm St. Quinn Epic Bioprosthetic valve & 30 mm Dacron conduit - Bekah May 2017.  S/P Flutter ablation - Srini 11/2017.  S/P Generator change - Macicek 3/2018.     SEGMENTAL CARDIAC CONNECTIONS (previously demonstrated):  Abdominal situs is solitus.   Atrial situs is normal.   Atrioventricular concordance.   Grossly normal tricuspid and mitral valve structure.    RV dilation and RV hypertrophy.   Ventriculoarterial concordance.   Aortic valve is normal and pulmonic valve is abnormal.   Ventricular loop: D-loop.   Cardiac position is mesocardia.     IMPRESSION:  Qualitative impression of at least mild right atrial enlargement.  There is a pacing wire crossing the tricuspid valve to the right ventricle with mild-to-moderate associated insufficiency.  Qualitative impression of mild to moderate right ventricular dilation and mild hypertrophy with low normal systolic function.  Right ventricular pressure estimated 56 mmHg above right atrial pressure from reasonably well defined TR doppler profile.  Echodensity consistent with patch repair of ventricular septal defect and malalignment of the ventricular septum with no residual shunt demonstrated.  Bioprosthetic pulmonary valve in good position with peak velocity < 2.9 m/sec, mean gradient <22 mm Hg and no significant insufficiency (minimally increased from previous study).  Pulmonary branches were not  well demonstrated.  Qualitatively normal left ventricular size and structure.  Abnormal septal motion with good movement of the LV free wall, SF = 35% and EF estimated 60 -65% from apical views.  Normal velocity across aortic valve with trace aortic insufficiency.  Aortic dimensions:  Sinuses of Valsalva = 32 mm.  ST junction               = 29 mm.  Ascending aorta       = 30 mm.  Aorta is normal in size and probable right branching pattern with no evidence of coarctation.  No pericardial effusion demonstrated.    Device Interrogation:  (01/19/2023)  Permanent Programming     RA Lead: 1.5 A V @ 0.4 ms. Sensitivity: 0.6 mV.     RV Lead: 2.5 A V @ 0.4 ms. Sensitivity: 0.9 mV.      Pacemaker Generator and Leads meet standard of FDA approval.   Chamber type: dual.     Mode: DDD     Lower limit rate: 75 bpm     Upper tracking rate: 130 bpm     Max sensor rate: 130 bpm     AV Delay - Longest: 240 msec  PV Delay - Longest: 220 msec     Tachy Zone        AT1  Rate: >  171 bpm Therapies: monitor       VT1       Rate: >  150 bpm  Therapies: monitor    Battery voltage: 2.99 V; BV @ RINA: 2.63 V    Estimated longevity: 9.1 Years .   Leads    RA Lead:        P/R-wave: 2.5  mV       Lead Impedance: 399 Ohms       Paced: 53%     RV Lead:        P/R-wave: 3.1  mV       Impedance: 456 Ohms       Paced: 0%     Thresholds       RA Lead: 0.5 V @ 0.4 ms. Configuration: bipolar.        RV Lead: 1.25 V @ 0.4 ms. Configuration: bipolar.  Reprogramming comments:     No changes this session   General comments:     Device interrogation and lead testing performed. Device and leads WNL.    VT-NS x3 since last carelink transmission - all 1-2 seconds, lasting 6-10 beats.     Scheduled for REMOTE transmission on Monday, April 17, 2023.    Assessment / Plan:   Cipriano Thompson is a 32 y.o. male with Down Syndrome, Tetralogy of Fallot, Obesity; hx of sinus node dysfunction s/p dual chamber pacemaker, NS-VT s/p negative NIEPS (2019); who presents to  [facility] at [facility] for follow-up.    His pacemaker continues to function well on review of his interrogation today.  There is some nonsustained VT episodes noted.  Lead characteristics remain adequate.    We discussed at length the challenging c assessing risk of dangerous or life-threatening arrhythmias in his scenario.  Certainly, we have good criteria in the setting of tetralogy of flow.  However, her predictions are not absolute.  He does have nonsustained ventricular tachycardia noted on his device, which is not  new to him at all.  He did have a negative noninvasive EP study in 2019, which is indeed greatly reassuring.  However, his risk can change over time,  so the negative study, while reassuring, is not perfectly predictive.  Ultimately, we were in agreement to  continue to monitor at this point, and  that the risks of additional medications or procedures likely outweigh the benefit for him at the moment.      Follow-up:   1 year with clinic visit with Dr. Valle and myself with ECG, 24-hr heart rhythm monitor, echocardiogram, and pacemaker interrogation.  Cardiac medications:    Carvedilol, Hydrochlorothiazide, Metroprolol-XL 25mg daily.  SBE prophylaxis:    Yes.  Antibiotic prophylaxis is required prior to all dental procedures cleanings.    Please contact us if he has any questions or concerns.  Our clinic from his [phone] during office hours. For urgent night and weekend concerns, call [phone] and ask for the pediatric cardiologist on call to be paged.

## 2023-01-21 LAB
AV DELAY - LONGEST: 240 MSEC
BATTERY VOLTAGE (V): 2.99 V
ERI (V): 2.63 V
IMPEDANCE RA LEAD (NATIVE): 456 OHMS
IMPEDANCE RA LEAD: 399 OHMS
OHS CV DC PP MS1: 0.4 MS
OHS CV DC PP MS2: 0.4 MS
OHS CV DC PP V1: NORMAL V
OHS CV DC PP V2: NORMAL V
P/R-WAVE RA LEAD (NATIVE): 3.1 MV
P/R-WAVE RA LEAD: 2.5 MV
PV DELAY - LONGEST: 220 MSEC
THRESHOLD MS RA LEAD (NATIVE): 0.4 MS
THRESHOLD MS RA LEAD: 0.4 MS
THRESHOLD V RA LEAD (NATIVE): 1.25 V
THRESHOLD V RA LEAD: 0.5 V

## 2023-01-27 ENCOUNTER — OFFICE VISIT (OUTPATIENT)
Dept: INTERNAL MEDICINE | Facility: CLINIC | Age: 33
End: 2023-01-27
Payer: MEDICAID

## 2023-01-27 ENCOUNTER — LAB VISIT (OUTPATIENT)
Dept: LAB | Facility: HOSPITAL | Age: 33
End: 2023-01-27
Attending: INTERNAL MEDICINE
Payer: MEDICAID

## 2023-01-27 VITALS
HEART RATE: 76 BPM | DIASTOLIC BLOOD PRESSURE: 52 MMHG | OXYGEN SATURATION: 95 % | BODY MASS INDEX: 50.84 KG/M2 | SYSTOLIC BLOOD PRESSURE: 105 MMHG | HEIGHT: 65 IN | WEIGHT: 305.13 LBS

## 2023-01-27 DIAGNOSIS — Q90.9 DOWN SYNDROME: ICD-10-CM

## 2023-01-27 DIAGNOSIS — Q24.9 ADULT CONGENITAL HEART DISEASE: ICD-10-CM

## 2023-01-27 DIAGNOSIS — E11.9 TYPE 2 DIABETES MELLITUS WITHOUT COMPLICATION: ICD-10-CM

## 2023-01-27 DIAGNOSIS — E13.9 DIABETES MELLITUS DUE TO ABNORMAL INSULIN: Primary | ICD-10-CM

## 2023-01-27 DIAGNOSIS — E13.9 DIABETES MELLITUS DUE TO ABNORMAL INSULIN: ICD-10-CM

## 2023-01-27 DIAGNOSIS — E03.9 HYPOTHYROIDISM, UNSPECIFIED TYPE: ICD-10-CM

## 2023-01-27 DIAGNOSIS — M10.9 GOUT, ARTHRITIS: ICD-10-CM

## 2023-01-27 DIAGNOSIS — Q21.3 TETRALOGY OF FALLOT: ICD-10-CM

## 2023-01-27 DIAGNOSIS — G47.33 OSA (OBSTRUCTIVE SLEEP APNEA): ICD-10-CM

## 2023-01-27 LAB
ALBUMIN SERPL BCP-MCNC: 3.5 G/DL (ref 3.5–5.2)
ALP SERPL-CCNC: 126 U/L (ref 55–135)
ALT SERPL W/O P-5'-P-CCNC: 26 U/L (ref 10–44)
ANION GAP SERPL CALC-SCNC: 11 MMOL/L (ref 8–16)
AST SERPL-CCNC: 24 U/L (ref 10–40)
BASOPHILS # BLD AUTO: 0.07 K/UL (ref 0–0.2)
BASOPHILS NFR BLD: 1 % (ref 0–1.9)
BILIRUB SERPL-MCNC: 0.5 MG/DL (ref 0.1–1)
BUN SERPL-MCNC: 21 MG/DL (ref 6–20)
CALCIUM SERPL-MCNC: 9.7 MG/DL (ref 8.7–10.5)
CHLORIDE SERPL-SCNC: 100 MMOL/L (ref 95–110)
CHOLEST SERPL-MCNC: 178 MG/DL (ref 120–199)
CHOLEST/HDLC SERPL: 5.9 {RATIO} (ref 2–5)
CO2 SERPL-SCNC: 27 MMOL/L (ref 23–29)
CREAT SERPL-MCNC: 1.3 MG/DL (ref 0.5–1.4)
DIFFERENTIAL METHOD: ABNORMAL
EOSINOPHIL # BLD AUTO: 0.1 K/UL (ref 0–0.5)
EOSINOPHIL NFR BLD: 1.8 % (ref 0–8)
ERYTHROCYTE [DISTWIDTH] IN BLOOD BY AUTOMATED COUNT: 15.9 % (ref 11.5–14.5)
EST. GFR  (NO RACE VARIABLE): >60 ML/MIN/1.73 M^2
ESTIMATED AVG GLUCOSE: 131 MG/DL (ref 68–131)
GLUCOSE SERPL-MCNC: 121 MG/DL (ref 70–110)
HBA1C MFR BLD: 6.2 % (ref 4–5.6)
HCT VFR BLD AUTO: 43.1 % (ref 40–54)
HDLC SERPL-MCNC: 30 MG/DL (ref 40–75)
HDLC SERPL: 16.9 % (ref 20–50)
HGB BLD-MCNC: 13.8 G/DL (ref 14–18)
IMM GRANULOCYTES # BLD AUTO: 0.03 K/UL (ref 0–0.04)
IMM GRANULOCYTES NFR BLD AUTO: 0.4 % (ref 0–0.5)
LDLC SERPL CALC-MCNC: 113 MG/DL (ref 63–159)
LYMPHOCYTES # BLD AUTO: 1.3 K/UL (ref 1–4.8)
LYMPHOCYTES NFR BLD: 18.6 % (ref 18–48)
MCH RBC QN AUTO: 30.2 PG (ref 27–31)
MCHC RBC AUTO-ENTMCNC: 32 G/DL (ref 32–36)
MCV RBC AUTO: 94 FL (ref 82–98)
MONOCYTES # BLD AUTO: 0.5 K/UL (ref 0.3–1)
MONOCYTES NFR BLD: 7 % (ref 4–15)
NEUTROPHILS # BLD AUTO: 5.1 K/UL (ref 1.8–7.7)
NEUTROPHILS NFR BLD: 71.2 % (ref 38–73)
NONHDLC SERPL-MCNC: 148 MG/DL
NRBC BLD-RTO: 0 /100 WBC
PLATELET # BLD AUTO: 286 K/UL (ref 150–450)
PMV BLD AUTO: 9.7 FL (ref 9.2–12.9)
POTASSIUM SERPL-SCNC: 4.1 MMOL/L (ref 3.5–5.1)
PROT SERPL-MCNC: 7.7 G/DL (ref 6–8.4)
RBC # BLD AUTO: 4.57 M/UL (ref 4.6–6.2)
SODIUM SERPL-SCNC: 138 MMOL/L (ref 136–145)
TRIGL SERPL-MCNC: 175 MG/DL (ref 30–150)
TSH SERPL DL<=0.005 MIU/L-ACNC: 3.55 UIU/ML (ref 0.4–4)
URATE SERPL-MCNC: 6.8 MG/DL (ref 3.4–7)
WBC # BLD AUTO: 7.15 K/UL (ref 3.9–12.7)

## 2023-01-27 PROCEDURE — 84443 ASSAY THYROID STIM HORMONE: CPT | Performed by: INTERNAL MEDICINE

## 2023-01-27 PROCEDURE — 99214 OFFICE O/P EST MOD 30 MIN: CPT | Mod: S$PBB,,, | Performed by: INTERNAL MEDICINE

## 2023-01-27 PROCEDURE — 85025 COMPLETE CBC W/AUTO DIFF WBC: CPT | Performed by: INTERNAL MEDICINE

## 2023-01-27 PROCEDURE — 1159F MED LIST DOCD IN RCRD: CPT | Mod: CPTII,,, | Performed by: INTERNAL MEDICINE

## 2023-01-27 PROCEDURE — 84550 ASSAY OF BLOOD/URIC ACID: CPT | Performed by: INTERNAL MEDICINE

## 2023-01-27 PROCEDURE — 3008F PR BODY MASS INDEX (BMI) DOCUMENTED: ICD-10-PCS | Mod: CPTII,,, | Performed by: INTERNAL MEDICINE

## 2023-01-27 PROCEDURE — 99999 PR PBB SHADOW E&M-EST. PATIENT-LVL III: ICD-10-PCS | Mod: PBBFAC,,, | Performed by: INTERNAL MEDICINE

## 2023-01-27 PROCEDURE — 80053 COMPREHEN METABOLIC PANEL: CPT | Performed by: INTERNAL MEDICINE

## 2023-01-27 PROCEDURE — 36415 COLL VENOUS BLD VENIPUNCTURE: CPT | Performed by: INTERNAL MEDICINE

## 2023-01-27 PROCEDURE — 1159F PR MEDICATION LIST DOCUMENTED IN MEDICAL RECORD: ICD-10-PCS | Mod: CPTII,,, | Performed by: INTERNAL MEDICINE

## 2023-01-27 PROCEDURE — 80061 LIPID PANEL: CPT | Performed by: INTERNAL MEDICINE

## 2023-01-27 PROCEDURE — 99213 OFFICE O/P EST LOW 20 MIN: CPT | Mod: PBBFAC | Performed by: INTERNAL MEDICINE

## 2023-01-27 PROCEDURE — 3074F SYST BP LT 130 MM HG: CPT | Mod: CPTII,,, | Performed by: INTERNAL MEDICINE

## 2023-01-27 PROCEDURE — 3078F PR MOST RECENT DIASTOLIC BLOOD PRESSURE < 80 MM HG: ICD-10-PCS | Mod: CPTII,,, | Performed by: INTERNAL MEDICINE

## 2023-01-27 PROCEDURE — 3078F DIAST BP <80 MM HG: CPT | Mod: CPTII,,, | Performed by: INTERNAL MEDICINE

## 2023-01-27 PROCEDURE — 83036 HEMOGLOBIN GLYCOSYLATED A1C: CPT | Performed by: INTERNAL MEDICINE

## 2023-01-27 PROCEDURE — 99999 PR PBB SHADOW E&M-EST. PATIENT-LVL III: CPT | Mod: PBBFAC,,, | Performed by: INTERNAL MEDICINE

## 2023-01-27 PROCEDURE — 3074F PR MOST RECENT SYSTOLIC BLOOD PRESSURE < 130 MM HG: ICD-10-PCS | Mod: CPTII,,, | Performed by: INTERNAL MEDICINE

## 2023-01-27 PROCEDURE — 3008F BODY MASS INDEX DOCD: CPT | Mod: CPTII,,, | Performed by: INTERNAL MEDICINE

## 2023-01-27 PROCEDURE — 99214 PR OFFICE/OUTPT VISIT, EST, LEVL IV, 30-39 MIN: ICD-10-PCS | Mod: S$PBB,,, | Performed by: INTERNAL MEDICINE

## 2023-01-27 NOTE — PROGRESS NOTES
"Chief Complaint: Follow up of  Medical problems     HPI:This is a 32 year old man who presents with his mother for follow up of his medical problems.     All history is obtained from his mother     He is getting this Trulicity 1.5 mg injection once a week for his blood sugars.      He continues to have mid back pain because he lays in bed a lot. When he gets up, he is better.       He is bored at home.  He has been doing " same old, same old".   He is not exercising.  His niece, age 21,  is staying with them.  Niece will start bringing him to the park to walk.      He is wearing his CPAP machine when he is sleeping.  He saw sleep medicine.  He is wearing his cpap machine longer at night.     When he wears the CPAP machine, he is not sleepy while he is awake.      Mother gets off work at midnight. He us usally awake when she gets home.  Mother goes to bed at 3 am and Cipriano is still awake.  Mother wakes Cipriano up at 7:30-8 am to bring the dogs out.  He goes back to sleep around 10 am (CPAP machine is on).  He will sleep until the evening if his mother lets him sleep. Mother wakes Cipriano up at 3 pm. .     He gets 20 hours a month from services from the [facility]. His aunt does his services by taking him to different activities.   She just started him in the community due to COVID pandemic             He has gout. He should take allopurinol 300 mg daily.  NO gouty flares.      His mother notes that Cipriano has not been taking his medication regularly - he still takes his medications 50% of the time. His mother fills a 2 week tray of his medications and she winds up filling his medication tray once a month      He should take coreg 25 mg daily, metoprolol succinate 25 mg 1 tablet daily, hctz 25 mg once daily and levothyroxine 88 mcg daily.      HE is not taking vitamin D supplement. He should take vitamin D 50,000 units twice weekly.              Past Medical History:   Diagnosis Date    Atrial fibrillation      CHF (congestive " heart failure)      Down syndrome      Encounter for blood transfusion      Gout      Hypothyroidism      S/P surgery for complex congenital heart disease 1/30/2017    Sleep apnea      Tetralogy of Fallot 05/1990    VT (ventricular tachycardia) 3/27/2019                      Past Surgical History:   Procedure Laterality Date    BUNIONECTOMY        CARDIAC SURGERY         open heart, ppm     INSERT / REPLACE / REMOVE PACEMAKER Left 10/2008    NONINVASIVE CARDIAC ELECTROPHYSIOLOGY STUDY N/A 3/27/2019     Procedure: CARDIAC ELECTROPHYSIOLOGY STUDY, NONINVASIVE;  Surgeon: Jose Ortiz MD;  Location: [facility] LAB;  Service: Cardiology;  Laterality: N/A;  VT, NIEPS, Venogram, MDT, MAC, 3 prep, Macicek    TONSILLECTOMY          Social History                   Socioeconomic History    Marital status: Single       Spouse name: Not on file    Number of children: Not on file    Years of education: Not on file    Highest education level: Not on file   Occupational History    Not on file   Tobacco Use    Smoking status: Never Smoker    Smokeless tobacco: Never Used   Substance and Sexual Activity    Alcohol use: No    Drug use: No    Sexual activity: Not Currently   Other Topics Concern    Not on file   Social History Narrative     Lives with mother, 1 dog (inside)      Social Determinants of Health            Financial Resource Strain:     Difficulty of Paying Living Expenses:    Food Insecurity:     Worried About Running Out of Food in the Last Year:     Ran Out of Food in the Last Year:    Transportation Needs:     Lack of Transportation (Medical):     Lack of Transportation (Non-Medical):    Physical Activity:     Days of Exercise per Week:     Minutes of Exercise per Session:    Stress:     Feeling of Stress :    Social Connections:     Frequency of Communication with Friends and Family:     Frequency of Social Gatherings with Friends and Family:     Attends Jewish Services:     Active Member of Clubs or  "Organizations:     Attends Club or Organization Meetings:     Marital Status:                    Family Status   Relation Name Status    Mother Luann Alive    Sister Barbara Alive    MGM       MGF       PGM       PGF               Meds and allergies: updated on Epic     REVIEW OF SYSTEMS: No fevers, chills,  hearing loss, sinus congestion, sore throat, chest pain, nausea, vomiting, constipation, diarrhea, dysuria, hematuria, polydipsia, polyuria, headaches, anxiety, depression     He is not wearing his eye glasses.      Physical exam:     BP (!) 105/52 (BP Location: Left arm, Patient Position: Sitting)   Pulse 76   Ht 5' 5" (1.651 m)   Wt (!) 138.4 kg (305 lb 1.9 oz)   SpO2 95%   BMI 50.77 kg/m²     General: alert, oriented x 3, no apparent distress.  Affect normal  HEENT: Conjunctivae: anicteric, PERRL, EOMI, TM clear, Oralpharynx clear  Neck: supple, no thyroid enlargement, no cervical lymphadenopathy  Resp: effort normal, lungs clear bilaterally  CV: Regular rate and rhythm without murmurs, gallops or rubs, no lower extremity edema,          Assessment/Plan:         Congenital heart disease with CHF, a fib and pacemaker - follow up with cardiology. Stressed compliance  with medications and diet.      Sleep apnea - discussed compliance with CPAP machine.     Hypothyroidism - TSH     GOut on allopurinol to 300 mg once daily     Morbid Obesity - work on diet and exercise     Down's Syndrome with mental handicapped    Encouraged more family support.      Vitamin D deficiency - get on prescription vitamin D 50,000 units twice a week      Diabetes on Trulicity to 1.5 mg sq once a week.  Labs          I will see him back in 4 months, sooner if problems arise.  "

## 2023-01-29 ENCOUNTER — PATIENT MESSAGE (OUTPATIENT)
Dept: INTERNAL MEDICINE | Facility: CLINIC | Age: 33
End: 2023-01-29
Payer: MEDICAID

## 2023-03-09 LAB
OHS CV EVENT MONITOR DAY: 1
OHS CV HOLTER HOOKUP DATE: NORMAL
OHS CV HOLTER HOOKUP TIME: NORMAL
OHS CV HOLTER LENGTH DECIMAL HOURS: 24
OHS CV HOLTER LENGTH HOURS: 0
OHS CV HOLTER LENGTH MINUTES: 0
OHS CV HOLTER SCAN DATE: NORMAL
OHS CV HOLTER SINUS AVERAGE HR: 78 BPM
OHS CV HOLTER SINUS MAX HR: 103 BPM
OHS CV HOLTER SINUS MIN HR: 70 BPM
OHS CV HOLTER STUDY END DATE: NORMAL
OHS CV HOLTER STUDY END TIME: NORMAL

## 2023-04-03 ENCOUNTER — PATIENT MESSAGE (OUTPATIENT)
Dept: ADMINISTRATIVE | Facility: HOSPITAL | Age: 33
End: 2023-04-03
Payer: MEDICAID

## 2023-04-17 ENCOUNTER — HOSPITAL ENCOUNTER (OUTPATIENT)
Dept: PEDIATRIC CARDIOLOGY | Facility: HOSPITAL | Age: 33
Discharge: HOME | End: 2023-04-17
Attending: PEDIATRICS
Payer: MEDICAID

## 2023-04-17 DIAGNOSIS — Z95.0 PACEMAKER: ICD-10-CM

## 2023-04-17 DIAGNOSIS — I48.91 ATRIAL FIBRILLATION, UNSPECIFIED TYPE: ICD-10-CM

## 2023-04-17 DIAGNOSIS — I11.9 RIGHT VENTRICULAR HYPERTENSION: Primary | ICD-10-CM

## 2023-04-17 DIAGNOSIS — I47.20 VT (VENTRICULAR TACHYCARDIA): ICD-10-CM

## 2023-04-17 DIAGNOSIS — I48.3 TYPICAL ATRIAL FLUTTER: ICD-10-CM

## 2023-04-17 DIAGNOSIS — Q21.3 TETRALOGY OF FALLOT: ICD-10-CM

## 2023-04-17 DIAGNOSIS — I37.2 NONRHEUMATIC PULMONARY VALVE STENOSIS WITH INSUFFICIENCY: ICD-10-CM

## 2023-04-17 DIAGNOSIS — Q24.9 ADULT CONGENITAL HEART DISEASE: ICD-10-CM

## 2023-04-17 DIAGNOSIS — I49.3 PVC'S (PREMATURE VENTRICULAR CONTRACTIONS): ICD-10-CM

## 2023-04-17 PROCEDURE — 93294 CV PACEMAKER REMOTE PEDIATRICS (CUPID ONLY): ICD-10-PCS | Mod: ,,, | Performed by: PEDIATRICS

## 2023-04-17 PROCEDURE — 93296 REM INTERROG EVL PM/IDS: CPT

## 2023-04-17 PROCEDURE — 93294 REM INTERROG EVL PM/LDLS PM: CPT | Mod: ,,, | Performed by: PEDIATRICS

## 2023-05-02 ENCOUNTER — TELEPHONE (OUTPATIENT)
Dept: INTERNAL MEDICINE | Facility: CLINIC | Age: 33
End: 2023-05-02
Payer: MEDICAID

## 2023-05-02 NOTE — TELEPHONE ENCOUNTER
I called and spoke to mom she will drop off the 90L form   They have an appt to see you both in [place]

## 2023-05-02 NOTE — TELEPHONE ENCOUNTER
----- Message from Barbie Morejon sent at 5/2/2023  9:16 AM CDT -----  Regarding: Questions and concerns  Contact: [phone]  Patients mom Luann is calling. Mom has a 90L form that needs to be completed. Mom would like to know if she can drop the form off. Please call mom at [phone]     Thank You

## 2023-05-05 LAB
AV DELAY - LONGEST: 240 MSEC
BATTERY VOLTAGE (V): 2.99 V
ERI (V): 2.63 V
IMPEDANCE RA LEAD (NATIVE): 456 OHMS
IMPEDANCE RA LEAD: 380 OHMS
OHS CV DC PP MS1: 0.4 MS
OHS CV DC PP MS2: 0.4 MS
OHS CV DC PP V1: NORMAL V
OHS CV DC PP V2: NORMAL V
P/R-WAVE RA LEAD (NATIVE): 2.5 MV
P/R-WAVE RA LEAD: 2.4 MV
PV DELAY - LONGEST: 220 MSEC
THRESHOLD MS RA LEAD (NATIVE): 0.4 MS
THRESHOLD MS RA LEAD: 0.4 MS
THRESHOLD V RA LEAD (NATIVE): 1.25 V
THRESHOLD V RA LEAD: 0.5 V

## 2023-05-06 RX ORDER — METOPROLOL SUCCINATE 25 MG/1
TABLET, EXTENDED RELEASE ORAL
Qty: 90 TABLET | Refills: 2 | Status: SHIPPED | OUTPATIENT
Start: 2023-05-06 | End: 2023-08-24

## 2023-05-06 NOTE — TELEPHONE ENCOUNTER
Care Due:                  Date            Visit Type   Department     Provider  --------------------------------------------------------------------------------                                EP -                              PRIMARY      [facility] INTERNAL  Last Visit: 01-      CARE ([facility])   MEDICINE       SOCORRO ROSAS                              EP -                              PRIMARY      [facility] INTERNAL  Next Visit: 06-      CARE ([facility])   MEDICINE       SOCORRO ROSAS                                                            Last  Test          Frequency    Reason                     Performed    Due Date  --------------------------------------------------------------------------------    HBA1C.......  6 months...  dulaglutide..............  01- 07-    Health [facility] Embedded Care Due Messages. Reference number: [id number].   5/06/2023 3:35:51 AM CDT

## 2023-05-07 NOTE — TELEPHONE ENCOUNTER
Refill Routing Note   Medication(s) are not appropriate for processing by [facility] for the following reason(s):      Drug-drug interaction    [facility] action(s):  Defer     Medication Therapy Plan: COREG      Appointments  past 12m or future 3m with PCP    Date Provider   Last Visit   1/27/2023 Polly Oneal MD   Next Visit   6/19/2023 Polly Oneal MD   ED visits in past 90 days: 0        Note composed:8:49 PM 05/06/2023

## 2023-07-14 ENCOUNTER — TELEPHONE (OUTPATIENT)
Dept: PEDIATRIC CARDIOLOGY | Facility: CLINIC | Age: 33
End: 2023-07-14
Payer: MEDICAID

## 2023-07-17 ENCOUNTER — HOSPITAL ENCOUNTER (OUTPATIENT)
Dept: PEDIATRIC CARDIOLOGY | Facility: HOSPITAL | Age: 33
Discharge: HOME | End: 2023-07-17
Attending: PEDIATRICS
Payer: MEDICAID

## 2023-07-17 DIAGNOSIS — Z95.0 PACEMAKER: ICD-10-CM

## 2023-07-17 DIAGNOSIS — Q24.9 ADULT CONGENITAL HEART DISEASE: ICD-10-CM

## 2023-07-17 DIAGNOSIS — I37.2 NONRHEUMATIC PULMONARY VALVE STENOSIS WITH INSUFFICIENCY: ICD-10-CM

## 2023-07-17 DIAGNOSIS — Q21.3 TETRALOGY OF FALLOT: ICD-10-CM

## 2023-07-17 DIAGNOSIS — I11.9 RIGHT VENTRICULAR HYPERTENSION: ICD-10-CM

## 2023-07-17 PROCEDURE — 93294 REM INTERROG EVL PM/LDLS PM: CPT | Mod: ,,, | Performed by: PEDIATRICS

## 2023-07-17 PROCEDURE — 93294 CV PACEMAKER REMOTE PEDIATRICS (CUPID ONLY): ICD-10-PCS | Mod: ,,, | Performed by: PEDIATRICS

## 2023-07-17 PROCEDURE — 93296 REM INTERROG EVL PM/IDS: CPT

## 2023-07-20 ENCOUNTER — LAB VISIT (OUTPATIENT)
Dept: LAB | Facility: HOSPITAL | Age: 33
End: 2023-07-20
Attending: INTERNAL MEDICINE
Payer: MEDICAID

## 2023-07-20 ENCOUNTER — OFFICE VISIT (OUTPATIENT)
Dept: INTERNAL MEDICINE | Facility: CLINIC | Age: 33
End: 2023-07-20
Payer: MEDICAID

## 2023-07-20 VITALS
HEART RATE: 76 BPM | OXYGEN SATURATION: 98 % | WEIGHT: 300.94 LBS | DIASTOLIC BLOOD PRESSURE: 60 MMHG | BODY MASS INDEX: 50.14 KG/M2 | SYSTOLIC BLOOD PRESSURE: 100 MMHG | HEIGHT: 65 IN

## 2023-07-20 DIAGNOSIS — Z00.00 ROUTINE GENERAL MEDICAL EXAMINATION AT A HEALTH CARE FACILITY: Primary | ICD-10-CM

## 2023-07-20 DIAGNOSIS — E13.9 DIABETES MELLITUS DUE TO ABNORMAL INSULIN: ICD-10-CM

## 2023-07-20 LAB
ALBUMIN SERPL BCP-MCNC: 3.4 G/DL (ref 3.5–5.2)
ALP SERPL-CCNC: 119 U/L (ref 55–135)
ALT SERPL W/O P-5'-P-CCNC: 21 U/L (ref 10–44)
ANION GAP SERPL CALC-SCNC: 9 MMOL/L (ref 8–16)
AST SERPL-CCNC: 20 U/L (ref 10–40)
BASOPHILS # BLD AUTO: 0.09 K/UL (ref 0–0.2)
BASOPHILS NFR BLD: 1.2 % (ref 0–1.9)
BILIRUB SERPL-MCNC: 0.4 MG/DL (ref 0.1–1)
BUN SERPL-MCNC: 18 MG/DL (ref 6–20)
CALCIUM SERPL-MCNC: 9.3 MG/DL (ref 8.7–10.5)
CHLORIDE SERPL-SCNC: 102 MMOL/L (ref 95–110)
CO2 SERPL-SCNC: 27 MMOL/L (ref 23–29)
CREAT SERPL-MCNC: 1.2 MG/DL (ref 0.5–1.4)
DIFFERENTIAL METHOD: ABNORMAL
EOSINOPHIL # BLD AUTO: 0.2 K/UL (ref 0–0.5)
EOSINOPHIL NFR BLD: 2.4 % (ref 0–8)
ERYTHROCYTE [DISTWIDTH] IN BLOOD BY AUTOMATED COUNT: 15.4 % (ref 11.5–14.5)
EST. GFR  (NO RACE VARIABLE): >60 ML/MIN/1.73 M^2
ESTIMATED AVG GLUCOSE: 120 MG/DL (ref 68–131)
GLUCOSE SERPL-MCNC: 83 MG/DL (ref 70–110)
HBA1C MFR BLD: 5.8 % (ref 4–5.6)
HCT VFR BLD AUTO: 45.9 % (ref 40–54)
HGB BLD-MCNC: 15 G/DL (ref 14–18)
IMM GRANULOCYTES # BLD AUTO: 0.03 K/UL (ref 0–0.04)
IMM GRANULOCYTES NFR BLD AUTO: 0.4 % (ref 0–0.5)
LYMPHOCYTES # BLD AUTO: 1.4 K/UL (ref 1–4.8)
LYMPHOCYTES NFR BLD: 18.2 % (ref 18–48)
MCH RBC QN AUTO: 30.7 PG (ref 27–31)
MCHC RBC AUTO-ENTMCNC: 32.7 G/DL (ref 32–36)
MCV RBC AUTO: 94 FL (ref 82–98)
MONOCYTES # BLD AUTO: 0.6 K/UL (ref 0.3–1)
MONOCYTES NFR BLD: 7.4 % (ref 4–15)
NEUTROPHILS # BLD AUTO: 5.5 K/UL (ref 1.8–7.7)
NEUTROPHILS NFR BLD: 70.4 % (ref 38–73)
NRBC BLD-RTO: 0 /100 WBC
PLATELET # BLD AUTO: 277 K/UL (ref 150–450)
PMV BLD AUTO: 9.8 FL (ref 9.2–12.9)
POTASSIUM SERPL-SCNC: 3.8 MMOL/L (ref 3.5–5.1)
PROT SERPL-MCNC: 7.4 G/DL (ref 6–8.4)
RBC # BLD AUTO: 4.89 M/UL (ref 4.6–6.2)
SODIUM SERPL-SCNC: 138 MMOL/L (ref 136–145)
TSH SERPL DL<=0.005 MIU/L-ACNC: 3.29 UIU/ML (ref 0.4–4)
URATE SERPL-MCNC: 8.2 MG/DL (ref 3.4–7)
WBC # BLD AUTO: 7.82 K/UL (ref 3.9–12.7)

## 2023-07-20 PROCEDURE — 84550 ASSAY OF BLOOD/URIC ACID: CPT | Performed by: INTERNAL MEDICINE

## 2023-07-20 PROCEDURE — 84443 ASSAY THYROID STIM HORMONE: CPT | Performed by: INTERNAL MEDICINE

## 2023-07-20 PROCEDURE — 3074F SYST BP LT 130 MM HG: CPT | Mod: CPTII,,, | Performed by: INTERNAL MEDICINE

## 2023-07-20 PROCEDURE — 3008F PR BODY MASS INDEX (BMI) DOCUMENTED: ICD-10-PCS | Mod: CPTII,,, | Performed by: INTERNAL MEDICINE

## 2023-07-20 PROCEDURE — 99999 PR PBB SHADOW E&M-EST. PATIENT-LVL II: CPT | Mod: PBBFAC,,, | Performed by: INTERNAL MEDICINE

## 2023-07-20 PROCEDURE — 3078F PR MOST RECENT DIASTOLIC BLOOD PRESSURE < 80 MM HG: ICD-10-PCS | Mod: CPTII,,, | Performed by: INTERNAL MEDICINE

## 2023-07-20 PROCEDURE — 85025 COMPLETE CBC W/AUTO DIFF WBC: CPT | Performed by: INTERNAL MEDICINE

## 2023-07-20 PROCEDURE — 99395 PREV VISIT EST AGE 18-39: CPT | Mod: S$PBB,,, | Performed by: INTERNAL MEDICINE

## 2023-07-20 PROCEDURE — 3044F HG A1C LEVEL LT 7.0%: CPT | Mod: CPTII,,, | Performed by: INTERNAL MEDICINE

## 2023-07-20 PROCEDURE — 3074F PR MOST RECENT SYSTOLIC BLOOD PRESSURE < 130 MM HG: ICD-10-PCS | Mod: CPTII,,, | Performed by: INTERNAL MEDICINE

## 2023-07-20 PROCEDURE — 3008F BODY MASS INDEX DOCD: CPT | Mod: CPTII,,, | Performed by: INTERNAL MEDICINE

## 2023-07-20 PROCEDURE — 99212 OFFICE O/P EST SF 10 MIN: CPT | Mod: PBBFAC | Performed by: INTERNAL MEDICINE

## 2023-07-20 PROCEDURE — 80053 COMPREHEN METABOLIC PANEL: CPT | Performed by: INTERNAL MEDICINE

## 2023-07-20 PROCEDURE — 3078F DIAST BP <80 MM HG: CPT | Mod: CPTII,,, | Performed by: INTERNAL MEDICINE

## 2023-07-20 PROCEDURE — 36415 COLL VENOUS BLD VENIPUNCTURE: CPT | Performed by: INTERNAL MEDICINE

## 2023-07-20 PROCEDURE — 83036 HEMOGLOBIN GLYCOSYLATED A1C: CPT | Performed by: INTERNAL MEDICINE

## 2023-07-20 PROCEDURE — 99395 PR PREVENTIVE VISIT,EST,18-39: ICD-10-PCS | Mod: S$PBB,,, | Performed by: INTERNAL MEDICINE

## 2023-07-20 PROCEDURE — 99999 PR PBB SHADOW E&M-EST. PATIENT-LVL II: ICD-10-PCS | Mod: PBBFAC,,, | Performed by: INTERNAL MEDICINE

## 2023-07-20 PROCEDURE — 3044F PR MOST RECENT HEMOGLOBIN A1C LEVEL <7.0%: ICD-10-PCS | Mod: CPTII,,, | Performed by: INTERNAL MEDICINE

## 2023-07-20 NOTE — PROGRESS NOTES
"Chief Complaint: Annual exam and Follow up of  Medical problems     HPI:This is a 33 year old man who presents with his mother for follow up of his medical problems.     All history is obtained from his mother     He is getting this Trulicity 1.5 mg injection once a week for his blood sugars.      He continues to have mid back pain because he lays in bed a lot. When he gets up, he is better.       He is bored at home.  He has been doing " same old, same old".   He is not exercising.  His niece, age 21,  is staying with them.  Niece will start bringing him to the park to walk.      He is wearing his CPAP machine when he is sleeping.  He saw sleep medicine.  He is wearing his cpap machine longer at night.     When he wears the CPAP machine, he is not sleepy while he is awake.      Mother gets off work at midnight. He us usally awake when she gets home.  Mother goes to bed at 3 am and Cipriano is still awake.  Mother wakes Cipriano up at 7:30-8 am to bring the dogs out.  He goes back to sleep around 10 am (CPAP machine is on).  He will sleep until the evening if his mother lets him sleep. Mother wakes Cipriano up at 3 pm. .     He gets 20 hours a month from services from the [facility]. His aunt does his services by taking him to different activities.   She just started him in the community due to COVID pandemic             He has gout. He should take allopurinol 300 mg daily.  No gouty flares.      His mother notes that Cipriano has not been taking his medication regularly - he still takes his medications 50% of the time. His mother fills a 2 week tray of his medications and she winds up filling his medication tray once a month. Mother reports that he has been better taking his medication.       He should take coreg 25 mg daily, metoprolol succinate 25 mg 1 tablet daily, hctz 25 mg once daily and levothyroxine 88 mcg daily.      HE is not taking vitamin D supplement. He should take vitamin D 50,000 units twice weekly.     He takes " levothyroxine 88 mcg daily.              Past Medical History:   Diagnosis Date    Atrial fibrillation      CHF (congestive heart failure)      Down syndrome      Encounter for blood transfusion      Gout      Hypothyroidism      S/P surgery for complex congenital heart disease 1/30/2017    Sleep apnea      Tetralogy of Fallot 05/1990    VT (ventricular tachycardia) 3/27/2019                      Past Surgical History:   Procedure Laterality Date    BUNIONECTOMY        CARDIAC SURGERY         open heart, ppm     INSERT / REPLACE / REMOVE PACEMAKER Left 10/2008    NONINVASIVE CARDIAC ELECTROPHYSIOLOGY STUDY N/A 3/27/2019     Procedure: CARDIAC ELECTROPHYSIOLOGY STUDY, NONINVASIVE;  Surgeon: Jose Ortiz MD;  Location: [facility] EP LAB;  Service: Cardiology;  Laterality: N/A;  VT, NIEPS, Venogram, MDT, MAC, 3 prep, Macicek    TONSILLECTOMY          Social History                   Socioeconomic History    Marital status: Single       Spouse name: Not on file    Number of children: Not on file    Years of education: Not on file    Highest education level: Not on file   Occupational History    Not on file   Tobacco Use    Smoking status: Never Smoker    Smokeless tobacco: Never Used   Substance and Sexual Activity    Alcohol use: No    Drug use: No    Sexual activity: Not Currently   Other Topics Concern    Not on file   Social History Narrative     Lives with mother, 1 dog (inside)      Social Determinants of Health            Financial Resource Strain:     Difficulty of Paying Living Expenses:    Food Insecurity:     Worried About Running Out of Food in the Last Year:     Ran Out of Food in the Last Year:    Transportation Needs:     Lack of Transportation (Medical):     Lack of Transportation (Non-Medical):    Physical Activity:     Days of Exercise per Week:     Minutes of Exercise per Session:    Stress:     Feeling of Stress :    Social Connections:     Frequency of Communication with Friends and Family:      "Frequency of Social Gatherings with Friends and Family:     Attends Sabianist Services:     Active Member of Clubs or Organizations:     Attends Club or Organization Meetings:     Marital Status:                    Family Status   Relation Name Status    Mother Luann Alive    Sister Barbara Alive    MGM       MGF       PGM       PGF               Meds and allergies: updated on Epic     REVIEW OF SYSTEMS: No fevers, chills,  hearing loss, sinus congestion, sore throat, chest pain, nausea, vomiting, constipation, diarrhea, dysuria, hematuria, polydipsia, polyuria, headaches, anxiety, depression     He is not wearing his eye glasses.      Physical exam:      /60   Pulse 76   Ht 5' 5" (1.651 m)   Wt (!) 136.5 kg (300 lb 14.9 oz)   SpO2 98%   BMI 50.08 kg/m²     General: alert, oriented x 3, no apparent distress.  Affect normal  HEENT: Conjunctivae: anicteric, PERRL, EOMI, TM clear, Oralpharynx clear  Neck: supple, no thyroid enlargement, no cervical lymphadenopathy  Resp: effort normal, lungs clear bilaterally  CV: Regular rate and rhythm without murmurs, gallops or rubs, no lower extremity edema,          Assessment/Plan:    Annual exam - discussed diet, exercise and safety issues.           Congenital heart disease with CHF, a fib and pacemaker - follow up with cardiology. Stressed compliance  with medications and diet.      Sleep apnea - discussed compliance with CPAP machine.     Hypothyroidism - TSH     GOut on allopurinol to 300 mg once daily     Morbid Obesity - work on diet and exercise     Down's Syndrome with mental handicapped    Encouraged more family support.      Vitamin D deficiency - get on prescription vitamin D 50,000 units twice a week      Diabetes on Trulicity to 1.5 mg sq once a week.  Labs          I will see him back in 4 months, sooner if problems arise.  "

## 2023-08-07 LAB
AV DELAY - LONGEST: 240 MSEC
BATTERY VOLTAGE (V): 2.99 V
ERI (V): 2.63 V
IMPEDANCE RA LEAD (NATIVE): 437 OHMS
IMPEDANCE RA LEAD: 399 OHMS
OHS CV DC PP MS1: 0.4 MS
OHS CV DC PP MS2: 0.4 MS
OHS CV DC PP V1: NORMAL V
OHS CV DC PP V2: NORMAL V
P/R-WAVE RA LEAD (NATIVE): 4.1 MV
P/R-WAVE RA LEAD: 2.1 MV
PV DELAY - LONGEST: 220 MSEC
THRESHOLD MS RA LEAD (NATIVE): 0.4 MS
THRESHOLD MS RA LEAD: 0.4 MS
THRESHOLD V RA LEAD (NATIVE): 1.12 V
THRESHOLD V RA LEAD: 0.5 V

## 2023-08-21 ENCOUNTER — HOSPITAL ENCOUNTER (EMERGENCY)
Facility: OTHER | Age: 33
Discharge: HOME | End: 2023-08-21
Attending: EMERGENCY MEDICINE
Payer: MEDICAID

## 2023-08-21 ENCOUNTER — TELEPHONE (OUTPATIENT)
Dept: INTERNAL MEDICINE | Facility: CLINIC | Age: 33
End: 2023-08-21
Payer: MEDICAID

## 2023-08-21 ENCOUNTER — TELEPHONE (OUTPATIENT)
Dept: CARDIOLOGY | Facility: CLINIC | Age: 33
End: 2023-08-21
Payer: MEDICAID

## 2023-08-21 VITALS
HEIGHT: 65 IN | OXYGEN SATURATION: 96 % | HEART RATE: 75 BPM | RESPIRATION RATE: 16 BRPM | BODY MASS INDEX: 49.98 KG/M2 | TEMPERATURE: 98 F | WEIGHT: 300 LBS | DIASTOLIC BLOOD PRESSURE: 66 MMHG | SYSTOLIC BLOOD PRESSURE: 106 MMHG

## 2023-08-21 DIAGNOSIS — Q24.9 ADULT CONGENITAL HEART DISEASE: ICD-10-CM

## 2023-08-21 DIAGNOSIS — I48.91 ATRIAL FIBRILLATION, UNSPECIFIED TYPE: ICD-10-CM

## 2023-08-21 DIAGNOSIS — Z87.74 S/P SURGERY FOR COMPLEX CONGENITAL HEART DISEASE: ICD-10-CM

## 2023-08-21 DIAGNOSIS — M79.89 LEG SWELLING: ICD-10-CM

## 2023-08-21 DIAGNOSIS — I11.9 RIGHT VENTRICULAR HYPERTENSION: Primary | ICD-10-CM

## 2023-08-21 DIAGNOSIS — R07.89 ATYPICAL CHEST PAIN: Primary | ICD-10-CM

## 2023-08-21 DIAGNOSIS — R06.02 SHORTNESS OF BREATH: ICD-10-CM

## 2023-08-21 LAB
ALBUMIN SERPL BCP-MCNC: 3.3 G/DL (ref 3.5–5.2)
ALP SERPL-CCNC: 115 U/L (ref 55–135)
ALT SERPL W/O P-5'-P-CCNC: 24 U/L (ref 10–44)
ANION GAP SERPL CALC-SCNC: 9 MMOL/L (ref 8–16)
AST SERPL-CCNC: 19 U/L (ref 10–40)
BASOPHILS # BLD AUTO: 0.07 K/UL (ref 0–0.2)
BASOPHILS NFR BLD: 0.9 % (ref 0–1.9)
BILIRUB SERPL-MCNC: 0.4 MG/DL (ref 0.1–1)
BNP SERPL-MCNC: 16 PG/ML (ref 0–99)
BUN SERPL-MCNC: 16 MG/DL (ref 6–20)
CALCIUM SERPL-MCNC: 9.4 MG/DL (ref 8.7–10.5)
CHLORIDE SERPL-SCNC: 104 MMOL/L (ref 95–110)
CO2 SERPL-SCNC: 28 MMOL/L (ref 23–29)
CREAT SERPL-MCNC: 1.2 MG/DL (ref 0.5–1.4)
CTP QC/QA: YES
DIFFERENTIAL METHOD: ABNORMAL
EOSINOPHIL # BLD AUTO: 0.2 K/UL (ref 0–0.5)
EOSINOPHIL NFR BLD: 2.2 % (ref 0–8)
ERYTHROCYTE [DISTWIDTH] IN BLOOD BY AUTOMATED COUNT: 15.9 % (ref 11.5–14.5)
EST. GFR  (NO RACE VARIABLE): >60 ML/MIN/1.73 M^2
GLUCOSE SERPL-MCNC: 105 MG/DL (ref 70–110)
HCT VFR BLD AUTO: 43 % (ref 40–54)
HGB BLD-MCNC: 13.8 G/DL (ref 14–18)
IMM GRANULOCYTES # BLD AUTO: 0.03 K/UL (ref 0–0.04)
IMM GRANULOCYTES NFR BLD AUTO: 0.4 % (ref 0–0.5)
LYMPHOCYTES # BLD AUTO: 1.2 K/UL (ref 1–4.8)
LYMPHOCYTES NFR BLD: 15.3 % (ref 18–48)
MCH RBC QN AUTO: 30.1 PG (ref 27–31)
MCHC RBC AUTO-ENTMCNC: 32.1 G/DL (ref 32–36)
MCV RBC AUTO: 94 FL (ref 82–98)
MONOCYTES # BLD AUTO: 0.5 K/UL (ref 0.3–1)
MONOCYTES NFR BLD: 6.3 % (ref 4–15)
NEUTROPHILS # BLD AUTO: 5.8 K/UL (ref 1.8–7.7)
NEUTROPHILS NFR BLD: 74.9 % (ref 38–73)
NRBC BLD-RTO: 0 /100 WBC
PLATELET # BLD AUTO: 257 K/UL (ref 150–450)
PMV BLD AUTO: 9.1 FL (ref 9.2–12.9)
POTASSIUM SERPL-SCNC: 4 MMOL/L (ref 3.5–5.1)
PROT SERPL-MCNC: 7.2 G/DL (ref 6–8.4)
RBC # BLD AUTO: 4.58 M/UL (ref 4.6–6.2)
SARS-COV-2 RDRP RESP QL NAA+PROBE: NEGATIVE
SODIUM SERPL-SCNC: 141 MMOL/L (ref 136–145)
TROPONIN I SERPL DL<=0.01 NG/ML-MCNC: <0.006 NG/ML (ref 0–0.03)
WBC # BLD AUTO: 7.79 K/UL (ref 3.9–12.7)

## 2023-08-21 PROCEDURE — 85025 COMPLETE CBC W/AUTO DIFF WBC: CPT | Performed by: PHYSICIAN ASSISTANT

## 2023-08-21 PROCEDURE — 99285 EMERGENCY DEPT VISIT HI MDM: CPT | Mod: 25

## 2023-08-21 PROCEDURE — 93010 ELECTROCARDIOGRAM REPORT: CPT | Mod: ,,, | Performed by: INTERNAL MEDICINE

## 2023-08-21 PROCEDURE — 83880 ASSAY OF NATRIURETIC PEPTIDE: CPT | Performed by: PHYSICIAN ASSISTANT

## 2023-08-21 PROCEDURE — 84484 ASSAY OF TROPONIN QUANT: CPT | Performed by: PHYSICIAN ASSISTANT

## 2023-08-21 PROCEDURE — 80053 COMPREHEN METABOLIC PANEL: CPT | Performed by: PHYSICIAN ASSISTANT

## 2023-08-21 PROCEDURE — 87635 SARS-COV-2 COVID-19 AMP PRB: CPT | Performed by: EMERGENCY MEDICINE

## 2023-08-21 PROCEDURE — 93010 EKG 12-LEAD: ICD-10-PCS | Mod: ,,, | Performed by: INTERNAL MEDICINE

## 2023-08-21 PROCEDURE — 93005 ELECTROCARDIOGRAM TRACING: CPT

## 2023-08-21 NOTE — FIRST PROVIDER EVALUATION
Emergency Department TeleTriage Encounter Note      CHIEF COMPLAINT    Chief Complaint   Patient presents with    Leg Swelling     Pt presents to the ER with complaints of swelling to the BLE with SOB for the past couple of days. Pt reporting some chest pain yesterday; none today. Pt is noncompliant wits medications.         VITAL SIGNS   Initial Vitals [08/21/23 1717]   BP Pulse Resp Temp SpO2   (!) 140/73 86 20 98 °F (36.7 °C) (!) 92 %      MAP       --            ALLERGIES    Review of patient's allergies indicates:   Allergen Reactions    Adhesive     Latex, natural rubber     Sulfa (sulfonamide antibiotics)        PROVIDER TRIAGE NOTE  Patient presents with bilateral lower extremity edema and shortness of breath. No chest pain.       ORDERS  Labs Reviewed - No data to display    ED Orders (720h ago, onward)      None              Virtual Visit Note: The provider triage portion of this emergency department evaluation and documentation was performed via Glassdoor, a HIPAA-compliant telemedicine application, in concert with a tele-presenter in the room. A face to face patient evaluation with one of my colleagues will occur once the patient is placed in an emergency department room.      DISCLAIMER: This note was prepared with [employer] voice recognition transcription software. Garbled syntax, mangled pronouns, and other bizarre constructions may be attributed to that software system.

## 2023-08-21 NOTE — TELEPHONE ENCOUNTER
Mom called to let PCP know that his legs and feet are swollen. She states she has spoke to Dr Valle and they will see him on Thursday. Mom said patient is having chest pains. Explained to mom if pt is having chest pains, he needs to go to ED for an urgent evaluation. Mom states she told to the cardiology office that he was experiencing chest pain and they scheduled him for an appt Thursday. Pt denies CP this morning, states he was having CP last night.

## 2023-08-21 NOTE — ED TRIAGE NOTES
Pt with hx of Down Syndrome BIB mother with c/o bilateral lower extremity swelling and SOB x 1 week.  Mother reports R foot swollen x 1 week, L foot swollen x a few days.  Mother states pt has extensive cardiac hx including CHF, open heart surgey, and pacemaker.  Mother states pt was c/o CP yesterday, but not today.  Mother denies pt had any recent cough or fever.  Pt able to answer some questions.  Respirations appear even and unlabored.  NAD noted.

## 2023-08-21 NOTE — TELEPHONE ENCOUNTER
Advised mom to proceed to the nearest ER. Appt scheduled for this Thursday 8/24 start time 1 PM. Mom verbalized understanding all information provided    ----- Message from Handy Rodgesr sent at 8/21/2023  7:44 AM CDT -----  Type:  Needs Medical Advice    Who Called: pt  Symptoms (please be specific): swollen feet and legs, chest pain  How long has patient had these symptoms:chest pain since 08/20  1 foot was swollen for an week, the other leg since 08/20  Pharmacy name and phone #:  [facility] #[id number] [place] [address] [facility]   Phone: [phone]  Would the patient rather a call back or a response via [facility]? call  Best Call Back Number: [phone]  Additional Information:

## 2023-08-22 NOTE — DISCHARGE INSTRUCTIONS

## 2023-08-22 NOTE — ED NOTES
Bed: 12  Expected date: 8/21/23  Expected time:   Means of arrival:   Comments:  Cleaning in progress

## 2023-08-22 NOTE — ED PROVIDER NOTES
Source of History:  Patient    Chief complaint:  Leg Swelling (Pt presents to the ER with complaints of swelling to the BLE with SOB for the past couple of days. Pt reporting some chest pain yesterday; none today. Pt is noncompliant wits medications./)      HPI:  Cipriano Thompson is a 33 y.o. male presenting with chest pain and leg swelling.  Patient has a history of Down syndrome, history is mainly obtained from patient's mother.  Mother states that 1 week ago, she began noticing swelling to patient's right lower extremity.  States that 2 days ago, his left lower extremity also began to swell.  States they contacted his doctor, and he is scheduled for an appointment with Cardiology on Thursday.  Reports that yesterday, patient complained of chest pain.  Due to this, Cardiology recommended ER evaluation.  Mother states that patient complained of chest pain on initial arrival to ER as well.  On my evaluation, patient does not recall having chest pain.  He denies any current chest pain.  States that his legs are hurting.  Denies any difficulty breathing, fevers, chills, nausea, vomiting, diarrhea.    This is the extent to the patients complaints today here in the emergency department.    PMH:  As per HPI and below:  Past Medical History:   Diagnosis Date    Atrial fibrillation     CHF (congestive heart failure)     Down syndrome     Encounter for blood transfusion     Gout     Hypothyroidism     S/P surgery for complex congenital heart disease 1/30/2017    Sleep apnea     Tetralogy of Fallot 05/1990    VT (ventricular tachycardia) 3/27/2019     Past Surgical History:   Procedure Laterality Date    BUNIONECTOMY      CARDIAC SURGERY      open heart x 3, ppm     INSERT / REPLACE / REMOVE PACEMAKER Left 10/2008    NONINVASIVE CARDIAC ELECTROPHYSIOLOGY STUDY N/A 3/27/2019    Procedure: CARDIAC ELECTROPHYSIOLOGY STUDY, NONINVASIVE;  Surgeon: Jose Ortiz MD;  Location: [facility] EP LAB;  Service: Cardiology;  Laterality: N/A;   "VT, NIEPS, Venogram, MDT, MAC, 3 prep, Macicek    TONSILLECTOMY         Social History     Tobacco Use    Smoking status: Never    Smokeless tobacco: Never   Substance Use Topics    Alcohol use: No    Drug use: No       Review of patient's allergies indicates:   Allergen Reactions    Adhesive     Latex, natural rubber     Sulfa (sulfonamide antibiotics)        ROS: As per HPI and below:  Constitutional: No fever.  No chills.  Cardiovascular:  Positive for chest pain yesterday.  Respiratory:  No shortness of breath.  GI: No abdominal pain.  No nausea or vomiting.  Genitourinary: No abnormal urination.  Neurologic: No headache. No focal weakness.  No numbness.  MSK: no back pain.  Positive for leg pain, swelling.  Integument: No rashes or lesions.    Physical Exam:    /66   Pulse 75   Temp 98 °F (36.7 °C) (Oral)   Resp 16   Ht 5' 5" (1.651 m)   Wt 136.1 kg (300 lb)   SpO2 96% Comment: pt ambulated on room air  BMI 49.92 kg/m²   Vitals:    08/21/23 1950 08/21/23 1952 08/21/23 2013 08/21/23 2021   BP:       Pulse:  75 78 75   Resp:  19 20 16   Temp:       TempSrc:       SpO2: 95%  95% 95%   Weight:       Height:        08/21/23 2126   BP:    Pulse:    Resp:    Temp:    TempSrc:    SpO2: 96%   Weight:    Height:        Nursing note and vital signs reviewed.  Constitutional:  Well-appearing male, sleeping in bed on evaluation.  Easily awakens.  No acute distress.  Well-appearing, non-toxic.  Eyes: No conjunctival injection.  Extraocular muscles are intact.  ENT: Oropharynx clear.   Cardiovascular:  Heart regular rate and rhythm. 3/6 systolic murmur present.  Chest/ Respiratory:  Clear to auscultation bilaterally without wheezing rhonchi or rales.   Musculoskeletal:  Swelling present to bilateral lower extremities, right greater than left. No overlying erythema, warmth, infectious signs.  2+ DP pulses present bilaterally.  Sensation and motor intact to lower extremities.  Good range of motion all joints.  No " deformities.  Neck supple.  No meningismus.  Skin: No rashes seen.  Good turgor.  No abrasions.  No ecchymoses.  Neuro: alert and oriented,  no focal neurological deficits.  Psych: Appropriate, conversant    Initial MDM:  33-year-old male with history of Down syndrome, tetralogy of Fallot, CHF, sleep apnea presenting for evaluation of chest pain and leg swelling.  Chest pain occurred once yesterday, and possibly another episode on ER arrival.  Patient denies any current chest pain.  Leg swelling began 1 week ago with right leg and left leg became swollen 2 days ago.  Patient with history of Down syndrome, therefore difficult to obtain history.  Patient afebrile and hemodynamically stable.  Initial EKG obtained, no significant changes from prior.  Atrial paced rhythm present with a rate of 79, occasional PVC.  Right bundle branch block present.  No STEMI.  Given right leg swelling greater than left, we will obtain ultrasound of the right leg to rule out DVT.  Patient with O2 sat of 92% on arrival.  Placed on 2 L O2 and satting greater than 95%.    Labs that have been ordered have been independently reviewed and interpreted by myself.    Labs Reviewed   CBC W/ AUTO DIFFERENTIAL - Abnormal; Notable for the following components:       Result Value    RBC 4.58 (*)     Hemoglobin 13.8 (*)     RDW 15.9 (*)     MPV 9.1 (*)     Gran % 74.9 (*)     Lymph % 15.3 (*)     All other components within normal limits   COMPREHENSIVE METABOLIC PANEL - Abnormal; Notable for the following components:    Albumin 3.3 (*)     All other components within normal limits   TROPONIN I   B-TYPE NATRIURETIC PEPTIDE   SARS-COV-2 RDRP GENE       US Lower Extremity Veins Right   Final Result      No evidence of right deep venous thrombosis.         Electronically signed by: Isabel Vasquez   Date:    08/21/2023   Time:    22:58      X-Ray Chest AP Portable   Final Result      1. Pulmonary findings suggest congestive change or edema.  No large focal  consolidation.         Electronically signed by: Kartik Rod MD   Date:    08/21/2023   Time:    18:08          Results for orders placed or performed during the hospital encounter of 01/19/23   EKG 12-lead    Collection Time: 01/19/23  9:46 AM    Narrative    Test Reason : Q21.3,Q24.9,I49.3,I47.20,Z95.0,I48.3,I48.91,    Vent. Rate : 076 BPM     Atrial Rate : 076 BPM     P-R Int : 152 ms          QRS Dur : 162 ms      QT Int : 398 ms       P-R-T Axes : 040 077 080 degrees     QTc Int : 447 ms    Atrial-paced rhythm  Right bundle branch block  Criteria for Right ventricular hypertrophy Now present  Abnormal ECG  When compared with ECG of 10-DEC-2020 12:11,    Confirmed by CED VILLATORO MD ([id number]) on 1/20/2023 9:41:19 AM    Referred By: KRISTINE MCGHEE           Confirmed By:CED VILLATORO MD       Differential Diagnosis:  Differential Diagnosis includes, but is not limited to:  ACS/MI, PE, aortic dissection, pneumothorax, cardiac tamponade, pericarditis/myocarditis, pneumonia, infection/abscess, lung mass, costochondritis/pleurisy, GERD, biliary disease, pancreatitis    MDM  Workup reassuring.  CBC notable for very mild anemia.  CMP within normal limits.  Troponin and BNP negative.  COVID negative.  Ultrasound of the right lower leg with no DVT.  Chest x-ray with reading of changes consistent with congestion or edema.  My attending and I evaluated chest x-ray, do not feel there is significant edema or congestion.  Patient has a history of sleep apnea and O2 sat was noted to be dropping when patient was asleep.  Patient was fully woken up and sat up in the bed, oxygen was removed.  At this point his O2 sat was 96%.  Nursing staff ambulated patient and his O2 sat remained at 96% with no supplemental oxygen.  Suspect low readings partially due to sleep apnea.  Patient denies any current symptoms and is ambulating with no hypoxia.  Given reassuring workup, low suspicion for acute MI, heart failure, other acute cause of  chest pain.  Recommend compression and elevation of the legs for treatment of lower extremity edema.  Patient has a follow up appointment scheduled with cardiologist in 3 days.  Recommend follow up with Cardiology, return for any new or worsening symptoms.  I discussed this case my attending, Dr. Foster, who was in agreement with plan.         Diagnostic Impression:    1. Atypical chest pain    2. Shortness of breath    3. Leg swelling         ED Disposition Condition    Discharge Stable            ED Prescriptions    None       Follow-up Information       Follow up With Specialties Details Why Contact Info    Philip Tyson MD Cardiovascular Disease, Cardiology, Interventional Cardiology Go to  your scheduled appointment [address]  [address]  [facility] [address]  [phone]      [facility] - Emergency Dept Emergency Medicine Go to  If symptoms worsen [address] [facility] [id number] [phone]             Mercedes Rouse PA-C  08/22/23 0252

## 2023-08-24 ENCOUNTER — HOSPITAL ENCOUNTER (OUTPATIENT)
Dept: CARDIOLOGY | Facility: CLINIC | Age: 33
Discharge: HOME | End: 2023-08-24
Payer: MEDICAID

## 2023-08-24 ENCOUNTER — OFFICE VISIT (OUTPATIENT)
Dept: CARDIOLOGY | Facility: CLINIC | Age: 33
End: 2023-08-24
Payer: MEDICAID

## 2023-08-24 VITALS
OXYGEN SATURATION: 89 % | HEIGHT: 65 IN | HEART RATE: 75 BPM | WEIGHT: 300.06 LBS | DIASTOLIC BLOOD PRESSURE: 54 MMHG | BODY MASS INDEX: 49.99 KG/M2 | SYSTOLIC BLOOD PRESSURE: 125 MMHG

## 2023-08-24 DIAGNOSIS — Q24.9 ADULT CONGENITAL HEART DISEASE: ICD-10-CM

## 2023-08-24 DIAGNOSIS — Q21.3 TETRALOGY OF FALLOT: Primary | ICD-10-CM

## 2023-08-24 DIAGNOSIS — Z95.2 PULMONARY VALVE REPLACED: ICD-10-CM

## 2023-08-24 DIAGNOSIS — Z87.74 S/P SURGERY FOR COMPLEX CONGENITAL HEART DISEASE: ICD-10-CM

## 2023-08-24 DIAGNOSIS — Q90.9 DOWN SYNDROME: ICD-10-CM

## 2023-08-24 DIAGNOSIS — I48.91 ATRIAL FIBRILLATION, UNSPECIFIED TYPE: ICD-10-CM

## 2023-08-24 DIAGNOSIS — I11.9 RIGHT VENTRICULAR HYPERTENSION: ICD-10-CM

## 2023-08-24 DIAGNOSIS — E66.01 MORBID OBESITY: ICD-10-CM

## 2023-08-24 PROCEDURE — 99214 PR OFFICE/OUTPT VISIT, EST, LEVL IV, 30-39 MIN: ICD-10-PCS | Mod: 25,S$PBB,, | Performed by: PEDIATRICS

## 2023-08-24 PROCEDURE — 3044F HG A1C LEVEL LT 7.0%: CPT | Mod: CPTII,,, | Performed by: PEDIATRICS

## 2023-08-24 PROCEDURE — 93010 ELECTROCARDIOGRAM REPORT: CPT | Mod: S$PBB,,, | Performed by: INTERNAL MEDICINE

## 2023-08-24 PROCEDURE — 3074F PR MOST RECENT SYSTOLIC BLOOD PRESSURE < 130 MM HG: ICD-10-PCS | Mod: CPTII,,, | Performed by: PEDIATRICS

## 2023-08-24 PROCEDURE — 99214 OFFICE O/P EST MOD 30 MIN: CPT | Mod: 25,S$PBB,, | Performed by: PEDIATRICS

## 2023-08-24 PROCEDURE — 99212 OFFICE O/P EST SF 10 MIN: CPT | Mod: PBBFAC | Performed by: PEDIATRICS

## 2023-08-24 PROCEDURE — 3078F PR MOST RECENT DIASTOLIC BLOOD PRESSURE < 80 MM HG: ICD-10-PCS | Mod: CPTII,,, | Performed by: PEDIATRICS

## 2023-08-24 PROCEDURE — 3044F PR MOST RECENT HEMOGLOBIN A1C LEVEL <7.0%: ICD-10-PCS | Mod: CPTII,,, | Performed by: PEDIATRICS

## 2023-08-24 PROCEDURE — 99999 PR PBB SHADOW E&M-EST. PATIENT-LVL II: ICD-10-PCS | Mod: PBBFAC,,, | Performed by: PEDIATRICS

## 2023-08-24 PROCEDURE — 93010 EKG 12-LEAD: ICD-10-PCS | Mod: S$PBB,,, | Performed by: INTERNAL MEDICINE

## 2023-08-24 PROCEDURE — 3008F PR BODY MASS INDEX (BMI) DOCUMENTED: ICD-10-PCS | Mod: CPTII,,, | Performed by: PEDIATRICS

## 2023-08-24 PROCEDURE — 3074F SYST BP LT 130 MM HG: CPT | Mod: CPTII,,, | Performed by: PEDIATRICS

## 2023-08-24 PROCEDURE — 3078F DIAST BP <80 MM HG: CPT | Mod: CPTII,,, | Performed by: PEDIATRICS

## 2023-08-24 PROCEDURE — 99999 PR PBB SHADOW E&M-EST. PATIENT-LVL II: CPT | Mod: PBBFAC,,, | Performed by: PEDIATRICS

## 2023-08-24 PROCEDURE — 93005 ELECTROCARDIOGRAM TRACING: CPT | Mod: PBBFAC | Performed by: INTERNAL MEDICINE

## 2023-08-24 PROCEDURE — 3008F BODY MASS INDEX DOCD: CPT | Mod: CPTII,,, | Performed by: PEDIATRICS

## 2023-08-24 RX ORDER — FUROSEMIDE 40 MG/1
40 TABLET ORAL DAILY
Qty: 30 TABLET | Refills: 11 | Status: SHIPPED | OUTPATIENT
Start: 2023-08-24 | End: 2024-08-23

## 2023-08-24 NOTE — PROGRESS NOTES
2023    re:Cipriano Thompson  :1990    [facility]     Polly Oneal MD  [address]  [place] [address]    Dear Dr. Oneal:    Cipriano Thompson is a 33 y.o. male seen in my [facility] clinic today for evaluation of congenital heart disease.  To summarize his diagnoses are as follow:  1.  Tetralogy of Fallot status post complete repair with a trans annular patch followed by pulmonary valve replacement with a 27 mm Freestyle Medtronic bioprosthetic valve in .  Both surgeries at [facility].   - now status post placement of a 27 mm [employer] bioprosthetic valve in a 30 mm Dacron conduit in the pulmonary position by Dr. Godfrey May 2017.   - mild pulmonary valve stenosis and no significant pulmonary insufficiency   - echocardiogram suggests a right ventricular systolic pressure around 60 mm of mercury, unchanged.  2.  Sinus node dysfunction status post dual chamber pacemaker  at [facility]  3.  History of atrial flutter status post flutter ablation by Dr. Milligan in   4.  Down syndrome  5.  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  6.  Medical noncompliance, improved  7.  Obesity  8.  LE edema of unclear etiology    To summarize, my recommendations are as follows:  1.  SBE prophylaxis is indicated before procedures.  2.  Stop metoprolol  3.  Follow up as planned with EP team  4.  CPAP for obstructive sleep apnea   5.  Close follow up with PCP  6.  Work on increasing activity level, better diet.    7.  start lasix 40mg daily  8.  At a minimum, follow up in about 2 months with no tests except labs.    Discussion:  I believe his lifestyle plays a significant role in his edema.  He is sedentary.  He has a very unhealthy diet.  His last echocardiogram was reassuring, his recent BNP in the emergency room was normal, and there is no evidence of arrhythmia.  I do not think that his symptoms are related to  cardiac dysfunction.  Likewise, there is no evidence of DVT.  I stopped his metoprolol.  He is on a small dose of this, and he is already on a significant dose of carvedilol.  I am trying to simplify his medical regimen.  We are going to try some Lasix.  I reinforced the need for a healthy low-salt diet.    History of present illness:  He comes to see me today secondary to edema.  This is been going on for a few weeks.  It has shifted from the right to the left leg, but both look a little swollen to mom.  He has had pain in the left foot.  He does get short of breath easily with exertion.  No syncope.  There may be some dizziness when walking up steps.  No chest pain.  No obvious palpitations.      He has a very unhealthy diet.  He eats a lot of junk food and fast food.  He has a very sedentary lifestyle.    Due to these symptoms, he went to the emergency room 3 days ago.  An EKG was unchanged.  Extensive blood work including a CMP and BNP were normal.  His BNP was 16.  Ultrasound of the right leg showed no evidence of DVT.    The review of systems is as noted above. It is otherwise negative for other symptoms related to the general, neurological, psychiatric, endocrine, gastrointestinal, genitourinary, respiratory, dermatologic, musculoskeletal, hematologic, and immunologic systems.    Past Medical History:   Diagnosis Date    Atrial fibrillation     CHF (congestive heart failure)     Down syndrome     Encounter for blood transfusion     Gout     Hypothyroidism     S/P surgery for complex congenital heart disease 1/30/2017    Sleep apnea     Tetralogy of Fallot 05/1990    VT (ventricular tachycardia) 3/27/2019     Past Surgical History:   Procedure Laterality Date    BUNIONECTOMY      CARDIAC SURGERY      open heart x 3, ppm     INSERT / REPLACE / REMOVE PACEMAKER Left 10/2008    NONINVASIVE CARDIAC ELECTROPHYSIOLOGY STUDY N/A 3/27/2019    Procedure: CARDIAC ELECTROPHYSIOLOGY STUDY, NONINVASIVE;  Surgeon: Jose PRESTON  "MD Diana;  Location: [facility] EP LAB;  Service: Cardiology;  Laterality: N/A;  VT, NIEPS, Venogram, MDT, MAC, 3 prepDiana    TONSILLECTOMY       Family History   Problem Relation Age of Onset    No Known Problems Mother     No Known Problems Sister      Social History     Socioeconomic History    Marital status: Single   Tobacco Use    Smoking status: Never    Smokeless tobacco: Never   Substance and Sexual Activity    Alcohol use: No    Drug use: No    Sexual activity: Not Currently   Social History Narrative    Lives with mother, 3 dog - Elfego, Rolando and .    He got a certificicate of completion of school in [employer].     He has never been in a day program.      Current Outpatient Medications on File Prior to Visit   Medication Sig Dispense Refill    allopurinoL (ZYLOPRIM) 300 MG tablet TAKE 1 TABLET(300 MG) BY MOUTH EVERY DAY 90 tablet 3    carvediloL (COREG) 25 MG tablet TAKE 1 TABLET(25 MG) BY MOUTH EVERY DAY 30 tablet 3    dulaglutide (TRULICITY) 1.5 mg/0.5 mL pen injector Inject 1.5 mg into the skin every 7 days. 4 pen 11    ergocalciferol (ERGOCALCIFEROL) 50,000 unit Cap Take 1 capsule (50,000 Units total) by mouth twice a week. 24 capsule 4    hydroCHLOROthiazide (HYDRODIURIL) 25 MG tablet TAKE 1 TABLET(25 MG) BY MOUTH EVERY DAY 90 tablet 3    levothyroxine (SYNTHROID) 88 MCG tablet TAKE 1 TABLET(88 MCG) BY MOUTH BEFORE BREAKFAST 90 tablet 3    metoprolol succinate (TOPROL-XL) 25 MG 24 hr tablet TAKE 1 TABLET(25 MG) BY MOUTH EVERY DAY 90 tablet 2     No current facility-administered medications on file prior to visit.     Review of patient's allergies indicates:   Allergen Reactions    Adhesive     Latex, natural rubber     Sulfa (sulfonamide antibiotics)         Vitals:    08/24/23 1413   BP: (!) 125/54   BP Location: Left arm   Patient Position: Sitting   Pulse: 75   SpO2: (!) 89%   Weight: (!) 136.1 kg (300 lb 0.7 oz)   Height: 5' 5" (1.651 m)     Of note, he weighed 122 kg March 2020.   In " general, he is morbidly obese.  Alert, no distress, sitting in a wheelchair.  He does have Down syndrome.  The eyes, nares, and oropharynx are clear.  Eyelids and conjunctiva are normal without drainage or erythema.  Pupils equal and round bilaterally.  The head is normocephalic and atraumatic.  The neck is supple without jugular venous distention or thyroid enlargement.  The lungs are clear to auscultation bilaterally.  He has a well-healed median sternotomy incision.  His pacemaker is in the left upper chest.  Heart sounds are somewhat distant.  Normal 1st heart sound, likely split 2nd heart sound.  2/6 systolic ejection murmur with no diastolic murmur.  The abdominal exam is benign without hepatosplenomegaly, tenderness, or distention.  Pulses are normal in all 4 extremities with brisk capillary refill and no clubbing, cyanosis.  About 1+ edema is noted up to his knees bilaterally.  He has very mild tenderness to the dorsum of his left foot, but no erythema or any evidence of infection.  No evidence of gout.    I personally reviewed the following tests performed today and my interpretation follows:  His EKG reveals sinus rhythm with a right bundle branch block.    Pacemaker was interrogated by the electrophysiology team.  It is working well.  Results for orders placed during the hospital encounter of 01/19/23  Echo Saline Bubble? No    IMPRESSION:  Qualitative impression of at least mild right atrial enlargement.  There is a pacing wire crossing the tricuspid valve to the right ventricle with mild-to-moderate associated insufficiency.  Qualitative impression of mild to moderate right ventricular dilation and mild hypertrophy with low normal systolic function.  Right ventricular pressure estimated 56 mmHg above right atrial pressure from reasonably well defined TR doppler profile.  Echodensity consistent with patch repair of ventricular septal defect and malalignment of the ventricular septum with no residual  shunt demonstrated.  Bioprosthetic pulmonary valve in good position with peak velocity < 2.9 m/sec, mean gradient <22 mm Hg and no significant insufficiency (minimally increased from previous study).  Pulmonary branches were not well demonstrated.  Qualitatively normal left ventricular size and structure.  Abnormal septal motion with good movement of the LV free wall, SF = 35% and EF estimated 60 -65% from apical views.  Normal velocity across aortic valve with trace aortic insufficiency.  Sinuses of Valsalva = 32 mm.    He had a cardiac catheterization April 4, 2017:  IMPRESSION:   1.  Down syndrome.  Repaired tetralogy of Fallot with subsequent 27 mm FreeStyle bioprosthetic pulmonary valve implantation.   2.  Moderate pulmonary valve stenosis (peak gradient 24 mmHg).  Moderate pulmonary insufficiency.   3.  Normal cardiac output and vascular resistance calculations.   4.  Pacemaker for sinus node dysfunction.   5.  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  Normal origin of left coronary.   6.  Left aortic arch.  Normal head and neck branching.   At that catheterization, his wedge pressure was 13.  Left ventricular end-diastolic pressure 10.  Pulmonary artery pressure mean 22-24.    Lab Results   Component Value Date    WBC 7.79 08/21/2023    HGB 13.8 (L) 08/21/2023    HCT 43.0 08/21/2023    MCV 94 08/21/2023     08/21/2023     CMP  Sodium   Date Value Ref Range Status   08/21/2023 141 136 - 145 mmol/L Final     Potassium   Date Value Ref Range Status   08/21/2023 4.0 3.5 - 5.1 mmol/L Final     Chloride   Date Value Ref Range Status   08/21/2023 104 95 - 110 mmol/L Final     CO2   Date Value Ref Range Status   08/21/2023 28 23 - 29 mmol/L Final     Glucose   Date Value Ref Range Status   08/21/2023 105 70 - 110 mg/dL Final     BUN   Date Value Ref Range Status   08/21/2023 16 6 - 20 mg/dL Final     Creatinine   Date Value Ref Range Status   08/21/2023 1.2 0.5 - 1.4 mg/dL Final     Calcium   Date  "Value Ref Range Status   08/21/2023 9.4 8.7 - 10.5 mg/dL Final     Total Protein   Date Value Ref Range Status   08/21/2023 7.2 6.0 - 8.4 g/dL Final     Albumin   Date Value Ref Range Status   08/21/2023 3.3 (L) 3.5 - 5.2 g/dL Final     Total Bilirubin   Date Value Ref Range Status   08/21/2023 0.4 0.1 - 1.0 mg/dL Final     Comment:     For infants and newborns, interpretation of results should be based  on gestational age, weight and in agreement with clinical  observations.    Premature Infant recommended reference ranges:  Up to 24 hours.............<8.0 mg/dL  Up to 48 hours............<12.0 mg/dL  3-5 days..................<15.0 mg/dL  6-29 days.................<15.0 mg/dL       Alkaline Phosphatase   Date Value Ref Range Status   08/21/2023 115 55 - 135 U/L Final     AST   Date Value Ref Range Status   08/21/2023 19 10 - 40 U/L Final     ALT   Date Value Ref Range Status   08/21/2023 24 10 - 44 U/L Final     Anion Gap   Date Value Ref Range Status   08/21/2023 9 8 - 16 mmol/L Final     eGFR   Date Value Ref Range Status   08/21/2023 >60 >60 mL/min/1.73 m^2 Final     Lab Results   Component Value Date    CHOL 178 01/27/2023    CHOL 175 10/12/2021    CHOL 178 03/08/2021     Lab Results   Component Value Date    HDL 30 (L) 01/27/2023    HDL 29 (L) 10/12/2021    HDL 29 (L) 03/08/2021     Lab Results   Component Value Date    LDLCALC 113.0 01/27/2023    LDLCALC 106.0 10/12/2021    LDLCALC 117.4 03/08/2021     No results found for: "DLDL"  Lab Results   Component Value Date    TRIG 175 (H) 01/27/2023    TRIG 200 (H) 10/12/2021    TRIG 158 (H) 03/08/2021       f1   Lab Results   Component Value Date    CHOLHDL 16.9 (L) 01/27/2023    CHOLHDL 16.6 (L) 10/12/2021    CHOLHDL 16.3 (L) 03/08/2021     TSH   Date Value Ref Range Status   07/20/2023 3.292 0.400 - 4.000 uIU/mL Final     Free T4   Date Value Ref Range Status   03/08/2021 0.82 0.71 - 1.51 ng/dL Final     BNP   Date Value Ref Range Status   08/21/2023 16 0 - 99 " pg/mL Final     Comment:     Values of less than 100 pg/ml are consistent with non-CHF populations.     Troponin I   Date Value Ref Range Status   08/21/2023 <0.006 0.000 - 0.026 ng/mL Final     Comment:     The reference interval for Troponin I represents the 99th percentile   cutoff   for our facility and is consistent with 3rd generation assay   performance.           Thank you for referring this patient to our clinic.  Please call with any questions.    Sincerely,        Bradley Valle MD  Pediatric Cardiology  Adult Congenital Heart Disease  Pediatric Heart Failure and Transplantation  [facility] [address] [place]  [address]  [phone]

## 2023-09-18 RX ORDER — HYDROCHLOROTHIAZIDE 25 MG/1
TABLET ORAL
Qty: 90 TABLET | Refills: 0 | Status: SHIPPED | OUTPATIENT
Start: 2023-09-18 | End: 2023-12-25

## 2023-09-18 NOTE — TELEPHONE ENCOUNTER
Refill Decision Note   Cipriano Thompson  is requesting a refill authorization.  Brief Assessment and Rationale for Refill:  Approve     Medication Therapy Plan:  ED documentation reviewed & no changes to therapy noted. FOVS 12/20/23      Extended chart review required: Yes   Comments:     Note composed:5:34 PM 09/18/2023

## 2023-09-18 NOTE — TELEPHONE ENCOUNTER
No care due was identified.  [facility] Embedded Care Due Messages. Reference number: [id number].   9/18/2023 5:40:37 AM CDT

## 2023-09-20 ENCOUNTER — PATIENT MESSAGE (OUTPATIENT)
Dept: CARDIOLOGY | Facility: CLINIC | Age: 33
End: 2023-09-20
Payer: MEDICAID

## 2023-10-20 ENCOUNTER — PATIENT MESSAGE (OUTPATIENT)
Dept: PEDIATRIC CARDIOLOGY | Facility: CLINIC | Age: 33
End: 2023-10-20
Payer: MEDICAID

## 2023-10-23 ENCOUNTER — HOSPITAL ENCOUNTER (OUTPATIENT)
Dept: PEDIATRIC CARDIOLOGY | Facility: HOSPITAL | Age: 33
Discharge: HOME | End: 2023-10-23
Attending: PEDIATRICS
Payer: MEDICAID

## 2023-10-23 DIAGNOSIS — Q21.3 TETRALOGY OF FALLOT: ICD-10-CM

## 2023-10-23 DIAGNOSIS — I11.9 RIGHT VENTRICULAR HYPERTENSION: ICD-10-CM

## 2023-10-23 DIAGNOSIS — I37.2 NONRHEUMATIC PULMONARY VALVE STENOSIS WITH INSUFFICIENCY: ICD-10-CM

## 2023-10-23 DIAGNOSIS — Z95.0 PACEMAKER: ICD-10-CM

## 2023-10-23 DIAGNOSIS — Q24.9 ADULT CONGENITAL HEART DISEASE: ICD-10-CM

## 2023-10-23 PROCEDURE — 93296 REM INTERROG EVL PM/IDS: CPT

## 2023-10-23 PROCEDURE — 93294 CV PACEMAKER REMOTE PEDIATRICS (CUPID ONLY): ICD-10-PCS | Mod: ,,, | Performed by: PEDIATRICS

## 2023-10-23 PROCEDURE — 93294 REM INTERROG EVL PM/LDLS PM: CPT | Mod: ,,, | Performed by: PEDIATRICS

## 2023-10-25 ENCOUNTER — OFFICE VISIT (OUTPATIENT)
Dept: CARDIOLOGY | Facility: CLINIC | Age: 33
End: 2023-10-25
Payer: MEDICAID

## 2023-10-25 VITALS
BODY MASS INDEX: 49.44 KG/M2 | WEIGHT: 296.75 LBS | HEIGHT: 65 IN | SYSTOLIC BLOOD PRESSURE: 132 MMHG | DIASTOLIC BLOOD PRESSURE: 71 MMHG | HEART RATE: 75 BPM | OXYGEN SATURATION: 92 %

## 2023-10-25 DIAGNOSIS — Z87.74 S/P SURGERY FOR COMPLEX CONGENITAL HEART DISEASE: ICD-10-CM

## 2023-10-25 DIAGNOSIS — Q21.3 TETRALOGY OF FALLOT: Primary | ICD-10-CM

## 2023-10-25 DIAGNOSIS — E66.01 MORBID OBESITY: ICD-10-CM

## 2023-10-25 DIAGNOSIS — I37.2 NONRHEUMATIC PULMONARY VALVE STENOSIS WITH INSUFFICIENCY: ICD-10-CM

## 2023-10-25 DIAGNOSIS — Z95.2 PULMONARY VALVE REPLACED: ICD-10-CM

## 2023-10-25 DIAGNOSIS — Q24.9 ADULT CONGENITAL HEART DISEASE: ICD-10-CM

## 2023-10-25 DIAGNOSIS — Q90.9 DOWN SYNDROME: ICD-10-CM

## 2023-10-25 PROCEDURE — 3078F DIAST BP <80 MM HG: CPT | Mod: CPTII,,, | Performed by: PEDIATRICS

## 2023-10-25 PROCEDURE — 99999 PR PBB SHADOW E&M-EST. PATIENT-LVL III: CPT | Mod: PBBFAC,,, | Performed by: PEDIATRICS

## 2023-10-25 PROCEDURE — 3075F SYST BP GE 130 - 139MM HG: CPT | Mod: CPTII,,, | Performed by: PEDIATRICS

## 2023-10-25 PROCEDURE — 99999 PR PBB SHADOW E&M-EST. PATIENT-LVL III: ICD-10-PCS | Mod: PBBFAC,,, | Performed by: PEDIATRICS

## 2023-10-25 PROCEDURE — 3075F PR MOST RECENT SYSTOLIC BLOOD PRESS GE 130-139MM HG: ICD-10-PCS | Mod: CPTII,,, | Performed by: PEDIATRICS

## 2023-10-25 PROCEDURE — 1159F PR MEDICATION LIST DOCUMENTED IN MEDICAL RECORD: ICD-10-PCS | Mod: CPTII,,, | Performed by: PEDIATRICS

## 2023-10-25 PROCEDURE — 3044F PR MOST RECENT HEMOGLOBIN A1C LEVEL <7.0%: ICD-10-PCS | Mod: CPTII,,, | Performed by: PEDIATRICS

## 2023-10-25 PROCEDURE — 99212 OFFICE O/P EST SF 10 MIN: CPT | Mod: S$PBB,,, | Performed by: PEDIATRICS

## 2023-10-25 PROCEDURE — 3008F PR BODY MASS INDEX (BMI) DOCUMENTED: ICD-10-PCS | Mod: CPTII,,, | Performed by: PEDIATRICS

## 2023-10-25 PROCEDURE — 99213 OFFICE O/P EST LOW 20 MIN: CPT | Mod: PBBFAC | Performed by: PEDIATRICS

## 2023-10-25 PROCEDURE — 99212 PR OFFICE/OUTPT VISIT, EST, LEVL II, 10-19 MIN: ICD-10-PCS | Mod: S$PBB,,, | Performed by: PEDIATRICS

## 2023-10-25 PROCEDURE — 3044F HG A1C LEVEL LT 7.0%: CPT | Mod: CPTII,,, | Performed by: PEDIATRICS

## 2023-10-25 PROCEDURE — 3008F BODY MASS INDEX DOCD: CPT | Mod: CPTII,,, | Performed by: PEDIATRICS

## 2023-10-25 PROCEDURE — 1159F MED LIST DOCD IN RCRD: CPT | Mod: CPTII,,, | Performed by: PEDIATRICS

## 2023-10-25 PROCEDURE — 3078F PR MOST RECENT DIASTOLIC BLOOD PRESSURE < 80 MM HG: ICD-10-PCS | Mod: CPTII,,, | Performed by: PEDIATRICS

## 2023-10-25 RX ORDER — METOPROLOL SUCCINATE 25 MG/1
25 TABLET, EXTENDED RELEASE ORAL
COMMUNITY
Start: 2023-09-22 | End: 2023-10-25

## 2023-10-25 NOTE — PROGRESS NOTES
10/25/2023    re:Cipriano Thompson  :1990    [facility]     Polly Oneal MD  [address]  [place] [address]    Dear Dr. Oneal:    Cipriano Thompson is a 33 y.o. male seen in my ACHD clinic today for evaluation of congenital heart disease.  To summarize his diagnoses are as follow:  1.  Tetralogy of Fallot status post complete repair with a trans annular patch followed by pulmonary valve replacement with a 27 mm Freestyle Medtronic bioprosthetic valve in .  Both surgeries at [facility].   - now status post placement of a 27 mm Saint Quinn bioprosthetic valve in a 30 mm Dacron conduit in the pulmonary position by Dr. Godfrey May 2017.   - mild pulmonary valve stenosis and no significant pulmonary insufficiency   - echocardiogram suggests a right ventricular systolic pressure around 60 mm of mercury, unchanged.  2.  Sinus node dysfunction status post dual chamber pacemaker  at [facility]  3.  History of atrial flutter status post flutter ablation by Dr. Milligan in   4.  Down syndrome  5.  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  6.  Medical noncompliance, improved  7.  Obesity  8.  LE edema of unclear etiology, resolved    To summarize, my recommendations are as follows:  1.  SBE prophylaxis is indicated before procedures.  2.  Stop metoprolol  3.  Follow up as planned with EP team  4.  CPAP for obstructive sleep apnea   5.  Close follow up with PCP  6.  Work on increasing activity level, better diet.    7.  stop lasix 40mg daily  8.  Follow up in 3 months with echo and ekg    Discussion:  His edema has resolved, and I suspect his lifestyle played a significant role in his edema.  He is sedentary.  He has a very unhealthy diet.  His last echocardiogram was reassuring, his recent BNP in the emergency room was normal, and there was no evidence of arrhythmia.  I do not think that his symptoms are  related to cardiac dysfunction.     History of present illness:  I saw him 2 months ago.  Due to lower extremity edema, I stopped his metoprolol and started daily lasix.  Mom did not stop the metoprolol.  He took the Lasix for several weeks, and his edema completely resolved.  They have not taken the Lasix for about 3 weeks.  No other complaints.  No fever.  No shortness of breath.    He has a very unhealthy diet.  He eats a lot of junk food and fast food.  He has a very sedentary lifestyle.    The review of systems is as noted above. It is otherwise negative for other symptoms related to the general, neurological, psychiatric, endocrine, gastrointestinal, genitourinary, respiratory, dermatologic, musculoskeletal, hematologic, and immunologic systems.    Past Medical History:   Diagnosis Date    Atrial fibrillation     CHF (congestive heart failure)     Down syndrome     Encounter for blood transfusion     Gout     Hypothyroidism     S/P surgery for complex congenital heart disease 1/30/2017    Sleep apnea     Tetralogy of Fallot 05/1990    VT (ventricular tachycardia) 3/27/2019     Past Surgical History:   Procedure Laterality Date    BUNIONECTOMY      CARDIAC SURGERY      open heart x 3, ppm     INSERT / REPLACE / REMOVE PACEMAKER Left 10/2008    NONINVASIVE CARDIAC ELECTROPHYSIOLOGY STUDY N/A 3/27/2019    Procedure: CARDIAC ELECTROPHYSIOLOGY STUDY, NONINVASIVE;  Surgeon: Jose Ortiz MD;  Location: [facility] EP LAB;  Service: Cardiology;  Laterality: N/A;  VT, NIEPS, Venogram, MDT, MAC, 3 prep, [place]    TONSILLECTOMY       Family History   Problem Relation Age of Onset    No Known Problems Mother     No Known Problems Sister      Social History     Socioeconomic History    Marital status: Single   Tobacco Use    Smoking status: Never    Smokeless tobacco: Never   Substance and Sexual Activity    Alcohol use: No    Drug use: No    Sexual activity: Not Currently   Social History Narrative    Lives with mother, 3  "dog - Elfego, Rolando and Jacinto.    He got a certificicate of completion of school in [employer].     He has never been in a day program.      Current Outpatient Medications on File Prior to Visit   Medication Sig Dispense Refill    allopurinoL (ZYLOPRIM) 300 MG tablet TAKE 1 TABLET(300 MG) BY MOUTH EVERY DAY 90 tablet 3    carvediloL (COREG) 25 MG tablet TAKE 1 TABLET(25 MG) BY MOUTH EVERY DAY 30 tablet 3    ergocalciferol (ERGOCALCIFEROL) 50,000 unit Cap Take 1 capsule (50,000 Units total) by mouth twice a week. 24 capsule 4    furosemide (LASIX) 40 MG tablet Take 1 tablet (40 mg total) by mouth once daily. 30 tablet 11    hydroCHLOROthiazide (HYDRODIURIL) 25 MG tablet TAKE 1 TABLET(25 MG) BY MOUTH EVERY DAY 90 tablet 0    levothyroxine (SYNTHROID) 88 MCG tablet TAKE 1 TABLET(88 MCG) BY MOUTH BEFORE BREAKFAST 90 tablet 3    metoprolol succinate (TOPROL-XL) 25 MG 24 hr tablet Take 25 mg by mouth.       No current facility-administered medications on file prior to visit.     Review of patient's allergies indicates:   Allergen Reactions    Adhesive     Latex, natural rubber     Sulfa (sulfonamide antibiotics)         Vitals:    10/25/23 1007   BP: 132/71   BP Location: Left arm   Patient Position: Sitting   Pulse: 75   SpO2: (!) 92%   Weight: 134.6 kg (296 lb 11.8 oz)   Height: 5' 5" (1.651 m)     Of note, he weighed 122 kg March 2020.   In general, he is morbidly obese.  Alert, no distress, sitting in a wheelchair.  He does have Down syndrome.  The eyes, nares, and oropharynx are clear.  Eyelids and conjunctiva are normal without drainage or erythema.  Pupils equal and round bilaterally.  The head is normocephalic and atraumatic.  The neck is supple without jugular venous distention or thyroid enlargement.  The lungs are clear to auscultation bilaterally.  He has a well-healed median sternotomy incision.  His pacemaker is in the left upper chest.  Heart sounds are somewhat distant.  Normal 1st heart sound, likely split 2nd " heart sound.  2/6 systolic ejection murmur with no diastolic murmur.  The abdominal exam is benign without hepatosplenomegaly, tenderness, or distention.  Pulses are normal in all 4 extremities with brisk capillary refill and no clubbing, cyanosis.  About 1+ edema is noted up to his knees bilaterally.  He has very mild tenderness to the dorsum of his left foot, but no erythema or any evidence of infection.  No evidence of gout.    I personally reviewed the following tests performed today and my interpretation follows:  No tests in clinic today.    Pacemaker was interrogated by the electrophysiology team.  It is working well.  Results for orders placed during the hospital encounter of 01/19/23  Echo Saline Bubble? No    IMPRESSION:  Qualitative impression of at least mild right atrial enlargement.  There is a pacing wire crossing the tricuspid valve to the right ventricle with mild-to-moderate associated insufficiency.  Qualitative impression of mild to moderate right ventricular dilation and mild hypertrophy with low normal systolic function.  Right ventricular pressure estimated 56 mmHg above right atrial pressure from reasonably well defined TR doppler profile.  Echodensity consistent with patch repair of ventricular septal defect and malalignment of the ventricular septum with no residual shunt demonstrated.  Bioprosthetic pulmonary valve in good position with peak velocity < 2.9 m/sec, mean gradient <22 mm Hg and no significant insufficiency (minimally increased from previous study).  Pulmonary branches were not well demonstrated.  Qualitatively normal left ventricular size and structure.  Abnormal septal motion with good movement of the LV free wall, SF = 35% and EF estimated 60 -65% from apical views.  Normal velocity across aortic valve with trace aortic insufficiency.  Sinuses of Valsalva = 32 mm.    He had a cardiac catheterization April 4, 2017:  IMPRESSION:   1.  Down syndrome.  Repaired tetralogy of  Fallot with subsequent 27 mm FreeStyle bioprosthetic pulmonary valve implantation.   2.  Moderate pulmonary valve stenosis (peak gradient 24 mmHg).  Moderate pulmonary insufficiency.   3.  Normal cardiac output and vascular resistance calculations.   4.  Pacemaker for sinus node dysfunction.   5.  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  Normal origin of left coronary.   6.  Left aortic arch.  Normal head and neck branching.   At that catheterization, his wedge pressure was 13.  Left ventricular end-diastolic pressure 10.  Pulmonary artery pressure mean 22-24.    Lab Results   Component Value Date    WBC 7.79 08/21/2023    HGB 13.8 (L) 08/21/2023    HCT 43.0 08/21/2023    MCV 94 08/21/2023     08/21/2023     CMP  Sodium   Date Value Ref Range Status   08/21/2023 141 136 - 145 mmol/L Final     Potassium   Date Value Ref Range Status   08/21/2023 4.0 3.5 - 5.1 mmol/L Final     Chloride   Date Value Ref Range Status   08/21/2023 104 95 - 110 mmol/L Final     CO2   Date Value Ref Range Status   08/21/2023 28 23 - 29 mmol/L Final     Glucose   Date Value Ref Range Status   08/21/2023 105 70 - 110 mg/dL Final     BUN   Date Value Ref Range Status   08/21/2023 16 6 - 20 mg/dL Final     Creatinine   Date Value Ref Range Status   08/21/2023 1.2 0.5 - 1.4 mg/dL Final     Calcium   Date Value Ref Range Status   08/21/2023 9.4 8.7 - 10.5 mg/dL Final     Total Protein   Date Value Ref Range Status   08/21/2023 7.2 6.0 - 8.4 g/dL Final     Albumin   Date Value Ref Range Status   08/21/2023 3.3 (L) 3.5 - 5.2 g/dL Final     Total Bilirubin   Date Value Ref Range Status   08/21/2023 0.4 0.1 - 1.0 mg/dL Final     Comment:     For infants and newborns, interpretation of results should be based  on gestational age, weight and in agreement with clinical  observations.    Premature Infant recommended reference ranges:  Up to 24 hours.............<8.0 mg/dL  Up to 48 hours............<12.0 mg/dL  3-5  "days..................<15.0 mg/dL  6-29 days.................<15.0 mg/dL       Alkaline Phosphatase   Date Value Ref Range Status   08/21/2023 115 55 - 135 U/L Final     AST   Date Value Ref Range Status   08/21/2023 19 10 - 40 U/L Final     ALT   Date Value Ref Range Status   08/21/2023 24 10 - 44 U/L Final     Anion Gap   Date Value Ref Range Status   08/21/2023 9 8 - 16 mmol/L Final     eGFR   Date Value Ref Range Status   08/21/2023 >60 >60 mL/min/1.73 m^2 Final     Lab Results   Component Value Date    CHOL 178 01/27/2023    CHOL 175 10/12/2021    CHOL 178 03/08/2021     Lab Results   Component Value Date    HDL 30 (L) 01/27/2023    HDL 29 (L) 10/12/2021    HDL 29 (L) 03/08/2021     Lab Results   Component Value Date    LDLCALC 113.0 01/27/2023    LDLCALC 106.0 10/12/2021    LDLCALC 117.4 03/08/2021     No results found for: "DLDL"  Lab Results   Component Value Date    TRIG 175 (H) 01/27/2023    TRIG 200 (H) 10/12/2021    TRIG 158 (H) 03/08/2021       f1   Lab Results   Component Value Date    CHOLHDL 16.9 (L) 01/27/2023    CHOLHDL 16.6 (L) 10/12/2021    CHOLHDL 16.3 (L) 03/08/2021     TSH   Date Value Ref Range Status   07/20/2023 3.292 0.400 - 4.000 uIU/mL Final     Free T4   Date Value Ref Range Status   03/08/2021 0.82 0.71 - 1.51 ng/dL Final     BNP   Date Value Ref Range Status   08/21/2023 16 0 - 99 pg/mL Final     Comment:     Values of less than 100 pg/ml are consistent with non-CHF populations.     Troponin I   Date Value Ref Range Status   08/21/2023 <0.006 0.000 - 0.026 ng/mL Final     Comment:     The reference interval for Troponin I represents the 99th percentile   cutoff   for our facility and is consistent with 3rd generation assay   performance.           Thank you for referring this patient to our clinic.  Please call with any questions.    Sincerely,        Bradley Valle MD  Pediatric Cardiology  Adult Congenital Heart Disease  Pediatric Heart Failure and Transplantation  [facility] [id number] [place]  [address]  [phone]

## 2023-10-28 ENCOUNTER — PATIENT MESSAGE (OUTPATIENT)
Dept: INTERNAL MEDICINE | Facility: CLINIC | Age: 33
End: 2023-10-28
Payer: MEDICAID

## 2023-10-28 RX ORDER — DULAGLUTIDE 1.5 MG/.5ML
INJECTION, SOLUTION SUBCUTANEOUS
Refills: 0 | OUTPATIENT
Start: 2023-10-28

## 2023-10-28 NOTE — TELEPHONE ENCOUNTER
No care due was identified.  [facility] Embedded Care Due Messages. Reference number: [id number].   10/28/2023 8:35:05 AM CDT

## 2023-10-28 NOTE — TELEPHONE ENCOUNTER
No care due was identified.  [facility] Embedded Care Due Messages. Reference number: [id number].   10/28/2023 1:51:10 AM CDT

## 2023-10-29 NOTE — TELEPHONE ENCOUNTER
Quick DC. Duplicate Request-  med pended in previous encounter, awaiting assessment    Refill Authorization Note   Cipriano Thompson  is requesting a refill authorization.  Brief Assessment and Rationale for Refill:  Quick Discontinue  Medication Therapy Plan:       Medication Reconciliation Completed:  No      Comments:     Note composed:7:42 PM 10/28/2023

## 2023-10-29 NOTE — TELEPHONE ENCOUNTER
Refill Routing Note   Medication(s) are not appropriate for processing by [facility] for the following reason(s):      Patient seen in ED/Hospital since LOV with PCP  No active prescription written by PCP    [facility] action(s):  Defer Care Due:  None identified          Appointments  past 12m or future 3m with PCP    Date Provider   Last Visit   7/20/2023 Polly Oneal MD   Next Visit   12/20/2023 Polly Oneal MD   ED visits in past 90 days: 1        Note composed:7:42 PM 10/28/2023

## 2023-10-30 RX ORDER — DULAGLUTIDE 1.5 MG/.5ML
1.5 INJECTION, SOLUTION SUBCUTANEOUS
Qty: 12 PEN | Refills: 3 | Status: SHIPPED | OUTPATIENT
Start: 2023-10-30 | End: 2023-12-20

## 2023-10-31 LAB
AV DELAY - LONGEST: 240 MSEC
BATTERY VOLTAGE (V): 2.99 V
ERI (V): 2.63 V
IMPEDANCE RA LEAD (NATIVE): 456 OHMS
IMPEDANCE RA LEAD: 418 OHMS
OHS CV DC PP MS1: 0.4 MS
OHS CV DC PP MS2: 0.4 MS
OHS CV DC PP V1: NORMAL V
OHS CV DC PP V2: NORMAL V
P/R-WAVE RA LEAD (NATIVE): 2.3 MV
P/R-WAVE RA LEAD: 3 MV
PV DELAY - LONGEST: 220 MSEC
THRESHOLD MS RA LEAD (NATIVE): 0.4 MS
THRESHOLD MS RA LEAD: 0.4 MS
THRESHOLD V RA LEAD (NATIVE): 1 V
THRESHOLD V RA LEAD: 0.5 V

## 2023-11-16 ENCOUNTER — PATIENT MESSAGE (OUTPATIENT)
Dept: CARDIOLOGY | Facility: CLINIC | Age: 33
End: 2023-11-16
Payer: MEDICAID

## 2023-11-16 DIAGNOSIS — Q21.3 TETRALOGY OF FALLOT: ICD-10-CM

## 2023-11-16 DIAGNOSIS — Q24.9 ADULT CONGENITAL HEART DISEASE: ICD-10-CM

## 2023-11-16 DIAGNOSIS — Z87.74 S/P SURGERY FOR COMPLEX CONGENITAL HEART DISEASE: ICD-10-CM

## 2023-11-16 DIAGNOSIS — Z95.0 PACEMAKER: Primary | ICD-10-CM

## 2023-11-22 DIAGNOSIS — E11.9 TYPE 2 DIABETES MELLITUS WITHOUT COMPLICATION, UNSPECIFIED WHETHER LONG TERM INSULIN USE: ICD-10-CM

## 2023-12-20 ENCOUNTER — OFFICE VISIT (OUTPATIENT)
Dept: INTERNAL MEDICINE | Facility: CLINIC | Age: 33
End: 2023-12-20
Payer: MEDICAID

## 2023-12-20 ENCOUNTER — LAB VISIT (OUTPATIENT)
Dept: LAB | Facility: HOSPITAL | Age: 33
End: 2023-12-20
Attending: INTERNAL MEDICINE
Payer: MEDICAID

## 2023-12-20 VITALS
OXYGEN SATURATION: 95 % | SYSTOLIC BLOOD PRESSURE: 100 MMHG | DIASTOLIC BLOOD PRESSURE: 60 MMHG | BODY MASS INDEX: 50.4 KG/M2 | HEIGHT: 65 IN | HEART RATE: 88 BPM | WEIGHT: 302.5 LBS

## 2023-12-20 DIAGNOSIS — E66.01 MORBID OBESITY: ICD-10-CM

## 2023-12-20 DIAGNOSIS — E03.9 HYPOTHYROIDISM, UNSPECIFIED TYPE: ICD-10-CM

## 2023-12-20 DIAGNOSIS — E13.9 DIABETES MELLITUS DUE TO ABNORMAL INSULIN: Primary | ICD-10-CM

## 2023-12-20 DIAGNOSIS — M10.9 GOUT, ARTHRITIS: ICD-10-CM

## 2023-12-20 DIAGNOSIS — E13.9 DIABETES MELLITUS DUE TO ABNORMAL INSULIN: ICD-10-CM

## 2023-12-20 DIAGNOSIS — G47.33 OSA (OBSTRUCTIVE SLEEP APNEA): ICD-10-CM

## 2023-12-20 DIAGNOSIS — E11.9 TYPE 2 DIABETES MELLITUS WITHOUT COMPLICATION, WITHOUT LONG-TERM CURRENT USE OF INSULIN: ICD-10-CM

## 2023-12-20 DIAGNOSIS — E55.9 VITAMIN D DEFICIENCY DISEASE: ICD-10-CM

## 2023-12-20 DIAGNOSIS — Q90.9 DOWN SYNDROME: ICD-10-CM

## 2023-12-20 LAB
25(OH)D3+25(OH)D2 SERPL-MCNC: 32 NG/ML (ref 30–96)
ALBUMIN SERPL BCP-MCNC: 3.3 G/DL (ref 3.5–5.2)
ALP SERPL-CCNC: 120 U/L (ref 55–135)
ALT SERPL W/O P-5'-P-CCNC: 21 U/L (ref 10–44)
ANION GAP SERPL CALC-SCNC: 11 MMOL/L (ref 8–16)
AST SERPL-CCNC: 17 U/L (ref 10–40)
BASOPHILS # BLD AUTO: 0.09 K/UL (ref 0–0.2)
BASOPHILS NFR BLD: 1.1 % (ref 0–1.9)
BILIRUB SERPL-MCNC: 0.6 MG/DL (ref 0.1–1)
BUN SERPL-MCNC: 16 MG/DL (ref 6–20)
CALCIUM SERPL-MCNC: 9.2 MG/DL (ref 8.7–10.5)
CHLORIDE SERPL-SCNC: 105 MMOL/L (ref 95–110)
CO2 SERPL-SCNC: 25 MMOL/L (ref 23–29)
CREAT SERPL-MCNC: 1.2 MG/DL (ref 0.5–1.4)
DIFFERENTIAL METHOD: ABNORMAL
EOSINOPHIL # BLD AUTO: 0.2 K/UL (ref 0–0.5)
EOSINOPHIL NFR BLD: 2.1 % (ref 0–8)
ERYTHROCYTE [DISTWIDTH] IN BLOOD BY AUTOMATED COUNT: 15.9 % (ref 11.5–14.5)
EST. GFR  (NO RACE VARIABLE): >60 ML/MIN/1.73 M^2
ESTIMATED AVG GLUCOSE: 126 MG/DL (ref 68–131)
GLUCOSE SERPL-MCNC: 113 MG/DL (ref 70–110)
HBA1C MFR BLD: 6 % (ref 4–5.6)
HCT VFR BLD AUTO: 43.4 % (ref 40–54)
HGB BLD-MCNC: 14.1 G/DL (ref 14–18)
IMM GRANULOCYTES # BLD AUTO: 0.02 K/UL (ref 0–0.04)
IMM GRANULOCYTES NFR BLD AUTO: 0.2 % (ref 0–0.5)
LYMPHOCYTES # BLD AUTO: 1.2 K/UL (ref 1–4.8)
LYMPHOCYTES NFR BLD: 14.2 % (ref 18–48)
MCH RBC QN AUTO: 30.4 PG (ref 27–31)
MCHC RBC AUTO-ENTMCNC: 32.5 G/DL (ref 32–36)
MCV RBC AUTO: 94 FL (ref 82–98)
MONOCYTES # BLD AUTO: 0.5 K/UL (ref 0.3–1)
MONOCYTES NFR BLD: 5.7 % (ref 4–15)
NEUTROPHILS # BLD AUTO: 6.3 K/UL (ref 1.8–7.7)
NEUTROPHILS NFR BLD: 76.7 % (ref 38–73)
NRBC BLD-RTO: 0 /100 WBC
PLATELET # BLD AUTO: 286 K/UL (ref 150–450)
PMV BLD AUTO: 9.2 FL (ref 9.2–12.9)
POTASSIUM SERPL-SCNC: 4.1 MMOL/L (ref 3.5–5.1)
PROT SERPL-MCNC: 7.1 G/DL (ref 6–8.4)
RBC # BLD AUTO: 4.64 M/UL (ref 4.6–6.2)
SODIUM SERPL-SCNC: 141 MMOL/L (ref 136–145)
TSH SERPL DL<=0.005 MIU/L-ACNC: 3.8 UIU/ML (ref 0.4–4)
WBC # BLD AUTO: 8.22 K/UL (ref 3.9–12.7)

## 2023-12-20 PROCEDURE — 3044F PR MOST RECENT HEMOGLOBIN A1C LEVEL <7.0%: ICD-10-PCS | Mod: CPTII,,, | Performed by: INTERNAL MEDICINE

## 2023-12-20 PROCEDURE — 99212 OFFICE O/P EST SF 10 MIN: CPT | Mod: PBBFAC | Performed by: INTERNAL MEDICINE

## 2023-12-20 PROCEDURE — 80053 COMPREHEN METABOLIC PANEL: CPT | Performed by: INTERNAL MEDICINE

## 2023-12-20 PROCEDURE — 84443 ASSAY THYROID STIM HORMONE: CPT | Performed by: INTERNAL MEDICINE

## 2023-12-20 PROCEDURE — 99999 PR PBB SHADOW E&M-EST. PATIENT-LVL II: ICD-10-PCS | Mod: PBBFAC,,, | Performed by: INTERNAL MEDICINE

## 2023-12-20 PROCEDURE — 3078F PR MOST RECENT DIASTOLIC BLOOD PRESSURE < 80 MM HG: ICD-10-PCS | Mod: CPTII,,, | Performed by: INTERNAL MEDICINE

## 2023-12-20 PROCEDURE — 3044F HG A1C LEVEL LT 7.0%: CPT | Mod: CPTII,,, | Performed by: INTERNAL MEDICINE

## 2023-12-20 PROCEDURE — 99214 PR OFFICE/OUTPT VISIT, EST, LEVL IV, 30-39 MIN: ICD-10-PCS | Mod: S$PBB,,, | Performed by: INTERNAL MEDICINE

## 2023-12-20 PROCEDURE — 85025 COMPLETE CBC W/AUTO DIFF WBC: CPT | Performed by: INTERNAL MEDICINE

## 2023-12-20 PROCEDURE — 3008F PR BODY MASS INDEX (BMI) DOCUMENTED: ICD-10-PCS | Mod: CPTII,,, | Performed by: INTERNAL MEDICINE

## 2023-12-20 PROCEDURE — 36415 COLL VENOUS BLD VENIPUNCTURE: CPT | Performed by: INTERNAL MEDICINE

## 2023-12-20 PROCEDURE — 3008F BODY MASS INDEX DOCD: CPT | Mod: CPTII,,, | Performed by: INTERNAL MEDICINE

## 2023-12-20 PROCEDURE — 3078F DIAST BP <80 MM HG: CPT | Mod: CPTII,,, | Performed by: INTERNAL MEDICINE

## 2023-12-20 PROCEDURE — 99999 PR PBB SHADOW E&M-EST. PATIENT-LVL II: CPT | Mod: PBBFAC,,, | Performed by: INTERNAL MEDICINE

## 2023-12-20 PROCEDURE — 82306 VITAMIN D 25 HYDROXY: CPT | Performed by: INTERNAL MEDICINE

## 2023-12-20 PROCEDURE — 83036 HEMOGLOBIN GLYCOSYLATED A1C: CPT | Performed by: INTERNAL MEDICINE

## 2023-12-20 PROCEDURE — 3074F SYST BP LT 130 MM HG: CPT | Mod: CPTII,,, | Performed by: INTERNAL MEDICINE

## 2023-12-20 PROCEDURE — 3074F PR MOST RECENT SYSTOLIC BLOOD PRESSURE < 130 MM HG: ICD-10-PCS | Mod: CPTII,,, | Performed by: INTERNAL MEDICINE

## 2023-12-20 PROCEDURE — 99214 OFFICE O/P EST MOD 30 MIN: CPT | Mod: S$PBB,,, | Performed by: INTERNAL MEDICINE

## 2023-12-20 RX ORDER — SEMAGLUTIDE 2.68 MG/ML
2 INJECTION, SOLUTION SUBCUTANEOUS
Qty: 3 ML | Refills: 11 | Status: SHIPPED | OUTPATIENT
Start: 2023-12-20 | End: 2024-12-19

## 2023-12-20 NOTE — PROGRESS NOTES
"Chief Complaint:  Follow up of  Medical problems     HPI:This is a 33 year old man who presents with his mother for follow up of his medical problems.     All history is obtained from his mother     He is getting this Trulicity 1.5 mg injection once a week for his blood sugars. He has gained 4 pounds.       He is bored at home.  He has been doing " same old, same old".   He is not exercising.  HE has dyspnea on exertion.      He is wearing his CPAP machine when he is sleeping.  He saw sleep medicine.  He is wearing his cpap machine longer at night.     When he wears the CPAP machine, he is not sleepy while he is awake.      Mother gets off work at midnight. He us usally awake when she gets home.  Mother goes to bed at 3 am and Cipriano is still awake.  Mother wakes Cipriano up at 7:30-8 am to bring the dogs out.  He goes back to sleep around 10 am (CPAP machine is on).  He will sleep until the evening if his mother lets him sleep. Mother wakes Cipriano up at 3 pm. .     He gets 20 hours a month from services from the [facility]. His aunt does his services by taking him to different activities.   She just started him in the community due to COVID pandemic             He has gout. He should take allopurinol 300 mg daily.  No gouty flares.      His mother notes that Cipriano has not been taking his medication regularly - he still takes his medications 50% of the time. His mother fills a 2 week tray of his medications and she winds up filling his medication tray once a month. Mother reports that he has been better taking his medication.       He should take coreg 25 mg daily, furosemide 40 mg once daily as needed.  and levothyroxine 88 mcg daily.      HE is taking vitamin D 50,000 units twice weekly.      He takes levothyroxine 88 mcg daily.              Past Medical History:   Diagnosis Date    Atrial fibrillation      CHF (congestive heart failure)      Down syndrome      Encounter for blood transfusion      Gout      Hypothyroidism   "    S/P surgery for complex congenital heart disease 1/30/2017    Sleep apnea      Tetralogy of Fallot 05/1990    VT (ventricular tachycardia) 3/27/2019                      Past Surgical History:   Procedure Laterality Date    BUNIONECTOMY        CARDIAC SURGERY         open heart, ppm     INSERT / REPLACE / REMOVE PACEMAKER Left 10/2008    NONINVASIVE CARDIAC ELECTROPHYSIOLOGY STUDY N/A 3/27/2019     Procedure: CARDIAC ELECTROPHYSIOLOGY STUDY, NONINVASIVE;  Surgeon: Jose Ortiz MD;  [facility] EP LAB;  Service: Cardiology;  Laterality: N/A;  VT, NIEPS, Venogram, MDT, MAC, 3 prep, [place]    TONSILLECTOMY          Social History                   Socioeconomic History    Marital status: Single       Spouse name: Not on file    Number of children: Not on file    Years of education: Not on file    Highest education level: Not on file   Occupational History    Not on file   Tobacco Use    Smoking status: Never Smoker    Smokeless tobacco: Never Used   Substance and Sexual Activity    Alcohol use: No    Drug use: No    Sexual activity: Not Currently   Other Topics Concern    Not on file   Social History Narrative     Lives with mother, 1 dog (inside)      Social Determinants of Health            Financial Resource Strain:     Difficulty of Paying Living Expenses:    Food Insecurity:     Worried About Running Out of Food in the Last Year:     Ran Out of Food in the Last Year:    Transportation Needs:     Lack of Transportation (Medical):     Lack of Transportation (Non-Medical):    Physical Activity:     Days of Exercise per Week:     Minutes of Exercise per Session:    Stress:     Feeling of Stress :    Social Connections:     Frequency of Communication with Friends and Family:     Frequency of Social Gatherings with Friends and Family:     Attends Bahai Services:     Active Member of Clubs or Organizations:     Attends Club or Organization Meetings:     Marital Status:                    Family Status    Relation Name Status    Mother Luann Alive    Sister Barbara Alive    MGM       MGF       PGM       PGF               Meds and allergies: updated on Epic     REVIEW OF SYSTEMS: No fevers, chills,  hearing loss, sinus congestion, sore throat, chest pain, nausea, vomiting, constipation, diarrhea, dysuria, hematuria, polydipsia, polyuria, headaches, anxiety, depression     He is not wearing his eye glasses.      Physical exam:         General: alert, oriented x 3, no apparent distress.  Affect normal  HEENT: Conjunctivae: anicteric, PERRL, EOMI, TM clear, Oralpharynx clear  Neck: supple, no thyroid enlargement, no cervical lymphadenopathy  Resp: effort normal, lungs clear bilaterally  CV: Regular rate and rhythm without murmurs, gallops or rubs, no lower extremity edema,          Assessment/Plan:       Congenital heart disease with CHF, a fib and pacemaker - follow up with cardiology. Stressed compliance  with medications and diet.      Sleep apnea - on CPAP machine.     Hypothyroidism - TSH     GOut on allopurinol to 300 mg once daily     Morbid Obesity - work on diet and exercise     Down's Syndrome with mental handicapped    Encouraged more family support.      Vitamin D deficiency - get on prescription vitamin D 50,000 units twice a week      Diabetes - change Trulicity to Ozempic 2 mg once weekly. Labs today     I will see him back in 4 months, sooner if problems arise.

## 2023-12-23 NOTE — TELEPHONE ENCOUNTER
No care due was identified.  [facility] Embedded Care Due Messages. Reference number: [id number].   12/23/2023 10:49:32 AM CST   [Midline] : trachea located in midline position [Normal] : no rashes [de-identified] : right eac normal; left ear completely occluded with plug of blue "slime"

## 2023-12-25 RX ORDER — ERGOCALCIFEROL 1.25 MG/1
50000 CAPSULE ORAL
Qty: 24 CAPSULE | Refills: 4 | Status: SHIPPED | OUTPATIENT
Start: 2023-12-25

## 2023-12-25 RX ORDER — HYDROCHLOROTHIAZIDE 25 MG/1
TABLET ORAL
Qty: 90 TABLET | Refills: 3 | Status: SHIPPED | OUTPATIENT
Start: 2023-12-25

## 2023-12-25 RX ORDER — CARVEDILOL 25 MG/1
TABLET ORAL
Qty: 90 TABLET | Refills: 3 | Status: SHIPPED | OUTPATIENT
Start: 2023-12-25

## 2023-12-25 NOTE — TELEPHONE ENCOUNTER
Refill Routing Note   Medication(s) are not appropriate for processing by [facility] for the following reason(s):        Outside of protocol    [facility] action(s):  Route  Approve             Appointments  past 12m or future 3m with PCP    Date Provider   Last Visit   12/20/2023 Polly Oneal MD   Next Visit   5/9/2024 Polly Oneal MD   ED visits in past 90 days: 0        Note composed:2:01 AM 12/25/2023

## 2024-01-16 ENCOUNTER — TELEPHONE (OUTPATIENT)
Dept: PEDIATRIC CARDIOLOGY | Facility: CLINIC | Age: 34
End: 2024-01-16
Payer: MEDICAID

## 2024-01-16 NOTE — TELEPHONE ENCOUNTER
----- Message from Ambar Todd sent at 1/16/2024 12:50 PM CST -----  Type:  Sooner Apoointment Request    Caller is requesting a sooner appointment.  Caller declined first available appointment listed below.  Caller will not accept being placed on the waitlist and is requesting a message be sent to doctor.  Name of Caller: Pt  When is the first available appointment?  Symptoms: Reschedule  Would the patient rather a call back or a response via [facility]? Call  Best Call Back Number:  [phone]  Additional Information: Pt mother would like to have appt schedule appt between 11a.m. and 1p.m.

## 2024-01-16 NOTE — TELEPHONE ENCOUNTER
Spoke with mother. Discussed available rescheduling options. Mother accepted Thursday 3/28 starting at 9:45. Advised that new appointments are spread out and they will be in facility longer that day. Mother voiced understanding.

## 2024-01-19 ENCOUNTER — PATIENT MESSAGE (OUTPATIENT)
Dept: PEDIATRIC CARDIOLOGY | Facility: CLINIC | Age: 34
End: 2024-01-19
Payer: MEDICAID

## 2024-01-22 ENCOUNTER — HOSPITAL ENCOUNTER (OUTPATIENT)
Dept: PEDIATRIC CARDIOLOGY | Facility: HOSPITAL | Age: 34
Discharge: HOME | End: 2024-01-22
Attending: PEDIATRICS
Payer: MEDICAID

## 2024-01-22 DIAGNOSIS — I37.2 NONRHEUMATIC PULMONARY VALVE STENOSIS WITH INSUFFICIENCY: ICD-10-CM

## 2024-01-22 DIAGNOSIS — Q21.3 TETRALOGY OF FALLOT: ICD-10-CM

## 2024-01-22 DIAGNOSIS — Q24.9 ADULT CONGENITAL HEART DISEASE: ICD-10-CM

## 2024-01-22 DIAGNOSIS — Z95.0 PACEMAKER: ICD-10-CM

## 2024-01-22 DIAGNOSIS — I11.9 RIGHT VENTRICULAR HYPERTENSION: ICD-10-CM

## 2024-01-22 PROCEDURE — 93294 REM INTERROG EVL PM/LDLS PM: CPT | Mod: ,,, | Performed by: PEDIATRICS

## 2024-01-22 PROCEDURE — 93296 REM INTERROG EVL PM/IDS: CPT

## 2024-01-24 DIAGNOSIS — I37.2 NONRHEUMATIC PULMONARY VALVE STENOSIS WITH INSUFFICIENCY: Primary | ICD-10-CM

## 2024-01-24 DIAGNOSIS — Q21.3 TETRALOGY OF FALLOT: ICD-10-CM

## 2024-01-24 DIAGNOSIS — I48.91 ATRIAL FIBRILLATION, UNSPECIFIED TYPE: ICD-10-CM

## 2024-01-24 DIAGNOSIS — I47.20 VT (VENTRICULAR TACHYCARDIA): ICD-10-CM

## 2024-01-24 DIAGNOSIS — I48.3 TYPICAL ATRIAL FLUTTER: ICD-10-CM

## 2024-01-24 DIAGNOSIS — Q24.9 ADULT CONGENITAL HEART DISEASE: ICD-10-CM

## 2024-01-24 DIAGNOSIS — Z95.0 PACEMAKER: ICD-10-CM

## 2024-01-24 LAB
AV DELAY - LONGEST: 240 MSEC
BATTERY VOLTAGE (V): 2.99 V
ERI (V): 2.63 V
IMPEDANCE RA LEAD (NATIVE): 456 OHMS
IMPEDANCE RA LEAD: 418 OHMS
OHS CV DC PP MS1: 0.4 MS
OHS CV DC PP MS2: 0.4 MS
OHS CV DC PP V1: NORMAL V
OHS CV DC PP V2: NORMAL V
P/R-WAVE RA LEAD (NATIVE): 2.5 MV
P/R-WAVE RA LEAD: 3.3 MV
PV DELAY - LONGEST: 220 MSEC
THRESHOLD MS RA LEAD (NATIVE): 0.4 MS
THRESHOLD MS RA LEAD: 0.4 MS
THRESHOLD V RA LEAD (NATIVE): 1.12 V
THRESHOLD V RA LEAD: 0.5 V

## 2024-02-14 DIAGNOSIS — E11.9 TYPE 2 DIABETES MELLITUS WITHOUT COMPLICATION: ICD-10-CM

## 2024-02-21 DIAGNOSIS — E11.9 TYPE 2 DIABETES MELLITUS WITHOUT COMPLICATION: ICD-10-CM

## 2024-02-29 ENCOUNTER — TELEPHONE (OUTPATIENT)
Dept: INTERNAL MEDICINE | Facility: CLINIC | Age: 34
End: 2024-02-29
Payer: MEDICAID

## 2024-02-29 NOTE — TELEPHONE ENCOUNTER
Called and left a messGE FOR THE PT'S MOM TO CALL BACK   JENSEN MARCELINO ALREADY IN mAY DUE TO FOLLOW UP HUNTER DR CELESTIN

## 2024-02-29 NOTE — TELEPHONE ENCOUNTER
Called patient spoke to his mother to check to see if had his DM eye exam this year or last she states he has not. Looking at last eye exam 10.26.21 notes say he may need a pediatric sedated exam, will send request to make appt with the Ophthalmology dept, unable to do eye camera.  Mother request early appt with Dr. Oneal for Cipriano due to tiredness and thinks too much air is coming out of the CPAP she can hear from across the room. Will send msg to MA to request earlier appt.   Alanna Stock RN HC

## 2024-03-24 NOTE — TELEPHONE ENCOUNTER
Care Due:                  Date            Visit Type   Department     Provider  --------------------------------------------------------------------------------                                EP -                              PRIMARY      [facility] INTERNAL  Last Visit: 12-      CARE ([facility])   MEDICINE       SOCORRO ROSAS                              EP -                              PRIMARY      [facility] INTERNAL  Next Visit: 05-      CARE ([facility])   MEDICINE       SOCORRO ROSAS                                                            Last  Test          Frequency    Reason                     Performed    Due Date  --------------------------------------------------------------------------------    HBA1C.......  6 months...  semaglutide..............  12- 06-    [facility] Embedded Care Due Messages. Reference number: [id number].   3/24/2024 5:47:06 AM CDT

## 2024-03-25 RX ORDER — LEVOTHYROXINE SODIUM 88 UG/1
TABLET ORAL
Qty: 90 TABLET | Refills: 2 | Status: SHIPPED | OUTPATIENT
Start: 2024-03-25

## 2024-03-25 NOTE — TELEPHONE ENCOUNTER
Provider Staff:  Action required for this patient    Requires labs      Please see care gap opportunities below in Care Due Message.    Thanks!  [facility]     Appointments      Date Provider   Last Visit   12/20/2023 Polly Oneal MD   Next Visit   5/9/2024 Polly Oneal MD     Refill Decision Note   Cipriano Thompson  is requesting a refill authorization.    Brief Assessment and Rationale for Refill:   Approve       Medication Therapy Plan:          Comments:     Note composed:3:25 PM 03/25/2024

## 2024-03-28 ENCOUNTER — TELEPHONE (OUTPATIENT)
Dept: PEDIATRIC CARDIOLOGY | Facility: CLINIC | Age: 34
End: 2024-03-28
Payer: MEDICAID

## 2024-03-28 DIAGNOSIS — Z87.74 S/P SURGERY FOR COMPLEX CONGENITAL HEART DISEASE: Primary | ICD-10-CM

## 2024-03-28 DIAGNOSIS — I37.2 NONRHEUMATIC PULMONARY VALVE STENOSIS WITH INSUFFICIENCY: ICD-10-CM

## 2024-03-28 DIAGNOSIS — Q21.3 TETRALOGY OF FALLOT: ICD-10-CM

## 2024-03-28 DIAGNOSIS — E11.9 TYPE 2 DIABETES MELLITUS WITHOUT COMPLICATION, WITHOUT LONG-TERM CURRENT USE OF INSULIN: ICD-10-CM

## 2024-03-28 DIAGNOSIS — I48.91 ATRIAL FIBRILLATION, UNSPECIFIED TYPE: ICD-10-CM

## 2024-03-28 DIAGNOSIS — I48.3 TYPICAL ATRIAL FLUTTER: ICD-10-CM

## 2024-03-28 NOTE — TELEPHONE ENCOUNTER
Rescheduled missed appointments to May 22 at 11:00 AM for echo and EKG and May 23 at 8:00 AM for visits with Dr. Prabhakar and Dr. Valle and device check.

## 2024-03-28 NOTE — TELEPHONE ENCOUNTER
----- Message from Lenore Mancera sent at 3/28/2024  1:36 PM CDT -----  Regarding: Appt  Contact: [phone]  Patient mom is calling to schedule an appointment missed today. Please contact pt

## 2024-04-19 ENCOUNTER — PATIENT MESSAGE (OUTPATIENT)
Dept: PEDIATRIC CARDIOLOGY | Facility: CLINIC | Age: 34
End: 2024-04-19
Payer: MEDICAID

## 2024-04-22 ENCOUNTER — HOSPITAL ENCOUNTER (OUTPATIENT)
Dept: PEDIATRIC CARDIOLOGY | Facility: HOSPITAL | Age: 34
Discharge: HOME | End: 2024-04-22
Attending: PEDIATRICS
Payer: MEDICAID

## 2024-04-22 DIAGNOSIS — Q21.3 TETRALOGY OF FALLOT: ICD-10-CM

## 2024-04-22 DIAGNOSIS — I48.3 TYPICAL ATRIAL FLUTTER: ICD-10-CM

## 2024-04-22 DIAGNOSIS — I48.91 ATRIAL FIBRILLATION, UNSPECIFIED TYPE: ICD-10-CM

## 2024-04-22 DIAGNOSIS — I37.2 NONRHEUMATIC PULMONARY VALVE STENOSIS WITH INSUFFICIENCY: ICD-10-CM

## 2024-04-22 DIAGNOSIS — Z95.0 PACEMAKER: ICD-10-CM

## 2024-04-22 DIAGNOSIS — I47.20 VT (VENTRICULAR TACHYCARDIA): ICD-10-CM

## 2024-04-22 DIAGNOSIS — Q24.9 ADULT CONGENITAL HEART DISEASE: ICD-10-CM

## 2024-04-22 PROCEDURE — 93294 REM INTERROG EVL PM/LDLS PM: CPT | Mod: ,,, | Performed by: PEDIATRICS

## 2024-04-22 PROCEDURE — 93296 REM INTERROG EVL PM/IDS: CPT

## 2024-04-29 LAB
AV DELAY - LONGEST: 240 MSEC
BATTERY VOLTAGE (V): 2.98 V
ERI (V): 2.63 V
IMPEDANCE RA LEAD (NATIVE): 456 OHMS
IMPEDANCE RA LEAD: 418 OHMS
OHS CV DC PP MS1: 0.4 MS
OHS CV DC PP MS2: 0.4 MS
OHS CV DC PP V1: NORMAL V
OHS CV DC PP V2: NORMAL V
P/R-WAVE RA LEAD (NATIVE): 2.6 MV
P/R-WAVE RA LEAD: 2.6 MV
PV DELAY - LONGEST: 220 MSEC
THRESHOLD MS RA LEAD (NATIVE): 0.4 MS
THRESHOLD MS RA LEAD: 0.4 MS
THRESHOLD V RA LEAD (NATIVE): 1 V
THRESHOLD V RA LEAD: 0.5 V

## 2024-05-03 ENCOUNTER — OFFICE VISIT (OUTPATIENT)
Dept: OPHTHALMOLOGY | Facility: CLINIC | Age: 34
End: 2024-05-03
Payer: MEDICAID

## 2024-05-03 DIAGNOSIS — H26.8 OTHER CATARACT OF RIGHT EYE: Primary | ICD-10-CM

## 2024-05-03 DIAGNOSIS — H52.12 MYOPIA OF LEFT EYE: ICD-10-CM

## 2024-05-03 DIAGNOSIS — E11.9 TYPE 2 DIABETES MELLITUS WITHOUT RETINOPATHY: ICD-10-CM

## 2024-05-03 DIAGNOSIS — H50.10 EXOTROPIA: ICD-10-CM

## 2024-05-03 PROBLEM — H26.9 CATARACT OF RIGHT EYE: Status: ACTIVE | Noted: 2024-05-03

## 2024-05-03 PROCEDURE — 1159F MED LIST DOCD IN RCRD: CPT | Mod: CPTII,,, | Performed by: STUDENT IN AN ORGANIZED HEALTH CARE EDUCATION/TRAINING PROGRAM

## 2024-05-03 PROCEDURE — 99213 OFFICE O/P EST LOW 20 MIN: CPT | Mod: PBBFAC,25 | Performed by: STUDENT IN AN ORGANIZED HEALTH CARE EDUCATION/TRAINING PROGRAM

## 2024-05-03 PROCEDURE — 99999 PR PBB SHADOW E&M-EST. PATIENT-LVL III: CPT | Mod: PBBFAC,,, | Performed by: STUDENT IN AN ORGANIZED HEALTH CARE EDUCATION/TRAINING PROGRAM

## 2024-05-03 PROCEDURE — 92060 SENSORIMOTOR EXAMINATION: CPT | Mod: PBBFAC | Performed by: STUDENT IN AN ORGANIZED HEALTH CARE EDUCATION/TRAINING PROGRAM

## 2024-05-03 PROCEDURE — 92060 SENSORIMOTOR EXAMINATION: CPT | Mod: 26,S$PBB,, | Performed by: STUDENT IN AN ORGANIZED HEALTH CARE EDUCATION/TRAINING PROGRAM

## 2024-05-03 PROCEDURE — 1160F RVW MEDS BY RX/DR IN RCRD: CPT | Mod: CPTII,,, | Performed by: STUDENT IN AN ORGANIZED HEALTH CARE EDUCATION/TRAINING PROGRAM

## 2024-05-03 PROCEDURE — 92004 COMPRE OPH EXAM NEW PT 1/>: CPT | Mod: S$PBB,,, | Performed by: STUDENT IN AN ORGANIZED HEALTH CARE EDUCATION/TRAINING PROGRAM

## 2024-05-03 NOTE — PROGRESS NOTES
HPI    Pt is brought here today by his mother for a Diabetic Eye Exam.  Cipriano reports he has not been wearing his glasses so he is having trouble   seeing, mostly far away. Patient was last seen for an eye exam on 10/26/21   with Dr. Brandon, OD. At that visit, patient was diagnosed with a posterior   subcapsular cataract of the right eye and myopia of the left eye. It was   unclear how long the cataract had been there and observation was   recommended.   Since that time, Cipriano refuses to wear his glasses. Mom says they have   also noticed his pupil become white over the past few months. Cipriano   reports no pain in the eye.    No Current Eye Meds.  Last edited by Chriss Jacobs MD on 5/3/2024  3:13 PM.        ROS    Positive for: Eyes  Negative for: Constitutional, Gastrointestinal, Neurological, Skin,   Genitourinary, Musculoskeletal, HENT, Endocrine, Cardiovascular,   Respiratory, Psychiatric, Allergic/Imm, Heme/Lymph  Last edited by Chriss Jacobs MD on 5/3/2024  3:13 PM.        Assessment /Plan     For exam results, see Encounter Report.    Other cataract of right eye    Exotropia    Myopia of left eye    Type 2 diabetes mellitus without retinopathy        Problem List Items Addressed This Visit          Ophtho    Cataract of right eye - Primary    Current Assessment & Plan     5/3/24: white cataract OD   No hx of trauma ; of note, exam in 2021 with dense PSC OD of unclear duration    Plan:   Given patient noncompliance with glasses - may need to consider cataract extraction of right eye, though I discussed with Mom extensively on unclear vision potential of right eye. Will need B scan   Will need general anesthesia given developmental level  Refer to comprehensive for CEIOL evaluation         Exotropia    Current Assessment & Plan     Sensory, secondary to poor VA from cataract         Myopia of left eye    Current Assessment & Plan     High myopia OS    Will give glasses for full time wear             Endocrine    Type 2 diabetes mellitus without retinopathy    Current Assessment & Plan     5/3/24: no view to retina OD secondary to cataract  OS with no retinopathy   A1c recently well controlled    Plan:  No signs of retinopathy  Continue blood glucose control with PCP            Chriss Jacobs MD  Pediatric Ophthalmology and Adult Strabismus  [facility]

## 2024-05-03 NOTE — ASSESSMENT & PLAN NOTE
5/3/24: white cataract OD   No hx of trauma ; of note, exam in 2021 with dense PSC OD of unclear duration    Plan:   Given patient noncompliance with glasses - may need to consider cataract extraction of right eye, though I discussed with Mom extensively on unclear vision potential of right eye. Will need B scan   Will need general anesthesia given developmental level  Refer to comprehensive for CEIOL evaluation

## 2024-05-03 NOTE — ASSESSMENT & PLAN NOTE
5/3/24: no view to retina OD secondary to cataract  OS with no retinopathy   A1c recently well controlled    Plan:  No signs of retinopathy  Continue blood glucose control with PCP

## 2024-05-09 ENCOUNTER — LAB VISIT (OUTPATIENT)
Dept: LAB | Facility: HOSPITAL | Age: 34
End: 2024-05-09
Attending: INTERNAL MEDICINE
Payer: MEDICAID

## 2024-05-09 ENCOUNTER — OFFICE VISIT (OUTPATIENT)
Dept: INTERNAL MEDICINE | Facility: CLINIC | Age: 34
End: 2024-05-09
Payer: MEDICAID

## 2024-05-09 VITALS
WEIGHT: 287.06 LBS | HEART RATE: 86 BPM | DIASTOLIC BLOOD PRESSURE: 76 MMHG | SYSTOLIC BLOOD PRESSURE: 112 MMHG | OXYGEN SATURATION: 95 % | HEIGHT: 65 IN | BODY MASS INDEX: 47.83 KG/M2

## 2024-05-09 DIAGNOSIS — M10.9 GOUT, ARTHRITIS: ICD-10-CM

## 2024-05-09 DIAGNOSIS — E03.9 HYPOTHYROIDISM, UNSPECIFIED TYPE: ICD-10-CM

## 2024-05-09 DIAGNOSIS — G47.33 OSA (OBSTRUCTIVE SLEEP APNEA): ICD-10-CM

## 2024-05-09 DIAGNOSIS — E11.9 TYPE 2 DIABETES MELLITUS WITHOUT COMPLICATION, WITHOUT LONG-TERM CURRENT USE OF INSULIN: Primary | ICD-10-CM

## 2024-05-09 DIAGNOSIS — E11.9 TYPE 2 DIABETES MELLITUS WITHOUT RETINOPATHY: ICD-10-CM

## 2024-05-09 DIAGNOSIS — Q24.9 ADULT CONGENITAL HEART DISEASE: ICD-10-CM

## 2024-05-09 DIAGNOSIS — E66.01 MORBID OBESITY: ICD-10-CM

## 2024-05-09 DIAGNOSIS — E11.9 TYPE 2 DIABETES MELLITUS WITHOUT COMPLICATION, WITHOUT LONG-TERM CURRENT USE OF INSULIN: ICD-10-CM

## 2024-05-09 LAB
ALBUMIN SERPL BCP-MCNC: 3.4 G/DL (ref 3.5–5.2)
ALP SERPL-CCNC: 134 U/L (ref 55–135)
ALT SERPL W/O P-5'-P-CCNC: 38 U/L (ref 10–44)
ANION GAP SERPL CALC-SCNC: 9 MMOL/L (ref 8–16)
AST SERPL-CCNC: 28 U/L (ref 10–40)
BASOPHILS # BLD AUTO: 0.07 K/UL (ref 0–0.2)
BASOPHILS NFR BLD: 1 % (ref 0–1.9)
BILIRUB SERPL-MCNC: 0.5 MG/DL (ref 0.1–1)
BUN SERPL-MCNC: 13 MG/DL (ref 6–20)
CALCIUM SERPL-MCNC: 9.6 MG/DL (ref 8.7–10.5)
CHLORIDE SERPL-SCNC: 104 MMOL/L (ref 95–110)
CO2 SERPL-SCNC: 24 MMOL/L (ref 23–29)
CREAT SERPL-MCNC: 1.1 MG/DL (ref 0.5–1.4)
DIFFERENTIAL METHOD BLD: ABNORMAL
EOSINOPHIL # BLD AUTO: 0.1 K/UL (ref 0–0.5)
EOSINOPHIL NFR BLD: 1.5 % (ref 0–8)
ERYTHROCYTE [DISTWIDTH] IN BLOOD BY AUTOMATED COUNT: 15.3 % (ref 11.5–14.5)
EST. GFR  (NO RACE VARIABLE): >60 ML/MIN/1.73 M^2
ESTIMATED AVG GLUCOSE: 120 MG/DL (ref 68–131)
GLUCOSE SERPL-MCNC: 92 MG/DL (ref 70–110)
HBA1C MFR BLD: 5.8 % (ref 4–5.6)
HCT VFR BLD AUTO: 45.2 % (ref 40–54)
HGB BLD-MCNC: 15.1 G/DL (ref 14–18)
IMM GRANULOCYTES # BLD AUTO: 0.02 K/UL (ref 0–0.04)
IMM GRANULOCYTES NFR BLD AUTO: 0.3 % (ref 0–0.5)
LYMPHOCYTES # BLD AUTO: 1.3 K/UL (ref 1–4.8)
LYMPHOCYTES NFR BLD: 17.7 % (ref 18–48)
MCH RBC QN AUTO: 31.7 PG (ref 27–31)
MCHC RBC AUTO-ENTMCNC: 33.4 G/DL (ref 32–36)
MCV RBC AUTO: 95 FL (ref 82–98)
MONOCYTES # BLD AUTO: 0.5 K/UL (ref 0.3–1)
MONOCYTES NFR BLD: 6.3 % (ref 4–15)
NEUTROPHILS # BLD AUTO: 5.4 K/UL (ref 1.8–7.7)
NEUTROPHILS NFR BLD: 73.2 % (ref 38–73)
NRBC BLD-RTO: 0 /100 WBC
PLATELET # BLD AUTO: 299 K/UL (ref 150–450)
PMV BLD AUTO: 9.3 FL (ref 9.2–12.9)
POTASSIUM SERPL-SCNC: 4 MMOL/L (ref 3.5–5.1)
PROT SERPL-MCNC: 7.8 G/DL (ref 6–8.4)
RBC # BLD AUTO: 4.77 M/UL (ref 4.6–6.2)
SODIUM SERPL-SCNC: 137 MMOL/L (ref 136–145)
WBC # BLD AUTO: 7.33 K/UL (ref 3.9–12.7)

## 2024-05-09 PROCEDURE — 80053 COMPREHEN METABOLIC PANEL: CPT | Performed by: INTERNAL MEDICINE

## 2024-05-09 PROCEDURE — 99214 OFFICE O/P EST MOD 30 MIN: CPT | Mod: S$PBB,,, | Performed by: INTERNAL MEDICINE

## 2024-05-09 PROCEDURE — 3074F SYST BP LT 130 MM HG: CPT | Mod: CPTII,,, | Performed by: INTERNAL MEDICINE

## 2024-05-09 PROCEDURE — 3008F BODY MASS INDEX DOCD: CPT | Mod: CPTII,,, | Performed by: INTERNAL MEDICINE

## 2024-05-09 PROCEDURE — 99999 PR PBB SHADOW E&M-EST. PATIENT-LVL III: CPT | Mod: PBBFAC,,, | Performed by: INTERNAL MEDICINE

## 2024-05-09 PROCEDURE — 1159F MED LIST DOCD IN RCRD: CPT | Mod: CPTII,,, | Performed by: INTERNAL MEDICINE

## 2024-05-09 PROCEDURE — 85025 COMPLETE CBC W/AUTO DIFF WBC: CPT | Performed by: INTERNAL MEDICINE

## 2024-05-09 PROCEDURE — 3078F DIAST BP <80 MM HG: CPT | Mod: CPTII,,, | Performed by: INTERNAL MEDICINE

## 2024-05-09 PROCEDURE — 83036 HEMOGLOBIN GLYCOSYLATED A1C: CPT | Performed by: INTERNAL MEDICINE

## 2024-05-09 PROCEDURE — 99213 OFFICE O/P EST LOW 20 MIN: CPT | Mod: PBBFAC | Performed by: INTERNAL MEDICINE

## 2024-05-09 PROCEDURE — 36415 COLL VENOUS BLD VENIPUNCTURE: CPT | Performed by: INTERNAL MEDICINE

## 2024-05-09 RX ORDER — METOPROLOL SUCCINATE 25 MG/1
25 TABLET, EXTENDED RELEASE ORAL
COMMUNITY
Start: 2024-01-24

## 2024-05-09 NOTE — PROGRESS NOTES
"Chief Complaint:  Follow up of  Medical problems     HPI:This is a 33 year old man who presents with his mother for follow up of his medical problems.     All history is obtained from his mother     He is getting this Ozempic 2 mg injection once a week for his blood sugars.  Weight 12/20/23 was 302. Current weight is 287. He has lost 15 pounds.      He is bored at home.  He has been doing " same old, same old".   He is not exercising.  HE has dyspnea on exertion.      He is NOT wearing his CPAP machine . Settings have changed. He needs to get the settings checked.      Mother gets off work at midnight. He us usally awake when she gets home.  Mother goes to bed at 3 am and Cipriano is still awake.  Mother wakes Cipriano up at 7:30-8 am to bring the dogs out.  He goes back to sleep around 10 am (CPAP machine is on).  He will sleep until the evening if his mother lets him sleep. Mother wakes Cipriano up at 3 pm. .     He gets 20 hours a month from services from the [facility]. His aunt does his services by taking him to different activities.   She just started him in the community due to COVID pandemic             He has gout. He should take allopurinol 300 mg daily.  No gouty flares.      His mother notes that Cipriano has not been taking his medication regularly - he still takes his medications 50% of the time. His mother fills a 2 week tray of his medications and she winds up filling his medication tray once a month. Mother reports that he has been better taking his medication.       He should take coreg 25 mg daily, furosemide 40 mg once daily as needed.  and levothyroxine 88 mcg daily.      HE is taking vitamin D 50,000 units twice weekly.               Past Medical History:   Diagnosis Date    Atrial fibrillation      CHF (congestive heart failure)      Down syndrome      Encounter for blood transfusion      Gout      Hypothyroidism      S/P surgery for complex congenital heart disease 1/30/2017    Sleep apnea      Tetralogy of " Fallot 1990    VT (ventricular tachycardia) 3/27/2019                      Past Surgical History:   Procedure Laterality Date    BUNIONECTOMY        CARDIAC SURGERY         open heart, ppm     INSERT / REPLACE / REMOVE PACEMAKER Left 10/2008    NONINVASIVE CARDIAC ELECTROPHYSIOLOGY STUDY N/A 3/27/2019     Procedure: CARDIAC ELECTROPHYSIOLOGY STUDY, NONINVASIVE;  Surgeon: Jose Ortiz MD;  Location: [facility] EP LAB;  Service: Cardiology;  Laterality: N/A;  VT, NIEPS, Venogram, MDT, MAC, 3 prep, [place]    TONSILLECTOMY          Social History                   Socioeconomic History    Marital status: Single       Spouse name: Not on file    Number of children: Not on file    Years of education: Not on file    Highest education level: Not on file   Occupational History    Not on file   Tobacco Use    Smoking status: Never Smoker    Smokeless tobacco: Never Used   Substance and Sexual Activity    Alcohol use: No    Drug use: No    Sexual activity: Not Currently   Other Topics Concern    Not on file   Social History Narrative     Lives with mother, 1 dog (inside)      Social Determinants of Health            Financial Resource Strain:     Difficulty of Paying Living Expenses:    Food Insecurity:     Worried About Running Out of Food in the Last Year:     Ran Out of Food in the Last Year:    Transportation Needs:     Lack of Transportation (Medical):     Lack of Transportation (Non-Medical):    Physical Activity:     Days of Exercise per Week:     Minutes of Exercise per Session:    Stress:     Feeling of Stress :    Social Connections:     Frequency of Communication with Friends and Family:     Frequency of Social Gatherings with Friends and Family:     Attends Yazidi Services:     Active Member of Clubs or Organizations:     Attends Club or Organization Meetings:     Marital Status:                    Family Status   Relation Name Status    Mother Luann Alive    Sister Barbara Alive    MGM       MGF    "    PGM       PGF               Meds and allergies: updated on Epic     REVIEW OF SYSTEMS: No fevers, chills,  hearing loss, sinus congestion, sore throat, chest pain, nausea, vomiting, constipation, diarrhea, dysuria, hematuria, polydipsia, polyuria, headaches, anxiety, depression     He is not wearing his eye glasses.      Physical exam:       /76 (BP Location: Right arm, Patient Position: Sitting, BP Method: Large (Manual))   Pulse 86   Ht 5' 5" (1.651 m)   Wt 130.2 kg (287 lb 0.6 oz)   SpO2 95%   BMI 47.77 kg/m²     General: alert, oriented x 3, no apparent distress.  Affect normal  HEENT: Conjunctivae: anicteric, PERRL, EOMI, TM clear, Oralpharynx clear  Neck: supple, no thyroid enlargement, no cervical lymphadenopathy  Resp: effort normal, lungs clear bilaterally  CV: Regular rate and rhythm without murmurs, gallops or rubs, no lower extremity edema,          Assessment/Plan:       Congenital heart disease with CHF, a fib and pacemaker - follow up with cardiology. Doing better with compliance  with medications and diet.      Sleep apnea - Get CPAP machine serviced/ Gave mother [facility] phone number.     Hypothyroidism - TSH     GOut on allopurinol to 300 mg once daily     Morbid Obesity - work on diet and exercise     Down's Syndrome with mental handicapped    Encouraged more family support.      Vitamin D deficiency - get on prescription vitamin D 50,000 units twice a week      Diabetes - better on Ozempic 2 mg once weekly. Labs today     I will see him back in 4 months, sooner if problems arise.  "

## 2024-05-20 PROBLEM — I49.5 SINUS NODE DYSFUNCTION: Status: ACTIVE | Noted: 2024-05-20

## 2024-05-20 NOTE — PROGRESS NOTES
Name: Cipriano Thompson  MRN: [id number]  : 1990      Subjective:   CC: ToF, SND s/p pacemaker, NS-VT    HPI:    Cipriano Thompson is a 34 y.o. male with Down Syndrome, Tetralogy of Fallot, Obesity; hx of sinus node dysfunction s/p dual chamber pacemaker, NS-VT s/p negative NIEPS (2019); who presents to [facility] at [facility] for follow-up.   Since he was last seen, his mother says he still sleeps all day and stays up all night playing video games. He falls asleep easily and has history of sleep apnea. He has no syncope or near syncope.  He has no chest pain or palpitations.    He admits to not wearing his CPAP. He has occasional sensations of possible chest pain, but this is not new for him. Mom notes his swelling remains significantly improved.      To review, his hx is notable for Down's syndrome, TOF s/p complete repair at age ~2 years (shunt as a baby via thoracotomy) with subsequent PVR in  for right ventricular dilation and pacemaker placement in  for Sinus node dysfunction. He presented with exercise intolerance and moderate PS/PI so he was referred for repeat PVR. Also with history of hypertension and ventricular ectopy with frequent PVC's.  He had a cardiac catheterization for possible Jeanna valve implantation on 17 and were unable to secondary to coronary artery anatomy. He underwent surgical pulmonary valve placement on 17.  He had frequent PVC's in the post op period.  Cipriano underwent atrial flutter ablation in 2017.  Cipriano underwent pacemaker generator change on 3/23/18.  Due to nonsustained VT on pacemaker check, he underwent NIEPS on 3/27/19 that was negative for inducible VT.       Past-Medical Hx/Problem List:  Tetralogy of Fallot   S/P complete repair with a trans annular patch (Clif)  S/P pulmonary valve replacement with a 27 mm Freestyle Medtronic bioprosthetic valve (, Clif).  S/P 27 mm [employer] Quinn bioprosthetic valve in a 30 mm  Dacron conduit in the pulmonary position by Dr. Godfrey May 2017.  Unchanged mild pulmonary valve stenosis and no significant pulmonary insufficiency  Echocardiogram suggests a right ventricular systolic pressure around 60 mm of mercury, a little bit higher than on his last echocardiogram.  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  Sinus node dysfunction  S/P dual chamber pacemaker 2008 at [facility]  History of atrial flutter   S/P atrial flutter ablation by Dr. Milligan in 2017  Ventricular Tachycardia  Non-sustained, noted on device interrogations  S/P NIEPS on 3/27/19 that was negative for inducible VT.    Down Syndrome  Obesity  Significant weight gain, new pitting edema in the legs, worsening daytime somnolence and fatigue.    Definite medical noncompliance.    Hypothyroidism.    Obstructive sleep apnea.  CPAP  Medical noncompliance    Family Hx:  No known family history of congenital heart defects or cardiac surgeries in childhood.  No known family members with pacemakers or defibrillators.  No known inherited channelopathies or cardiomyopathies.  No known hx of sudden cardiac death or heart transplant.  No No known heart attack in someone less than 50yoa.    Social Hx:  Lives in [place], [place].    Review of Systems:  GEN:  No fevers, No fatigue, No weight-loss, No weight-gain  EYE:  No significant changes in vision, No eye redness, No lens dislocation  ENT: No cough, No congestion, No swelling, + snoring, No hearing loss,   RESP: No increased work of breathing, No dyspnea, No noisy breathing, No hx of pneumothorax  CV:  No chest pain, No palpitations, No tachycardia, No activity or exercise intolerance  GI:  No abdominal pain, No nausea, No vomiting, No diarrhea, No constipation  VIKAS: Normal UOP  MSK: No pain, No swelling, No joint dislocations, No scoliosis, No extremity swelling  HEME: No easy bruising or bleeding  NEUR: No history of seizures,  "No dizziness, No near-syncope, No syncope  DERM: No Rashes  PSY: No anxiety, No depression  ALL: See below.    Medications & Allergy:  Current Outpatient Medications on File Prior to Visit   Medication Sig Dispense Refill    allopurinoL (ZYLOPRIM) 300 MG tablet TAKE 1 TABLET(300 MG) BY MOUTH EVERY DAY 90 tablet 1    carvediloL (COREG) 25 MG tablet TAKE 1 TABLET(25 MG) BY MOUTH EVERY DAY 90 tablet 3    ergocalciferol (ERGOCALCIFEROL) 50,000 unit Cap TAKE 1 CAPSULE BY MOUTH 2 TIMES A WEEK 24 capsule 4    furosemide (LASIX) 40 MG tablet Take 1 tablet (40 mg total) by mouth once daily. (Patient taking differently: Take 40 mg by mouth once as needed (swelling).) 30 tablet 11    hydroCHLOROthiazide (HYDRODIURIL) 25 MG tablet TAKE 1 TABLET(25 MG) BY MOUTH EVERY DAY 90 tablet 3    levothyroxine (SYNTHROID) 88 MCG tablet TAKE 1 TABLET(88 MCG) BY MOUTH BEFORE BREAKFAST 90 tablet 2    metoprolol succinate (TOPROL-XL) 25 MG 24 hr tablet Take 25 mg by mouth.      semaglutide (OZEMPIC) 2 mg/dose (8 mg/3 mL) PnIj Inject 2 mg into the skin every 7 days. 3 mL 11     No current facility-administered medications on file prior to visit.       Review of patient's allergies indicates:   Allergen Reactions    Adhesive     Latex, natural rubber     Sulfa (sulfonamide antibiotics)           Objective:   Vitals:  Vitals:    05/23/24 0856   BP: 118/70   BP Location: Right arm   Patient Position: Sitting   Pulse: 75   SpO2: (!) 93%   Weight: 130.2 kg (287 lb 0.6 oz)   Height: 5' 5" (1.651 m)       Body mass index is 47.77 kg/m².  Body surface area is 2.44 meters squared.    Exam:  GEN: No acute distress, Downs Facies  EYE: Anicteric sclerae  ENT: No drainage, Moist mucous membranes  PULM: Normal work of breathing;  Clear to auscultation bilaterally, Good air movement throughout.  CV: No chest pain;   II/VI systolic murmur;   No rubs or gallops;  EXT: No cyanosis, edema greatly improved   2+ radial and dorsalis pedis pulses " bilaterally  ABD: Soft, obese, Non-tender,    No hepatomegaly;  Normal bowel sounds  DERM: Several small lesions, descried as boils by mom, in various stages of healing.   NEUR: Normal gait, Grossly normal tone.  PSY: Fatigued, fell asleep several times in chair during visit (and snored during this).      Results / Data:   ECG:   (05/22/2024) - Atrial paced rhythm, intact AV conduction, RBBB, QRSD 176ms  (01/19/2023) - Atrial paced rhythm, intact AV conduction, RBBB, QRSD 176ms  (12/10/2020) - Atrial paced rhythm, intact AV conduction, RBBB, QRSD 176ms    Holter/Zio:   (05/23/2024)  Pending, placed today.    (01/19/2023)  Ventricular and atrioventricular paced rhythm throughout.  HR Range:  (avg 78) bpm  No patient-triggered events.  No significant atrial ectopy burden.  Occasional isolated PVCs (2.4%).       Echocardiogram:   (05/22/2024)  CONGENITAL CARDIAC HISTORY:  Tetralogy of Fallot.  Down syndrome.  S/P Complete repair using a transannular patch - Ruthane.  S/P 27 mm Freestyle Medtronic Bioprosthetic pulmonary valve- Clif 1999.  S/P Dual chamber endocardial pacemaker (sinus node dysfunction) - GEGE in 2008.  S/P 27 mm St. Quinn Epic Bioprosthetic valve & 30 mm Dacron conduit - Bekah May 2017.  S/P Flutter ablation - Srini 11/2017.  S/P Generator change - Macicek 3/2018.     SEGMENTAL CARDIAC CONNECTIONS (previously demonstrated):  Abdominal situs is solitus.  Atrial situs is normal.  Atrioventricular concordance.  Grossly normal tricuspid and mitral valve structure.  RV dilation and RV hypertrophy.  Ventriculoarterial concordance.  Aortic valve is normal and pulmonic valve is abnormal.  Ventricular loop: D-loop.  Cardiac position is mesocardia.        IMPRESSION:  Qualitative impression of at least moderate right atrial enlargement.  There is a pacing wire crossing the tricuspid valve to the right ventricle with at least associated insufficiency.  Qualitative impression of mild to moderate right  ventricular dilation and mild hypertrophy with low normal systolic function.  Right ventricular pressure estimated 48 mmHg above right atrial pressure from reasonably well defined TR doppler profile.  Echodensity consistent with patch repair of ventricular septal defect and malalignment of the ventricular septum with no residual shunt demonstrated.  Bioprosthetic pulmonary valve in good position with peak velocity <2.4 m/sec, mean gradient <16 mm Hg and no significant insufficiency.  Pulmonary branches were not well demonstrated.  Qualitatively normal left ventricular size and structure.  Abnormal septal motion with good movement of the LV free wall, SF = 38% and EF qualitatively estimated 55 -60% from apical views.  Normal velocity across aortic valve with this aortic insufficiency.  Aortic dimensions:  Sinuses of Valsalva = 33 mm.  ST junction               = 28 mm.  Ascending aorta       = 30 mm.  Aorta is normal in size with images in this study suggesting left aortic arch branching and no evidence of coarctation.  No pericardial effusion demonstrated.          (01/19/2023)  CONGENITAL CARDIAC HISTORY:  Tetralogy of Fallot.  Down syndrome.  S/P Complete repair using a transannular patch - Ruthane.  S/P 27 mm Freestyle Medtronic Bioprosthetic pulmonary valve- Clif 1999.  S/P Dual chamber endocardial pacemaker (sinus node dysfunction) - GEGE in 2008.  S/P 27 mm St. Quinn Epic Bioprosthetic valve & 30 mm Dacron conduit - Bekah May 2017.  S/P Flutter ablation - Srini 11/2017.  S/P Generator change - Macicek 3/2018.     SEGMENTAL CARDIAC CONNECTIONS (previously demonstrated):  Abdominal situs is solitus.   Atrial situs is normal.   Atrioventricular concordance.   Grossly normal tricuspid and mitral valve structure.    RV dilation and RV hypertrophy.   Ventriculoarterial concordance.   Aortic valve is normal and pulmonic valve is abnormal.   Ventricular loop: D-loop.   Cardiac position is mesocardia.      IMPRESSION:  Qualitative impression of at least mild right atrial enlargement.  There is a pacing wire crossing the tricuspid valve to the right ventricle with mild-to-moderate associated insufficiency.  Qualitative impression of mild to moderate right ventricular dilation and mild hypertrophy with low normal systolic function.  Right ventricular pressure estimated 56 mmHg above right atrial pressure from reasonably well defined TR doppler profile.  Echodensity consistent with patch repair of ventricular septal defect and malalignment of the ventricular septum with no residual shunt demonstrated.  Bioprosthetic pulmonary valve in good position with peak velocity < 2.9 m/sec, mean gradient <22 mm Hg and no significant insufficiency (minimally increased from previous study).  Pulmonary branches were not well demonstrated.  Qualitatively normal left ventricular size and structure.  Abnormal septal motion with good movement of the LV free wall, SF = 35% and EF estimated 60 -65% from apical views.  Normal velocity across aortic valve with trace aortic insufficiency.  Aortic dimensions:  Sinuses of Valsalva = 32 mm.  ST junction               = 29 mm.  Ascending aorta       = 30 mm.  Aorta is normal in size and probable right branching pattern with no evidence of coarctation.  No pericardial effusion demonstrated.    Device Interrogation:  (05/23/2024, in-clinic)    Device interrogation and lead testing performed.    Presenting rhythm: AP/VS @ 75 bpm Underlying rhythm: sinus bradycardia @ 50 bpm    Device and leads WNL.    No arrhythmias noted.    Thresholds RA Lead: 0.5 V @ 0.4 ms. Configuration: bipolar. RV Lead: 1.25 V @ 0.4 ms. Configuration: bipolar.    Mode: AAI <=> DDD Lower limit rate: 75 bpm Upper tracking rate: 130 bpm Max sensor rate: 130 bpm    Leads RA Lead: P/R-wave: 2.6 mV Lead Impedance: 380 Ohms Paced: 55% RV Lead: P/R-wave: 3.9 mV Impedance: 418 Ohms Paced: 0%    No programming changes made this  session.       (10/838520, remote)    REMOTE transmission received and data reviewed. Device and leads WNL. Battery longevity 8.6 yrs Atrial paced 53 % Ventricular paced 0 %    Nonsustained VT comments: 2 VTNS- longest 3 sec, fastest 200 bpm     (07/19/2023, remote)  REMOTE transmission received and data reviewed. Device and leads WNL. Battery longevity 8.8 yrs Atrial paced 56 % Ventricular paced 0 % NO Arrhythmias noted.     (04/19/2023, remote)  REMOTE transmission received and data reviewed. Device and leads WNL. Battery longevity 8.8 yrs Atrial paced 54 % Ventricular paced 0 % 1 VTNS-6 beats, likely VT 1 Short V-V Interval    (01/19/2023)  Permanent Programming     RA Lead: 1.5 A V @ 0.4 ms. Sensitivity: 0.6 mV.     RV Lead: 2.5 A V @ 0.4 ms. Sensitivity: 0.9 mV.      Pacemaker Generator and Leads meet standard of FDA approval.   Chamber type: dual.     Mode: DDD     Lower limit rate: 75 bpm     Upper tracking rate: 130 bpm     Max sensor rate: 130 bpm     AV Delay - Longest: 240 msec  PV Delay - Longest: 220 msec     Tachy Zone        AT1  Rate: >  171 bpm Therapies: monitor       VT1       Rate: >  150 bpm  Therapies: monitor    Battery voltage: 2.99 V; BV @ RINA: 2.63 V    Estimated longevity: 9.1 Years .   Leads    RA Lead:        P/R-wave: 2.5  mV       Lead Impedance: 399 Ohms       Paced: 53%     RV Lead:        P/R-wave: 3.1  mV       Impedance: 456 Ohms       Paced: 0%     Thresholds       RA Lead: 0.5 V @ 0.4 ms. Configuration: bipolar.        RV Lead: 1.25 V @ 0.4 ms. Configuration: bipolar.  Reprogramming comments:     No changes this session   General comments:     Device interrogation and lead testing performed. Device and leads WNL.    VT-NS x3 since last carelink transmission - all 1-2 seconds, lasting 6-10 beats.     Scheduled for REMOTE transmission on Monday, April 17, 2023.    Assessment / Plan:   Cipriano Thompson is a 34 y.o. male with Down Syndrome, Tetralogy of Fallot, Obesity; hx of sinus node  dysfunction s/p dual chamber pacemaker, NS-VT s/p negative RENETTA (2019); who presents to [facility] at [place] for follow-up.    His pacemaker continues to function well on review of his interrogation today.  There is some nonsustained VT episodes noted in the past.  Lead characteristics remain adequate.    His swelling is improved.    We reviewed the importance of CPAP and not only treatment of BIJAN but also it's reducing risk of arrhythmia in this situation. They understand this, and note it is very difficult to tolerate the CPAP, which is understandable in his overall situation. I am happy to re-refer to sleep medicine if they wish to pursue this again.    Mom noted some boils that recur. I reviewed the importance of evaluation of skin infections particularly with his pacemaker in place and potential risk of endocarditis. They are to start with their primary physician, and I would also be happy to refer to dermatology or infectious disease if needed.    We have discussed at length and reviewed again today the challenging c assessing risk of dangerous or life-threatening arrhythmias in his scenario.  Certainly, we have good criteria in the setting of tetralogy of flow.  However, her predictions are not absolute.  He does have nonsustained ventricular tachycardia noted on his device, which is not  new to him at all.  He did have a negative noninvasive EP study in 2019, which is indeed greatly reassuring.  However, his risk can change over time,  so the negative study, while reassuring, is not perfectly predictive.  Ultimately, we were in agreement to  continue to monitor at this point, and  that the risks of additional medications or procedures likely outweigh the benefit for him at the moment.        Follow-up:   1 year with clinic visit with Dr. Valle and myself with ECG, 24-hr heart rhythm monitor, echocardiogram, and pacemaker interrogation.  Cardiac medications:     Carvedilol, Hydrochlorothiazide, Metroprolol-XL 25mg daily.  SBE prophylaxis:    Yes.  Antibiotic prophylaxis is required prior to all dental procedures cleanings.    Please contact us if he has any questions or concerns.  Our clinic from his [phone] during office hours. For urgent night and weekend concerns, call [phone] and ask for the pediatric cardiologist on call to be paged.

## 2024-05-22 ENCOUNTER — HOSPITAL ENCOUNTER (OUTPATIENT)
Dept: CARDIOLOGY | Facility: HOSPITAL | Age: 34
Discharge: HOME | End: 2024-05-22
Attending: PEDIATRICS
Payer: MEDICAID

## 2024-05-22 ENCOUNTER — HOSPITAL ENCOUNTER (OUTPATIENT)
Dept: CARDIOLOGY | Facility: CLINIC | Age: 34
Discharge: HOME | End: 2024-05-22
Payer: MEDICAID

## 2024-05-22 VITALS — BODY MASS INDEX: 47.82 KG/M2 | WEIGHT: 287 LBS | HEIGHT: 65 IN

## 2024-05-22 DIAGNOSIS — Z95.0 PACEMAKER: ICD-10-CM

## 2024-05-22 DIAGNOSIS — Q24.9 ADULT CONGENITAL HEART DISEASE: ICD-10-CM

## 2024-05-22 DIAGNOSIS — I37.2 NONRHEUMATIC PULMONARY VALVE STENOSIS WITH INSUFFICIENCY: ICD-10-CM

## 2024-05-22 DIAGNOSIS — I48.3 TYPICAL ATRIAL FLUTTER: ICD-10-CM

## 2024-05-22 DIAGNOSIS — Q21.3 TETRALOGY OF FALLOT: ICD-10-CM

## 2024-05-22 DIAGNOSIS — I47.20 VT (VENTRICULAR TACHYCARDIA): ICD-10-CM

## 2024-05-22 DIAGNOSIS — Z87.74 S/P SURGERY FOR COMPLEX CONGENITAL HEART DISEASE: ICD-10-CM

## 2024-05-22 DIAGNOSIS — I48.91 ATRIAL FIBRILLATION, UNSPECIFIED TYPE: ICD-10-CM

## 2024-05-22 LAB
ASCENDING AORTA: 3.02 CM
AV INDEX (PROSTH): 0.71
AV MEAN GRADIENT: 3 MMHG
AV PEAK GRADIENT: 7 MMHG
AV VALVE AREA BY VELOCITY RATIO: 4.15 CM²
AV VALVE AREA: 4 CM²
AV VELOCITY RATIO: 0.74
BSA FOR ECHO PROCEDURE: 2.44 M2
CV ECHO LV RWT: 0.34 CM
DOP CALC AO PEAK VEL: 1.28 M/S
DOP CALC AO VTI: 22.42 CM
DOP CALC LVOT AREA: 5.6 CM2
DOP CALC LVOT DIAMETER: 2.67 CM
DOP CALC LVOT PEAK VEL: 0.95 M/S
DOP CALC LVOT STROKE VOLUME: 89.59 CM3
DOP CALC RVOT PEAK VEL: 0.67 M/S
DOP CALC RVOT VTI: 15.44 CM
DOP CALCLVOT PEAK VEL VTI: 16.01 CM
E WAVE DECELERATION TIME: 91.47 MSEC
E/A RATIO: 2.08
E/E' RATIO: 9.04 M/S
ECHO LV POSTERIOR WALL: 0.85 CM (ref 0.6–1.1)
FRACTIONAL SHORTENING: 38 % (ref 28–44)
INTERVENTRICULAR SEPTUM: 0.89 CM (ref 0.6–1.1)
LA MAJOR: 5.22 CM
LA MINOR: 5.16 CM
LA WIDTH: 2.87 CM
LEFT ATRIUM SIZE: 4.12 CM
LEFT ATRIUM VOLUME INDEX MOD: 10.1 ML/M2
LEFT ATRIUM VOLUME INDEX: 22.6 ML/M2
LEFT ATRIUM VOLUME MOD: 23.34 CM3
LEFT ATRIUM VOLUME: 52.16 CM3
LEFT INTERNAL DIMENSION IN SYSTOLE: 3.12 CM (ref 2.1–4)
LEFT VENTRICLE DIASTOLIC VOLUME INDEX: 51.46 ML/M2
LEFT VENTRICLE DIASTOLIC VOLUME: 118.87 ML
LEFT VENTRICLE MASS INDEX: 66 G/M2
LEFT VENTRICLE SYSTOLIC VOLUME INDEX: 16.7 ML/M2
LEFT VENTRICLE SYSTOLIC VOLUME: 38.59 ML
LEFT VENTRICULAR INTERNAL DIMENSION IN DIASTOLE: 5.01 CM (ref 3.5–6)
LEFT VENTRICULAR MASS: 151.85 G
LV LATERAL E/E' RATIO: 7.43 M/S
LV SEPTAL E/E' RATIO: 11.56 M/S
MV PEAK A VEL: 0.5 M/S
MV PEAK E VEL: 1.04 M/S
MV STENOSIS PRESSURE HALF TIME: 26.53 MS
MV VALVE AREA P 1/2 METHOD: 8.29 CM2
OHS CV RV/LV RATIO: 1.2 CM
OHS QRS DURATION: 162 MS
OHS QTC CALCULATION: 451 MS
PISA TR MAX VEL: 3.46 M/S
PULM VEIN S/D RATIO: 0.39
PV PEAK D VEL: 0.82 M/S
PV PEAK GRADIENT: 24
PV PEAK S VEL: 0.32 M/S
PV PEAK VELOCITY: 2.43 M/S
RA MAJOR: 5.08 CM
RA WIDTH: 5.68 CM
RIGHT VENTRICULAR END-DIASTOLIC DIMENSION: 6.03 CM
SINUS: 3.33 CM
STJ: 2.77 CM
TDI LATERAL: 0.14 M/S
TDI SEPTAL: 0.09 M/S
TDI: 0.12 M/S
TR MAX PG: 48 MMHG
TRICUSPID ANNULAR PLANE SYSTOLIC EXCURSION: 1.57 CM
Z-SCORE OF LEFT VENTRICULAR DIMENSION IN END DIASTOLE: -5.95
Z-SCORE OF LEFT VENTRICULAR DIMENSION IN END SYSTOLE: -4.45

## 2024-05-22 PROCEDURE — 93010 ELECTROCARDIOGRAM REPORT: CPT | Mod: S$PBB,,, | Performed by: INTERNAL MEDICINE

## 2024-05-22 PROCEDURE — 93320 DOPPLER ECHO COMPLETE: CPT | Mod: 26,,, | Performed by: PEDIATRICS

## 2024-05-22 PROCEDURE — 93325 DOPPLER ECHO COLOR FLOW MAPG: CPT | Mod: 26,,, | Performed by: PEDIATRICS

## 2024-05-22 PROCEDURE — 93303 ECHO TRANSTHORACIC: CPT | Mod: 26,,, | Performed by: PEDIATRICS

## 2024-05-22 PROCEDURE — 93320 DOPPLER ECHO COMPLETE: CPT

## 2024-05-22 PROCEDURE — 93005 ELECTROCARDIOGRAM TRACING: CPT | Mod: PBBFAC | Performed by: INTERNAL MEDICINE

## 2024-05-23 ENCOUNTER — OFFICE VISIT (OUTPATIENT)
Dept: CARDIOLOGY | Facility: CLINIC | Age: 34
End: 2024-05-23
Payer: MEDICAID

## 2024-05-23 ENCOUNTER — HOSPITAL ENCOUNTER (OUTPATIENT)
Dept: PEDIATRIC CARDIOLOGY | Facility: HOSPITAL | Age: 34
Discharge: HOME | End: 2024-05-23
Attending: PEDIATRICS
Payer: MEDICAID

## 2024-05-23 ENCOUNTER — CLINICAL SUPPORT (OUTPATIENT)
Dept: CARDIOLOGY | Facility: CLINIC | Age: 34
End: 2024-05-23
Attending: PEDIATRICS
Payer: MEDICAID

## 2024-05-23 VITALS
SYSTOLIC BLOOD PRESSURE: 118 MMHG | HEART RATE: 75 BPM | BODY MASS INDEX: 47.83 KG/M2 | WEIGHT: 287.06 LBS | BODY MASS INDEX: 47.83 KG/M2 | HEART RATE: 75 BPM | WEIGHT: 287.06 LBS | DIASTOLIC BLOOD PRESSURE: 70 MMHG | OXYGEN SATURATION: 93 % | HEIGHT: 65 IN | HEIGHT: 65 IN | DIASTOLIC BLOOD PRESSURE: 70 MMHG | OXYGEN SATURATION: 93 % | SYSTOLIC BLOOD PRESSURE: 118 MMHG

## 2024-05-23 DIAGNOSIS — I49.5 SINUS NODE DYSFUNCTION: ICD-10-CM

## 2024-05-23 DIAGNOSIS — I49.3 PVC'S (PREMATURE VENTRICULAR CONTRACTIONS): ICD-10-CM

## 2024-05-23 DIAGNOSIS — Q90.9 DOWN SYNDROME: ICD-10-CM

## 2024-05-23 DIAGNOSIS — I37.2 NONRHEUMATIC PULMONARY VALVE STENOSIS WITH INSUFFICIENCY: ICD-10-CM

## 2024-05-23 DIAGNOSIS — Q21.3 TETRALOGY OF FALLOT: Primary | ICD-10-CM

## 2024-05-23 DIAGNOSIS — Z95.0 PACEMAKER: ICD-10-CM

## 2024-05-23 DIAGNOSIS — Q21.3 TETRALOGY OF FALLOT: ICD-10-CM

## 2024-05-23 DIAGNOSIS — I48.3 TYPICAL ATRIAL FLUTTER: ICD-10-CM

## 2024-05-23 DIAGNOSIS — Q24.9 ADULT CONGENITAL HEART DISEASE: ICD-10-CM

## 2024-05-23 DIAGNOSIS — Z95.0 PACEMAKER: Primary | ICD-10-CM

## 2024-05-23 DIAGNOSIS — Z87.74 S/P SURGERY FOR COMPLEX CONGENITAL HEART DISEASE: ICD-10-CM

## 2024-05-23 DIAGNOSIS — I49.5 SINUS NODE DYSFUNCTION: Primary | ICD-10-CM

## 2024-05-23 DIAGNOSIS — I47.20 VT (VENTRICULAR TACHYCARDIA): ICD-10-CM

## 2024-05-23 DIAGNOSIS — I48.91 ATRIAL FIBRILLATION, UNSPECIFIED TYPE: ICD-10-CM

## 2024-05-23 DIAGNOSIS — Z95.2 PULMONARY VALVE REPLACED: ICD-10-CM

## 2024-05-23 LAB
AV DELAY - LONGEST: 240 MSEC
BATTERY VOLTAGE (V): 2.98 V
ERI (V): 2.63 V
IMPEDANCE RA LEAD (NATIVE): 418 OHMS
IMPEDANCE RA LEAD: 380 OHMS
OHS CV DC PP MS1: 0.4 MS
OHS CV DC PP MS2: 0.4 MS
OHS CV DC PP V1: NORMAL V
OHS CV DC PP V2: NORMAL V
P/R-WAVE RA LEAD (NATIVE): 3.9 MV
P/R-WAVE RA LEAD: 2.6 MV
PV DELAY - LONGEST: 220 MSEC
THRESHOLD MS RA LEAD (NATIVE): 0.4 MS
THRESHOLD MS RA LEAD: 0.4 MS
THRESHOLD V RA LEAD (NATIVE): 1.25 V
THRESHOLD V RA LEAD: 0.5 V

## 2024-05-23 PROCEDURE — 99214 OFFICE O/P EST MOD 30 MIN: CPT | Mod: S$PBB,,, | Performed by: PEDIATRICS

## 2024-05-23 PROCEDURE — 99213 OFFICE O/P EST LOW 20 MIN: CPT | Mod: PBBFAC,25,27 | Performed by: PEDIATRICS

## 2024-05-23 PROCEDURE — 3078F DIAST BP <80 MM HG: CPT | Mod: CPTII,,, | Performed by: PEDIATRICS

## 2024-05-23 PROCEDURE — 99213 OFFICE O/P EST LOW 20 MIN: CPT | Mod: PBBFAC,25 | Performed by: PEDIATRICS

## 2024-05-23 PROCEDURE — 3074F SYST BP LT 130 MM HG: CPT | Mod: CPTII,,, | Performed by: PEDIATRICS

## 2024-05-23 PROCEDURE — 93242 EXT ECG>48HR<7D RECORDING: CPT

## 2024-05-23 PROCEDURE — 93280 PM DEVICE PROGR EVAL DUAL: CPT | Mod: PBBFAC

## 2024-05-23 PROCEDURE — G2211 COMPLEX E/M VISIT ADD ON: HCPCS | Mod: S$PBB,,, | Performed by: PEDIATRICS

## 2024-05-23 PROCEDURE — 99999 PR PBB SHADOW E&M-EST. PATIENT-LVL III: CPT | Mod: PBBFAC,,, | Performed by: PEDIATRICS

## 2024-05-23 PROCEDURE — 3044F HG A1C LEVEL LT 7.0%: CPT | Mod: CPTII,,, | Performed by: PEDIATRICS

## 2024-05-23 PROCEDURE — 1159F MED LIST DOCD IN RCRD: CPT | Mod: CPTII,,, | Performed by: PEDIATRICS

## 2024-05-23 PROCEDURE — 3008F BODY MASS INDEX DOCD: CPT | Mod: CPTII,,, | Performed by: PEDIATRICS

## 2024-05-23 PROCEDURE — 93280 PM DEVICE PROGR EVAL DUAL: CPT | Mod: 26,S$PBB,, | Performed by: PEDIATRICS

## 2024-05-23 NOTE — PROGRESS NOTES
2024    re:Cipriano Thompson  :1990    [facility]     Polly Oneal MD  [address]  [place] [address]    Dear Dr. Oneal:    Cipriano Thompson is a 34 y.o. male seen in my [facility] clinic today for evaluation of congenital heart disease.  To summarize his diagnoses are as follow:  1.  Tetralogy of Fallot status post complete repair with a trans annular patch followed by pulmonary valve replacement with a 27 mm Freestyle Medtronic bioprosthetic valve in .  Both surgeries at [facility].   - now status post placement of a 27 mm Saint Quinn bioprosthetic valve in a 30 mm Dacron conduit in the pulmonary position by Dr. Godfrey May 2017.   - mild pulmonary valve stenosis and no significant pulmonary insufficiency   - echocardiogram suggests a right ventricular systolic pressure around 55 mm of mercury, unchanged.  Normal PVR on 2017 cath.  2.  Sinus node dysfunction status post dual chamber pacemaker  at [facility]  3.  History of atrial flutter status post flutter ablation by Dr. Milligan in   4.  Down syndrome  5.  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  6.  Medical noncompliance, improved  7.  Obesity - improving  8.  LE edema of unclear etiology, resolved    To summarize, my recommendations are as follows:  1.  SBE prophylaxis is indicated before procedures.  2.  continue off metoprolol.  Take Lasix as needed.  3.  Follow up as planned with EP team  4.  CPAP for obstructive sleep apnea   5.  Close follow up with PCP  6.  Work on increasing activity level, better diet.    7.  Follow up in 1 year with echo and ekg    Discussion:  His edema has resolved, and he looks great.  He is losing weight on Ozempic.  His echo looks great.   His bioprosthetic pulmonary valve is functioning very well.  At some point, he will need catheter based replacement of that valve, but there is certainly no indication at  present.    History of present illness:  I saw him in October when we stopped his lasix and metoprolol.  (Had been on lasix for edema that had resolved).  Mom still give him Lasix on an intermittent basis, but it has been several months since he needed it.  He has been on Ozempic, and he has definitely lost some weight.  No chest pain.  No palpitations, syncope, near-syncope, cyanosis, or edema.  Mild dyspnea on exertion which is, perhaps, a little better than it was a few years ago.  He is certainly more active than he used to be.  He is getting ready to go on a cruise with his family to [place].    The review of systems is as noted above. It is otherwise negative for other symptoms related to the general, neurological, psychiatric, endocrine, gastrointestinal, genitourinary, respiratory, dermatologic, musculoskeletal, hematologic, and immunologic systems.    Past Medical History:   Diagnosis Date    Atrial fibrillation     CHF (congestive heart failure)     Down syndrome     Encounter for blood transfusion     Gout     Hypothyroidism     S/P surgery for complex congenital heart disease 1/30/2017    Sleep apnea     Tetralogy of Fallot 05/1990    VT (ventricular tachycardia) 3/27/2019     Past Surgical History:   Procedure Laterality Date    BUNIONECTOMY      CARDIAC SURGERY      open heart x 3, ppm     INSERT / REPLACE / REMOVE PACEMAKER Left 10/2008    NONINVASIVE CARDIAC ELECTROPHYSIOLOGY STUDY N/A 3/27/2019    Procedure: CARDIAC ELECTROPHYSIOLOGY STUDY, NONINVASIVE;  Surgeon: Jose Ortiz MD;  Location: [facility];  Service: Cardiology;  Laterality: N/A;  VT, NIEPS, Venogram, MDT, MAC, 3 prep, Macicek    TONSILLECTOMY       Family History   Problem Relation Name Age of Onset    No Known Problems Mother Luann     No Known Problems Sister Darrelynn      Social History     Socioeconomic History    Marital status: Single   Tobacco Use    Smoking status: Never    Smokeless tobacco: Never   Substance and Sexual  "Activity    Alcohol use: No    Drug use: No    Sexual activity: Not Currently   Social History Narrative    Lives with mother, 3 dog - Elfego, Rolando and Prince.    He got a certificicate of completion of school in [place].     He has never been in a day program.      Current Outpatient Medications on File Prior to Visit   Medication Sig Dispense Refill    allopurinoL (ZYLOPRIM) 300 MG tablet TAKE 1 TABLET(300 MG) BY MOUTH EVERY DAY 90 tablet 1    carvediloL (COREG) 25 MG tablet TAKE 1 TABLET(25 MG) BY MOUTH EVERY DAY 90 tablet 3    ergocalciferol (ERGOCALCIFEROL) 50,000 unit Cap TAKE 1 CAPSULE BY MOUTH 2 TIMES A WEEK 24 capsule 4    furosemide (LASIX) 40 MG tablet Take 1 tablet (40 mg total) by mouth once daily. (Patient taking differently: Take 40 mg by mouth once as needed (swelling).) 30 tablet 11    hydroCHLOROthiazide (HYDRODIURIL) 25 MG tablet TAKE 1 TABLET(25 MG) BY MOUTH EVERY DAY 90 tablet 3    levothyroxine (SYNTHROID) 88 MCG tablet TAKE 1 TABLET(88 MCG) BY MOUTH BEFORE BREAKFAST 90 tablet 2    metoprolol succinate (TOPROL-XL) 25 MG 24 hr tablet Take 25 mg by mouth.      semaglutide (OZEMPIC) 2 mg/dose (8 mg/3 mL) PnIj Inject 2 mg into the skin every 7 days. 3 mL 11     No current facility-administered medications on file prior to visit.     Review of patient's allergies indicates:   Allergen Reactions    Adhesive     Latex, natural rubber     Sulfa (sulfonamide antibiotics)         Vitals:    05/23/24 0853   BP: 118/70   BP Location: Right arm   Patient Position: Sitting   Pulse: 75   SpO2: (!) 93%   Weight: 130.2 kg (287 lb 0.6 oz)   Height: 5' 5" (1.651 m)     Wt Readings from Last 3 Encounters:   05/23/24 0853 130.2 kg (287 lb 0.6 oz)   05/23/24 0856 130.2 kg (287 lb 0.6 oz)   05/22/24 1139 130.2 kg (287 lb)        In general, he is morbidly obese.  Alert, no distress, ambulating well.  He does have Down syndrome.  The eyes, nares, and oropharynx are clear.  Eyelids and conjunctiva are normal without " drainage or erythema.  Pupils equal and round bilaterally.  The head is normocephalic and atraumatic.  The neck is supple without jugular venous distention or thyroid enlargement.  The lungs are clear to auscultation bilaterally.  He has a well-healed median sternotomy incision.  His pacemaker is in the left upper chest.  Heart sounds are somewhat distant.  Normal 1st heart sound, likely split 2nd heart sound.  2/6 systolic ejection murmur with no diastolic murmur.  The abdominal exam is benign without hepatosplenomegaly, tenderness, or distention.  Pulses are normal in all 4 extremities with brisk capillary refill and no clubbing, cyanosis.  No edema, no tenderness in legs.  No evidence of gout.    I personally reviewed the following tests performed today and my interpretation follows:  EKG 5/22:  Electronic atrial pacemaker   Right bundle branch block   Possible Inferior infarct ,age undetermined   Abnormal ECG   When compared with ECG of 24-AUG-2023 13:34,   Electronic atrial pacemaker has replaced Sinus rhythm   Borderline criteria for Inferior infarct are now Present   T wave inversion more evident in Anterior leads     Results for orders placed during the hospital encounter of 05/22/24    Echo    Interpretation Summary  CONGENITAL CARDIAC HISTORY:  Tetralogy of Fallot.  Down syndrome.  S/P Complete repair using a transannular patch - Ruthane.  S/P 27 mm Freestyle Medtronic Bioprosthetic pulmonary valve- Clif 1999.  S/P Dual chamber endocardial pacemaker (sinus node dysfunction) - GEGE in 2008.  S/P 27 mm St. Quinn Epic Bioprosthetic valve & 30 mm Dacron conduit - Bekah May 2017.  S/P Flutter ablation - Srini 11/2017.  S/P Generator change - Macicek 3/2018.    SEGMENTAL CARDIAC CONNECTIONS (previously demonstrated):  Abdominal situs is solitus.  Atrial situs is normal.  Atrioventricular concordance.  Grossly normal tricuspid and mitral valve structure.  RV dilation and RV hypertrophy.  Ventriculoarterial  concordance.  Aortic valve is normal and pulmonic valve is abnormal.  Ventricular loop: D-loop.  Cardiac position is mesocardia.      IMPRESSION:  Qualitative impression of at least moderate right atrial enlargement.  There is a pacing wire crossing the tricuspid valve to the right ventricle with at least associated insufficiency.  Qualitative impression of mild to moderate right ventricular dilation and mild hypertrophy with low normal systolic function.  Right ventricular pressure estimated 48 mmHg above right atrial pressure from reasonably well defined TR doppler profile.  Echodensity consistent with patch repair of ventricular septal defect and malalignment of the ventricular septum with no residual shunt demonstrated.  Bioprosthetic pulmonary valve in good position with peak velocity <2.4 m/sec, mean gradient <16 mm Hg and no significant insufficiency.  Pulmonary branches were not well demonstrated.  Qualitatively normal left ventricular size and structure.  Abnormal septal motion with good movement of the LV free wall, SF = 38% and EF qualitatively estimated 55 -60% from apical views.  Normal velocity across aortic valve with this aortic insufficiency.  Aortic dimensions:  Sinuses of Valsalva = 33 mm.  ST junction               = 28 mm.  Ascending aorta       = 30 mm.  Aorta is normal in size with images in this study suggesting left aortic arch branching and no evidence of coarctation.  No pericardial effusion demonstrated.    He had a cardiac catheterization April 4, 2017:  IMPRESSION:   1.  Down syndrome.  Repaired tetralogy of Fallot with subsequent 27 mm FreeStyle bioprosthetic pulmonary valve implantation.   2.  Moderate pulmonary valve stenosis (peak gradient 24 mmHg).  Moderate pulmonary insufficiency.   3.  Normal cardiac output and vascular resistance calculations.   4.  Pacemaker for sinus node dysfunction.   5.  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  Normal origin of left coronary.   6.   Left aortic arch.  Normal head and neck branching.   At that catheterization, his wedge pressure was 13.  Left ventricular end-diastolic pressure 10.  Pulmonary artery pressure mean 22-24.    Lab Results   Component Value Date    WBC 7.33 05/09/2024    HGB 15.1 05/09/2024    HCT 45.2 05/09/2024    MCV 95 05/09/2024     05/09/2024     CMP  Sodium   Date Value Ref Range Status   05/09/2024 137 136 - 145 mmol/L Final     Potassium   Date Value Ref Range Status   05/09/2024 4.0 3.5 - 5.1 mmol/L Final     Chloride   Date Value Ref Range Status   05/09/2024 104 95 - 110 mmol/L Final     CO2   Date Value Ref Range Status   05/09/2024 24 23 - 29 mmol/L Final     Glucose   Date Value Ref Range Status   05/09/2024 92 70 - 110 mg/dL Final     BUN   Date Value Ref Range Status   05/09/2024 13 6 - 20 mg/dL Final     Creatinine   Date Value Ref Range Status   05/09/2024 1.1 0.5 - 1.4 mg/dL Final     Calcium   Date Value Ref Range Status   05/09/2024 9.6 8.7 - 10.5 mg/dL Final     Total Protein   Date Value Ref Range Status   05/09/2024 7.8 6.0 - 8.4 g/dL Final     Albumin   Date Value Ref Range Status   05/09/2024 3.4 (L) 3.5 - 5.2 g/dL Final     Total Bilirubin   Date Value Ref Range Status   05/09/2024 0.5 0.1 - 1.0 mg/dL Final     Comment:     For infants and newborns, interpretation of results should be based  on gestational age, weight and in agreement with clinical  observations.    Premature Infant recommended reference ranges:  Up to 24 hours.............<8.0 mg/dL  Up to 48 hours............<12.0 mg/dL  3-5 days..................<15.0 mg/dL  6-29 days.................<15.0 mg/dL       Alkaline Phosphatase   Date Value Ref Range Status   05/09/2024 134 55 - 135 U/L Final     AST   Date Value Ref Range Status   05/09/2024 28 10 - 40 U/L Final     ALT   Date Value Ref Range Status   05/09/2024 38 10 - 44 U/L Final     Anion Gap   Date Value Ref Range Status   05/09/2024 9 8 - 16 mmol/L Final     eGFR   Date Value  "Ref Range Status   05/09/2024 >60.0 >60 mL/min/1.73 m^2 Final     Lab Results   Component Value Date    CHOL 178 01/27/2023    CHOL 175 10/12/2021    CHOL 178 03/08/2021     Lab Results   Component Value Date    HDL 30 (L) 01/27/2023    HDL 29 (L) 10/12/2021    HDL 29 (L) 03/08/2021     Lab Results   Component Value Date    LDLCALC 113.0 01/27/2023    LDLCALC 106.0 10/12/2021    LDLCALC 117.4 03/08/2021     No results found for: "DLDL"  Lab Results   Component Value Date    TRIG 175 (H) 01/27/2023    TRIG 200 (H) 10/12/2021    TRIG 158 (H) 03/08/2021       f1   Lab Results   Component Value Date    CHOLHDL 16.9 (L) 01/27/2023    CHOLHDL 16.6 (L) 10/12/2021    CHOLHDL 16.3 (L) 03/08/2021     TSH   Date Value Ref Range Status   12/20/2023 3.796 0.400 - 4.000 uIU/mL Final     Free T4   Date Value Ref Range Status   03/08/2021 0.82 0.71 - 1.51 ng/dL Final     BNP   Date Value Ref Range Status   08/21/2023 16 0 - 99 pg/mL Final     Comment:     Values of less than 100 pg/ml are consistent with non-CHF populations.     Troponin I   Date Value Ref Range Status   08/21/2023 <0.006 0.000 - 0.026 ng/mL Final     Comment:     The reference interval for Troponin I represents the 99th percentile   cutoff   for our facility and is consistent with 3rd generation assay   performance.           Thank you for referring this patient to our clinic.  Please call with any questions.    Sincerely,        Bradley Valle MD  Pediatric Cardiology  Adult Congenital Heart Disease  Pediatric Heart Failure and Transplantation  [facility] [address] [place]  [address]  [phone]            "

## 2024-06-10 ENCOUNTER — PATIENT MESSAGE (OUTPATIENT)
Dept: INTERNAL MEDICINE | Facility: CLINIC | Age: 34
End: 2024-06-10
Payer: MEDICAID

## 2024-06-13 ENCOUNTER — OFFICE VISIT (OUTPATIENT)
Dept: OPHTHALMOLOGY | Facility: CLINIC | Age: 34
End: 2024-06-13
Payer: MEDICAID

## 2024-06-13 DIAGNOSIS — E11.9 TYPE 2 DIABETES MELLITUS WITHOUT RETINOPATHY: ICD-10-CM

## 2024-06-13 DIAGNOSIS — H25.11 CATARACTA BRUNESCENS, RIGHT: Primary | ICD-10-CM

## 2024-06-13 DIAGNOSIS — H50.10 EXOTROPIA: ICD-10-CM

## 2024-06-13 DIAGNOSIS — H25.11 NUCLEAR SCLEROTIC CATARACT OF RIGHT EYE: Primary | ICD-10-CM

## 2024-06-13 DIAGNOSIS — H25.12 NUCLEAR SCLEROTIC CATARACT OF LEFT EYE: ICD-10-CM

## 2024-06-13 DIAGNOSIS — H52.12 MYOPIA OF LEFT EYE: ICD-10-CM

## 2024-06-13 PROCEDURE — 3044F HG A1C LEVEL LT 7.0%: CPT | Mod: CPTII,,, | Performed by: OPHTHALMOLOGY

## 2024-06-13 PROCEDURE — 76519 ECHO EXAM OF EYE: CPT | Mod: PBBFAC | Performed by: OPHTHALMOLOGY

## 2024-06-13 PROCEDURE — 99214 OFFICE O/P EST MOD 30 MIN: CPT | Mod: S$PBB,,, | Performed by: OPHTHALMOLOGY

## 2024-06-13 PROCEDURE — 76512 OPH US DX B-SCAN: CPT | Mod: PBBFAC,RT | Performed by: OPHTHALMOLOGY

## 2024-06-13 RX ORDER — PREDNISOLONE/MOXIFLOX/BROMF/PF 1 %-0.5 %
1 DROPS OPHTHALMIC (EYE) 3 TIMES DAILY
Qty: 5 ML | Refills: 3 | Status: SHIPPED | OUTPATIENT
Start: 2024-06-13

## 2024-06-13 NOTE — PROGRESS NOTES
HPI    Dr. LEIDA Jacobs    Cataract OD>OS  DM2  Exotropia OD secondary to cataract   Down Syndrome    Patient here for OD cataract evaluation.   Last edited by Shanda Lozano MA on 6/13/2024  2:38 PM.            Assessment /Plan     For exam results, see Encounter Report.    Cataracta michaelcens, right  -     B Scan  -     Axial Eye Length - OU - Both Eyes    Nuclear sclerotic cataract of left eye    Exotropia    Myopia of left eye    Type 2 diabetes mellitus without retinopathy      Downs syndrome.                 Visually significant nuclear sclerotic cataract in both eyes    - Cataracts are interfering with activities of daily living, including night time driving.  - Pt desires cataract surgery for visual rehabilitation.   - risks / benefits/ alternatives were discussed and patient agrees to proceed with surgery.   - IOL options discussed as well, according to patient's goals and concomitant ocular pathology.  - Target: int range.      White cataract OD -- no view to posterior pole    Bscan OD - vit debris    Unclear VA potential OD, pt and mom understand.    DIBOO 15.0 range  Ora - n/c (can use OS numbers)  GETA anesthesia.

## 2024-07-23 ENCOUNTER — TELEPHONE (OUTPATIENT)
Dept: OPHTHALMOLOGY | Facility: CLINIC | Age: 34
End: 2024-07-23
Payer: MEDICAID

## 2024-07-23 NOTE — PRE-PROCEDURE INSTRUCTIONS
PreOp Instructions given: To Pt's Mother - Luann   - Verbal medication information (what to hold and what to take)   - NPO guidelines   - Arrival place directions given; time to be given the day before procedure by the   Surgeon's Office   - Bathing with antibacterial soap   - Don't wear any jewelry or bring any valuables AM of surgery   - No makeup or moisturizer to face   - No perfume/cologne, powder, lotions or aftershave   Pt. verbalized understanding.   Pt denies any h/o Anesthesia/Sedation complications or side effects.  Patient does not know arrival time.  Explained that this information comes from the surgeon's office and if they haven't heard from them by 2 or 3 pm to call the office.  Patient stated an understanding.     Ozempic 2 mg - last taken Friday 7/19/24.    Msg sent to Dr. Vallejo/Staff, POC staff-Kelli Preston re: Ozempic 2 mg - last taken Friday 7/19/24.

## 2024-07-23 NOTE — PRE-PROCEDURE INSTRUCTIONS
Recommendation/Dr. Kelli Meredith should be r/s to Friday 7/26/24 or early the following week 2/2 pt's high risk for aspiration.

## 2024-07-24 ENCOUNTER — TELEPHONE (OUTPATIENT)
Dept: OPHTHALMOLOGY | Facility: CLINIC | Age: 34
End: 2024-07-24
Payer: MEDICAID

## 2024-07-24 NOTE — TELEPHONE ENCOUNTER
----- Message from Elaine Rivera sent at 7/24/2024  9:21 AM CDT -----  Regarding: Consult/Advisory  Contact: :Luann santana     Consult/Advisory     Name Of Caller:Luann santana        Contact Preference:[phone]     Nature of call:Patients mom is calling to speak to Lottie and needs time for surgery. Requesting a call back

## 2024-07-26 ENCOUNTER — TELEPHONE (OUTPATIENT)
Dept: OPHTHALMOLOGY | Facility: CLINIC | Age: 34
End: 2024-07-26
Payer: MEDICAID

## 2024-07-26 NOTE — TELEPHONE ENCOUNTER
I spoke to pt's mom jennifer this evening - she was frustrated that no one told her about the rule to stop taking ozempic 7 days prior to sx, and the first she heard about it was the phone call from [facility] that was documented and by then it was too late. She says that she does not see any 1/2 page in her folder. She had taken time off from work and now has to wait another 2 months to have sx done at .     I apologized and said I would look into this oversight and see if any of my colleagues can do his sx at [facility] before end of sept.

## 2024-08-09 ENCOUNTER — TELEPHONE (OUTPATIENT)
Dept: INTERNAL MEDICINE | Facility: CLINIC | Age: 34
End: 2024-08-09
Payer: MEDICAID

## 2024-08-09 NOTE — TELEPHONE ENCOUNTER
----- Message from Booker Castellanos MA sent at 8/8/2024  4:01 PM CDT -----  Hello,    The above patient is scheduled for cataract surgery. Patient will needs medical clearance prior to surgery which has been scheduled for August 22,2024 with Dr.Sara Anand BELLO. This surgery will be performed under general anesthesia. Please schedule patient for a  pre op appointment if need be or clear patient.     Please fax medical clearance   Fax :[phone]   Surgery coordinator   Thank you,     Booker Castellanos

## 2024-08-11 NOTE — TELEPHONE ENCOUNTER
Kiel,  Do you have any issues from a cardiac perspective with Cipriano having general anesthesia for cataract removal?  Polly

## 2024-08-12 NOTE — TELEPHONE ENCOUNTER
Cipriano may proceed with cataract surgery.  Dr Bradley Valle does not have any issues from a cardiac perspective.    He does have sleep apnea.   Anesthesia is going to have to monitor his airway closely upon extubation. He could also be difficult to intubate because of his obesity and short stature.  Have anesthesia see him for a pre op apt

## 2024-08-12 NOTE — TELEPHONE ENCOUNTER
Bradley Valle MD  You1 hour ago (2:08 PM)       None from a cardiac standpoint.  Always worry about their airway, but heart is good.

## 2024-08-16 ENCOUNTER — OFFICE VISIT (OUTPATIENT)
Dept: OPHTHALMOLOGY | Facility: CLINIC | Age: 34
End: 2024-08-16
Payer: MEDICAID

## 2024-08-16 DIAGNOSIS — H50.10 EXOTROPIA: ICD-10-CM

## 2024-08-16 DIAGNOSIS — H52.12 MYOPIA OF LEFT EYE: ICD-10-CM

## 2024-08-16 DIAGNOSIS — H25.11 CATARACTA BRUNESCENS, RIGHT: Primary | ICD-10-CM

## 2024-08-16 DIAGNOSIS — H25.12 NUCLEAR SCLEROTIC CATARACT OF LEFT EYE: ICD-10-CM

## 2024-08-16 DIAGNOSIS — E11.9 TYPE 2 DIABETES MELLITUS WITHOUT RETINOPATHY: ICD-10-CM

## 2024-08-16 PROCEDURE — 99999 PR PBB SHADOW E&M-EST. PATIENT-LVL II: CPT | Mod: PBBFAC,,, | Performed by: STUDENT IN AN ORGANIZED HEALTH CARE EDUCATION/TRAINING PROGRAM

## 2024-08-16 PROCEDURE — 99212 OFFICE O/P EST SF 10 MIN: CPT | Mod: PBBFAC | Performed by: STUDENT IN AN ORGANIZED HEALTH CARE EDUCATION/TRAINING PROGRAM

## 2024-08-16 NOTE — PROGRESS NOTES
Subjective:  HPI    DLS: 6/13/24 w/ Dr. Vallejo     34 y.o. male presents for CEIOL OD preop. Patient complains of pain OD in   the mornings only. Denies headaches, flashes, and floaters.    Meds:  none  Last edited by Maura Mendoza on 8/16/2024  1:56 PM.        Exam:  See encounter report for full exam    Assessment:  1. Cataracta brunescens, right  2. Nuclear sclerotic cataract of left eye  - Cataracts are interfering with activities of daily living, including night time driving.  - Pt desires cataract surgery for visual rehabilitation.   - Risks / benefits/ alternatives were discussed and patient agrees to proceed with surgery.   - IOL options discussed as well, according to patient's goals and concomitant ocular pathology.  - Target: int range.  - B scan reviewed: vit debris, retina intact OD  - Unclear VA potential again discussed; will need to use calcs from OS for lens power  - Lens: DIBOO 15.0 range    3. Exotropia  Sensory, secondary to poor VA from #1    4. Myopia of left eye  F/b Dr. Jacobs    5. Type 2 diabetes mellitus without retinopathy  5/3/24: No signs of retinopathy OS (no view OD)  Continue blood glucose control with PCP     Plan:  Unclear VA potential and difficulty with accurate calcs discussed with mom and patient  - Schedule complex phaco/IOL OD    Return OR as scheduled -- complex phaco/IOL OD  Lens: DIBOO 15.0 range, MA60AC 15.0  Trypan, Healon GV  General anesthesia    Polly Michel MD  [facility]

## 2024-08-20 ENCOUNTER — ANESTHESIA EVENT (OUTPATIENT)
Dept: SURGERY | Facility: HOSPITAL | Age: 34
End: 2024-08-20
Payer: MEDICAID

## 2024-08-20 RX ORDER — FUROSEMIDE 40 MG/1
40 TABLET ORAL DAILY PRN
COMMUNITY

## 2024-08-20 NOTE — PRE-PROCEDURE INSTRUCTIONS
PreOp Instructions given: To Pt's Mother - Luann  - Verbal medication information (what to hold and what to take)   - NPO guidelines 2400  - Arrival place directions given; time to be given the day before procedure by the   Surgeon's Office [facility]  - Bathing with antibacterial soap   - Don't wear any jewelry or bring any valuables AM of surgery   - No makeup or moisturizer to face   - No perfume/cologne, powder, lotions or aftershave   Pt. verbalized understanding.   Pt with h/o Emergence Delirium/Combative   Pacemaker - Medtronic -   BIJAN      Patient does not know arrival time.  Explained that this information comes from the surgeon's office and if they haven't heard from them by 2 or 3 pm to call the office.  Patient'S MOTHER stated an understanding.

## 2024-08-20 NOTE — ANESTHESIA PREPROCEDURE EVALUATION
08/20/2024  Cipriano Thompson is a 34 y.o., male.      Pre-op Assessment    I have reviewed the Patient Summary Reports.       I have reviewed the Medications.     Review of Systems  Anesthesia Hx:    Combative when waking from anesthesia in the past.  History of prior surgery of interest to airway management or planning:          Denies Family Hx of Anesthesia complications.   Personal Hx of Anesthesia complications                    Social:  Non-Smoker, No Alcohol Use       Hematology/Oncology:  Hematology Normal   Oncology Normal                                   EENT/Dental:  EENT/Dental Normal           Cardiovascular:         Dysrhythmias   CHF        Interpretation Summary  Tetralogy of Fallot s/p prior pulmonary valve replacement and s/p  pacemaker  - s/p pulmonary valve replacement with 27mm porcine valve in 30 mm  conduit (5/2/17)  Limited JAKE to evaluate left atrial appendage prior to cardioversion.  1. No evidence of intracardiac thrombus or sludging as contraindication for  cardioversion.  2. Intact atrial septum. Mild right atrial enlargement.  3. Trivial mitral valve insufficiency. Mild tricuspid valve insufficiency.  4. The bioprosthetic pulmonary valve leaflets are thin and mobile. There is  laminar flow through the pulmonary valve with a peak velocity of 2.1 m/sec  and no significant regurgitation.  5.Qualitatively the left ventricle appears mildly hypertrophied with normal  systolic function.  Page 1 of 3  11/29/2017  6. Qualitatively the right ventricle is moderately dilated and mildly  hypertrophied with mildly diminished systolic function.  7. The tricuspid regurgitant jet peak velocity is 2.2 m/sec, estimating a right  ventricular pressure of 19 mmHg above the right atrial pressure.                         Pulmonary:        Sleep Apnea                Renal/:  Renal/ Normal                  Hepatic/GI:  Hepatic/GI Normal                 Musculoskeletal:  Arthritis               OB/GYN/PEDS:                 Developmental Delay (Down Syndrome)    Neurological:  Neurology Normal                                      Endocrine:  Diabetes, well controlled, type 2 Hypothyroidism        Obesity / BMI > 30  Dermatological:  Skin Normal    Psych:  Psychiatric Normal                       Anesthesia Plan  Type of Anesthesia, risks & benefits discussed:    Anesthesia Type: Gen ETT, Gen Supraglottic Airway  Induction:  IV  Informed Consent: Informed consent signed with the Patient representative and all parties understand the risks and agree with anesthesia plan.  All questions answered.   ASA Score: 3    Ready For Surgery From Anesthesia Perspective.     .  FYI: Hx of Emergence Delirium/Combative behavior   BIJAN  Down Syndrome

## 2024-08-21 ENCOUNTER — TELEPHONE (OUTPATIENT)
Dept: OPHTHALMOLOGY | Facility: CLINIC | Age: 34
End: 2024-08-21
Payer: MEDICAID

## 2024-08-22 ENCOUNTER — HOSPITAL ENCOUNTER (OUTPATIENT)
Facility: HOSPITAL | Age: 34
Discharge: HOME | End: 2024-08-22
Attending: STUDENT IN AN ORGANIZED HEALTH CARE EDUCATION/TRAINING PROGRAM | Admitting: STUDENT IN AN ORGANIZED HEALTH CARE EDUCATION/TRAINING PROGRAM
Payer: MEDICAID

## 2024-08-22 ENCOUNTER — ANESTHESIA (OUTPATIENT)
Dept: SURGERY | Facility: HOSPITAL | Age: 34
End: 2024-08-22
Payer: MEDICAID

## 2024-08-22 VITALS
HEART RATE: 75 BPM | HEIGHT: 65 IN | OXYGEN SATURATION: 95 % | WEIGHT: 287 LBS | DIASTOLIC BLOOD PRESSURE: 65 MMHG | RESPIRATION RATE: 20 BRPM | SYSTOLIC BLOOD PRESSURE: 115 MMHG | TEMPERATURE: 98 F | BODY MASS INDEX: 47.82 KG/M2

## 2024-08-22 DIAGNOSIS — H25.811 COMBINED FORMS OF AGE-RELATED CATARACT OF RIGHT EYE: ICD-10-CM

## 2024-08-22 DIAGNOSIS — H25.11 AGE-RELATED NUCLEAR CATARACT OF RIGHT EYE: Primary | ICD-10-CM

## 2024-08-22 PROCEDURE — 36000706: Performed by: STUDENT IN AN ORGANIZED HEALTH CARE EDUCATION/TRAINING PROGRAM

## 2024-08-22 PROCEDURE — 71000044 HC DOSC ROUTINE RECOVERY FIRST HOUR: Performed by: STUDENT IN AN ORGANIZED HEALTH CARE EDUCATION/TRAINING PROGRAM

## 2024-08-22 PROCEDURE — 66982 XCAPSL CTRC RMVL CPLX WO ECP: CPT | Mod: RT,,, | Performed by: STUDENT IN AN ORGANIZED HEALTH CARE EDUCATION/TRAINING PROGRAM

## 2024-08-22 PROCEDURE — 82962 GLUCOSE BLOOD TEST: CPT | Performed by: STUDENT IN AN ORGANIZED HEALTH CARE EDUCATION/TRAINING PROGRAM

## 2024-08-22 PROCEDURE — 63600175 PHARM REV CODE 636 W HCPCS: Performed by: NURSE ANESTHETIST, CERTIFIED REGISTERED

## 2024-08-22 PROCEDURE — V2632 POST CHMBR INTRAOCULAR LENS: HCPCS | Performed by: STUDENT IN AN ORGANIZED HEALTH CARE EDUCATION/TRAINING PROGRAM

## 2024-08-22 PROCEDURE — 25000003 PHARM REV CODE 250: Performed by: STUDENT IN AN ORGANIZED HEALTH CARE EDUCATION/TRAINING PROGRAM

## 2024-08-22 PROCEDURE — 37000008 HC ANESTHESIA 1ST 15 MINUTES: Performed by: STUDENT IN AN ORGANIZED HEALTH CARE EDUCATION/TRAINING PROGRAM

## 2024-08-22 PROCEDURE — 71000015 HC POSTOP RECOV 1ST HR: Performed by: STUDENT IN AN ORGANIZED HEALTH CARE EDUCATION/TRAINING PROGRAM

## 2024-08-22 PROCEDURE — 25000003 PHARM REV CODE 250: Performed by: NURSE ANESTHETIST, CERTIFIED REGISTERED

## 2024-08-22 PROCEDURE — 36000707: Performed by: STUDENT IN AN ORGANIZED HEALTH CARE EDUCATION/TRAINING PROGRAM

## 2024-08-22 PROCEDURE — 37000009 HC ANESTHESIA EA ADD 15 MINS: Performed by: STUDENT IN AN ORGANIZED HEALTH CARE EDUCATION/TRAINING PROGRAM

## 2024-08-22 PROCEDURE — 25000003 PHARM REV CODE 250

## 2024-08-22 PROCEDURE — 25000003 PHARM REV CODE 250: Performed by: ANESTHESIOLOGY

## 2024-08-22 DEVICE — LENS EYHANCE +15.0D: Type: IMPLANTABLE DEVICE | Site: EYE | Status: FUNCTIONAL

## 2024-08-22 RX ORDER — HALOPERIDOL 5 MG/ML
0.5 INJECTION INTRAMUSCULAR EVERY 10 MIN PRN
Status: DISCONTINUED | OUTPATIENT
Start: 2024-08-22 | End: 2024-08-22 | Stop reason: HOSPADM

## 2024-08-22 RX ORDER — TROPICAMIDE 10 MG/ML
1 SOLUTION/ DROPS OPHTHALMIC
Status: DISCONTINUED | OUTPATIENT
Start: 2024-08-22 | End: 2024-08-22 | Stop reason: HOSPADM

## 2024-08-22 RX ORDER — SODIUM CHLORIDE 9 MG/ML
INJECTION, SOLUTION INTRAVENOUS CONTINUOUS
Status: DISCONTINUED | OUTPATIENT
Start: 2024-08-22 | End: 2024-08-22 | Stop reason: HOSPADM

## 2024-08-22 RX ORDER — PHENYLEPHRINE HYDROCHLORIDE 10 MG/ML
INJECTION INTRAVENOUS
Status: DISCONTINUED | OUTPATIENT
Start: 2024-08-22 | End: 2024-08-22

## 2024-08-22 RX ORDER — PHENYLEPHRINE HYDROCHLORIDE 25 MG/ML
1 SOLUTION/ DROPS OPHTHALMIC
Status: DISCONTINUED | OUTPATIENT
Start: 2024-08-22 | End: 2024-08-22 | Stop reason: HOSPADM

## 2024-08-22 RX ORDER — CYCLOPENTOLATE HYDROCHLORIDE 10 MG/ML
1 SOLUTION/ DROPS OPHTHALMIC
Status: DISCONTINUED | OUTPATIENT
Start: 2024-08-22 | End: 2024-08-22 | Stop reason: HOSPADM

## 2024-08-22 RX ORDER — MOXIFLOXACIN 5 MG/ML
SOLUTION/ DROPS OPHTHALMIC
Status: DISCONTINUED | OUTPATIENT
Start: 2024-08-22 | End: 2024-08-22 | Stop reason: HOSPADM

## 2024-08-22 RX ORDER — MANNITOL 250 MG/ML
INJECTION, SOLUTION INTRAVENOUS
Status: DISCONTINUED
Start: 2024-08-22 | End: 2024-08-22 | Stop reason: HOSPADM

## 2024-08-22 RX ORDER — PROCHLORPERAZINE EDISYLATE 5 MG/ML
5 INJECTION INTRAMUSCULAR; INTRAVENOUS EVERY 30 MIN PRN
Status: DISCONTINUED | OUTPATIENT
Start: 2024-08-22 | End: 2024-08-22 | Stop reason: HOSPADM

## 2024-08-22 RX ORDER — PREDNISOLONE ACETATE 10 MG/ML
SUSPENSION/ DROPS OPHTHALMIC
Status: DISCONTINUED
Start: 2024-08-22 | End: 2024-08-22 | Stop reason: HOSPADM

## 2024-08-22 RX ORDER — ACETAZOLAMIDE 500 MG/1
CAPSULE, EXTENDED RELEASE ORAL
Qty: 2 CAPSULE | Refills: 0 | Status: SHIPPED | OUTPATIENT
Start: 2024-08-22

## 2024-08-22 RX ORDER — LIDOCAINE HYDROCHLORIDE 10 MG/ML
INJECTION, SOLUTION EPIDURAL; INFILTRATION; INTRACAUDAL; PERINEURAL
Status: DISCONTINUED
Start: 2024-08-22 | End: 2024-08-22 | Stop reason: HOSPADM

## 2024-08-22 RX ORDER — DEXAMETHASONE SODIUM PHOSPHATE 4 MG/ML
INJECTION, SOLUTION INTRA-ARTICULAR; INTRALESIONAL; INTRAMUSCULAR; INTRAVENOUS; SOFT TISSUE
Status: DISCONTINUED
Start: 2024-08-22 | End: 2024-08-22 | Stop reason: HOSPADM

## 2024-08-22 RX ORDER — CEFAZOLIN SODIUM 500 MG/2.2ML
INJECTION, POWDER, FOR SOLUTION INTRAMUSCULAR; INTRAVENOUS
Status: DISCONTINUED
Start: 2024-08-22 | End: 2024-08-22 | Stop reason: HOSPADM

## 2024-08-22 RX ORDER — MOXIFLOXACIN 5 MG/ML
1 SOLUTION/ DROPS OPHTHALMIC
Status: DISCONTINUED | OUTPATIENT
Start: 2024-08-22 | End: 2024-08-22 | Stop reason: HOSPADM

## 2024-08-22 RX ORDER — GLUCAGON 1 MG
1 KIT INJECTION
Status: DISCONTINUED | OUTPATIENT
Start: 2024-08-22 | End: 2024-08-22 | Stop reason: HOSPADM

## 2024-08-22 RX ORDER — FENTANYL CITRATE 50 UG/ML
INJECTION, SOLUTION INTRAMUSCULAR; INTRAVENOUS
Status: DISCONTINUED | OUTPATIENT
Start: 2024-08-22 | End: 2024-08-22

## 2024-08-22 RX ORDER — PROPOFOL 10 MG/ML
VIAL (ML) INTRAVENOUS
Status: DISCONTINUED | OUTPATIENT
Start: 2024-08-22 | End: 2024-08-22

## 2024-08-22 RX ORDER — PREDNISOLONE ACETATE 10 MG/ML
1 SUSPENSION/ DROPS OPHTHALMIC
Status: DISCONTINUED | OUTPATIENT
Start: 2024-08-22 | End: 2024-08-22 | Stop reason: HOSPADM

## 2024-08-22 RX ORDER — PROPARACAINE HYDROCHLORIDE 5 MG/ML
1 SOLUTION/ DROPS OPHTHALMIC
Status: DISCONTINUED | OUTPATIENT
Start: 2024-08-22 | End: 2024-08-22 | Stop reason: HOSPADM

## 2024-08-22 RX ORDER — LIDOCAINE HYDROCHLORIDE 20 MG/ML
JELLY TOPICAL
Status: DISCONTINUED
Start: 2024-08-22 | End: 2024-08-22 | Stop reason: HOSPADM

## 2024-08-22 RX ORDER — HYDROMORPHONE HYDROCHLORIDE 1 MG/ML
0.2 INJECTION, SOLUTION INTRAMUSCULAR; INTRAVENOUS; SUBCUTANEOUS EVERY 5 MIN PRN
Status: DISCONTINUED | OUTPATIENT
Start: 2024-08-22 | End: 2024-08-22 | Stop reason: HOSPADM

## 2024-08-22 RX ORDER — FENTANYL CITRATE 50 UG/ML
25 INJECTION, SOLUTION INTRAMUSCULAR; INTRAVENOUS EVERY 5 MIN PRN
Status: DISCONTINUED | OUTPATIENT
Start: 2024-08-22 | End: 2024-08-22 | Stop reason: HOSPADM

## 2024-08-22 RX ORDER — KETOROLAC TROMETHAMINE 5 MG/ML
1 SOLUTION OPHTHALMIC
Status: DISCONTINUED | OUTPATIENT
Start: 2024-08-22 | End: 2024-08-22 | Stop reason: HOSPADM

## 2024-08-22 RX ORDER — LIDOCAINE HYDROCHLORIDE 20 MG/ML
INJECTION INTRAVENOUS
Status: DISCONTINUED | OUTPATIENT
Start: 2024-08-22 | End: 2024-08-22

## 2024-08-22 RX ORDER — DEXMEDETOMIDINE HYDROCHLORIDE 100 UG/ML
INJECTION, SOLUTION INTRAVENOUS
Status: DISCONTINUED | OUTPATIENT
Start: 2024-08-22 | End: 2024-08-22

## 2024-08-22 RX ORDER — MOXIFLOXACIN 5 MG/ML
SOLUTION/ DROPS OPHTHALMIC
Status: COMPLETED
Start: 2024-08-22 | End: 2024-08-22

## 2024-08-22 RX ORDER — LIDOCAINE HYDROCHLORIDE 40 MG/ML
INJECTION, SOLUTION RETROBULBAR
Status: DISCONTINUED
Start: 2024-08-22 | End: 2024-08-22 | Stop reason: HOSPADM

## 2024-08-22 RX ORDER — PREDNISOLONE ACETATE 10 MG/ML
SUSPENSION/ DROPS OPHTHALMIC
Status: DISCONTINUED | OUTPATIENT
Start: 2024-08-22 | End: 2024-08-22 | Stop reason: HOSPADM

## 2024-08-22 RX ORDER — ROCURONIUM BROMIDE 10 MG/ML
INJECTION, SOLUTION INTRAVENOUS
Status: DISCONTINUED | OUTPATIENT
Start: 2024-08-22 | End: 2024-08-22

## 2024-08-22 RX ORDER — MANNITOL 250 MG/ML
INJECTION, SOLUTION INTRAVENOUS
Status: DISCONTINUED | OUTPATIENT
Start: 2024-08-22 | End: 2024-08-22

## 2024-08-22 RX ORDER — OXYCODONE HYDROCHLORIDE 5 MG/1
5 TABLET ORAL
Status: DISCONTINUED | OUTPATIENT
Start: 2024-08-22 | End: 2024-08-22 | Stop reason: HOSPADM

## 2024-08-22 RX ORDER — SUCCINYLCHOLINE CHLORIDE 20 MG/ML
INJECTION INTRAMUSCULAR; INTRAVENOUS
Status: DISCONTINUED | OUTPATIENT
Start: 2024-08-22 | End: 2024-08-22

## 2024-08-22 RX ORDER — ONDANSETRON HYDROCHLORIDE 2 MG/ML
INJECTION, SOLUTION INTRAVENOUS
Status: DISCONTINUED | OUTPATIENT
Start: 2024-08-22 | End: 2024-08-22

## 2024-08-22 RX ORDER — SODIUM CHLORIDE 0.9 % (FLUSH) 0.9 %
2 SYRINGE (ML) INJECTION
Status: DISCONTINUED | OUTPATIENT
Start: 2024-08-22 | End: 2024-08-22 | Stop reason: HOSPADM

## 2024-08-22 RX ORDER — LIDOCAINE HYDROCHLORIDE 40 MG/ML
INJECTION, SOLUTION RETROBULBAR
Status: DISCONTINUED | OUTPATIENT
Start: 2024-08-22 | End: 2024-08-22 | Stop reason: HOSPADM

## 2024-08-22 RX ADMIN — MOXIFLOXACIN OPHTHALMIC 1 DROP: 5 SOLUTION/ DROPS OPHTHALMIC at 09:08

## 2024-08-22 RX ADMIN — KETOROLAC TROMETHAMINE 1 DROP: 5 SOLUTION/ DROPS OPHTHALMIC at 09:08

## 2024-08-22 RX ADMIN — CYCLOPENTOLATE HYDROCHLORIDE 1 DROP: 10 SOLUTION/ DROPS OPHTHALMIC at 09:08

## 2024-08-22 RX ADMIN — PHENYLEPHRINE HYDROCHLORIDE 1 DROP: 25 SOLUTION/ DROPS OPHTHALMIC at 09:08

## 2024-08-22 RX ADMIN — SUGAMMADEX 200 MG: 100 INJECTION, SOLUTION INTRAVENOUS at 10:08

## 2024-08-22 RX ADMIN — SUCCINYLCHOLINE 140 MG: 20 INJECTION, SOLUTION INTRAMUSCULAR; INTRAVENOUS at 09:08

## 2024-08-22 RX ADMIN — ONDANSETRON 4 MG: 2 INJECTION INTRAMUSCULAR; INTRAVENOUS at 10:08

## 2024-08-22 RX ADMIN — OXYCODONE 5 MG: 5 TABLET ORAL at 11:08

## 2024-08-22 RX ADMIN — DEXMEDETOMIDINE 4 MCG: 100 INJECTION, SOLUTION, CONCENTRATE INTRAVENOUS at 10:08

## 2024-08-22 RX ADMIN — DEXMEDETOMIDINE 4 MCG: 100 INJECTION, SOLUTION, CONCENTRATE INTRAVENOUS at 09:08

## 2024-08-22 RX ADMIN — PHENYLEPHRINE HYDROCHLORIDE 200 MCG: 10 INJECTION INTRAVENOUS at 10:08

## 2024-08-22 RX ADMIN — SODIUM CHLORIDE: 9 INJECTION, SOLUTION INTRAVENOUS at 09:08

## 2024-08-22 RX ADMIN — FENTANYL CITRATE 100 MCG: 50 INJECTION, SOLUTION INTRAMUSCULAR; INTRAVENOUS at 09:08

## 2024-08-22 RX ADMIN — MANNITOL 12.5 G: 12.5 INJECTION, SOLUTION INTRAVENOUS at 10:08

## 2024-08-22 RX ADMIN — LIDOCAINE HYDROCHLORIDE 100 MG: 20 INJECTION INTRAVENOUS at 09:08

## 2024-08-22 RX ADMIN — ROCURONIUM BROMIDE 15 MG: 10 INJECTION, SOLUTION INTRAVENOUS at 10:08

## 2024-08-22 RX ADMIN — TROPICAMIDE 1 DROP: 10 SOLUTION/ DROPS OPHTHALMIC at 09:08

## 2024-08-22 RX ADMIN — PROPOFOL 100 MG: 10 INJECTION, EMULSION INTRAVENOUS at 10:08

## 2024-08-22 RX ADMIN — PROPARACAINE HYDROCHLORIDE 1 DROP: 5 SOLUTION/ DROPS OPHTHALMIC at 09:08

## 2024-08-22 RX ADMIN — PROPOFOL 200 MG: 10 INJECTION, EMULSION INTRAVENOUS at 09:08

## 2024-08-22 RX ADMIN — ROCURONIUM BROMIDE 5 MG: 10 INJECTION, SOLUTION INTRAVENOUS at 09:08

## 2024-08-22 NOTE — ANESTHESIA RELEASE NOTE
"Anesthesia Release from PACU Note    Patient: Cipriano Thompson    Procedure(s) Performed: Procedure(s) (LRB):  PHACOEMULSIFICATION, CATARACT (Right)  INSERTION, IOL PROSTHESIS (Right)    Anesthesia type: general    Post pain: Adequate analgesia    Post assessment: no apparent anesthetic complications, tolerated procedure well, and no evidence of recall    Last Vitals: Visit Vitals  /78 (BP Location: Left arm, Patient Position: Lying)   Pulse 75   Temp 36.6 °C (97.9 °F) (Temporal)   Resp 16   Ht 5' 5" (1.651 m)   Wt 130.2 kg (287 lb)   SpO2 97%   BMI 47.76 kg/m²       Post vital signs: stable    Level of consciousness: sedated    Nausea/Vomiting: no nausea/no vomiting    Complications: none    Airway Patency: patent    Respiratory: unassisted, spontaneous ventilation, face mask    Cardiovascular: stable and blood pressure at baseline    Hydration: euvolemic  "

## 2024-08-22 NOTE — DISCHARGE SUMMARY
[facility] - Surgery (1st Fl)  Discharge Note  Short Stay    Procedure(s) (LRB):  PHACOEMULSIFICATION, CATARACT (Right)  INSERTION, IOL PROSTHESIS (Right)    OUTCOME: Patient tolerated treatment/procedure well without complication and is now ready for discharge.    DISPOSITION: Home or Self Care    FINAL DIAGNOSIS:  Combined forms of age-related cataract of right eye    FOLLOWUP: In clinic    DISCHARGE INSTRUCTIONS:  No discharge procedures on file.     TIME SPENT ON DISCHARGE: 5 minutes

## 2024-08-22 NOTE — H&P
"Ophthalmology History and Physical     Patient Name:  Cipriano Thompson  MRN:  [id number]  :  1990    Allergies:  Adhesive; Latex, natural rubber; and Sulfa (sulfonamide antibiotics)    CC:  Here for Surgery    HPI:  Patient presenting for cataract surgery.    Interval History: No significant changes to past medical history, allergies, or medications.    ROS:  No fevers, chills, chest pain, shortness of breath, nausea or emesis         Past Medical History:   Diagnosis Date    Atrial fibrillation     CHF (congestive heart failure)     Down syndrome     Encounter for blood transfusion     Gout     Hypothyroidism     S/P surgery for complex congenital heart disease 2017    Sleep apnea     Tetralogy of Fallot 1990    VT (ventricular tachycardia) 3/27/2019     Family History   Problem Relation Name Age of Onset    No Known Problems Mother Luann     No Known Problems Sister Barbara      Past Surgical History:   Procedure Laterality Date    BUNIONECTOMY      CARDIAC SURGERY      open heart x 3, ppm     INSERT / REPLACE / REMOVE PACEMAKER Left 10/2008    NONINVASIVE CARDIAC ELECTROPHYSIOLOGY STUDY N/A 3/27/2019    Procedure: CARDIAC ELECTROPHYSIOLOGY STUDY, NONINVASIVE;  Surgeon: Jose Ortiz MD;  Location: [facility] EP LAB;  Service: Cardiology;  Laterality: N/A;  VT, NIEPS, Venogram, MDT, MAC, 3 prep, Macicek    TONSILLECTOMY         Medications marked as taking:  [unfilled]    Vital Signs:  /86 (BP Location: Left arm, Patient Position: Lying)   Pulse 75   Temp 97.9 °F (36.6 °C) (Temporal)   Resp 16   Ht 5' 5" (1.651 m)   Wt 130.2 kg (287 lb)   SpO2 97%   BMI 47.76 kg/m²     Ophthalmology Exam:  Per last ophthalmology clinic note    Heart Exam: As per anesthesia    Lung Exam:  As per anesthesia    Assessment and Plan:     Cipriano Thompson is a 34 y.o. male presenting for cataract surgery.    -Written consent present   -Surgical eye marked  -Plan to proceed to OR as planned    Polly Michel, " MD [facility], Gl[facility]

## 2024-08-22 NOTE — DISCHARGE INSTRUCTIONS
POST-OPERATIVE CATARACT DROP INSTRUCTIONS    You will re-start the combination post op drop (prednisolone/moxifloxacin/bromfenac) tomorrow. Do not take the patch/shield off tonight.    Acetazolamide, 500 mg -- at the pharmacy  Take 1 pill tonight at 7pm. Take 1 pill tomorrow morning at 7am prior to the post op visit.    ---------------------------------------------------------------------------------------    No eye make-up for 2 weeks  No heavy lifting, bending or straining for 10 days  Do not rub your eyes for 1 month  Do not get water in your eyes for 1 week  No swimming for 1 month  Please sleep with the shield over your eye for the next week to protect your eye during sleep    If you experience any increasing redness, sensitivity to light, pain, or changes in vision, please call the office immediately.    Polly Michel MD  Office number: [phone]

## 2024-08-22 NOTE — ANESTHESIA PROCEDURE NOTES
Intubation    Date/Time: 8/22/2024 10:00 AM    Performed by: Larry Maurer CRNA  Authorized by: Josephine Barrios MD    Intubation:     Induction:  Intravenous    Intubated:  Postinduction    Mask Ventilation:  Easy with oral airway    Attempts:  1    Attempted By:  CRNA    Method of Intubation:  Video laryngoscopy    Blade:  Elmore 4    Laryngeal View Grade: Grade I - full view of cords      Difficult Airway Encountered?: No      Complications:  None    Airway Device:  Oral endotracheal tube    Airway Device Size:  7.5    Style/Cuff Inflation:  Cuffed (inflated to minimal occlusive pressure)    Tube secured:  22    Secured at:  The teeth    Placement Verified By:  Capnometry    Complicating Factors:  None    Findings Post-Intubation:  BS equal bilateral and atraumatic/condition of teeth unchanged

## 2024-08-22 NOTE — OP NOTE
DATE OF SURGERY: 8/22/2024    PREOPERATIVE DIAGNOSES:  1. Visually significant combined form cataract, right eye.  2. Poor red reflex, right eye    POSTOPERATIVE DIAGNOSES:  Same    PROCEDURES PERFORMED:  Complex cataract extraction with intraocular lens implant, right eye    ATTENDING SURGEON:  Polly Michel M.D.    ANESTHESIA:  MAC    COMPLICATIONS:  None.    IMPLANTS:   Implant Name Type Inv. Item Serial No.  Lot No. LRB No. Used Action   LENS EYHANCE +18.0D - [id number]  LENS EYHANCE +18.0D [id number] [employer]  Right 1 Implanted         JUSTIFICATION OF SURGERY:     Cipriano Thompson is a 34 y.o. male with a history and physical exam consistent with a visually significant cataract. We discussed his medical conditions and treatment options, including the risks, benefits, and alternatives of surgery. he expressed his understanding of the risks, benefits, and alternatives to the procedure including, but not limited to infection, bleeding, loss of vision, loss of eye, corneal edema, need for glasses, and need for further surgical intervention. After answering all his questions, there was an understanding of the issues involved with surgery and the consent was signed.    PROCEDURE:  Local anesthesia  Betadine paint and drop in holding room   Prep and drape in usual manner in OR  Time out according to protocol  Temporal lid speculum placed  Paracentesis made with sideport blade  Lidocaine and Omidria injected. and Trypan blue injected and rinsed. Viscoelastic injected  Clear corneal incision made with 2.5 mm keratome blade  Healon GV injected into the anterior chamber  27 g needle decompression of central white cataract  Continuous curvilinear capsulorrhexis created using a prebent cystotome and Utrata forceps  Gentle hydrodissection until nucleus was mobile  Phacoemulsification tip used to disassemble the lens and remove it  Removal of remaining cortical material and epinuclear shell with  the irrigation and aspiration handpiece  Capsular bag inflated with Provisc  Intraocular lens injected into the capsular bag and centered  Viscoelastic removed with the irrigation and aspiration handpiece  Posterior pressure pushing things forward  IV Mannitol given  Provisc inserted into anterior chamber to deepen the chamber  Both wounds hydrated, suture placed to close main wound, wounds checked and found to be watertight  Subconjunctival injection of antibiotics and steroids  Speculum removed from the eye.  Anti-inflammatory and antibiotic drops instilled into the eye  Patch and plastic shield placed on the eye    The patient tolerated the procedure well. There were no complications and the patient left the operating room in good condition. Arrangements were made for the patient to be seen in the outpatient clinic on the first postoperative day.

## 2024-08-23 ENCOUNTER — PATIENT MESSAGE (OUTPATIENT)
Dept: PEDIATRIC CARDIOLOGY | Facility: CLINIC | Age: 34
End: 2024-08-23
Payer: MEDICAID

## 2024-08-23 ENCOUNTER — OFFICE VISIT (OUTPATIENT)
Dept: OPHTHALMOLOGY | Facility: CLINIC | Age: 34
End: 2024-08-23
Payer: MEDICAID

## 2024-08-23 DIAGNOSIS — Z96.1 STATUS POST CATARACT EXTRACTION AND INSERTION OF INTRAOCULAR LENS, RIGHT: Primary | ICD-10-CM

## 2024-08-23 DIAGNOSIS — Z98.41 STATUS POST CATARACT EXTRACTION AND INSERTION OF INTRAOCULAR LENS, RIGHT: Primary | ICD-10-CM

## 2024-08-23 PROCEDURE — 99999 PR PBB SHADOW E&M-EST. PATIENT-LVL III: CPT | Mod: PBBFAC,,, | Performed by: STUDENT IN AN ORGANIZED HEALTH CARE EDUCATION/TRAINING PROGRAM

## 2024-08-23 PROCEDURE — 99213 OFFICE O/P EST LOW 20 MIN: CPT | Mod: PBBFAC | Performed by: STUDENT IN AN ORGANIZED HEALTH CARE EDUCATION/TRAINING PROGRAM

## 2024-08-23 NOTE — PROGRESS NOTES
Subjective:  HPI     Post-op Evaluation     Additional comments: 1 day po           Comments    S/p Phaco w/IOL OD- 8/22/2024    Pt's mom states Cipriano is doing well. No pain. No discharge.              Last edited by Frank Todd on 8/23/2024  9:25 AM.        Exam:  See encounter report for full exam    Assessment:  1. Status post cataract extraction and insertion of intraocular lens, right  2. Nuclear sclerotic cataract of left eye  - White cataract with unclear VA potential and inability to get accurate calcs    08/23/2024  POD#1 s/p complex phaco/IOL 8/22/24  Doing well    3. Exotropia  Sensory, secondary to poor VA from #1    4. Myopia of left eye  F/b Dr. Jacobs    5. Type 2 diabetes mellitus without retinopathy  5/3/24: No signs of retinopathy OS (no view OD)  Continue blood glucose control with PCP     Plan:  - Start Pred/Moxi/Bromfenac TID x 1 month then stop    Return 2 weeks -- VA, IOP, sooner PRN    Polly Michel MD  [facility], [facility]

## 2024-08-25 NOTE — ANESTHESIA POSTPROCEDURE EVALUATION
Anesthesia Post Evaluation    Patient: Cipriano Thompson    Procedure(s) Performed: Procedure(s) (LRB):  PHACOEMULSIFICATION, CATARACT (Right)  INSERTION, IOL PROSTHESIS (Right)    Final Anesthesia Type: general      Patient location during evaluation: PACU  Patient participation: Yes- Able to Participate  Level of consciousness: awake and alert  Post-procedure vital signs: reviewed and stable  Pain management: adequate  Airway patency: patent    PONV status at discharge: No PONV  Anesthetic complications: no      Cardiovascular status: blood pressure returned to baseline  Respiratory status: room air  Hydration status: euvolemic  Follow-up not needed.              Vitals Value Taken Time   /65 08/22/24 1145   Temp 36.7 °C (98 °F) 08/22/24 1145   Pulse 75 08/22/24 1145   Resp 20 08/22/24 1145   SpO2 95 % 08/22/24 1145         No case tracking events are documented in the log.      Pain/Justo Score: No data recorded

## 2024-08-26 ENCOUNTER — HOSPITAL ENCOUNTER (OUTPATIENT)
Dept: PEDIATRIC CARDIOLOGY | Facility: HOSPITAL | Age: 34
Discharge: HOME | End: 2024-08-26
Attending: PEDIATRICS
Payer: MEDICAID

## 2024-08-26 DIAGNOSIS — I47.20 VT (VENTRICULAR TACHYCARDIA): ICD-10-CM

## 2024-08-26 DIAGNOSIS — I48.3 TYPICAL ATRIAL FLUTTER: ICD-10-CM

## 2024-08-26 DIAGNOSIS — Q21.3 TETRALOGY OF FALLOT: ICD-10-CM

## 2024-08-26 DIAGNOSIS — I37.2 NONRHEUMATIC PULMONARY VALVE STENOSIS WITH INSUFFICIENCY: ICD-10-CM

## 2024-08-26 DIAGNOSIS — I48.91 ATRIAL FIBRILLATION, UNSPECIFIED TYPE: ICD-10-CM

## 2024-08-26 DIAGNOSIS — Q24.9 ADULT CONGENITAL HEART DISEASE: ICD-10-CM

## 2024-08-26 DIAGNOSIS — Z95.0 PACEMAKER: ICD-10-CM

## 2024-08-26 LAB
AV DELAY - LONGEST: 240 MSEC
BATTERY VOLTAGE (V): 2.98 V
ERI (V): 2.63 V
IMPEDANCE RA LEAD (NATIVE): 418 OHMS
IMPEDANCE RA LEAD: 418 OHMS
OHS CV DC PP MS1: 0.4 MS
OHS CV DC PP MS2: 0.4 MS
OHS CV DC PP V1: NORMAL V
OHS CV DC PP V2: NORMAL V
P/R-WAVE RA LEAD (NATIVE): 2.9 MV
P/R-WAVE RA LEAD: 2.6 MV
PV DELAY - LONGEST: 220 MSEC
THRESHOLD MS RA LEAD (NATIVE): 0.4 MS
THRESHOLD MS RA LEAD: 0.4 MS
THRESHOLD V RA LEAD (NATIVE): 1.25 V
THRESHOLD V RA LEAD: 0.5 V

## 2024-08-26 PROCEDURE — 93294 REM INTERROG EVL PM/LDLS PM: CPT | Mod: ,,, | Performed by: PEDIATRICS

## 2024-08-26 PROCEDURE — 93296 REM INTERROG EVL PM/IDS: CPT

## 2024-09-04 ENCOUNTER — OFFICE VISIT (OUTPATIENT)
Dept: OPHTHALMOLOGY | Facility: CLINIC | Age: 34
End: 2024-09-04
Payer: MEDICAID

## 2024-09-04 DIAGNOSIS — Z98.41 STATUS POST CATARACT EXTRACTION AND INSERTION OF INTRAOCULAR LENS, RIGHT: Primary | ICD-10-CM

## 2024-09-04 DIAGNOSIS — Z96.1 STATUS POST CATARACT EXTRACTION AND INSERTION OF INTRAOCULAR LENS, RIGHT: Primary | ICD-10-CM

## 2024-09-04 DIAGNOSIS — H50.10 EXOTROPIA: ICD-10-CM

## 2024-09-04 DIAGNOSIS — H52.12 MYOPIA OF LEFT EYE: ICD-10-CM

## 2024-09-04 DIAGNOSIS — H25.12 NUCLEAR SCLEROTIC CATARACT OF LEFT EYE: ICD-10-CM

## 2024-09-04 DIAGNOSIS — E11.9 TYPE 2 DIABETES MELLITUS WITHOUT RETINOPATHY: ICD-10-CM

## 2024-09-04 PROCEDURE — 99024 POSTOP FOLLOW-UP VISIT: CPT | Mod: ,,, | Performed by: STUDENT IN AN ORGANIZED HEALTH CARE EDUCATION/TRAINING PROGRAM

## 2024-09-04 PROCEDURE — 3044F HG A1C LEVEL LT 7.0%: CPT | Mod: CPTII,,, | Performed by: STUDENT IN AN ORGANIZED HEALTH CARE EDUCATION/TRAINING PROGRAM

## 2024-09-04 PROCEDURE — 99213 OFFICE O/P EST LOW 20 MIN: CPT | Mod: PBBFAC | Performed by: STUDENT IN AN ORGANIZED HEALTH CARE EDUCATION/TRAINING PROGRAM

## 2024-09-04 PROCEDURE — 99999 PR PBB SHADOW E&M-EST. PATIENT-LVL III: CPT | Mod: PBBFAC,,, | Performed by: STUDENT IN AN ORGANIZED HEALTH CARE EDUCATION/TRAINING PROGRAM

## 2024-09-04 PROCEDURE — 1159F MED LIST DOCD IN RCRD: CPT | Mod: CPTII,,, | Performed by: STUDENT IN AN ORGANIZED HEALTH CARE EDUCATION/TRAINING PROGRAM

## 2024-09-04 NOTE — PROGRESS NOTES
Subjective:  HPI    DLS: 08/23/2024  S/p Complex Phaco w/IOL OD- 8/22/2024  Post op check   Doing well per Mom   Triple Drops TID OD     Last edited by Balbina Sorensen MA on 9/4/2024 10:12 AM.        Exam:  See encounter report for full exam    Assessment:  1. Status post cataract extraction and insertion of intraocular lens, right  2. Nuclear sclerotic cataract of left eye  - White cataract with unclear VA potential and inability to get accurate calcs    09/04/2024  POW#2 s/p complex phaco/IOL 8/22/24  Doing well    3. Exotropia  Sensory, secondary to poor VA from #1  Re-refer to Dr. Jacobs after phaco- patient's mom interested in strab sx    4. Myopia of left eye  F/b Dr. Jacobs    5. Type 2 diabetes mellitus without retinopathy  5/3/24: No signs of retinopathy OS (no view OD)  Continue blood glucose control with PCP     Plan:  - Continue Pred/Moxi/Bromfenac TID x 1 month then stop (if runs out, use PF TID until end date)    Return 3 weeks -- VA, IOP, Dilate    Polly Michel MD  [facility], [facility]

## 2024-09-16 ENCOUNTER — OFFICE VISIT (OUTPATIENT)
Dept: INTERNAL MEDICINE | Facility: CLINIC | Age: 34
End: 2024-09-16
Payer: MEDICAID

## 2024-09-16 VITALS
SYSTOLIC BLOOD PRESSURE: 120 MMHG | HEART RATE: 76 BPM | HEIGHT: 65 IN | BODY MASS INDEX: 45.45 KG/M2 | OXYGEN SATURATION: 93 % | WEIGHT: 272.81 LBS | DIASTOLIC BLOOD PRESSURE: 82 MMHG

## 2024-09-16 DIAGNOSIS — E03.9 HYPOTHYROIDISM, UNSPECIFIED TYPE: ICD-10-CM

## 2024-09-16 DIAGNOSIS — E11.9 TYPE 2 DIABETES MELLITUS WITHOUT RETINOPATHY: ICD-10-CM

## 2024-09-16 DIAGNOSIS — Q90.9 DOWN SYNDROME: Primary | ICD-10-CM

## 2024-09-16 DIAGNOSIS — G47.33 OBSTRUCTIVE SLEEP APNEA: ICD-10-CM

## 2024-09-16 DIAGNOSIS — M10.9 GOUT, ARTHRITIS: ICD-10-CM

## 2024-09-16 PROCEDURE — 3044F HG A1C LEVEL LT 7.0%: CPT | Mod: CPTII,,, | Performed by: INTERNAL MEDICINE

## 2024-09-16 PROCEDURE — 3079F DIAST BP 80-89 MM HG: CPT | Mod: CPTII,,, | Performed by: INTERNAL MEDICINE

## 2024-09-16 PROCEDURE — 3074F SYST BP LT 130 MM HG: CPT | Mod: CPTII,,, | Performed by: INTERNAL MEDICINE

## 2024-09-16 PROCEDURE — 3008F BODY MASS INDEX DOCD: CPT | Mod: CPTII,,, | Performed by: INTERNAL MEDICINE

## 2024-09-16 PROCEDURE — 1159F MED LIST DOCD IN RCRD: CPT | Mod: CPTII,,, | Performed by: INTERNAL MEDICINE

## 2024-09-16 PROCEDURE — 99214 OFFICE O/P EST MOD 30 MIN: CPT | Mod: PBBFAC | Performed by: INTERNAL MEDICINE

## 2024-09-16 PROCEDURE — 99214 OFFICE O/P EST MOD 30 MIN: CPT | Mod: S$PBB,,, | Performed by: INTERNAL MEDICINE

## 2024-09-16 PROCEDURE — 99999 PR PBB SHADOW E&M-EST. PATIENT-LVL IV: CPT | Mod: PBBFAC,,, | Performed by: INTERNAL MEDICINE

## 2024-09-16 NOTE — PROGRESS NOTES
Chief Complaint:  Follow up of  Medical problems     HPI:This is a 34 year old man who presents with his mother for follow up of his medical problems.     All history is obtained from his mother     He is getting this Ozempic 2 mg injection once a week for his blood sugars.  Weight 12/20/23 was 302. Current weight is 272. He has lost another 15 pounds. HE has cut back on the food that he eats.     He threw the electrical cord away from his CPAP machine several months ago.  He has lots of air leak from the CPAP machine since he has lost weight.       He is going to a day program 5 days a week at the [facility] since July 22.     He had his right cataract removed and vision is better in the right eye.        He has gout. He should take allopurinol 300 mg daily.  No gouty flares.      His mother notes that Cipriano has not been taking his medication regularly - he still takes his medications 50% of the time. His mother fills a 2 week tray of his medications and she winds up filling his medication tray once a month. Mother reports that he has been better taking his medication.       He should take coreg 25 mg daily, furosemide 40 mg once daily as needed.  and levothyroxine 88 mcg daily.      HE is taking vitamin D 50,000 units twice weekly.               Past Medical History:   Diagnosis Date    Atrial fibrillation      CHF (congestive heart failure)      Down syndrome      Encounter for blood transfusion      Gout      Hypothyroidism      S/P surgery for complex congenital heart disease 1/30/2017    Sleep apnea      Tetralogy of Fallot 05/1990    VT (ventricular tachycardia) 3/27/2019                      Past Surgical History:   Procedure Laterality Date    BUNIONECTOMY        CARDIAC SURGERY         open heart, ppm     INSERT / REPLACE / REMOVE PACEMAKER Left 10/2008    NONINVASIVE CARDIAC ELECTROPHYSIOLOGY STUDY N/A 3/27/2019     Procedure: CARDIAC ELECTROPHYSIOLOGY STUDY, NONINVASIVE;  Surgeon: Jose Ortiz MD;   Location: [facility] EP LAB;  Service: Cardiology;  Laterality: N/A;  VT, NIEPS, Venogram, MDT, MAC, 3 prep, Macicek    TONSILLECTOMY          Social History                   Socioeconomic History    Marital status: Single       Spouse name: Not on file    Number of children: Not on file    Years of education: Not on file    Highest education level: Not on file   Occupational History    Not on file   Tobacco Use    Smoking status: Never Smoker    Smokeless tobacco: Never Used   Substance and Sexual Activity    Alcohol use: No    Drug use: No    Sexual activity: Not Currently   Other Topics Concern    Not on file   Social History Narrative     Lives with mother, 1 dog (inside)      Social Determinants of Health            Financial Resource Strain:     Difficulty of Paying Living Expenses:    Food Insecurity:     Worried About Running Out of Food in the Last Year:     Ran Out of Food in the Last Year:    Transportation Needs:     Lack of Transportation (Medical):     Lack of Transportation (Non-Medical):    Physical Activity:     Days of Exercise per Week:     Minutes of Exercise per Session:    Stress:     Feeling of Stress :    Social Connections:     Frequency of Communication with Friends and Family:     Frequency of Social Gatherings with Friends and Family:     Attends [employer] Services:     Active Member of Clubs or Organizations:     Attends Club or Organization Meetings:     Marital Status:                    Family Status   Relation Name Status    Mother Luann Alive    Sister Barbara Alive    MGM       MGF       PGM       PGF               Meds and allergies: updated on Epic     REVIEW OF SYSTEMS: No fevers, chills,  hearing loss, sinus congestion, sore throat, chest pain, nausea, vomiting, constipation, diarrhea, dysuria, hematuria, polydipsia, polyuria, headaches, anxiety, depression     He is not wearing his eye glasses.      Physical exam:   /82 (BP Location: Right  "arm, Patient Position: Sitting, BP Method: Large (Manual))   Pulse 76   Ht 5' 5" (1.651 m)   Wt 123.8 kg (272 lb 13.1 oz)   SpO2 (!) 93%   BMI 45.40 kg/m²      General: alert, oriented x 3, no apparent distress.  Affect normal  HEENT: Conjunctivae: anicteric, PERRL, EOMI, TM clear, Oralpharynx clear  Neck: supple, no thyroid enlargement, no cervical lymphadenopathy  Resp: effort normal, lungs clear bilaterally  CV: Regular rate and rhythm without murmurs, gallops or rubs, no lower extremity edema,          Assessment/Plan:       Congenital heart disease with CHF, a fib and pacemaker - follow up with cardiology. Doing better with compliance  with medications and diet.      Sleep apnea - Get CPAP machine serviced/ order written for cord. Apt scheduled with sleep medicine     Hypothyroidism - TSH     GOut on allopurinol to 300 mg once daily     Morbid Obesity - work on diet and exercise     Down's Syndrome with mental handicapped    Encouraged more family support.      Vitamin D deficiency - get on prescription vitamin D 50,000 units twice a week      Diabetes - better on Ozempic 2 mg once weekly.      I will see him back in 4 months, sooner if problems arise.  "

## 2024-09-24 NOTE — PROGRESS NOTES
Subjective:      Exam:  See encounter report for full exam    Assessment:  1. Status post cataract extraction and insertion of intraocular lens, right  2. Nuclear sclerotic cataract of left eye  - White cataract with unclear VA potential and inability to get accurate calcs    09/24/2024  S/p complex phaco/IOL 8/22/24  Doing well    3. Exotropia  Sensory, secondary to poor VA from #1    4. Myopia of left eye  F/b Dr. Jacobs    5. Type 2 diabetes mellitus without retinopathy  5/3/24: No signs of retinopathy OS (no view OD)  Continue blood glucose control with PCP     Plan:  - STOP PF    Return me PRN  Dr. Jacobs 2-3 months retinoscopy MRX    Polly Michel MD  [facility] [place] [facility]

## 2024-09-25 ENCOUNTER — OFFICE VISIT (OUTPATIENT)
Dept: OPHTHALMOLOGY | Facility: CLINIC | Age: 34
End: 2024-09-25
Payer: MEDICAID

## 2024-09-25 DIAGNOSIS — E11.9 TYPE 2 DIABETES MELLITUS WITHOUT RETINOPATHY: ICD-10-CM

## 2024-09-25 DIAGNOSIS — H25.12 NUCLEAR SCLEROTIC CATARACT OF LEFT EYE: ICD-10-CM

## 2024-09-25 DIAGNOSIS — Z96.1 STATUS POST CATARACT EXTRACTION AND INSERTION OF INTRAOCULAR LENS, RIGHT: Primary | ICD-10-CM

## 2024-09-25 DIAGNOSIS — Z98.41 STATUS POST CATARACT EXTRACTION AND INSERTION OF INTRAOCULAR LENS, RIGHT: Primary | ICD-10-CM

## 2024-09-25 DIAGNOSIS — H50.10 EXOTROPIA: ICD-10-CM

## 2024-09-25 DIAGNOSIS — H52.12 MYOPIA OF LEFT EYE: ICD-10-CM

## 2024-09-25 PROCEDURE — 3044F HG A1C LEVEL LT 7.0%: CPT | Mod: CPTII,,, | Performed by: STUDENT IN AN ORGANIZED HEALTH CARE EDUCATION/TRAINING PROGRAM

## 2024-09-25 PROCEDURE — 1159F MED LIST DOCD IN RCRD: CPT | Mod: CPTII,,, | Performed by: STUDENT IN AN ORGANIZED HEALTH CARE EDUCATION/TRAINING PROGRAM

## 2024-09-25 PROCEDURE — 99024 POSTOP FOLLOW-UP VISIT: CPT | Mod: ,,, | Performed by: STUDENT IN AN ORGANIZED HEALTH CARE EDUCATION/TRAINING PROGRAM

## 2024-09-25 PROCEDURE — 99999 PR PBB SHADOW E&M-EST. PATIENT-LVL II: CPT | Mod: PBBFAC,,, | Performed by: STUDENT IN AN ORGANIZED HEALTH CARE EDUCATION/TRAINING PROGRAM

## 2024-09-25 PROCEDURE — 99212 OFFICE O/P EST SF 10 MIN: CPT | Mod: PBBFAC | Performed by: STUDENT IN AN ORGANIZED HEALTH CARE EDUCATION/TRAINING PROGRAM

## 2024-11-04 NOTE — PROGRESS NOTES
Subjective     Patient ID: Cipriano Thompson is a 34 y.o. male.    Chief Complaint: Cerumen Impaction    HPI  Cipriano Thompson is a 34 y.o. male with a history of Down Syndrome, T2DM (on ozempic) Hypothyroidism, Congenital heart disease, Afib (pacemaker), BIJAN and obesity who presents to clinic for hearing loss. Patient presents with his mother Paula who helps provide the history. Notes hearing loss over the last few weeks and is worse on the right. Denies otalgia or otorrhea. They use Q tips and peroxide frequently to his ears. Denies history of chronic ear infections or ear surgeries. Denies loud noise exposure. Last had his ears cleaned at 10 years old.      Past Medical History:   Diagnosis Date    Atrial fibrillation     CHF (congestive heart failure)     Down syndrome     Encounter for blood transfusion     Gout     Hypothyroidism     S/P surgery for complex congenital heart disease 1/30/2017    Sleep apnea     Tetralogy of Fallot 05/1990    VT (ventricular tachycardia) 3/27/2019      Past Surgical History:   Procedure Laterality Date    BUNIONECTOMY      CARDIAC SURGERY      open heart x 3, ppm     INSERT / REPLACE / REMOVE PACEMAKER Left 10/2008    INTRAOCULAR PROSTHESES INSERTION Right 8/22/2024    Procedure: INSERTION, IOL PROSTHESIS;  Surgeon: Polly Michel MD;  Location: [facility] OR [address] [place] [address];  Service: Ophthalmology;  Laterality: Right;    NONINVASIVE CARDIAC ELECTROPHYSIOLOGY STUDY N/A 3/27/2019    Procedure: CARDIAC ELECTROPHYSIOLOGY STUDY, NONINVASIVE;  Surgeon: Jose Ortiz MD;  Location: [facility] EP LAB;  Service: Cardiology;  Laterality: N/A;  VT, NIEPS, Venogram, MDT, MAC, 3 prep, Macicek    PHACOEMULSIFICATION OF CATARACT Right 8/22/2024    Procedure: PHACOEMULSIFICATION, CATARACT;  Surgeon: Polly Michel MD;  Location: [facility] OR [address] [place] [address];  Service: Ophthalmology;  Laterality: Right;  NEEDS RETINA MICROSCOPE    TONSILLECTOMY        Review of patient's allergies indicates:   Allergen Reactions     Adhesive     Latex, natural rubber     Sulfa (sulfonamide antibiotics)       Review of Systems   Constitutional:  Negative for chills and fever.   HENT:  Positive for hearing loss. Negative for ear discharge and ear pain.    Neurological:  Negative for dizziness.        Objective   Physical Exam  HENT:      Ears:        Comments: Ceruminous debris obstructing bilateral Tms, removed via microscopy. Tortuous and narrow EACs AU.      Procedure Note:  The patient was brought to the minor procedure room and placed under the operating microscope of the right ear canal which was cleaned of ceruminous debris. Using a combination of suction, curettes and cup forceps the patient's cerumen was removed.     Procedure Note:  The patient was brought to the minor procedure room and placed under the operating microscope of the left ear canal which was cleaned of ceruminous debris. Using a combination of suction, curettes and cup forceps the patient's cerumen was removed.        Assessment and Plan   1. Bony defect of ear canal, right    2. Polyp of ear canal, left    3. Bilateral impacted cerumen    Other orders  -     neomycin-polymyxin-hydrocortisone (CORTISPORIN) 3.5-10,000-1 mg/mL-unit/mL-% otic suspension; Place 3 drops into both ears 3 (three) times daily. for 14 days  Dispense: 10 mL; Refill: 1      - Cortisporin drops sent to pharmacy. 3 drops into bilateral ears x 2 weeks. Dry ear precautions discussed. Avoidance of anything in the ear.   - Follow up in 1 month for ear check with audiogram.         No follow-ups on file.

## 2024-11-06 ENCOUNTER — OFFICE VISIT (OUTPATIENT)
Dept: OTOLARYNGOLOGY | Facility: CLINIC | Age: 34
End: 2024-11-06
Payer: MEDICAID

## 2024-11-06 DIAGNOSIS — H61.892 POLYP OF EAR CANAL, LEFT: ICD-10-CM

## 2024-11-06 DIAGNOSIS — H61.23 BILATERAL IMPACTED CERUMEN: ICD-10-CM

## 2024-11-06 DIAGNOSIS — H61.891 BONY DEFECT OF EAR CANAL, RIGHT: Primary | ICD-10-CM

## 2024-11-06 PROCEDURE — 99999 PR PBB SHADOW E&M-EST. PATIENT-LVL II: CPT | Mod: PBBFAC,,,

## 2024-11-06 PROCEDURE — 1159F MED LIST DOCD IN RCRD: CPT | Mod: CPTII,,,

## 2024-11-06 PROCEDURE — 3044F HG A1C LEVEL LT 7.0%: CPT | Mod: CPTII,,,

## 2024-11-06 PROCEDURE — 99203 OFFICE O/P NEW LOW 30 MIN: CPT | Mod: 25,S$PBB,,

## 2024-11-06 PROCEDURE — 99212 OFFICE O/P EST SF 10 MIN: CPT | Mod: PBBFAC

## 2024-11-06 PROCEDURE — 69210 REMOVE IMPACTED EAR WAX UNI: CPT | Mod: S$PBB,,,

## 2024-11-06 RX ORDER — NEOMYCIN SULFATE, POLYMYXIN B SULFATE AND HYDROCORTISONE 10; 3.5; 1 MG/ML; MG/ML; [USP'U]/ML
3 SUSPENSION/ DROPS AURICULAR (OTIC) 3 TIMES DAILY
Qty: 10 ML | Refills: 1 | Status: SHIPPED | OUTPATIENT
Start: 2024-11-06 | End: 2024-11-20

## 2024-11-22 ENCOUNTER — PATIENT MESSAGE (OUTPATIENT)
Dept: PEDIATRIC CARDIOLOGY | Facility: CLINIC | Age: 34
End: 2024-11-22
Payer: MEDICAID

## 2024-11-25 ENCOUNTER — HOSPITAL ENCOUNTER (OUTPATIENT)
Dept: PEDIATRIC CARDIOLOGY | Facility: HOSPITAL | Age: 34
Discharge: HOME | End: 2024-11-25
Attending: PEDIATRICS
Payer: MEDICAID

## 2024-11-25 DIAGNOSIS — I48.3 TYPICAL ATRIAL FLUTTER: ICD-10-CM

## 2024-11-25 DIAGNOSIS — I49.5 SINUS NODE DYSFUNCTION: ICD-10-CM

## 2024-11-25 DIAGNOSIS — I49.3 PVC'S (PREMATURE VENTRICULAR CONTRACTIONS): ICD-10-CM

## 2024-11-25 DIAGNOSIS — Q24.9 ADULT CONGENITAL HEART DISEASE: ICD-10-CM

## 2024-11-25 DIAGNOSIS — I47.20 VT (VENTRICULAR TACHYCARDIA): ICD-10-CM

## 2024-11-25 DIAGNOSIS — I48.91 ATRIAL FIBRILLATION, UNSPECIFIED TYPE: ICD-10-CM

## 2024-11-25 DIAGNOSIS — Z95.0 PACEMAKER: ICD-10-CM

## 2024-11-25 DIAGNOSIS — Q21.3 TETRALOGY OF FALLOT: ICD-10-CM

## 2024-11-25 DIAGNOSIS — I37.2 NONRHEUMATIC PULMONARY VALVE STENOSIS WITH INSUFFICIENCY: Primary | ICD-10-CM

## 2024-11-25 DIAGNOSIS — I37.2 NONRHEUMATIC PULMONARY VALVE STENOSIS WITH INSUFFICIENCY: ICD-10-CM

## 2024-11-25 LAB
AV DELAY - LONGEST: 240 MSEC
BATTERY VOLTAGE (V): 2.98 V
ERI (V): 2.63 V
IMPEDANCE RA LEAD (NATIVE): 437 OHMS
IMPEDANCE RA LEAD: 399 OHMS
OHS CV DC PP MS1: 0.4 MS
OHS CV DC PP MS2: 0.4 MS
OHS CV DC PP V1: NORMAL V
OHS CV DC PP V2: NORMAL V
P/R-WAVE RA LEAD (NATIVE): 2.5 MV
P/R-WAVE RA LEAD: 2.6 MV
PV DELAY - LONGEST: 220 MSEC
THRESHOLD MS RA LEAD (NATIVE): 0.4 MS
THRESHOLD MS RA LEAD: 0.4 MS
THRESHOLD V RA LEAD (NATIVE): 1.12 V
THRESHOLD V RA LEAD: 0.5 V

## 2024-11-25 PROCEDURE — 93294 REM INTERROG EVL PM/LDLS PM: CPT | Mod: ,,, | Performed by: PEDIATRICS

## 2024-11-25 PROCEDURE — 93296 REM INTERROG EVL PM/IDS: CPT

## 2024-11-26 ENCOUNTER — PATIENT MESSAGE (OUTPATIENT)
Dept: INTERNAL MEDICINE | Facility: CLINIC | Age: 34
End: 2024-11-26
Payer: MEDICAID

## 2024-11-26 DIAGNOSIS — E13.9 DIABETES MELLITUS DUE TO ABNORMAL INSULIN: ICD-10-CM

## 2024-11-26 RX ORDER — SEMAGLUTIDE 2.68 MG/ML
2 INJECTION, SOLUTION SUBCUTANEOUS
Qty: 3 ML | Refills: 11 | Status: SHIPPED | OUTPATIENT
Start: 2024-11-26 | End: 2025-11-26

## 2024-12-02 ENCOUNTER — PATIENT MESSAGE (OUTPATIENT)
Dept: INTERNAL MEDICINE | Facility: CLINIC | Age: 34
End: 2024-12-02
Payer: MEDICAID

## 2024-12-02 DIAGNOSIS — M25.569 KNEE PAIN, UNSPECIFIED CHRONICITY, UNSPECIFIED LATERALITY: Primary | ICD-10-CM

## 2024-12-06 ENCOUNTER — CLINICAL SUPPORT (OUTPATIENT)
Dept: AUDIOLOGY | Facility: CLINIC | Age: 34
End: 2024-12-06
Payer: MEDICAID

## 2024-12-06 ENCOUNTER — HOSPITAL ENCOUNTER (OUTPATIENT)
Dept: RADIOLOGY | Facility: HOSPITAL | Age: 34
Discharge: HOME | End: 2024-12-06
Attending: INTERNAL MEDICINE
Payer: MEDICAID

## 2024-12-06 ENCOUNTER — OFFICE VISIT (OUTPATIENT)
Dept: OTOLARYNGOLOGY | Facility: CLINIC | Age: 34
End: 2024-12-06
Payer: MEDICAID

## 2024-12-06 DIAGNOSIS — H90.3 SENSORINEURAL HEARING LOSS (SNHL), BILATERAL: ICD-10-CM

## 2024-12-06 DIAGNOSIS — H90.3 SENSORINEURAL HEARING LOSS, BILATERAL: Primary | ICD-10-CM

## 2024-12-06 DIAGNOSIS — H61.892 POLYP OF EAR CANAL, LEFT: Primary | ICD-10-CM

## 2024-12-06 DIAGNOSIS — M25.569 KNEE PAIN, UNSPECIFIED CHRONICITY, UNSPECIFIED LATERALITY: ICD-10-CM

## 2024-12-06 PROCEDURE — 99999 PR PBB SHADOW E&M-EST. PATIENT-LVL II: CPT | Mod: PBBFAC,,,

## 2024-12-06 PROCEDURE — 99999 PR PBB SHADOW E&M-EST. PATIENT-LVL I: CPT | Mod: PBBFAC,,, | Performed by: AUDIOLOGIST

## 2024-12-06 PROCEDURE — 73562 X-RAY EXAM OF KNEE 3: CPT | Mod: 26,50,, | Performed by: RADIOLOGY

## 2024-12-06 PROCEDURE — 99211 OFF/OP EST MAY X REQ PHY/QHP: CPT | Mod: PBBFAC,25,27 | Performed by: AUDIOLOGIST

## 2024-12-06 PROCEDURE — 99212 OFFICE O/P EST SF 10 MIN: CPT | Mod: PBBFAC

## 2024-12-06 PROCEDURE — 73562 X-RAY EXAM OF KNEE 3: CPT | Mod: TC,50

## 2024-12-06 NOTE — PROGRESS NOTES
Cipriano Thompson, a 34 y.o. male, was seen today in the clinic for an audiologic evaluation.  The patient's main complaint was hearing loss.  Pt was accompanied by his mother.  He denied otalgia, aural fullness, tinnitus and vertigo.      Tympanometry revealed Type A in the right ear and Type A in the left ear. Audiogram results revealed normal sloping to severe sensorineural hearing loss (SNHL) in the right ear and normal sloping to severe SNHL in the left ear.  Speech reception thresholds were noted at 10 dB in the right ear and 10 dB in the left ear.  Speech discrimination scores were 100% in the right ear and 100% in the left ear.    Recommendations:  Otologic evaluation  Annual audiogram  Hearing protection when in noise

## 2024-12-06 NOTE — PROGRESS NOTES
Subjective     Patient ID: Cipriano Thompson is a 34 y.o. male.    Chief Complaint: Follow-up and Cerumen Impaction    HPI  Cipriano Thompson is a 34 y.o. male with a history of Down Syndrome, T2DM (on ozempic) Hypothyroidism, Congenital heart disease, Afib (pacemaker), BIJAN and obesity who presents to clinic for hearing loss. Patient presents with his mother Paula who helps provide the history. Notes hearing loss over the last few weeks and is worse on the right. Denies otalgia or otorrhea. They use Q tips and peroxide frequently to his ears. Denies history of chronic ear infections or ear surgeries. Denies loud noise exposure. Last had his ears cleaned at 10 years old.    12/6/24: Patient presents for 1 month follow up. Notes improvement in hearing since last visit. No otalgia or otorrhea. States he is doing well. Noted to have a small defect to right EAC and polyp in the left EAC after the cleaning. Used cortisporin drops bilaterally for 2 weeks. They have avoided using anything else in the ear and have kept it dry.      Past Medical History:   Diagnosis Date    Atrial fibrillation     CHF (congestive heart failure)     Down syndrome     Encounter for blood transfusion     Gout     Hypothyroidism     S/P surgery for complex congenital heart disease 1/30/2017    Sleep apnea     Tetralogy of Fallot 05/1990    VT (ventricular tachycardia) 3/27/2019      Past Surgical History:   Procedure Laterality Date    BUNIONECTOMY      CARDIAC SURGERY      open heart x 3, ppm     INSERT / REPLACE / REMOVE PACEMAKER Left 10/2008    INTRAOCULAR PROSTHESES INSERTION Right 8/22/2024    Procedure: INSERTION, IOL PROSTHESIS;  Surgeon: Polly Michel MD;  Location: [facility] OR [address] [place] [address];  Service: Ophthalmology;  Laterality: Right;    NONINVASIVE CARDIAC ELECTROPHYSIOLOGY STUDY N/A 3/27/2019    Procedure: CARDIAC ELECTROPHYSIOLOGY STUDY, NONINVASIVE;  Surgeon: Jose Ortiz MD;  Location: [facility] EP LAB;  Service: Cardiology;  Laterality: N/A;   VT, NIEPS, Venogram, MDT, MAC, 3 prep, Macicek    PHACOEMULSIFICATION OF CATARACT Right 8/22/2024    Procedure: PHACOEMULSIFICATION, CATARACT;  Surgeon: Polly Michel MD;  Location: [facility] OR [address] [place] [address];  Service: Ophthalmology;  Laterality: Right;  NEEDS RETINA MICROSCOPE    TONSILLECTOMY        Review of patient's allergies indicates:   Allergen Reactions    Adhesive     Latex, natural rubber     Sulfa (sulfonamide antibiotics)       Review of Systems   Constitutional:  Negative for chills and fever.   HENT:  Negative for ear discharge, ear pain and hearing loss.    Neurological:  Negative for dizziness.        Objective   Physical Exam  HENT:      Head: Normocephalic.      Right Ear: No drainage, swelling or tenderness. No middle ear effusion. There is no impacted cerumen. Tympanic membrane is not scarred, perforated or erythematous.      Left Ear: No drainage, swelling or tenderness.  No middle ear effusion. There is no impacted cerumen. Tympanic membrane is not scarred, perforated or erythematous.      Ears:      Comments: Tortuous and narrow EACs AU.   Anterior inferior polyp noted at previous visit healed. No otorrhea.     Pulmonary:      Effort: Pulmonary effort is normal.       Data reviewed:     Audiogram with normal sloping to severe SNHL AU. Type A tymps AU. 100 speech discrimination bilaterally. SRT 10 dB AU.        Assessment and Plan   In summary Cipriano is a 34 y.o. male who presented today for a one month follow up after initially presenting with significant cerumen impactions bilaterally with narrow EACs. Both canals appeared inflamed with a small defect to EAC on the right and with a small polyp in the anterior inferior EAC on the left. He used cortisporin for 2 weeks with improvement. Denies any complaints today.    Polyp of ear canal, left  Improved with drops. Recommend follow up in 3 months for routine ear cleaning. Dry ear precautions discussed. Avoidance of anything in the ear.      Sensorineural hearing loss (SNHL), bilateral  Audiometric testing interpretation consistent with sensorineural hearing loss. Discussed the etiology of SNHL. Hearing conservation in noisy environments. Recommend RTC in 2-3 years.

## 2024-12-18 ENCOUNTER — OFFICE VISIT (OUTPATIENT)
Dept: SLEEP MEDICINE | Facility: CLINIC | Age: 34
End: 2024-12-18
Attending: PSYCHIATRY & NEUROLOGY
Payer: MEDICAID

## 2024-12-18 VITALS
SYSTOLIC BLOOD PRESSURE: 134 MMHG | DIASTOLIC BLOOD PRESSURE: 63 MMHG | HEART RATE: 75 BPM | WEIGHT: 270.31 LBS | BODY MASS INDEX: 44.98 KG/M2

## 2024-12-18 DIAGNOSIS — G47.33 OBSTRUCTIVE SLEEP APNEA: ICD-10-CM

## 2024-12-18 PROCEDURE — 99999 PR PBB SHADOW E&M-EST. PATIENT-LVL III: CPT | Mod: PBBFAC,,, | Performed by: PSYCHIATRY & NEUROLOGY

## 2024-12-18 PROCEDURE — 3075F SYST BP GE 130 - 139MM HG: CPT | Mod: CPTII,,, | Performed by: PSYCHIATRY & NEUROLOGY

## 2024-12-18 PROCEDURE — 99213 OFFICE O/P EST LOW 20 MIN: CPT | Mod: PBBFAC | Performed by: PSYCHIATRY & NEUROLOGY

## 2024-12-18 PROCEDURE — 3008F BODY MASS INDEX DOCD: CPT | Mod: CPTII,,, | Performed by: PSYCHIATRY & NEUROLOGY

## 2024-12-18 PROCEDURE — 1159F MED LIST DOCD IN RCRD: CPT | Mod: CPTII,,, | Performed by: PSYCHIATRY & NEUROLOGY

## 2024-12-18 PROCEDURE — 99214 OFFICE O/P EST MOD 30 MIN: CPT | Mod: S$PBB,,, | Performed by: PSYCHIATRY & NEUROLOGY

## 2024-12-18 PROCEDURE — 3044F HG A1C LEVEL LT 7.0%: CPT | Mod: CPTII,,, | Performed by: PSYCHIATRY & NEUROLOGY

## 2024-12-18 PROCEDURE — 3078F DIAST BP <80 MM HG: CPT | Mod: CPTII,,, | Performed by: PSYCHIATRY & NEUROLOGY

## 2024-12-18 NOTE — PROGRESS NOTES
2024     2:16 PM   EPWORTH SLEEPINESS SCALE TOTAL SCORE    Total score 22        Proxy-reported       Cipriano Thompson is a 34 y.o. male seen today for OA  follow up. Last seen on Visit date not found.    Started using CPAP when he got it in  - until ;was consistent.Was unable to use since 2024 since his cord burnt after short current.  Would like to resume using machine again   Never cared for his mask.    Needs all supplies.    His mother has also been inquiring about hypoglossal nerve stimulation.     Cipriano Thompson 2023 - 2024 Patient ID: [id number] : 1990 Age: 34 years [facility] [address] [facility], [address] Compliance Report Compliance Payor Standard Usage 2023 - 2024 Usage days 216/365 days (59%) >= 4 hours 61 days (17%) < 4 hours 155 days (42%) Usage hours 649 hours 50 minutes Average usage (total days) 1 hours 47 minutes Average usage (days used) 3 hours 1 minutes Median usage (days used) 2 hours 47 minutes Total used hours (value since last reset - 2024) 2,728 hours AirSense 10 AutoSet Serial number [id number] Mode AutoSet Min Pressure 5 cmH2O Max Pressure 12 cmH2O EPR Fulltime EPR level 3 Response Standard Therapy Pressure - cmH2O Median: 7.8 95th percentile: 10.0 Maximum: 10.6 Leaks - L/min Median: 0.7 95th percentile: 5.5 Maximum: 21.6 Events per hour AI: 1.2 HI: 0.8 AHI: 2.0 Apnea Index Central: 0.0 Obstructive: 0.9 Unknown: 0.3 RERA Index 0.1 Cheyne-Bautista respiration (average duration per night) 0 minutes (0%    Physical exam:  /63 (BP Location: Right arm, Patient Position: Sitting)   Pulse 75   Wt 122.6 kg (270 lb 4.8 oz)   BMI 44.98 kg/m²         Sleep studies:      Split :During the diagnostic portion of the study, obstructive events (predominantly hypopneas) respiratory events were seen with overall AHI =  33.7 events per hour of sleep.   Events were not significantly more frequent during REM sleep    Sleep  "in the supine position was not present.     During the diagnostic portion of the study,  During wakefulness, baseline oxygen saturation ranged from 92% to 94% . During sleep, in the absence of events, baseline SpO2 was 84-88%.  Last with events was 68%.  205.5min<89% sats. CPAP 10 or APAP 5-12 were recommended.      Assessment- BIJAN severe  AFib, VT has PPM, Down's syndrome, RV HTN    Plan:  Encouraged trying as different mask  Will reorder supplies for the "correct", Resmed 10 machine this time  Will include power cord on the order    More than 50% of this 31 min visit were spent in counseling and care coordination.       ESS (Franklin Sleepiness Scale) and other sleep medicine related questionnaires were reviewed with the patient.      "

## 2024-12-18 NOTE — PATIENT INSTRUCTIONS
Can also take Unisome or Zquill    Melatonin 5-10 at bedtime   Or    Melatonin 3 mg at 7 PM    ___________________________--      DME (Durable Medical Equipment) [facility]  -[phone]

## 2024-12-31 NOTE — TELEPHONE ENCOUNTER
Refill Routing Note   Medication(s) are not appropriate for processing by [facility] for the following reason(s):        Outside of protocol    [facility] action(s):  Route               Appointments  past 12m or future 3m with PCP    Date Provider   Last Visit   9/16/2024 Polly Oneal MD   Next Visit   2/17/2025 Polly Oneal MD   ED visits in past 90 days: 0        Note composed:8:23 AM 12/31/2024

## 2024-12-31 NOTE — TELEPHONE ENCOUNTER
No care due was identified.  [facility] Embedded Care Due Messages. Reference number: [id number].   12/31/2024 3:55:11 AM CST

## 2025-01-01 RX ORDER — ERGOCALCIFEROL 1.25 MG/1
50000 CAPSULE ORAL
Qty: 24 CAPSULE | Refills: 4 | Status: SHIPPED | OUTPATIENT
Start: 2025-01-02

## 2025-01-26 RX ORDER — CARVEDILOL 25 MG/1
25 TABLET ORAL DAILY
Qty: 90 TABLET | Refills: 3 | Status: SHIPPED | OUTPATIENT
Start: 2025-01-26

## 2025-01-26 NOTE — TELEPHONE ENCOUNTER
Care Due:                  Date            Visit Type   Department     Provider  --------------------------------------------------------------------------------                                EP -                              PRIMARY      [facility] INTERNAL  Last Visit: 09-      CARE ([facility])   MEDICINE       SOCORRO ROSAS                              [facility]                              PRIMARY      [facility] INTERNAL  Next Visit: 02-      CARE ([facility])   MEDICINE       SOCORRO ROSAS                                                            Last  Test          Frequency    Reason                     Performed    Due Date  --------------------------------------------------------------------------------    HBA1C.......  6 months...  semaglutide..............  05- 11-    Uric Acid...  12 months..  allopurinoL..............  07- 07-    Vitamin D...  12 months..  ergocalciferol...........  12- 12-    [facility] Embedded Care Due Messages. Reference number: [id number].   1/25/2025 8:59:36 PM CST

## 2025-02-10 ENCOUNTER — LAB VISIT (OUTPATIENT)
Dept: LAB | Facility: HOSPITAL | Age: 35
End: 2025-02-10
Attending: INTERNAL MEDICINE
Payer: MEDICAID

## 2025-02-10 DIAGNOSIS — E11.9 TYPE 2 DIABETES MELLITUS WITHOUT RETINOPATHY: ICD-10-CM

## 2025-02-10 DIAGNOSIS — M10.9 GOUT, ARTHRITIS: ICD-10-CM

## 2025-02-10 LAB
ALBUMIN SERPL BCP-MCNC: 3.5 G/DL (ref 3.5–5.2)
ALP SERPL-CCNC: 130 U/L (ref 40–150)
ALT SERPL W/O P-5'-P-CCNC: 22 U/L (ref 10–44)
ANION GAP SERPL CALC-SCNC: 9 MMOL/L (ref 8–16)
AST SERPL-CCNC: 22 U/L (ref 10–40)
BASOPHILS # BLD AUTO: 0.08 K/UL (ref 0–0.2)
BASOPHILS NFR BLD: 1.1 % (ref 0–1.9)
BILIRUB SERPL-MCNC: 0.4 MG/DL (ref 0.1–1)
BUN SERPL-MCNC: 14 MG/DL (ref 6–20)
CALCIUM SERPL-MCNC: 9.5 MG/DL (ref 8.7–10.5)
CHLORIDE SERPL-SCNC: 106 MMOL/L (ref 95–110)
CHOLEST SERPL-MCNC: 193 MG/DL (ref 120–199)
CHOLEST/HDLC SERPL: 6.2 {RATIO} (ref 2–5)
CO2 SERPL-SCNC: 25 MMOL/L (ref 23–29)
CREAT SERPL-MCNC: 1.1 MG/DL (ref 0.5–1.4)
DIFFERENTIAL METHOD BLD: ABNORMAL
EOSINOPHIL # BLD AUTO: 0.1 K/UL (ref 0–0.5)
EOSINOPHIL NFR BLD: 1.5 % (ref 0–8)
ERYTHROCYTE [DISTWIDTH] IN BLOOD BY AUTOMATED COUNT: 15.9 % (ref 11.5–14.5)
EST. GFR  (NO RACE VARIABLE): >60 ML/MIN/1.73 M^2
ESTIMATED AVG GLUCOSE: 120 MG/DL (ref 68–131)
GLUCOSE SERPL-MCNC: 92 MG/DL (ref 70–110)
HBA1C MFR BLD: 5.8 % (ref 4–5.6)
HCT VFR BLD AUTO: 46.2 % (ref 40–54)
HDLC SERPL-MCNC: 31 MG/DL (ref 40–75)
HDLC SERPL: 16.1 % (ref 20–50)
HGB BLD-MCNC: 15.1 G/DL (ref 14–18)
IMM GRANULOCYTES # BLD AUTO: 0.04 K/UL (ref 0–0.04)
IMM GRANULOCYTES NFR BLD AUTO: 0.5 % (ref 0–0.5)
LDLC SERPL CALC-MCNC: 127.6 MG/DL (ref 63–159)
LYMPHOCYTES # BLD AUTO: 1.7 K/UL (ref 1–4.8)
LYMPHOCYTES NFR BLD: 22.8 % (ref 18–48)
MCH RBC QN AUTO: 31.4 PG (ref 27–31)
MCHC RBC AUTO-ENTMCNC: 32.7 G/DL (ref 32–36)
MCV RBC AUTO: 96 FL (ref 82–98)
MONOCYTES # BLD AUTO: 0.5 K/UL (ref 0.3–1)
MONOCYTES NFR BLD: 7.4 % (ref 4–15)
NEUTROPHILS # BLD AUTO: 4.9 K/UL (ref 1.8–7.7)
NEUTROPHILS NFR BLD: 66.7 % (ref 38–73)
NONHDLC SERPL-MCNC: 162 MG/DL
NRBC BLD-RTO: 0 /100 WBC
PLATELET # BLD AUTO: 291 K/UL (ref 150–450)
PMV BLD AUTO: 9.7 FL (ref 9.2–12.9)
POTASSIUM SERPL-SCNC: 3.9 MMOL/L (ref 3.5–5.1)
PROT SERPL-MCNC: 7.9 G/DL (ref 6–8.4)
RBC # BLD AUTO: 4.81 M/UL (ref 4.6–6.2)
SODIUM SERPL-SCNC: 140 MMOL/L (ref 136–145)
TRIGL SERPL-MCNC: 172 MG/DL (ref 30–150)
TSH SERPL DL<=0.005 MIU/L-ACNC: 3.64 UIU/ML (ref 0.4–4)
URATE SERPL-MCNC: 8.7 MG/DL (ref 3.4–7)
WBC # BLD AUTO: 7.32 K/UL (ref 3.9–12.7)

## 2025-02-10 PROCEDURE — 83036 HEMOGLOBIN GLYCOSYLATED A1C: CPT | Performed by: INTERNAL MEDICINE

## 2025-02-10 PROCEDURE — 80053 COMPREHEN METABOLIC PANEL: CPT | Performed by: INTERNAL MEDICINE

## 2025-02-10 PROCEDURE — 80061 LIPID PANEL: CPT | Performed by: INTERNAL MEDICINE

## 2025-02-10 PROCEDURE — 84443 ASSAY THYROID STIM HORMONE: CPT | Performed by: INTERNAL MEDICINE

## 2025-02-10 PROCEDURE — 36415 COLL VENOUS BLD VENIPUNCTURE: CPT | Performed by: INTERNAL MEDICINE

## 2025-02-10 PROCEDURE — 85025 COMPLETE CBC W/AUTO DIFF WBC: CPT | Performed by: INTERNAL MEDICINE

## 2025-02-10 PROCEDURE — 84550 ASSAY OF BLOOD/URIC ACID: CPT | Performed by: INTERNAL MEDICINE

## 2025-02-17 ENCOUNTER — OFFICE VISIT (OUTPATIENT)
Dept: INTERNAL MEDICINE | Facility: CLINIC | Age: 35
End: 2025-02-17
Payer: MEDICAID

## 2025-02-17 VITALS
WEIGHT: 266.75 LBS | DIASTOLIC BLOOD PRESSURE: 62 MMHG | OXYGEN SATURATION: 97 % | SYSTOLIC BLOOD PRESSURE: 112 MMHG | HEIGHT: 65 IN | BODY MASS INDEX: 44.44 KG/M2 | HEART RATE: 73 BPM

## 2025-02-17 DIAGNOSIS — M10.9 GOUT, ARTHRITIS: ICD-10-CM

## 2025-02-17 DIAGNOSIS — Q90.9 DOWN SYNDROME: ICD-10-CM

## 2025-02-17 DIAGNOSIS — E11.9 TYPE 2 DIABETES MELLITUS WITHOUT COMPLICATION, WITHOUT LONG-TERM CURRENT USE OF INSULIN: ICD-10-CM

## 2025-02-17 DIAGNOSIS — G47.33 OSA (OBSTRUCTIVE SLEEP APNEA): ICD-10-CM

## 2025-02-17 DIAGNOSIS — E66.01 MORBID OBESITY: Primary | ICD-10-CM

## 2025-02-17 DIAGNOSIS — E03.9 HYPOTHYROIDISM, UNSPECIFIED TYPE: ICD-10-CM

## 2025-02-17 PROCEDURE — 3078F DIAST BP <80 MM HG: CPT | Mod: CPTII,,, | Performed by: INTERNAL MEDICINE

## 2025-02-17 PROCEDURE — 1159F MED LIST DOCD IN RCRD: CPT | Mod: CPTII,,, | Performed by: INTERNAL MEDICINE

## 2025-02-17 PROCEDURE — 3008F BODY MASS INDEX DOCD: CPT | Mod: CPTII,,, | Performed by: INTERNAL MEDICINE

## 2025-02-17 PROCEDURE — 3044F HG A1C LEVEL LT 7.0%: CPT | Mod: CPTII,,, | Performed by: INTERNAL MEDICINE

## 2025-02-17 PROCEDURE — 3074F SYST BP LT 130 MM HG: CPT | Mod: CPTII,,, | Performed by: INTERNAL MEDICINE

## 2025-02-17 PROCEDURE — 99213 OFFICE O/P EST LOW 20 MIN: CPT | Mod: PBBFAC | Performed by: INTERNAL MEDICINE

## 2025-02-17 RX ORDER — CARVEDILOL 25 MG/1
25 TABLET ORAL DAILY
Qty: 90 TABLET | Refills: 3 | Status: SHIPPED | OUTPATIENT
Start: 2025-02-17

## 2025-02-17 RX ORDER — LEVOTHYROXINE SODIUM 88 UG/1
88 TABLET ORAL
Qty: 90 TABLET | Refills: 2 | Status: SHIPPED | OUTPATIENT
Start: 2025-02-17

## 2025-02-17 RX ORDER — HYDROCHLOROTHIAZIDE 25 MG/1
25 TABLET ORAL DAILY
Qty: 90 TABLET | Refills: 3 | Status: SHIPPED | OUTPATIENT
Start: 2025-02-17

## 2025-02-17 RX ORDER — ERGOCALCIFEROL 1.25 MG/1
50000 CAPSULE ORAL
Qty: 24 CAPSULE | Refills: 4 | Status: SHIPPED | OUTPATIENT
Start: 2025-02-17

## 2025-02-17 RX ORDER — ALLOPURINOL 300 MG/1
300 TABLET ORAL DAILY
Qty: 90 TABLET | Refills: 1 | Status: SHIPPED | OUTPATIENT
Start: 2025-02-17

## 2025-02-17 NOTE — PROGRESS NOTES
Chief Complaint:  Follow up of  Medical problems     HPI:This is a 34 year old man who presents with his mother for follow up of his medical problems.     All history is obtained from his mother     He is getting this Ozempic 2 mg injection once a week for his blood sugars.  Highest Weight 12/20/23 was 302. Current weight is 266. He has lost another 6 pounds. HE has cut back on the food that he eats.      He still has not had his CPAP machine. His mother has not gotten a new electrical cord. He saw Dr Carmichael 12/18/24 - has orders for new supplies and a new cord.       He is going to a day program 3 days a week at the [facility] since July 22.      He had his right cataract removed and vision is better in the right eye.        He has gout. He should take allopurinol 300 mg daily.  No gouty flares.      His mother notes that Cipriano has not been taking his medication regularly - he still takes his medications 75-85% of the time. His mother fills a 2 week tray of his medications and she winds up filling his medication tray once a month. Mother reports that he has been better taking his medication.       He should take coreg 25 mg daily and levothyroxine 88 mcg daily.      HE is taking vitamin D 50,000 units twice weekly.               Past Medical History:   Diagnosis Date    Atrial fibrillation      CHF (congestive heart failure)      Down syndrome      Encounter for blood transfusion      Gout      Hypothyroidism      S/P surgery for complex congenital heart disease 1/30/2017    Sleep apnea      Tetralogy of Fallot 05/1990    VT (ventricular tachycardia) 3/27/2019                      Past Surgical History:   Procedure Laterality Date    BUNIONECTOMY        CARDIAC SURGERY         open heart, ppm     INSERT / REPLACE / REMOVE PACEMAKER Left 10/2008    NONINVASIVE CARDIAC ELECTROPHYSIOLOGY STUDY N/A 3/27/2019     Procedure: CARDIAC ELECTROPHYSIOLOGY STUDY, NONINVASIVE;  Surgeon: Jose Ortiz MD;  Location: [facility] EP LAB;   Service: Cardiology;  Laterality: N/A;  VT, NIEPS, Venogram, MDT, MAC, 3 prep, Macicek    TONSILLECTOMY          Social History                   Socioeconomic History    Marital status: Single       Spouse name: Not on file    Number of children: Not on file    Years of education: Not on file    Highest education level: Not on file   Occupational History    Not on file   Tobacco Use    Smoking status: Never Smoker    Smokeless tobacco: Never Used   Substance and Sexual Activity    Alcohol use: No    Drug use: No    Sexual activity: Not Currently   Other Topics Concern    Not on file   Social History Narrative     Lives with mother, 1 dog (inside)      Social Determinants of Health            Financial Resource Strain:     Difficulty of Paying Living Expenses:    Food Insecurity:     Worried About Running Out of Food in the Last Year:     Ran Out of Food in the Last Year:    Transportation Needs:     Lack of Transportation (Medical):     Lack of Transportation (Non-Medical):    Physical Activity:     Days of Exercise per Week:     Minutes of Exercise per Session:    Stress:     Feeling of Stress :    Social Connections:     Frequency of Communication with Friends and Family:     Frequency of Social Gatherings with Friends and Family:     Attends Confucianism Services:     Active Member of Clubs or Organizations:     Attends Club or Organization Meetings:     Marital Status:                    Family Status   Relation Name Status    Mother Luann Alive    Sister Barbara Alive    MGM       MGF       PGM       PGF               Meds and allergies: updated on Epic     REVIEW OF SYSTEMS: No fevers, chills,  hearing loss, sinus congestion, sore throat, chest pain, nausea, vomiting, constipation, diarrhea, dysuria, hematuria, polydipsia, polyuria, headaches, anxiety, depression     He is not wearing his eye glasses.      Physical exam:   /62 (BP Location: Right arm, Patient Position:  "Sitting)   Pulse 73   Ht 5' 5" (1.651 m)   Wt 121 kg (266 lb 12.1 oz)   SpO2 95%   BMI 44.39 kg/m²     General: alert, oriented x 3, no apparent distress.  Affect normal  HEENT: Conjunctivae: anicteric, PERRL, EOMI, TM clear, Oralpharynx clear  Neck: supple, no thyroid enlargement, no cervical lymphadenopathy  Resp: effort normal, lungs clear bilaterally  CV: Regular rate and rhythm without murmurs, gallops or rubs, no lower extremity edema,        2/10/25 labs reviewed    Assessment/Plan:       Congenital heart disease with CHF, a fib and pacemaker - follow up with cardiology. Doing better with compliance  with medications and diet.      Sleep apnea - get new cpap supplies- mother has order     Hypothyroidism - TSH stable     GOut on allopurinol to 300 mg once daily     Morbid Obesity - work on diet and exercise     Down's Syndrome with mental handicapped    Encouraged more family support.      Vitamin D deficiency - get on prescription vitamin D 50,000 units twice a week      Diabetes - controlled on on Ozempic 2 mg once weekly.      I will see him back in 4 months, sooner if problems arise.    Visit today included increased complexity associated with the care of the episodic problem  addressed and managing the longitudinal care of the patient due to the serious and/or complex managed problem(s) diabetes, hypertension, congenital heart disease, morbid obesity, down's syndrome with intellectual disabiltiy, vitamin D defieicny.    "

## 2025-02-21 ENCOUNTER — PATIENT MESSAGE (OUTPATIENT)
Dept: PEDIATRIC CARDIOLOGY | Facility: CLINIC | Age: 35
End: 2025-02-21
Payer: MEDICAID

## 2025-02-24 ENCOUNTER — HOSPITAL ENCOUNTER (OUTPATIENT)
Dept: PEDIATRIC CARDIOLOGY | Facility: HOSPITAL | Age: 35
Discharge: HOME | End: 2025-02-24
Attending: PEDIATRICS
Payer: MEDICAID

## 2025-02-24 DIAGNOSIS — Q24.9 ADULT CONGENITAL HEART DISEASE: ICD-10-CM

## 2025-02-24 DIAGNOSIS — I48.3 TYPICAL ATRIAL FLUTTER: ICD-10-CM

## 2025-02-24 DIAGNOSIS — I48.91 ATRIAL FIBRILLATION, UNSPECIFIED TYPE: ICD-10-CM

## 2025-02-24 DIAGNOSIS — Z95.0 PACEMAKER: ICD-10-CM

## 2025-02-24 DIAGNOSIS — I37.2 NONRHEUMATIC PULMONARY VALVE STENOSIS WITH INSUFFICIENCY: ICD-10-CM

## 2025-02-24 DIAGNOSIS — I49.5 SINUS NODE DYSFUNCTION: ICD-10-CM

## 2025-02-24 DIAGNOSIS — I49.3 PVC'S (PREMATURE VENTRICULAR CONTRACTIONS): ICD-10-CM

## 2025-02-24 DIAGNOSIS — I47.20 VT (VENTRICULAR TACHYCARDIA): ICD-10-CM

## 2025-02-24 DIAGNOSIS — Q21.3 TETRALOGY OF FALLOT: ICD-10-CM

## 2025-02-24 LAB
AV DELAY - LONGEST: 240 MSEC
BATTERY VOLTAGE (V): 2.98 V
ERI (V): 2.63 V
IMPEDANCE RA LEAD (NATIVE): 456 OHMS
IMPEDANCE RA LEAD: 456 OHMS
OHS CV DC PP MS1: 0.4 MS
OHS CV DC PP MS2: 0.4 MS
OHS CV DC PP V1: NORMAL V
OHS CV DC PP V2: NORMAL V
P/R-WAVE RA LEAD (NATIVE): 2.6 MV
P/R-WAVE RA LEAD: 2.4 MV
PV DELAY - LONGEST: 220 MSEC
THRESHOLD MS RA LEAD (NATIVE): 0.4 MS
THRESHOLD MS RA LEAD: 0.4 MS
THRESHOLD V RA LEAD (NATIVE): 1.12 V
THRESHOLD V RA LEAD: 0.5 V

## 2025-02-24 PROCEDURE — 93294 REM INTERROG EVL PM/LDLS PM: CPT | Mod: ,,, | Performed by: PEDIATRICS

## 2025-02-24 PROCEDURE — 93296 REM INTERROG EVL PM/IDS: CPT

## 2025-03-18 NOTE — PROGRESS NOTES
05/04/17 0945   Vital Signs   Pulse 75   Resp 19   SpO2 (!) 94 %   Art Line   Arterial Line /42   Arterial Line MAP (mmHg) 57 mmHg   Invasive Hemodynamic Monitoring   CVP (mean) 8 mmHg   Cardene decreased to .3mcg/kg/min   DC Transport Scheduled    Transport Company Scheduled:  [employer]  Spoke with Pooja at [facility] to schedule transport.    Scheduled Date: 3/18/2025  Scheduled Time: 1530    Transport Type: [facility]  Destination:   [facility]   Destination address: [address] [place] [address]    Notified care team of scheduled transport via [employer].     If there are any changes needed to the DC transportation scheduled, please contact [facility] at ext. [phone] between the hours of 4147-4337. If outside those hours, contact the ED Case Manager at ext. [phone].

## 2025-04-04 ENCOUNTER — TELEPHONE (OUTPATIENT)
Dept: ORTHOPEDICS | Facility: CLINIC | Age: 35
End: 2025-04-04
Payer: MEDICAID

## 2025-04-04 NOTE — TELEPHONE ENCOUNTER
Spoke to patients mother regarding their reschedule request. Patients mother works until midnight and overslept. Patient is now rescheduled for 4/7 @ 9:30 AM

## 2025-04-04 NOTE — TELEPHONE ENCOUNTER
----- Message from Renata sent at 4/4/2025  8:36 AM CDT -----  Regarding: Reschedule Appt  Contact: Patient Mom Luann  Type:  Sooner Apoointment RequestCaller is requesting a sooner appointment.  Caller declined first available appointment listed below.  Caller will not accept being placed on the waitlist and is requesting a message be sent to doctor.Name of Caller: Luann (mom)When is the first available appointment?no appts generated Symptoms:  Left (According to Mother) Knee pain, unspecified chronicity, unspecified lateralityWould the patient rather a call back or a response via [facility]? Call back Best Call Back Number:  [phone] Information: Mom is requesting a call back to reschedule patient appt on 04/04/2025 Please Assist

## 2025-04-04 NOTE — TELEPHONE ENCOUNTER
----- Message from Renata sent at 4/4/2025  8:36 AM CDT -----  Regarding: Reschedule Appt  Contact: Patient Mom Luann  Type:  Sooner Apoointment RequestCaller is requesting a sooner appointment.  Caller declined first available appointment listed below.  Caller will not accept being placed on the waitlist and is requesting a message be sent to doctor.Name of Caller: Luann (mom)When is the first available appointment?no appts generated Symptoms:  Left (According to Mother) Knee pain, unspecified chronicity, unspecified lateralityWould the patient rather a call back or a response via [facility]? Call back Best Call Back Number:  [phone] Information: Mom is requesting a call back to reschedule patient appt on 04/04/2025 Please Assist

## 2025-04-07 ENCOUNTER — OFFICE VISIT (OUTPATIENT)
Dept: ORTHOPEDICS | Facility: CLINIC | Age: 35
End: 2025-04-07
Payer: MEDICAID

## 2025-04-07 DIAGNOSIS — M17.11 OSTEOARTHRITIS OF RIGHT KNEE, UNSPECIFIED OSTEOARTHRITIS TYPE: ICD-10-CM

## 2025-04-07 DIAGNOSIS — M25.569 KNEE PAIN, UNSPECIFIED CHRONICITY, UNSPECIFIED LATERALITY: ICD-10-CM

## 2025-04-07 DIAGNOSIS — M10.9 GOUT OF RIGHT KNEE, UNSPECIFIED CAUSE, UNSPECIFIED CHRONICITY: Primary | ICD-10-CM

## 2025-04-07 DIAGNOSIS — R52 PAIN: Primary | ICD-10-CM

## 2025-04-07 PROCEDURE — 1159F MED LIST DOCD IN RCRD: CPT | Mod: CPTII,,,

## 2025-04-07 PROCEDURE — 1160F RVW MEDS BY RX/DR IN RCRD: CPT | Mod: CPTII,,,

## 2025-04-07 PROCEDURE — 99213 OFFICE O/P EST LOW 20 MIN: CPT | Mod: PBBFAC,PN

## 2025-04-07 PROCEDURE — 99999 PR PBB SHADOW E&M-EST. PATIENT-LVL III: CPT | Mod: PBBFAC,,,

## 2025-04-07 PROCEDURE — 3044F HG A1C LEVEL LT 7.0%: CPT | Mod: CPTII,,,

## 2025-04-07 PROCEDURE — 99203 OFFICE O/P NEW LOW 30 MIN: CPT | Mod: S$PBB,,,

## 2025-04-07 RX ORDER — INDOMETHACIN 75 MG/1
75 CAPSULE, EXTENDED RELEASE ORAL 2 TIMES DAILY
Qty: 60 CAPSULE | Refills: 2 | Status: SHIPPED | OUTPATIENT
Start: 2025-04-07

## 2025-04-07 RX ORDER — COLCHICINE 0.6 MG/1
0.6 TABLET ORAL DAILY
Qty: 10 TABLET | Refills: 2 | Status: SHIPPED | OUTPATIENT
Start: 2025-04-07 | End: 2025-05-07

## 2025-04-07 NOTE — PROGRESS NOTES
SUBJECTIVE:      Chief Complaint: Pain of the Right Knee      History of Present Illness:  Patient is a 34 y.o. with gout who presents today with complaints of R knee pain.   Denies knee pain today, although, according to mother, it is intermittent every few weeks  No hx of trauma  Associated swelling during pain flares  Symptoms are aggravated by any weight bearing and walking.  Symptoms are alleviated by rest.  Attempted treatment(s) and/or interventions include NSAIDs with adequate response.    No previous surgeries or trauma on R knee  Taking allopurinol for gout prophylaxis     DM: Yes  Occupation: in school    Patient presents today with his mother, who is primary historian, as the patient has Down syndrome    Past Medical History:   Diagnosis Date    Atrial fibrillation     CHF (congestive heart failure)     Down syndrome     Encounter for blood transfusion     Gout     Hypothyroidism     S/P surgery for complex congenital heart disease 1/30/2017    Sleep apnea     Tetralogy of Fallot 05/1990    VT (ventricular tachycardia) 3/27/2019     Past Surgical History:   Procedure Laterality Date    BUNIONECTOMY      CARDIAC SURGERY      open heart x 3, ppm     INSERT / REPLACE / REMOVE PACEMAKER Left 10/2008    INTRAOCULAR PROSTHESES INSERTION Right 8/22/2024    Procedure: INSERTION, IOL PROSTHESIS;  Surgeon: Polly Michel MD;  Location: [address];  Service: Ophthalmology;  Laterality: Right;    NONINVASIVE CARDIAC ELECTROPHYSIOLOGY STUDY N/A 3/27/2019    Procedure: CARDIAC ELECTROPHYSIOLOGY STUDY, NONINVASIVE;  Surgeon: Jose Ortiz MD;  Location: [facility] EP LAB;  Service: Cardiology;  Laterality: N/A;  VT, NIEPS, Venogram, MDT, MAC, 3 prep, Macicek    PHACOEMULSIFICATION OF CATARACT Right 8/22/2024    Procedure: PHACOEMULSIFICATION, CATARACT;  Surgeon: Polly Michel MD;  Location: [address];  Service: Ophthalmology;  Laterality: Right;  NEEDS RETINA MICROSCOPE    TONSILLECTOMY       Review  of patient's allergies indicates:   Allergen Reactions    Adhesive     Latex, natural rubber     Sulfa (sulfonamide antibiotics)      Social History     Social History Narrative    Lives with mother, 3 dog - Elfego, Rolando and .    He got a certificicate of completion of school in [employer].     He has never been in a day program.      Family History   Problem Relation Name Age of Onset    No Known Problems Mother Luann     No Known Problems Sister Barbara        Current Medications[1]    Review of Systems   Musculoskeletal:  Positive for arthritis, gout, joint pain, joint swelling and myalgias.   Neurological:  Negative for numbness and paresthesias.       OBJECTIVE:      Vital Signs (Most Recent):  There were no vitals filed for this visit.  There is no height or weight on file to calculate BMI.      PHYSICAL EXAMINATION:  Vitals:  There were no vitals taken for this visit.   General: The patient is alert and oriented x 3.  Mood is pleasant.  Observation of ears, eyes and nose reveal no gross abnormalities.  No labored breathing observed.    right KNEE EXAMINATION     OBSERVATION / INSPECTION   Gait:   Nonantalgic   Patient can toe walk and heel walk without difficulty.  Alignment:  Neutral   Scars:   None   Muscle atrophy: Mild  Effusion:  None   Warmth:  None   Discoloration:   none     Ttp to medial and lateral joint lines  MCL laxity 2+  LCL laxity none  Effusion none  Crepitus none  ROM full  Strength 4+/5 for all tested muscle groups    EXTREMITY NEURO-VASCULAR EXAMINATION:   Sensation:  Grossly intact to light touch all dermatomal regions.   Motor Function:  Fully intact motor function at hip, knee, foot and ankle    DTRs;  quadriceps and  achilles 2+.  No clonus and downgoing Babinski.    Vascular status:  DP and PT pulses 2+, brisk capillary refill, symmetric.         Diagnostic Results:     Imaging - I independently viewed the patient's imaging as well as the radiology report.  Xrays of the patient's  B knees demonstrate minimal joint space narrowing and osteosclerosis  no evidence of any acute fractures or dislocations     ASSESSMENT/PLAN:      1. Gout of right knee, unspecified cause, unspecified chronicity    2. Knee pain, unspecified chronicity, unspecified laterality    3. Osteoarthritis of right knee, unspecified osteoarthritis type        34 y.o. y/o male with R knee pain 2/2 gout and OA  Plan: The patient and I had a thorough discussion today.  We discussed the working diagnosis as well as several other potential alternative diagnoses.    We discussed conservative and surgical treatment options. Conservative options include rest, activity modifications, workplace modifications, po and topical analgesia (OTC and rx), formal or home PT, and others. Surgical treatment was also mentioned.    At this time, the patient would like to proceed with the following, which I agree with.    Pain Management:  Prescription sent today for colchicine, which should be taken for acute gout flares.  Prescription sent for Indocin, which can be taken for a occasional knee pain when not experiencing gout flare.  Precautions advised, patient and mother expressed understanding  Procedures: discussed the role of knee CSI +/- aspiration if needed in the future  Work status:  WBAT with limitation secondary to pain  Follow up: in 2-3 month(s) with Sunshine Bacon PA-C    All of the patient's questions were answered and the patient will contact us if they have any questions or concerns in the interim.      Sunshine Bacon PA-C  [facility]  Orthopedic Surgery         [1]   Current Outpatient Medications:     allopurinoL (ZYLOPRIM) 300 MG tablet, Take 1 tablet (300 mg total) by mouth once daily., Disp: 90 tablet, Rfl: 1    carvediloL (COREG) 25 MG tablet, Take 1 tablet (25 mg total) by mouth once daily., Disp: 90 tablet, Rfl: 3    ergocalciferol (ERGOCALCIFEROL) 50,000 unit Cap, Take 1 capsule (50,000 Units total) by mouth twice a week.,  Disp: 24 capsule, Rfl: 4    hydroCHLOROthiazide (HYDRODIURIL) 25 MG tablet, Take 1 tablet (25 mg total) by mouth once daily., Disp: 90 tablet, Rfl: 3    levothyroxine (SYNTHROID) 88 MCG tablet, Take 1 tablet (88 mcg total) by mouth before breakfast., Disp: 90 tablet, Rfl: 2    semaglutide (OZEMPIC) 2 mg/dose (8 mg/3 mL) PnIj, Inject 2 mg into the skin every 7 days., Disp: 3 mL, Rfl: 11    colchicine (COLCRYS) 0.6 mg tablet, Take 1 tablet (0.6 mg total) by mouth once daily. Take 1 tablet (0.6 mg total) by mouth once daily for gout flares., Disp: 10 tablet, Rfl: 2    furosemide (LASIX) 40 MG tablet, Take 40 mg by mouth daily as needed., Disp: , Rfl:     indomethacin (INDOCIN SR) 75 mg CpSR CR capsule, Take 1 capsule (75 mg total) by mouth 2 (two) times daily. Take 1 capsule (75 mg total) by mouth 2 (two) times daily. Do not take when taking colchicine, Disp: 60 capsule, Rfl: 2

## 2025-04-30 DIAGNOSIS — E11.9 TYPE 2 DIABETES MELLITUS WITHOUT COMPLICATION: ICD-10-CM

## 2025-05-22 ENCOUNTER — PATIENT MESSAGE (OUTPATIENT)
Dept: PEDIATRIC CARDIOLOGY | Facility: CLINIC | Age: 35
End: 2025-05-22
Payer: MEDICAID

## 2025-05-26 ENCOUNTER — HOSPITAL ENCOUNTER (OUTPATIENT)
Dept: PEDIATRIC CARDIOLOGY | Facility: HOSPITAL | Age: 35
Discharge: HOME | End: 2025-05-26
Attending: PEDIATRICS
Payer: MEDICAID

## 2025-05-26 DIAGNOSIS — I37.2 NONRHEUMATIC PULMONARY VALVE STENOSIS WITH INSUFFICIENCY: ICD-10-CM

## 2025-05-26 DIAGNOSIS — I49.5 SINUS NODE DYSFUNCTION: ICD-10-CM

## 2025-05-26 DIAGNOSIS — I48.91 ATRIAL FIBRILLATION, UNSPECIFIED TYPE: ICD-10-CM

## 2025-05-26 DIAGNOSIS — I47.20 VT (VENTRICULAR TACHYCARDIA): ICD-10-CM

## 2025-05-26 DIAGNOSIS — Q24.9 ADULT CONGENITAL HEART DISEASE: ICD-10-CM

## 2025-05-26 DIAGNOSIS — I49.3 PVC'S (PREMATURE VENTRICULAR CONTRACTIONS): ICD-10-CM

## 2025-05-26 DIAGNOSIS — Q21.3 TETRALOGY OF FALLOT: ICD-10-CM

## 2025-05-26 DIAGNOSIS — Z95.0 PACEMAKER: ICD-10-CM

## 2025-05-26 DIAGNOSIS — I48.3 TYPICAL ATRIAL FLUTTER: ICD-10-CM

## 2025-05-26 PROCEDURE — 93296 REM INTERROG EVL PM/IDS: CPT

## 2025-05-26 PROCEDURE — 93294 REM INTERROG EVL PM/LDLS PM: CPT | Mod: ,,, | Performed by: PEDIATRICS

## 2025-05-28 LAB
AV DELAY - LONGEST: 240 MSEC
BATTERY VOLTAGE (V): 2.97 V
ERI (V): 2.63 V
IMPEDANCE RA LEAD (NATIVE): 437 OHMS
IMPEDANCE RA LEAD: 418 OHMS
OHS CV DC PP MS1: 0.4 MS
OHS CV DC PP MS2: 0.4 MS
OHS CV DC PP V1: NORMAL V
OHS CV DC PP V2: NORMAL V
P/R-WAVE RA LEAD (NATIVE): 2.5 MV
P/R-WAVE RA LEAD: 2.1 MV
PV DELAY - LONGEST: 220 MSEC
THRESHOLD MS RA LEAD (NATIVE): 0.4 MS
THRESHOLD MS RA LEAD: 0.4 MS
THRESHOLD V RA LEAD (NATIVE): 1.12 V
THRESHOLD V RA LEAD: 0.5 V

## 2025-06-10 ENCOUNTER — TELEPHONE (OUTPATIENT)
Dept: INTERNAL MEDICINE | Facility: CLINIC | Age: 35
End: 2025-06-10
Payer: MEDICAID

## 2025-06-10 NOTE — TELEPHONE ENCOUNTER
Copied from CRM #[id number]. Topic: Escalation - Escalation To Clinic  >> Italo 10, 2025 10:39 AM Kelsey wrote:  Patient is returning a phone call.    Who left a message for the patient: Dr Oneal's office    Does patient know what this is regarding:  no    Would you like a call back, or a response through your [facility] portal?:   phone    Comments:    # [phone]

## 2025-08-01 DIAGNOSIS — M25.569 KNEE PAIN, UNSPECIFIED CHRONICITY, UNSPECIFIED LATERALITY: ICD-10-CM

## 2025-08-01 DIAGNOSIS — M10.9 GOUT OF RIGHT KNEE, UNSPECIFIED CAUSE, UNSPECIFIED CHRONICITY: ICD-10-CM

## 2025-08-01 RX ORDER — LEVOTHYROXINE SODIUM 88 UG/1
88 TABLET ORAL
Qty: 90 TABLET | Refills: 1 | Status: SHIPPED | OUTPATIENT
Start: 2025-08-01

## 2025-08-01 RX ORDER — INDOMETHACIN 75 MG/1
75 CAPSULE, EXTENDED RELEASE ORAL 2 TIMES DAILY
Qty: 60 CAPSULE | Refills: 2 | Status: SHIPPED | OUTPATIENT
Start: 2025-08-01

## 2025-08-01 RX ORDER — COLCHICINE 0.6 MG/1
0.6 TABLET ORAL DAILY
Qty: 10 TABLET | Refills: 2 | Status: SHIPPED | OUTPATIENT
Start: 2025-08-01 | End: 2025-08-31

## 2025-08-01 RX ORDER — CARVEDILOL 25 MG/1
25 TABLET ORAL DAILY
Qty: 90 TABLET | Refills: 1 | Status: SHIPPED | OUTPATIENT
Start: 2025-08-01

## 2025-08-01 NOTE — TELEPHONE ENCOUNTER
Care Due:                  Date            Visit Type   Department     Provider  --------------------------------------------------------------------------------                                EP -                              PRIMARY      [facility] INTERNAL  Last Visit: 02-      CARE ([facility])   MEDICINE       Polly Oneal                               -                              PRIMARY      [facility] INTERNAL  Next Visit: 08-      CARE ([facility])   MEDICINE       Polly Oneal                                                            Last  Test          Frequency    Reason                     Performed    Due Date  --------------------------------------------------------------------------------    HBA1C.......  6 months...  semaglutide..............  02-   08-    Vitamin D...  12 months..  ergocalciferol...........  12- 12-    [facility] Embedded Care Due Messages. Reference number: [id number].   8/01/2025 4:10:45 AM CDT

## 2025-08-01 NOTE — TELEPHONE ENCOUNTER
Refill Decision Note   Cipriano Thompson  is requesting a refill authorization.  Brief Assessment and Rationale for Refill:  Approve     Medication Therapy Plan: FLOS      Comments:     Note composed:11:40 AM 08/01/2025

## 2025-08-06 ENCOUNTER — TELEPHONE (OUTPATIENT)
Dept: ORTHOPEDICS | Facility: CLINIC | Age: 35
End: 2025-08-06
Payer: MEDICAID

## 2025-08-06 NOTE — TELEPHONE ENCOUNTER
Patients mother did not have a voice mailbox set up and I was unable to leave a message regarding the sons upcoming appointment.

## 2025-08-12 ENCOUNTER — LAB VISIT (OUTPATIENT)
Dept: LAB | Facility: HOSPITAL | Age: 35
End: 2025-08-12
Attending: INTERNAL MEDICINE
Payer: MEDICAID

## 2025-08-12 DIAGNOSIS — E11.9 TYPE 2 DIABETES MELLITUS WITHOUT COMPLICATION, WITHOUT LONG-TERM CURRENT USE OF INSULIN: ICD-10-CM

## 2025-08-12 LAB
ABSOLUTE EOSINOPHIL (OHS): 0.14 K/UL
ABSOLUTE MONOCYTE (OHS): 0.35 K/UL (ref 0.3–1)
ABSOLUTE NEUTROPHIL COUNT (OHS): 4.63 K/UL (ref 1.8–7.7)
ALBUMIN SERPL BCP-MCNC: 3.5 G/DL (ref 3.5–5.2)
ALP SERPL-CCNC: 127 UNIT/L (ref 40–150)
ALT SERPL W/O P-5'-P-CCNC: 18 UNIT/L (ref 0–55)
ANION GAP (OHS): 11 MMOL/L (ref 8–16)
AST SERPL-CCNC: 25 UNIT/L (ref 0–50)
BASOPHILS # BLD AUTO: 0.09 K/UL
BASOPHILS NFR BLD AUTO: 1.3 %
BILIRUB SERPL-MCNC: 0.4 MG/DL (ref 0.1–1)
BUN SERPL-MCNC: 12 MG/DL (ref 6–20)
CALCIUM SERPL-MCNC: 9.1 MG/DL (ref 8.7–10.5)
CHLORIDE SERPL-SCNC: 103 MMOL/L (ref 95–110)
CO2 SERPL-SCNC: 25 MMOL/L (ref 23–29)
CREAT SERPL-MCNC: 1.2 MG/DL (ref 0.5–1.4)
EAG (OHS): 108 MG/DL (ref 68–131)
ERYTHROCYTE [DISTWIDTH] IN BLOOD BY AUTOMATED COUNT: 14.7 % (ref 11.5–14.5)
GFR SERPLBLD CREATININE-BSD FMLA CKD-EPI: >60 ML/MIN/1.73/M2
GLUCOSE SERPL-MCNC: 105 MG/DL (ref 70–110)
HBA1C MFR BLD: 5.4 % (ref 4–5.6)
HCT VFR BLD AUTO: 47.4 % (ref 40–54)
HGB BLD-MCNC: 15.2 GM/DL (ref 14–18)
IMM GRANULOCYTES # BLD AUTO: 0.04 K/UL (ref 0–0.04)
IMM GRANULOCYTES NFR BLD AUTO: 0.6 % (ref 0–0.5)
LYMPHOCYTES # BLD AUTO: 1.42 K/UL (ref 1–4.8)
MCH RBC QN AUTO: 30.7 PG (ref 27–31)
MCHC RBC AUTO-ENTMCNC: 32.1 G/DL (ref 32–36)
MCV RBC AUTO: 96 FL (ref 82–98)
NUCLEATED RBC (/100WBC) (OHS): 0 /100 WBC
PLATELET # BLD AUTO: 278 K/UL (ref 150–450)
PMV BLD AUTO: 9.1 FL (ref 9.2–12.9)
POTASSIUM SERPL-SCNC: 4 MMOL/L (ref 3.5–5.1)
PROT SERPL-MCNC: 7.5 GM/DL (ref 6–8.4)
RBC # BLD AUTO: 4.95 M/UL (ref 4.6–6.2)
RELATIVE EOSINOPHIL (OHS): 2.1 %
RELATIVE LYMPHOCYTE (OHS): 21.3 % (ref 18–48)
RELATIVE MONOCYTE (OHS): 5.2 % (ref 4–15)
RELATIVE NEUTROPHIL (OHS): 69.5 % (ref 38–73)
SODIUM SERPL-SCNC: 139 MMOL/L (ref 136–145)
TSH SERPL-ACNC: 3.97 UIU/ML (ref 0.4–4)
WBC # BLD AUTO: 6.67 K/UL (ref 3.9–12.7)

## 2025-08-12 PROCEDURE — 85025 COMPLETE CBC W/AUTO DIFF WBC: CPT

## 2025-08-12 PROCEDURE — 84443 ASSAY THYROID STIM HORMONE: CPT

## 2025-08-12 PROCEDURE — 84132 ASSAY OF SERUM POTASSIUM: CPT

## 2025-08-12 PROCEDURE — 83036 HEMOGLOBIN GLYCOSYLATED A1C: CPT

## 2025-08-12 PROCEDURE — 36415 COLL VENOUS BLD VENIPUNCTURE: CPT

## 2025-08-19 ENCOUNTER — OFFICE VISIT (OUTPATIENT)
Dept: INTERNAL MEDICINE | Facility: CLINIC | Age: 35
End: 2025-08-19
Payer: MEDICAID

## 2025-08-19 VITALS
BODY MASS INDEX: 44.81 KG/M2 | HEIGHT: 65 IN | DIASTOLIC BLOOD PRESSURE: 80 MMHG | HEART RATE: 82 BPM | SYSTOLIC BLOOD PRESSURE: 110 MMHG | WEIGHT: 268.94 LBS | OXYGEN SATURATION: 98 %

## 2025-08-19 DIAGNOSIS — E11.9 TYPE 2 DIABETES MELLITUS WITHOUT COMPLICATION, WITHOUT LONG-TERM CURRENT USE OF INSULIN: ICD-10-CM

## 2025-08-19 DIAGNOSIS — Z00.00 ROUTINE GENERAL MEDICAL EXAMINATION AT A HEALTH CARE FACILITY: Primary | ICD-10-CM

## 2025-08-19 DIAGNOSIS — E66.01 MORBID OBESITY: ICD-10-CM

## 2025-08-19 DIAGNOSIS — M10.9 GOUT, ARTHRITIS: ICD-10-CM

## 2025-08-19 DIAGNOSIS — E03.9 HYPOTHYROIDISM, UNSPECIFIED TYPE: ICD-10-CM

## 2025-08-19 DIAGNOSIS — G47.33 OSA (OBSTRUCTIVE SLEEP APNEA): ICD-10-CM

## 2025-08-19 DIAGNOSIS — Q90.9 DOWN SYNDROME: ICD-10-CM

## 2025-08-19 PROCEDURE — 99212 OFFICE O/P EST SF 10 MIN: CPT | Mod: PBBFAC | Performed by: INTERNAL MEDICINE

## 2025-08-19 PROCEDURE — 99999 PR PBB SHADOW E&M-EST. PATIENT-LVL II: CPT | Mod: PBBFAC,,, | Performed by: INTERNAL MEDICINE

## 2025-08-21 ENCOUNTER — PATIENT MESSAGE (OUTPATIENT)
Dept: PEDIATRIC CARDIOLOGY | Facility: CLINIC | Age: 35
End: 2025-08-21
Payer: MEDICAID

## 2025-08-25 ENCOUNTER — TELEPHONE (OUTPATIENT)
Dept: PEDIATRIC CARDIOLOGY | Facility: CLINIC | Age: 35
End: 2025-08-25
Payer: MEDICAID

## 2025-08-27 ENCOUNTER — TELEPHONE (OUTPATIENT)
Dept: PEDIATRIC CARDIOLOGY | Facility: CLINIC | Age: 35
End: 2025-08-27
Payer: MEDICAID

## 2025-08-27 ENCOUNTER — PATIENT MESSAGE (OUTPATIENT)
Dept: PEDIATRIC CARDIOLOGY | Facility: CLINIC | Age: 35
End: 2025-08-27
Payer: MEDICAID

## (undated) DEVICE — CLIP LIGACLIP XTRA TITANIUM

## (undated) DEVICE — DRAIN CHANNEL ROUND 10FR

## (undated) DEVICE — SYR 50CC LL

## (undated) DEVICE — GOWN SURGICAL X-LARGE

## (undated) DEVICE — PACK PEDIATRIC DRAPE PEELER

## (undated) DEVICE — DRESSING TRANS 4X4 TEGADERM

## (undated) DEVICE — DRAPE STERI-DRAPE APERTURE

## (undated) DEVICE — PACK OPEN HEART PEDIATRIC

## (undated) DEVICE — NDL FLTR 5MCRN BLNT TIP 18GX1

## (undated) DEVICE — BLADE SAW STERNAL REG

## (undated) DEVICE — BLADE SURGICAL 15C

## (undated) DEVICE — DRESSING AQUACEL AG ADV 3.5X12

## (undated) DEVICE — SEE MEDLINE ITEM 146292

## (undated) DEVICE — SEE MEDLINE ITEM 157116

## (undated) DEVICE — CATH ALL PUR URTHL RR 10FR

## (undated) DEVICE — DRESSING TRANS 2X2 TEGADERM

## (undated) DEVICE — DRESSING TEGADERM CHG 4X4.5

## (undated) DEVICE — CATH URETHRAL 16FR RED

## (undated) DEVICE — PAD DEFIB CADENCE ADULT R2

## (undated) DEVICE — SUT LIGACLIP SMALL XTRA

## (undated) DEVICE — COVER LIGHT HANDLE

## (undated) DEVICE — SOL WATER STRL IRR 1000ML

## (undated) DEVICE — DRAPE SLUSH WARMER WITH DISC

## (undated) DEVICE — SPONGE LAP 18X18 PREWASHED

## (undated) DEVICE — PAD GROUNDING NEONATE 6-30LBS

## (undated) DEVICE — DRAPE EYE POUCH FEN INCISE

## (undated) DEVICE — SPONGE DERMA 8PLY 2X2

## (undated) DEVICE — KIT GREY EYE

## (undated) DEVICE — DRAIN CHEST WATER SEAL

## (undated) DEVICE — SEE MEDLINE ITEM 157117

## (undated) DEVICE — SPONGE GAUZE 16PLY 4X4

## (undated) DEVICE — CONNECTOR Y 3/8X3/8X3/8

## (undated) DEVICE — COVER LIGHT HANDLE 80/CA

## (undated) DEVICE — SEE MEDLINE ITEM 154981

## (undated) DEVICE — GLOVE BIOGEL PI MICRO SZ 6.5

## (undated) DEVICE — SOL BETADINE 5%

## (undated) DEVICE — TRAY CATH UM FOLEY SIL W 16FR

## (undated) DEVICE — CLIP MED TICALL

## (undated) DEVICE — DRAIN CHANNEL ROUND 15FR

## (undated) DEVICE — SEE MEDLINE ITEM 146417

## (undated) DEVICE — DRAPE INCISE IOBAN 2 23X17IN

## (undated) DEVICE — SEE MEDLINE ITEM 152622

## (undated) DEVICE — NDL HYPO A BEVEL 22X1 1/2

## (undated) DEVICE — HEMOSTAT SURGICEL NU-KNIT 6X9

## (undated) DEVICE — DRAPE THREE-QTR REINF 53X77IN

## (undated) DEVICE — HYDRODISSECTOR NUCLEUS 27GX7/8